# Patient Record
Sex: FEMALE | Race: ASIAN | NOT HISPANIC OR LATINO | Employment: FULL TIME | URBAN - METROPOLITAN AREA
[De-identification: names, ages, dates, MRNs, and addresses within clinical notes are randomized per-mention and may not be internally consistent; named-entity substitution may affect disease eponyms.]

---

## 2019-02-08 ENCOUNTER — HOSPITAL ENCOUNTER (OUTPATIENT)
Facility: MEDICAL CENTER | Age: 54
End: 2019-02-08
Attending: EMERGENCY MEDICINE | Admitting: INTERNAL MEDICINE
Payer: MEDICAID

## 2019-02-08 ENCOUNTER — APPOINTMENT (OUTPATIENT)
Dept: RADIOLOGY | Facility: MEDICAL CENTER | Age: 54
End: 2019-02-08
Attending: EMERGENCY MEDICINE
Payer: MEDICAID

## 2019-02-08 VITALS
WEIGHT: 161.16 LBS | RESPIRATION RATE: 17 BRPM | HEIGHT: 59 IN | HEART RATE: 88 BPM | TEMPERATURE: 97.5 F | OXYGEN SATURATION: 96 % | SYSTOLIC BLOOD PRESSURE: 157 MMHG | DIASTOLIC BLOOD PRESSURE: 89 MMHG | BODY MASS INDEX: 32.49 KG/M2

## 2019-02-08 DIAGNOSIS — J01.00 ACUTE MAXILLARY SINUSITIS, RECURRENCE NOT SPECIFIED: ICD-10-CM

## 2019-02-08 DIAGNOSIS — L03.211 FACIAL CELLULITIS: ICD-10-CM

## 2019-02-08 DIAGNOSIS — K04.7 DENTAL ABSCESS: ICD-10-CM

## 2019-02-08 PROBLEM — R73.9 HYPERGLYCEMIA: Status: ACTIVE | Noted: 2019-02-08

## 2019-02-08 PROBLEM — E87.6 HYPOKALEMIA: Status: ACTIVE | Noted: 2019-02-08

## 2019-02-08 LAB
ANION GAP SERPL CALC-SCNC: 12 MMOL/L (ref 0–11.9)
BASOPHILS # BLD AUTO: 0.7 % (ref 0–1.8)
BASOPHILS # BLD: 0.06 K/UL (ref 0–0.12)
BUN SERPL-MCNC: 9 MG/DL (ref 8–22)
CALCIUM SERPL-MCNC: 9 MG/DL (ref 8.5–10.5)
CHLORIDE SERPL-SCNC: 103 MMOL/L (ref 96–112)
CO2 SERPL-SCNC: 24 MMOL/L (ref 20–33)
CREAT SERPL-MCNC: 0.55 MG/DL (ref 0.5–1.4)
EOSINOPHIL # BLD AUTO: 0.22 K/UL (ref 0–0.51)
EOSINOPHIL NFR BLD: 2.4 % (ref 0–6.9)
ERYTHROCYTE [DISTWIDTH] IN BLOOD BY AUTOMATED COUNT: 43.3 FL (ref 35.9–50)
GLUCOSE SERPL-MCNC: 135 MG/DL (ref 65–99)
HCT VFR BLD AUTO: 47.9 % (ref 37–47)
HGB BLD-MCNC: 15.6 G/DL (ref 12–16)
IMM GRANULOCYTES # BLD AUTO: 0.12 K/UL (ref 0–0.11)
IMM GRANULOCYTES NFR BLD AUTO: 1.3 % (ref 0–0.9)
LYMPHOCYTES # BLD AUTO: 2.34 K/UL (ref 1–4.8)
LYMPHOCYTES NFR BLD: 25.7 % (ref 22–41)
MCH RBC QN AUTO: 29.9 PG (ref 27–33)
MCHC RBC AUTO-ENTMCNC: 32.6 G/DL (ref 33.6–35)
MCV RBC AUTO: 91.8 FL (ref 81.4–97.8)
MONOCYTES # BLD AUTO: 0.56 K/UL (ref 0–0.85)
MONOCYTES NFR BLD AUTO: 6.1 % (ref 0–13.4)
NEUTROPHILS # BLD AUTO: 5.82 K/UL (ref 2–7.15)
NEUTROPHILS NFR BLD: 63.8 % (ref 44–72)
NRBC # BLD AUTO: 0 K/UL
NRBC BLD-RTO: 0 /100 WBC
PLATELET # BLD AUTO: 276 K/UL (ref 164–446)
PMV BLD AUTO: 9.8 FL (ref 9–12.9)
POTASSIUM SERPL-SCNC: 3.4 MMOL/L (ref 3.6–5.5)
RBC # BLD AUTO: 5.22 M/UL (ref 4.2–5.4)
SODIUM SERPL-SCNC: 139 MMOL/L (ref 135–145)
WBC # BLD AUTO: 9.1 K/UL (ref 4.8–10.8)

## 2019-02-08 PROCEDURE — 700111 HCHG RX REV CODE 636 W/ 250 OVERRIDE (IP): Performed by: EMERGENCY MEDICINE

## 2019-02-08 PROCEDURE — 99285 EMERGENCY DEPT VISIT HI MDM: CPT

## 2019-02-08 PROCEDURE — 96375 TX/PRO/DX INJ NEW DRUG ADDON: CPT

## 2019-02-08 PROCEDURE — 700105 HCHG RX REV CODE 258: Performed by: EMERGENCY MEDICINE

## 2019-02-08 PROCEDURE — 96366 THER/PROPH/DIAG IV INF ADDON: CPT

## 2019-02-08 PROCEDURE — G0378 HOSPITAL OBSERVATION PER HR: HCPCS

## 2019-02-08 PROCEDURE — 700111 HCHG RX REV CODE 636 W/ 250 OVERRIDE (IP): Performed by: NURSE PRACTITIONER

## 2019-02-08 PROCEDURE — 700117 HCHG RX CONTRAST REV CODE 255: Performed by: EMERGENCY MEDICINE

## 2019-02-08 PROCEDURE — 80048 BASIC METABOLIC PNL TOTAL CA: CPT

## 2019-02-08 PROCEDURE — 99219 PR INITIAL OBSERVATION CARE,LEVL II: CPT | Performed by: INTERNAL MEDICINE

## 2019-02-08 PROCEDURE — 70487 CT MAXILLOFACIAL W/DYE: CPT

## 2019-02-08 PROCEDURE — 70355 PANORAMIC X-RAY OF JAWS: CPT

## 2019-02-08 PROCEDURE — 96365 THER/PROPH/DIAG IV INF INIT: CPT

## 2019-02-08 PROCEDURE — 700111 HCHG RX REV CODE 636 W/ 250 OVERRIDE (IP): Performed by: INTERNAL MEDICINE

## 2019-02-08 PROCEDURE — 85025 COMPLETE CBC W/AUTO DIFF WBC: CPT

## 2019-02-08 PROCEDURE — 700105 HCHG RX REV CODE 258: Performed by: INTERNAL MEDICINE

## 2019-02-08 RX ORDER — ACETAMINOPHEN 325 MG/1
650 TABLET ORAL EVERY 6 HOURS PRN
Status: DISCONTINUED | OUTPATIENT
Start: 2019-02-08 | End: 2019-02-08 | Stop reason: HOSPADM

## 2019-02-08 RX ORDER — OXYCODONE HYDROCHLORIDE 5 MG/1
5 TABLET ORAL EVERY 4 HOURS PRN
Qty: 10 TAB | Refills: 0 | Status: SHIPPED | OUTPATIENT
Start: 2019-02-08 | End: 2019-02-11

## 2019-02-08 RX ORDER — HEPARIN SODIUM 5000 [USP'U]/ML
5000 INJECTION, SOLUTION INTRAVENOUS; SUBCUTANEOUS EVERY 8 HOURS
Status: DISCONTINUED | OUTPATIENT
Start: 2019-02-08 | End: 2019-02-08 | Stop reason: HOSPADM

## 2019-02-08 RX ORDER — BISACODYL 10 MG
10 SUPPOSITORY, RECTAL RECTAL
Status: DISCONTINUED | OUTPATIENT
Start: 2019-02-08 | End: 2019-02-08 | Stop reason: HOSPADM

## 2019-02-08 RX ORDER — AMOXICILLIN AND CLAVULANATE POTASSIUM 875; 125 MG/1; MG/1
1 TABLET, FILM COATED ORAL 2 TIMES DAILY
Qty: 20 TAB | Refills: 0 | Status: SHIPPED | OUTPATIENT
Start: 2019-02-08 | End: 2019-02-18

## 2019-02-08 RX ORDER — POLYETHYLENE GLYCOL 3350 17 G/17G
1 POWDER, FOR SOLUTION ORAL
Status: DISCONTINUED | OUTPATIENT
Start: 2019-02-08 | End: 2019-02-08 | Stop reason: HOSPADM

## 2019-02-08 RX ORDER — ONDANSETRON 4 MG/1
4 TABLET, ORALLY DISINTEGRATING ORAL EVERY 4 HOURS PRN
Status: DISCONTINUED | OUTPATIENT
Start: 2019-02-08 | End: 2019-02-08 | Stop reason: HOSPADM

## 2019-02-08 RX ORDER — SODIUM CHLORIDE 9 MG/ML
INJECTION, SOLUTION INTRAVENOUS CONTINUOUS
Status: DISCONTINUED | OUTPATIENT
Start: 2019-02-08 | End: 2019-02-08 | Stop reason: HOSPADM

## 2019-02-08 RX ORDER — MORPHINE SULFATE 4 MG/ML
2 INJECTION, SOLUTION INTRAMUSCULAR; INTRAVENOUS EVERY 4 HOURS PRN
Status: DISCONTINUED | OUTPATIENT
Start: 2019-02-08 | End: 2019-02-08 | Stop reason: HOSPADM

## 2019-02-08 RX ORDER — PROMETHAZINE HYDROCHLORIDE 12.5 MG/1
12.5-25 SUPPOSITORY RECTAL EVERY 4 HOURS PRN
Status: DISCONTINUED | OUTPATIENT
Start: 2019-02-08 | End: 2019-02-08 | Stop reason: HOSPADM

## 2019-02-08 RX ORDER — PROMETHAZINE HYDROCHLORIDE 25 MG/1
12.5-25 TABLET ORAL EVERY 4 HOURS PRN
Status: DISCONTINUED | OUTPATIENT
Start: 2019-02-08 | End: 2019-02-08 | Stop reason: HOSPADM

## 2019-02-08 RX ORDER — ONDANSETRON 2 MG/ML
4 INJECTION INTRAMUSCULAR; INTRAVENOUS EVERY 4 HOURS PRN
Status: DISCONTINUED | OUTPATIENT
Start: 2019-02-08 | End: 2019-02-08 | Stop reason: HOSPADM

## 2019-02-08 RX ORDER — IBUPROFEN 200 MG
400 TABLET ORAL EVERY 6 HOURS PRN
Status: SHIPPED | COMMUNITY
End: 2022-08-02

## 2019-02-08 RX ORDER — AMOXICILLIN 250 MG
2 CAPSULE ORAL 2 TIMES DAILY
Status: DISCONTINUED | OUTPATIENT
Start: 2019-02-08 | End: 2019-02-08 | Stop reason: HOSPADM

## 2019-02-08 RX ORDER — ENALAPRILAT 1.25 MG/ML
1.25 INJECTION INTRAVENOUS EVERY 4 HOURS PRN
Status: DISCONTINUED | OUTPATIENT
Start: 2019-02-08 | End: 2019-02-08 | Stop reason: HOSPADM

## 2019-02-08 RX ADMIN — AMPICILLIN SODIUM AND SULBACTAM SODIUM 3 G: 2; 1 INJECTION, POWDER, FOR SOLUTION INTRAMUSCULAR; INTRAVENOUS at 09:53

## 2019-02-08 RX ADMIN — IOHEXOL 100 ML: 350 INJECTION, SOLUTION INTRAVENOUS at 10:22

## 2019-02-08 RX ADMIN — MORPHINE SULFATE 2 MG: 4 INJECTION INTRAVENOUS at 18:53

## 2019-02-08 RX ADMIN — ENALAPRILAT 1.25 MG: 2.5 INJECTION INTRAVENOUS at 17:10

## 2019-02-08 RX ADMIN — AMPICILLIN SODIUM AND SULBACTAM SODIUM 3 G: 2; 1 INJECTION, POWDER, FOR SOLUTION INTRAMUSCULAR; INTRAVENOUS at 18:45

## 2019-02-08 ASSESSMENT — ENCOUNTER SYMPTOMS
NERVOUS/ANXIOUS: 0
EYE REDNESS: 0
SEIZURES: 0
EYE DISCHARGE: 0
VOMITING: 0
WEIGHT LOSS: 0
EYE PAIN: 0
INSOMNIA: 0
ORTHOPNEA: 0
PALPITATIONS: 0
COUGH: 0
DEPRESSION: 0
HEADACHES: 0
MYALGIAS: 0
SHORTNESS OF BREATH: 0
NAUSEA: 0
FOCAL WEAKNESS: 0
BACK PAIN: 0
STRIDOR: 0
SPUTUM PRODUCTION: 0
ABDOMINAL PAIN: 0
CHILLS: 0
HEARTBURN: 0
DIZZINESS: 0
DIARRHEA: 0
FEVER: 0
NECK PAIN: 0
BLURRED VISION: 0

## 2019-02-08 ASSESSMENT — LIFESTYLE VARIABLES
ALCOHOL_USE: NO
EVER_SMOKED: NEVER

## 2019-02-08 ASSESSMENT — PATIENT HEALTH QUESTIONNAIRE - PHQ9
SUM OF ALL RESPONSES TO PHQ9 QUESTIONS 1 AND 2: 0
1. LITTLE INTEREST OR PLEASURE IN DOING THINGS: NOT AT ALL
2. FEELING DOWN, DEPRESSED, IRRITABLE, OR HOPELESS: NOT AT ALL

## 2019-02-08 NOTE — ASSESSMENT & PLAN NOTE
With facial swelling right   On iv unasyn  Pain control  Oral surgery consulted by ER and to have I&D done by Dr. Vandana VAZQUEZO

## 2019-02-08 NOTE — ED TRIAGE NOTES
Chief Complaint   Patient presents with   • Dental Pain     x 4 days.    • Facial Swelling     in right side of face. was sent by MD to get I&D done d/t facial swelling and to take antibiotics.     Triage process explained instructed to notify staff for any worsening symptoms. Pt states that swelling in right side of face is better that it was 4 days ago but was told by her MD to still come in for antibiotics and possible I&D.

## 2019-02-08 NOTE — H&P
Hospital Medicine History and Physical      Date of Service  2/8/2019    Chief Complaint  Chief Complaint   Patient presents with   • Dental Pain     x 4 days.    • Facial Swelling     in right side of face. was sent by MD to get I&D done d/t facial swelling and to take antibiotics.       History of Presenting Illness  Alan is a 53 y.o. female no significant PMH, who presents with above.  She stated that she has been having pain in her right mandibular area for the past 4 days associated with swelling.  She went to see her dentist today and she was told to come to ER since he did think that she would need incision and drainage.  In the ER she has imaging done with no obvious abscess noticed.  Dr. Ross was informed about the patient being here by ERP.  The patient was seen and examined by the bedside.  Currently she is not complaining about any pain.  She will be started on IV antibiotic and kept n.p.o. for possible procedure later today.    Primary Care Physician  No primary care provider on file.      Code Status  Full code    Review of Systems  Review of Systems   Constitutional: Negative for chills, fever and weight loss.   HENT: Negative for congestion and nosebleeds.         Right facial pain, swelling   Eyes: Negative for blurred vision, pain, discharge and redness.   Respiratory: Negative for cough, sputum production, shortness of breath and stridor.    Cardiovascular: Negative for chest pain, palpitations and orthopnea.   Gastrointestinal: Negative for abdominal pain, diarrhea, heartburn, nausea and vomiting.   Genitourinary: Negative for dysuria, frequency and urgency.   Musculoskeletal: Negative for back pain, myalgias and neck pain.   Skin: Negative for itching and rash.   Neurological: Negative for dizziness, focal weakness, seizures and headaches.   Psychiatric/Behavioral: Negative for depression. The patient is not nervous/anxious and does not have insomnia.      Please see HPI, all other systems  were reviewed and are negative (AMA/CMS criteria)     Past Medical History  No pertinent PMH    Surgical History  No Pertinent PSH    Medications  Not taking any medications  Family History  History reviewed. No pertinent family history.      Social History  Social History   Substance Use Topics   • Smoking status: Never Smoker   • Smokeless tobacco: Never Used   • Alcohol use No       Allergies  No Known Allergies     Physical Exam  Laboratory   Hemodynamics  Temp (24hrs), Av.4 °C (97.5 °F), Min:36.4 °C (97.5 °F), Max:36.4 °C (97.5 °F)   Temperature: 36.4 °C (97.5 °F)  Pulse  Av  Min: 80  Max: 80    Blood Pressure: (!) 178/101      Respiratory      Respiration: 16, Pulse Oximetry: 92 %             Physical Exam   Constitutional: She is oriented to person, place, and time. No distress.   HENT:   Head: Normocephalic and atraumatic.   Right facial swelling   Eyes: Pupils are equal, round, and reactive to light. Conjunctivae and EOM are normal.   Neck: Normal range of motion. Neck supple. No tracheal deviation present. No thyromegaly present.   Cardiovascular: Normal rate and regular rhythm.    No murmur heard.  Pulmonary/Chest: Effort normal and breath sounds normal. No respiratory distress. She has no wheezes.   Abdominal: Soft. Bowel sounds are normal. She exhibits no distension. There is no tenderness.   Musculoskeletal: She exhibits no edema or tenderness.   Neurological: She is alert and oriented to person, place, and time. No cranial nerve deficit.   Skin: Skin is warm and dry. She is not diaphoretic. No erythema.   Psychiatric: She has a normal mood and affect. Her behavior is normal. Thought content normal.       Recent Labs      19   0941   WBC  9.1   RBC  5.22   HEMOGLOBIN  15.6   HEMATOCRIT  47.9*   MCV  91.8   MCH  29.9   MCHC  32.6*   RDW  43.3   PLATELETCT  276   MPV  9.8     Recent Labs      19   0941   SODIUM  139   POTASSIUM  3.4*   CHLORIDE  103   CO2  24   GLUCOSE  135*   BUN  9    CREATININE  0.55   CALCIUM  9.0     Recent Labs      02/08/19   0941   GLUCOSE  135*                 No results found for: TROPONINI    Imaging  CT-MAXILLOFACIAL WITH PLUS RECONS   Final Result      Dental disease.   Right maxillary sinusitis. Ethmoid and left maxillary sinus inflammatory changes also identified.   No acute fracture.   Right facial soft tissue swelling and enhancement consistent with inflammatory changes. No loculated fluid consistent with abscess identified.   Large right submandibular enhancing lymph node likely reactive.      [id number]   Final Result      Periapical lucencies surrounding the mandibular incisors.             Assessment/Plan     I anticipate this patient is appropriate for observation status at this time.    Hyperglycemia- (present on admission)   Assessment & Plan    No history of DM  Likely stress related     Dental infection- (present on admission)   Assessment & Plan    With facial swelling right   On iv unasyn  Pain control  Oral surgery consulted by ER and to have I&D done by Dr. Ross  NPO     Hypokalemia- (present on admission)   Assessment & Plan    Replete as needed  Follow cmp in am         Prophylaxis:  sc heparin

## 2019-02-08 NOTE — CARE PLAN
Problem: Communication  Goal: The ability to communicate needs accurately and effectively will improve  Outcome: PROGRESSING AS EXPECTED      Problem: Pain Management  Goal: Pain level will decrease to patient's comfort goal  Outcome: PROGRESSING AS EXPECTED  No c/o pain at this time

## 2019-02-08 NOTE — DISCHARGE PLANNING
Care Transition Team Assessment    Spoke with patient at bedside. Anticipate no needs @ D/C. Sister will be ride @ D/C.    Information Source  Orientation : Oriented x 4  Information Given By: Patient    Readmission Evaluation  Is this a readmission?: No    Interdisciplinary Discharge Planning  Does Admitting Nurse Feel This Could be a Complex Discharge?: No  Primary Care Physician: None  Lives with - Patient's Self Care Capacity: Child Less than 18 Years of Age, Adult Children  Patient or legal guardian wants to designate a caregiver (see row info): No  Support Systems: Children, Family Member(s)  Housing / Facility: [address]  Do You Take your Prescribed Medications Regularly: No  Reasons Why Not Taking Medications :  (No PCP)  Able to Return to Previous ADL's: Yes  Mobility Issues: No  Prior Services: None  Patient Expects to be Discharged to:: Home  Assistance Needed: No  Durable Medical Equipment: Not Applicable    Discharge Preparedness  What are your discharge supports?: Child, Sibling  Prior Functional Level: Ambulatory  Difficulity with ADLs: None    Functional Assesment  Prior Functional Level: Ambulatory    Finances  Prescription Coverage: Yes    Anticipated Discharge Information  Anticipated discharge disposition: Home  Discharge Address: [address]  Discharge Contact Phone Number: [phone]

## 2019-02-08 NOTE — PROGRESS NOTES
Assessment and admit profile completed. Pt A&O x 4. Respirations are even and unlabored on RA. Neuro intact. POC updated. Pt educated on room and call light. Pt verbalizes understanding. Whiteboard updated. Pt denies pain at this time. Monitors applied, call light and belongings within reach. Needs met, will continue to monitor.

## 2019-02-08 NOTE — ED PROVIDER NOTES
ED Provider Note    Scribed for Joseline Salas M.D. by Smith Thornton. 2/8/2019  9:27 AM    Primary care provider: No primary care provider on file.  Means of arrival: walk in  History obtained from: patient  History limited by: none    CHIEF COMPLAINT  Chief Complaint   Patient presents with   • Dental Pain     x 4 days.    • Facial Swelling     in right side of face. was sent by MD to get I&D done d/t facial swelling and to take antibiotics.       RONNY Phillips is a 53 y.o. female who presents to the Emergency Department complaining of right upper dental pain for the last 4 days. Patient reports associated facial swelling. She states that her pain and swelling has worsened over the lat 4 days, prompting her to see her oral surgeon today. Patient was evaluated by her dental surgeon, Dr. Ross today and referred to [facility] for emergent incision and drainage procedure to be performed. 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 Patient denies fever, neck swelling, dysphagia.    REVIEW OF SYSTEMS  HEENT:  Dental pain, facial swelling. No ear pain, congestion, or sore throat, dysphagia  EYES: no discharge, redness, or vision changes  CARDIAC: no chest pain, no palpitations    PULMONARY: no dyspnea, cough, or congestion   GI: no vomiting, diarrhea, or abdominal pain   : no dysuria, back pain, or hematuria   Neuro: no weakness, numbness, aphasia, or headache  Musculoskeletal: no swelling, deformity, pain, or joint swelling  Endocrine: no fevers, sweating, or weight loss   SKIN: no rash, erythema, or contusions     See history of present illness. All other systems are negative. C.    PAST MEDICAL HISTORY   Diabetes    SURGICAL HISTORY  patient denies any surgical history    SOCIAL HISTORY  Social History   Substance Use Topics   • Smoking status: Never Smoker   • Smokeless tobacco: Never Used   • Alcohol use No      History   Drug Use No       FAMILY HISTORY  History reviewed. No pertinent family history.    CURRENT  MEDICATIONS  Home Medications     Reviewed by Kriss Piedra R.N. (Registered Nurse) on 02/08/19 at 1233  Med List Status: Complete   Medication Last Dose Status   ibuprofen (MOTRIN) 200 MG Tab PRN Active                ALLERGIES  No Known Allergies    PHYSICAL EXAM  VITAL SIGNS: BP (!) 178/101   Pulse 80   Temp 36.4 °C (97.5 °F) (Temporal)   Resp 16   Wt 72.4 kg (159 lb 9.8 oz)   SpO2 92%     Constitutional: Well developed, Well nourished, No acute distress, Non-toxic appearance.   HEENT: Normocephalic, Atraumatic,  external ears normal, pharynx pink,  Mucous  Membranes moist, No rhinorrhea or mucosal edema. Right sided maxillary facial swelling. No swelling under tongue. Dental carries in the right upper second molar. No gingival abscess.   Eyes: PERRL, EOMI, Conjunctiva normal, No discharge.   Neck: Normal range of motion, No tenderness, Supple, No stridor.   Lymphatic: No lymphadenopathy    Cardiovascular: Regular Rate and Rhythm, No murmurs,  rubs, or gallops.   Thorax & Lungs: Lungs clear to auscultation bilaterally, No respiratory distress, No wheezes, rhales or rhonchi, No chest wall tenderness.   Skin: Warm, Dry, No erythema, No rash,   Neurologic: Alert & oriented x 3 No focal deficits noted. Normal reflexes.         DIAGNOSTIC STUDIES / PROCEDURES    LABS  Results for orders placed or performed during the hospital encounter of 02/08/19   CBC WITH DIFFERENTIAL   Result Value Ref Range    WBC 9.1 4.8 - 10.8 K/uL    RBC 5.22 4.20 - 5.40 M/uL    Hemoglobin 15.6 12.0 - 16.0 g/dL    Hematocrit 47.9 (H) 37.0 - 47.0 %    MCV 91.8 81.4 - 97.8 fL    MCH 29.9 27.0 - 33.0 pg    MCHC 32.6 (L) 33.6 - 35.0 g/dL    RDW 43.3 35.9 - 50.0 fL    Platelet Count 276 164 - 446 K/uL    MPV 9.8 9.0 - 12.9 fL    Neutrophils-Polys 63.80 44.00 - 72.00 %    Lymphocytes 25.70 22.00 - 41.00 %    Monocytes 6.10 0.00 - 13.40 %    Eosinophils 2.40 0.00 - 6.90 %    Basophils 0.70 0.00 - 1.80 %    Immature Granulocytes 1.30 (H) 0.00  - 0.90 %    Nucleated RBC 0.00 /100 WBC    Neutrophils (Absolute) 5.82 2.00 - 7.15 K/uL    Lymphs (Absolute) 2.34 1.00 - 4.80 K/uL    Monos (Absolute) 0.56 0.00 - 0.85 K/uL    Eos (Absolute) 0.22 0.00 - 0.51 K/uL    Baso (Absolute) 0.06 0.00 - 0.12 K/uL    Immature Granulocytes (abs) 0.12 (H) 0.00 - 0.11 K/uL    NRBC (Absolute) 0.00 K/uL   Basic Metabolic Panel   Result Value Ref Range    Sodium 139 135 - 145 mmol/L    Potassium 3.4 (L) 3.6 - 5.5 mmol/L    Chloride 103 96 - 112 mmol/L    Co2 24 20 - 33 mmol/L    Glucose 135 (H) 65 - 99 mg/dL    Bun 9 8 - 22 mg/dL    Creatinine 0.55 0.50 - 1.40 mg/dL    Calcium 9.0 8.5 - 10.5 mg/dL    Anion Gap 12.0 (H) 0.0 - 11.9   ESTIMATED GFR   Result Value Ref Range    GFR If African American >60 >60 mL/min/1.73 m 2    GFR If Non African American >60 >60 mL/min/1.73 m 2   All labs reviewed by me.    RADIOLOGY  CT-MAXILLOFACIAL WITH PLUS RECONS   Final Result      Dental disease.   Right maxillary sinusitis. Ethmoid and left maxillary sinus inflammatory changes also identified.   No acute fracture.   Right facial soft tissue swelling and enhancement consistent with inflammatory changes. No loculated fluid consistent with abscess identified.   Large right submandibular enhancing lymph node likely reactive.      [id number]   Final Result      Periapical lucencies surrounding the mandibular incisors.      The radiologist's interpretation of all radiological studies have been reviewed by me.    COURSE & MEDICAL DECISION MAKING  Nursing notes, VS, PMSFHx reviewed in chart.    9:27 AM Patient seen and examined at bedside. Discussed her treatment plan and admission tot  hospital for consult with oral surgeon. Patient will be treated with antibiotics and admitted. Patient verbalizes understanding and agreement to this plan of care. Patient will be treated with Unasyn 3 g. Ordered DX mandible, CT maxillofacial, CBC with differential, BMP to evaluate her symptoms. The  differential diagnoses include but are not limited to: abscess    10:38 AM - I discussed the patient's case and the above findings with Dr. De La Cruz (U hospitalist) who agrees to admit the patient.     10:41 AM - Patient reevaluated at bedside. Discussed her admission for treatment. Patient agrees to this course.     10:53 AM - I discussed the patient's case and the above findings with Dr. Ross (oral surgeon) who agrees to consult after admission.     DISPOSITION:  Patient will be admitted to hospital in guarded condition.    FINAL IMPRESSION  1. Dental abscess    2. Facial cellulitis    3. Acute maxillary sinusitis, recurrence not specified          Smith SHAW (Scribe), am scribing for, and in the presence of, Joseline Salas M.D..    Electronically signed by: Smith Thornton (Scribe), 2/8/2019    Joseline SHAW M.D. personally performed the services described in this documentation, as scribed by Smith Thornton in my presence, and it is both accurate and complete.    The note accurately reflects work and decisions made by me.  Joseline Salas  2/8/2019  2:53 PM

## 2019-02-08 NOTE — PROGRESS NOTES
Pt arrived to unit via gurney at 1105. Pt oriented to room, unit, and plan of care. All questions answered at this time. Call light within reach, fall precautions in place, will continue to monitor.

## 2019-02-09 NOTE — OP REPORT
"Pt was encountered on the floor.  Pt reported pain and right facial swelling x 4 days.  Pt was seen by general dentist yesterday and instructed to go to ED.  Exam reveals caries #6 and periodontal disease #25 along with right facial swelling.  Imaging reveals periapical lesion at apex of #6 and canine space abscess.  Pt informed or risks, benefits, and alternatives to extraction of 6, 25, and incision and drainage of abscess.  Consent.  Medical history.  BP (!) 183/94   Pulse 83   Temp 37 °C (98.6 °F) (Temporal)   Resp 16   Ht 1.499 m (4' 11\")   Wt 73.1 kg (161 lb 2.5 oz)   SpO2 97%   Breastfeeding? No   BMI 32.55 kg/m²      Local anesthesia given:  2 carpules of 2% Lidocaine with 1:100,000 epinephrine.  15 blade full thickness mucoperiosteal flap to the facial of teeth 6 and 25.  Surgically extracted teeth 6 and 25.  Curetted the sites.  Gauze.  Post op instructions given verbally.      Recommendations:     1) Augmentin 875 BID x 7 days  2) Norco x 3-5 days  3) Pt to follow up with her general dentist.    4) Pt to be discharged today.     Diego Ross, DMD  "

## 2019-02-09 NOTE — PROGRESS NOTES
Report received,assumed pt care.pt awake, anticipating discharge after antibiotics done,family at bedside.

## 2019-02-09 NOTE — CONSULTS
MAXILLOFACIAL SURGERY NOTE    DATE OF CONSULTATION:  2/8/2019    CHIEF COMPLAINT:  Dental pain and right facial swelling    HISTORY OF PRESENT ILLNESS:  This is a 53 y.o. female who has a history of pain and swelling in the right maxilla.  Female has had worsening symptoms over the last 4 several days until female came to the hospital.  Patient saw general dentist at [facility] on 2/7/19 and was instructed to go to emergency room.        Past Medical/ Family / Social history (PFSH):   Past Medical History:   Active Ambulatory Problems     Diagnosis Date Noted   • No Active Ambulatory Problems     Resolved Ambulatory Problems     Diagnosis Date Noted   • No Resolved Ambulatory Problems     No Additional Past Medical History     History reviewed. No pertinent past medical history.      Past Surgical History:   None    Current Outpatient Medications:   Current Facility-Administered Medications   Medication Dose   • senna-docusate (PERICOLACE or SENOKOT S) 8.6-50 MG per tablet 2 Tab  2 Tab    And   • polyethylene glycol/lytes (MIRALAX) PACKET 1 Packet  1 Packet    And   • magnesium hydroxide (MILK OF MAGNESIA) suspension 30 mL  30 mL    And   • bisacodyl (DULCOLAX) suppository 10 mg  10 mg   • NS infusion     • heparin injection 5,000 Units  5,000 Units   • ondansetron (ZOFRAN) syringe/vial injection 4 mg  4 mg   • ondansetron (ZOFRAN ODT) dispertab 4 mg  4 mg   • promethazine (PHENERGAN) tablet 12.5-25 mg  12.5-25 mg   • promethazine (PHENERGAN) suppository 12.5-25 mg  12.5-25 mg   • prochlorperazine (COMPAZINE) injection 5-10 mg  5-10 mg   • acetaminophen (TYLENOL) tablet 650 mg  650 mg   • morphine (pf) 4 mg/ml injection 2 mg  2 mg   • ampicillin/sulbactam (UNASYN) 3 g in  mL IVPB  3 g   • enalaprilat (VASOTEC) injection 1.25 mg  1.25 mg         Allergies:  No Known Allergies    REVIEW OF SYSTEMS:  Denies CP, Denies SOB, Denies any Changes in vision, no nausea, no GI upset, 12 point ROS was done and is  "negative per the HPI.  Facial pain.     PHYSICAL EXAMINATION:  VITAL SIGNS:  BP (!) 183/94   Pulse 83   Temp 37 °C (98.6 °F) (Temporal)   Resp 16   Ht 1.499 m (4' 11\")   Wt 73.1 kg (161 lb 2.5 oz)   SpO2 97%   Breastfeeding? No   BMI 32.55 kg/m²  Stable.  female is afebrile.  GENERAL:  female is in no acute distress.  HEENT:  Head is normocephalic and atraumatic.  Mild swelling of right cheek and malar region.    EARS: TM clear.    EYES: Extraocular   muscles are intact with no entrapment.  Pupils equally round and reactive to light and   accommodation.    NOSE: Nares are patent bilateral with no crepitus of the nasal bones  ORAL:   35 mm mouth opening.  No deviation upon opening.  Carious tooth #6 noted and periodontal disease noted on #25.    THROAT:  Mallampati 2  HEART:  Her heart was regular rate and rhythm.  LUNGS:  Clear to auscultation bilaterally.    X-RAYS:  Pano:    FINDINGS:  There are periapical lucencies surrounding the mandibular incisors. Temporomandibular joints are maintained. No fracture is identified.   Impression       Periapical lucencies surrounding the mandibular incisors.       CT  FINDINGS:  No acute facial bone fracture identified.  Mild TMJ arthritic changes.  The optic globes and nerves appear to be intact.    There is anterior facial soft tissue swelling.    There is focal soft tissue swelling and enhancement located at the anterolateral margins of the right maxilla and right mandible. No loculated fluid collection is identified. However images are partially obscured by beam hardening dental artifact.  There is 1.0 x 1.3 cm enhancing right submandibular lymph node. Smaller submental and submandibular enhancing lymph nodes are also demonstrated.    The right maxillary sinus is almost completely opacified by marked mucosal thickening.  There is mucosal thickening within the ethmoid sinuses and mild mucosal thickening left maxillary sinus.  No fluid collections are " identified.    The right maxillary ostium and ethmoid infundibulum are opacified by mucosal thickening.  No bony destruction identified.    Multiple dental caries are identified.  There are periapical lucencies right maxillary second bicuspid and right maxillary second molar.  There is right mandibular first incisor area of lucency.     Impression       Dental disease.  Right maxillary sinusitis. Ethmoid and left maxillary sinus inflammatory changes also identified.  No acute fracture.  Right facial soft tissue swelling and enhancement consistent with inflammatory changes. No loculated fluid consistent with abscess identified.  Large right submandibular enhancing lymph node likely reactive.         ASSESSMENT:  This is a 53 y.o. female who has dental caries #6 and periodontal disease #25 along with right maxillary facial swelling.          PLAN:    1. Extraction of teeth #'s 6 and 25 and incision and drainage of right maxillary canine space infection.    2. Discharge patient home with Augmentin 875 BID x 7 days.  3. Discharge patient home with analgesic for 3-5 days.    4. Pt to see general dentist for further dental care.      Diego Ross, DMD

## 2019-02-09 NOTE — DISCHARGE SUMMARY
Discharge Summary    CHIEF COMPLAINT ON ADMISSION  Chief Complaint   Patient presents with   • Dental Pain     x 4 days.    • Facial Swelling     in right side of face. was sent by MD to get I&D done d/t facial swelling and to take antibiotics.       Reason for Admission  Tooth pain     Admission Date  2/8/2019    CODE STATUS  Full Code    HPI & HOSPITAL COURSE  This is a 53 y.o. female here with facial swelling.  Please refer to H&P for detail.  She was referred to hospital by her oral surgeon for incision and drainage of dental abscess and removal of her decayed teeth.  The patient was started on antibiotic.  Dr. obrien symptoms did incision and drainage and remove a few infected teeth.  She tolerated the procedure well.  According to the surgeon she can be discharged home and follow-up with dentist as an outpatient.  She was instructed to come back to ER if her symptoms get worse.       Therefore, she is discharged in fair and stable condition to home with close outpatient follow-up.        Discharge Date  2/8/19    FOLLOW UP ITEMS POST DISCHARGE      DISCHARGE DIAGNOSES  Active Problems:    Hypokalemia POA: Yes    Dental infection POA: Yes    Hyperglycemia POA: Yes      Overview:         Resolved Problems:    * No resolved hospital problems. *      FOLLOW UP  No future appointments.  dentist in 1 week            MEDICATIONS ON DISCHARGE     Medication List      START taking these medications      Instructions   amoxicillin-clavulanate 875-125 MG Tabs  Commonly known as:  AUGMENTIN   Take 1 Tab by mouth 2 times a day for 10 days.  Dose:  1 Tab     oxyCODONE immediate-release 5 MG Tabs  Commonly known as:  ROXICODONE   Take 1 Tab by mouth every four hours as needed for Severe Pain for up to 3 days.  Dose:  5 mg        CONTINUE taking these medications      Instructions   ibuprofen 200 MG Tabs  Commonly known as:  MOTRIN   Take 400 mg by mouth every 6 hours as needed for Mild Pain.  Dose:  400 mg             Allergies  No Known Allergies    DIET  Orders Placed This Encounter   Procedures   • Diet NPO     Standing Status:   Standing     Number of Occurrences:   1     Order Specific Question:   Restrict to:     Answer:   Ice Chips [2]       ACTIVITY  As tolerated.  Weight bearing as tolerated    CONSULTATIONS  Dr. Ross    PROCEDURES  I&D    LABORATORY  Lab Results   Component Value Date    SODIUM 139 02/08/2019    POTASSIUM 3.4 (L) 02/08/2019    CHLORIDE 103 02/08/2019    CO2 24 02/08/2019    GLUCOSE 135 (H) 02/08/2019    BUN 9 02/08/2019    CREATININE 0.55 02/08/2019        Lab Results   Component Value Date    WBC 9.1 02/08/2019    HEMOGLOBIN 15.6 02/08/2019    HEMATOCRIT 47.9 (H) 02/08/2019    PLATELETCT 276 02/08/2019        Total time of the discharge process exceeds 38 minutes.

## 2019-02-09 NOTE — PROGRESS NOTES
Iv access dc'd, paper works   with patient ,signed and discharged education discussed by previous RN, pt discharge home, condition stable, family at bedside, pt's ride home,escorted to car via wheelchair.

## 2019-02-09 NOTE — DISCHARGE INSTRUCTIONS
Discharge Instructions    Discharged to home by car with relative. Discharged via walking, hospital escort: Refused.  Special equipment needed: Not Applicable    Be sure to schedule a follow-up appointment with your primary care doctor or any specialists as instructed.     Discharge Plan:   Diet Plan: Discussed  Activity Level: Discussed  Confirmed Follow up Appointment: Patient to Call and Schedule Appointment  Confirmed Symptoms Management: Discussed  Medication Reconciliation Updated: Yes  Pneumococcal Vaccine Administered/Refused: Not given - Patient refused pneumococcal vaccine  Influenza Vaccine Indication: Patient Refuses    I understand that a diet low in cholesterol, fat, and sodium is recommended for good health. Unless I have been given specific instructions below for another diet, I accept this instruction as my diet prescription.   Other diet: Heart healthy    Special Instructions: None    · Is patient discharged on Warfarin / Coumadin?   No     Depression / Suicide Risk    As you are discharged from this [facility] facility, it is important to learn how to keep safe from harming yourself.    Recognize the warning signs:  · Abrupt changes in personality, positive or negative- including increase in energy   · Giving away possessions  · Change in eating patterns- significant weight changes-  positive or negative  · Change in sleeping patterns- unable to sleep or sleeping all the time   · Unwillingness or inability to communicate  · Depression  · Unusual sadness, discouragement and loneliness  · Talk of wanting to die  · Neglect of personal appearance   · Rebelliousness- reckless behavior  · Withdrawal from people/activities they love  · Confusion- inability to concentrate     If you or a loved one observes any of these behaviors or has concerns about self-harm, here's what you can do:  · Talk about it- your feelings and reasons for harming yourself  · Remove any means that you might use to hurt  yourself (examples: pills, rope, extension cords, firearm)  · Get professional help from the community (Mental Health, Substance Abuse, psychological counseling)  · Do not be alone:Call your Safe Contact- someone whom you trust who will be there for you.  · Call your local CRISIS HOTLINE 673-8184 or 852-271-5834  · Call your local Children's Mobile Crisis Response Team [place] (747) 631-5002 or www.Dinetouch  · Call the toll free National Suicide Prevention Hotlines   · National Suicide Prevention Lifeline 830-903-GIJA (2585)  · [facility] Partners Line Network 800-SUICIDE (105-6400)    Dental Extraction, Care After  Refer to this sheet in the next few weeks. These instructions provide you with information about caring for yourself after your procedure. Your health care provider may also give you more specific instructions. Your treatment has been planned according to current medical practices, but problems sometimes occur. Call your health care provider if you have any problems or questions after your procedure.  WHAT TO EXPECT AFTER THE PROCEDURE  After your procedure, it is common to have:   · Pain and swelling for a few days.  · Numbness for a few hours after the procedure.  HOME CARE INSTRUCTIONS  Lifestyle  · Protect the area where your tooth was extracted, even if there is no pain.  · Do not smoke, do not spit, and do not drink through a straw until your health care provider says it is okay.  · Eat soft-textured foods as directed by your health care provider. Avoid hot drinks and spicy foods until your gum has healed.  Incision Care  · Follow instructions from your health care provider about:  ¨ Incision care.  ¨ Gauze changes and removal.  ¨ Incision closure removal.  · Do not chew on the gauze.  · If you have heavy bleeding from your gum, fold a clean piece of gauze, place it on the bleeding gum, and bite on it as directed by your health care provider.  General Instructions  · Take medicines only as  directed by your health care provider.  · Do not rinse your mouth until your health care provider says it is okay. Once you are told that you can rinse your mouth, do not rinse with a lot of force (vigorously). Doing that can break up the needed clot that forms where your tooth was extracted.  ¨ You may rinse your mouth with warm salt water after your health care provider says it is okay. You can make a salt rinse by mixing one teaspoon of salt in two cups of warm water. Do this as directed by your health care provider.  · Do not brush or floss near the tooth socket where your tooth was extracted until your health care provider says it is okay. You may brush your other teeth.  · If directed, apply ice to your cheek on the affected side of your mouth:  ¨ Put ice in a plastic bag.  ¨ Place a towel between your skin and the bag.  ¨ Leave the ice on for 20 minutes, 2-3 times a day.  · Keep all follow-up visits as directed by your health care provider. This is important.  SEEK MEDICAL CARE IF:  · Your pain is not controlled with medicines.  · You have a fever with nausea, vomiting, or chills.  · You have a severe cough or shortness of breath.  SEEK IMMEDIATE MEDICAL CARE IF:  · You have uncontrolled bleeding, increased swelling, or severe pain.  · You have fluid, blood, or pus coming from the gum where your tooth was extracted.  · You have difficulty swallowing.  · You cannot open your mouth.     This information is not intended to replace advice given to you by your health care provider. Make sure you discuss any questions you have with your health care provider.     Document Released: 04/03/2012 Document Revised: 05/03/2016 Document Reviewed: 12/14/2015  [employer] Interactive Patient Education ©2016 [employer]

## 2021-03-15 DIAGNOSIS — Z23 NEED FOR VACCINATION: ICD-10-CM

## 2022-08-02 ENCOUNTER — ANESTHESIA EVENT (OUTPATIENT)
Dept: SURGERY | Facility: MEDICAL CENTER | Age: 57
DRG: 023 | End: 2022-08-02
Payer: COMMERCIAL

## 2022-08-02 ENCOUNTER — ANESTHESIA (OUTPATIENT)
Dept: SURGERY | Facility: MEDICAL CENTER | Age: 57
DRG: 023 | End: 2022-08-02
Payer: COMMERCIAL

## 2022-08-02 ENCOUNTER — HOSPITAL ENCOUNTER (INPATIENT)
Facility: MEDICAL CENTER | Age: 57
LOS: 10 days | DRG: 023 | End: 2022-08-12
Attending: EMERGENCY MEDICINE | Admitting: STUDENT IN AN ORGANIZED HEALTH CARE EDUCATION/TRAINING PROGRAM
Payer: COMMERCIAL

## 2022-08-02 ENCOUNTER — APPOINTMENT (OUTPATIENT)
Dept: RADIOLOGY | Facility: MEDICAL CENTER | Age: 57
DRG: 023 | End: 2022-08-02
Attending: EMERGENCY MEDICINE
Payer: COMMERCIAL

## 2022-08-02 ENCOUNTER — APPOINTMENT (OUTPATIENT)
Dept: RADIOLOGY | Facility: MEDICAL CENTER | Age: 57
DRG: 023 | End: 2022-08-02
Payer: COMMERCIAL

## 2022-08-02 DIAGNOSIS — R51.9 ACUTE INTRACTABLE HEADACHE, UNSPECIFIED HEADACHE TYPE: ICD-10-CM

## 2022-08-02 DIAGNOSIS — I16.1 HYPERTENSIVE EMERGENCY: ICD-10-CM

## 2022-08-02 DIAGNOSIS — I61.4 CEREBELLAR HEMORRHAGE, ACUTE (HCC): ICD-10-CM

## 2022-08-02 DIAGNOSIS — K04.7 DENTAL INFECTION: ICD-10-CM

## 2022-08-02 DIAGNOSIS — E87.6 HYPOKALEMIA: ICD-10-CM

## 2022-08-02 DIAGNOSIS — I61.4 CEREBELLAR HEMORRHAGE (HCC): ICD-10-CM

## 2022-08-02 DIAGNOSIS — I10 PRIMARY HYPERTENSION: ICD-10-CM

## 2022-08-02 DIAGNOSIS — R73.9 HYPERGLYCEMIA: ICD-10-CM

## 2022-08-02 DIAGNOSIS — I62.9 INTRACRANIAL HEMORRHAGE (HCC): ICD-10-CM

## 2022-08-02 LAB
ALBUMIN SERPL BCP-MCNC: 5 G/DL (ref 3.2–4.9)
ALBUMIN/GLOB SERPL: 1.4 G/DL
ALP SERPL-CCNC: 118 U/L (ref 30–99)
ALT SERPL-CCNC: 22 U/L (ref 2–50)
ANION GAP SERPL CALC-SCNC: 16 MMOL/L (ref 7–16)
APTT PPP: 25.8 SEC (ref 24.7–36)
AST SERPL-CCNC: 23 U/L (ref 12–45)
BASE EXCESS BLDA CALC-SCNC: -3 MMOL/L (ref -4–3)
BASOPHILS # BLD AUTO: 0 % (ref 0–1.8)
BASOPHILS # BLD: 0 K/UL (ref 0–0.12)
BILIRUB SERPL-MCNC: 0.5 MG/DL (ref 0.1–1.5)
BODY TEMPERATURE: ABNORMAL DEGREES
BREATHS SETTING VENT: 22
BUN SERPL-MCNC: 8 MG/DL (ref 8–22)
CALCIUM SERPL-MCNC: 8.9 MG/DL (ref 8.5–10.5)
CFT BLD TEG: 4.6 MIN (ref 4.6–9.1)
CFT P HPASE BLD TEG: 5.6 MIN (ref 4.3–8.3)
CHLORIDE SERPL-SCNC: 103 MMOL/L (ref 96–112)
CHOLEST SERPL-MCNC: 166 MG/DL (ref 100–199)
CLOT ANGLE BLD TEG: 72.1 DEGREES (ref 63–78)
CLOT LYSIS 30M P MA LENFR BLD TEG: 0 % (ref 0–2.6)
CO2 BLDA-SCNC: 24 MMOL/L (ref 20–33)
CO2 SERPL-SCNC: 23 MMOL/L (ref 20–33)
CREAT SERPL-MCNC: 0.53 MG/DL (ref 0.5–1.4)
CT.EXTRINSIC BLD ROTEM: 1.3 MIN (ref 0.8–2.1)
DELSYS IDSYS: ABNORMAL
EKG IMPRESSION: NORMAL
EOSINOPHIL # BLD AUTO: 0.25 K/UL (ref 0–0.51)
EOSINOPHIL NFR BLD: 1.8 % (ref 0–6.9)
ERYTHROCYTE [DISTWIDTH] IN BLOOD BY AUTOMATED COUNT: 40.7 FL (ref 35.9–50)
EST. AVERAGE GLUCOSE BLD GHB EST-MCNC: 157 MG/DL
GFR SERPLBLD CREATININE-BSD FMLA CKD-EPI: 108 ML/MIN/1.73 M 2
GLOBULIN SER CALC-MCNC: 3.6 G/DL (ref 1.9–3.5)
GLUCOSE BLD STRIP.AUTO-MCNC: 230 MG/DL (ref 65–99)
GLUCOSE BLD STRIP.AUTO-MCNC: 244 MG/DL (ref 65–99)
GLUCOSE BLD STRIP.AUTO-MCNC: 264 MG/DL (ref 65–99)
GLUCOSE SERPL-MCNC: 247 MG/DL (ref 65–99)
HBA1C MFR BLD: 7.1 % (ref 4–5.6)
HCO3 BLDA-SCNC: 22.9 MMOL/L (ref 17–25)
HCT VFR BLD AUTO: 47.6 % (ref 37–47)
HDLC SERPL-MCNC: 46 MG/DL
HGB BLD-MCNC: 15.8 G/DL (ref 12–16)
HOROWITZ INDEX BLDA+IHG-RTO: 240 MM[HG]
INR PPP: 0.88 (ref 0.87–1.13)
LACTATE BLD-SCNC: 4.8 MMOL/L (ref 0.5–2)
LDLC SERPL CALC-MCNC: 104 MG/DL
LYMPHOCYTES # BLD AUTO: 2.47 K/UL (ref 1–4.8)
LYMPHOCYTES NFR BLD: 17.5 % (ref 22–41)
MANUAL DIFF BLD: NORMAL
MCF BLD TEG: 61.4 MM (ref 52–69)
MCF.PLATELET INHIB BLD ROTEM: 20.5 MM (ref 15–32)
MCH RBC QN AUTO: 29.7 PG (ref 27–33)
MCHC RBC AUTO-ENTMCNC: 33.2 G/DL (ref 33.6–35)
MCV RBC AUTO: 89.5 FL (ref 81.4–97.8)
METAMYELOCYTES NFR BLD MANUAL: 1.8 %
MODE IMODE: ABNORMAL
MONOCYTES # BLD AUTO: 0.37 K/UL (ref 0–0.85)
MONOCYTES NFR BLD AUTO: 2.6 % (ref 0–13.4)
MORPHOLOGY BLD-IMP: NORMAL
MYELOCYTES NFR BLD MANUAL: 1.8 %
NEUTROPHILS # BLD AUTO: 10.5 K/UL (ref 2–7.15)
NEUTROPHILS NFR BLD: 71 % (ref 44–72)
NEUTS BAND NFR BLD MANUAL: 3.5 % (ref 0–10)
NRBC # BLD AUTO: 0 K/UL
NRBC BLD-RTO: 0 /100 WBC
O2/TOTAL GAS SETTING VFR VENT: 60 %
PA AA BLD-ACNC: 6.3 % (ref 0–11)
PA ADP BLD-ACNC: 15.7 % (ref 0–17)
PCO2 BLDA: 41 MMHG (ref 26–37)
PCO2 TEMP ADJ BLDA: 39.6 MMHG (ref 26–37)
PEEP END EXPIRATORY PRESSURE IPEEP: 8 CMH20
PH BLDA: 7.36 [PH] (ref 7.4–7.5)
PH TEMP ADJ BLDA: 7.37 [PH] (ref 7.4–7.5)
PLATELET # BLD AUTO: 264 K/UL (ref 164–446)
PLATELET BLD QL SMEAR: NORMAL
PMV BLD AUTO: 10.6 FL (ref 9–12.9)
PO2 BLDA: 144 MMHG (ref 64–87)
PO2 TEMP ADJ BLDA: 139 MMHG (ref 64–87)
POTASSIUM SERPL-SCNC: 3.1 MMOL/L (ref 3.6–5.5)
PROT SERPL-MCNC: 8.6 G/DL (ref 6–8.2)
PROTHROMBIN TIME: 11.9 SEC (ref 12–14.6)
RBC # BLD AUTO: 5.32 M/UL (ref 4.2–5.4)
RBC BLD AUTO: NORMAL
SAO2 % BLDA: 99 % (ref 93–99)
SODIUM SERPL-SCNC: 137 MMOL/L (ref 135–145)
SODIUM SERPL-SCNC: 142 MMOL/L (ref 135–145)
SPECIMEN DRAWN FROM PATIENT: ABNORMAL
TEG ALGORITHM TGALG: NORMAL
TIDAL VOLUME IVT: 280 ML
TRIGL SERPL-MCNC: 78 MG/DL (ref 0–149)
TROPONIN T SERPL-MCNC: <6 NG/L (ref 6–19)
WBC # BLD AUTO: 14.1 K/UL (ref 4.8–10.8)

## 2022-08-02 PROCEDURE — A9270 NON-COVERED ITEM OR SERVICE: HCPCS

## 2022-08-02 PROCEDURE — 700105 HCHG RX REV CODE 258

## 2022-08-02 PROCEDURE — 700111 HCHG RX REV CODE 636 W/ 250 OVERRIDE (IP)

## 2022-08-02 PROCEDURE — 70496 CT ANGIOGRAPHY HEAD: CPT

## 2022-08-02 PROCEDURE — 85384 FIBRINOGEN ACTIVITY: CPT

## 2022-08-02 PROCEDURE — 700101 HCHG RX REV CODE 250: Performed by: NEUROLOGICAL SURGERY

## 2022-08-02 PROCEDURE — 94003 VENT MGMT INPAT SUBQ DAY: CPT

## 2022-08-02 PROCEDURE — 99291 CRITICAL CARE FIRST HOUR: CPT

## 2022-08-02 PROCEDURE — 160048 HCHG OR STATISTICAL LEVEL 1-5: Performed by: NEUROLOGICAL SURGERY

## 2022-08-02 PROCEDURE — 93005 ELECTROCARDIOGRAM TRACING: CPT | Performed by: NURSE PRACTITIONER

## 2022-08-02 PROCEDURE — 82962 GLUCOSE BLOOD TEST: CPT | Mod: 91

## 2022-08-02 PROCEDURE — 99140 ANES COMP EMERGENCY COND: CPT | Performed by: ANESTHESIOLOGY

## 2022-08-02 PROCEDURE — 99291 CRITICAL CARE FIRST HOUR: CPT | Performed by: STUDENT IN AN ORGANIZED HEALTH CARE EDUCATION/TRAINING PROGRAM

## 2022-08-02 PROCEDURE — 96375 TX/PRO/DX INJ NEW DRUG ADDON: CPT

## 2022-08-02 PROCEDURE — 700111 HCHG RX REV CODE 636 W/ 250 OVERRIDE (IP): Performed by: NEUROLOGICAL SURGERY

## 2022-08-02 PROCEDURE — 00212 ANES ICR PX SUBDURAL TAPS: CPT | Performed by: ANESTHESIOLOGY

## 2022-08-02 PROCEDURE — 700105 HCHG RX REV CODE 258: Performed by: ANESTHESIOLOGY

## 2022-08-02 PROCEDURE — 700102 HCHG RX REV CODE 250 W/ 637 OVERRIDE(OP): Performed by: STUDENT IN AN ORGANIZED HEALTH CARE EDUCATION/TRAINING PROGRAM

## 2022-08-02 PROCEDURE — 700111 HCHG RX REV CODE 636 W/ 250 OVERRIDE (IP): Performed by: EMERGENCY MEDICINE

## 2022-08-02 PROCEDURE — 160029 HCHG SURGERY MINUTES - 1ST 30 MINS LEVEL 4: Performed by: NEUROLOGICAL SURGERY

## 2022-08-02 PROCEDURE — 700105 HCHG RX REV CODE 258: Performed by: EMERGENCY MEDICINE

## 2022-08-02 PROCEDURE — 770022 HCHG ROOM/CARE - ICU (200)

## 2022-08-02 PROCEDURE — 80053 COMPREHEN METABOLIC PANEL: CPT

## 2022-08-02 PROCEDURE — 85347 COAGULATION TIME ACTIVATED: CPT

## 2022-08-02 PROCEDURE — 85025 COMPLETE CBC W/AUTO DIFF WBC: CPT

## 2022-08-02 PROCEDURE — 96365 THER/PROPH/DIAG IV INF INIT: CPT

## 2022-08-02 PROCEDURE — 110371 HCHG SHELL REV 272: Performed by: NEUROLOGICAL SURGERY

## 2022-08-02 PROCEDURE — 37799 UNLISTED PX VASCULAR SURGERY: CPT

## 2022-08-02 PROCEDURE — 83036 HEMOGLOBIN GLYCOSYLATED A1C: CPT

## 2022-08-02 PROCEDURE — 700111 HCHG RX REV CODE 636 W/ 250 OVERRIDE (IP): Performed by: ANESTHESIOLOGY

## 2022-08-02 PROCEDURE — 99221 1ST HOSP IP/OBS SF/LOW 40: CPT | Performed by: NURSE PRACTITIONER

## 2022-08-02 PROCEDURE — 85576 BLOOD PLATELET AGGREGATION: CPT | Mod: 91

## 2022-08-02 PROCEDURE — 94002 VENT MGMT INPAT INIT DAY: CPT

## 2022-08-02 PROCEDURE — 160009 HCHG ANES TIME/MIN: Performed by: NEUROLOGICAL SURGERY

## 2022-08-02 PROCEDURE — 93005 ELECTROCARDIOGRAM TRACING: CPT | Performed by: EMERGENCY MEDICINE

## 2022-08-02 PROCEDURE — 36415 COLL VENOUS BLD VENIPUNCTURE: CPT

## 2022-08-02 PROCEDURE — 70450 CT HEAD/BRAIN W/O DYE: CPT

## 2022-08-02 PROCEDURE — 70498 CT ANGIOGRAPHY NECK: CPT

## 2022-08-02 PROCEDURE — 700117 HCHG RX CONTRAST REV CODE 255: Performed by: EMERGENCY MEDICINE

## 2022-08-02 PROCEDURE — 84484 ASSAY OF TROPONIN QUANT: CPT

## 2022-08-02 PROCEDURE — 84295 ASSAY OF SERUM SODIUM: CPT

## 2022-08-02 PROCEDURE — 700111 HCHG RX REV CODE 636 W/ 250 OVERRIDE (IP): Performed by: STUDENT IN AN ORGANIZED HEALTH CARE EDUCATION/TRAINING PROGRAM

## 2022-08-02 PROCEDURE — 85610 PROTHROMBIN TIME: CPT

## 2022-08-02 PROCEDURE — 80061 LIPID PANEL: CPT

## 2022-08-02 PROCEDURE — 700101 HCHG RX REV CODE 250: Performed by: EMERGENCY MEDICINE

## 2022-08-02 PROCEDURE — 83605 ASSAY OF LACTIC ACID: CPT

## 2022-08-02 PROCEDURE — 82803 BLOOD GASES ANY COMBINATION: CPT

## 2022-08-02 PROCEDURE — 85007 BL SMEAR W/DIFF WBC COUNT: CPT

## 2022-08-02 PROCEDURE — 94760 N-INVAS EAR/PLS OXIMETRY 1: CPT

## 2022-08-02 PROCEDURE — 160041 HCHG SURGERY MINUTES - EA ADDL 1 MIN LEVEL 4: Performed by: NEUROLOGICAL SURGERY

## 2022-08-02 PROCEDURE — 85730 THROMBOPLASTIN TIME PARTIAL: CPT

## 2022-08-02 PROCEDURE — 700101 HCHG RX REV CODE 250: Performed by: ANESTHESIOLOGY

## 2022-08-02 PROCEDURE — 700102 HCHG RX REV CODE 250 W/ 637 OVERRIDE(OP)

## 2022-08-02 PROCEDURE — 00CC0ZZ EXTIRPATION OF MATTER FROM CEREBELLUM, OPEN APPROACH: ICD-10-PCS | Performed by: NEUROLOGICAL SURGERY

## 2022-08-02 PROCEDURE — 110454 HCHG SHELL REV 250: Performed by: NEUROLOGICAL SURGERY

## 2022-08-02 DEVICE — DUREPAIR 1X1: Type: IMPLANTABLE DEVICE | Site: CRANIAL | Status: FUNCTIONAL

## 2022-08-02 RX ORDER — CEFAZOLIN SODIUM 1 G/3ML
INJECTION, POWDER, FOR SOLUTION INTRAMUSCULAR; INTRAVENOUS
Status: DISCONTINUED | OUTPATIENT
Start: 2022-08-02 | End: 2022-08-02 | Stop reason: HOSPADM

## 2022-08-02 RX ORDER — POLYETHYLENE GLYCOL 3350 17 G/17G
1 POWDER, FOR SOLUTION ORAL
Status: DISCONTINUED | OUTPATIENT
Start: 2022-08-02 | End: 2022-08-02

## 2022-08-02 RX ORDER — LIDOCAINE HYDROCHLORIDE 20 MG/ML
INJECTION, SOLUTION EPIDURAL; INFILTRATION; INTRACAUDAL; PERINEURAL PRN
Status: DISCONTINUED | OUTPATIENT
Start: 2022-08-02 | End: 2022-08-02 | Stop reason: SURG

## 2022-08-02 RX ORDER — ONDANSETRON 4 MG/1
4 TABLET, ORALLY DISINTEGRATING ORAL EVERY 4 HOURS PRN
Status: DISCONTINUED | OUTPATIENT
Start: 2022-08-02 | End: 2022-08-04

## 2022-08-02 RX ORDER — CLONIDINE HYDROCHLORIDE 0.1 MG/1
0.1 TABLET ORAL EVERY 4 HOURS PRN
Status: DISCONTINUED | OUTPATIENT
Start: 2022-08-02 | End: 2022-08-04

## 2022-08-02 RX ORDER — OXYCODONE HYDROCHLORIDE 5 MG/1
5 TABLET ORAL EVERY 4 HOURS PRN
Status: DISCONTINUED | OUTPATIENT
Start: 2022-08-02 | End: 2022-08-02

## 2022-08-02 RX ORDER — METHOCARBAMOL 750 MG/1
750 TABLET, FILM COATED ORAL EVERY 8 HOURS PRN
Status: DISCONTINUED | OUTPATIENT
Start: 2022-08-02 | End: 2022-08-04

## 2022-08-02 RX ORDER — AMOXICILLIN 250 MG
1 CAPSULE ORAL
Status: DISCONTINUED | OUTPATIENT
Start: 2022-08-02 | End: 2022-08-02

## 2022-08-02 RX ORDER — ONDANSETRON 2 MG/ML
4 INJECTION INTRAMUSCULAR; INTRAVENOUS EVERY 4 HOURS PRN
Status: DISCONTINUED | OUTPATIENT
Start: 2022-08-02 | End: 2022-08-02

## 2022-08-02 RX ORDER — BISACODYL 10 MG
10 SUPPOSITORY, RECTAL RECTAL
Status: DISCONTINUED | OUTPATIENT
Start: 2022-08-02 | End: 2022-08-12 | Stop reason: HOSPADM

## 2022-08-02 RX ORDER — AMOXICILLIN 250 MG
2 CAPSULE ORAL 2 TIMES DAILY
Status: DISCONTINUED | OUTPATIENT
Start: 2022-08-02 | End: 2022-08-02

## 2022-08-02 RX ORDER — ENOXAPARIN SODIUM 100 MG/ML
40 INJECTION SUBCUTANEOUS DAILY
Status: DISCONTINUED | OUTPATIENT
Start: 2022-08-03 | End: 2022-08-12 | Stop reason: HOSPADM

## 2022-08-02 RX ORDER — ONDANSETRON 4 MG/1
4 TABLET, ORALLY DISINTEGRATING ORAL EVERY 4 HOURS PRN
Status: DISCONTINUED | OUTPATIENT
Start: 2022-08-02 | End: 2022-08-02

## 2022-08-02 RX ORDER — SODIUM CHLORIDE, SODIUM LACTATE, POTASSIUM CHLORIDE, CALCIUM CHLORIDE 600; 310; 30; 20 MG/100ML; MG/100ML; MG/100ML; MG/100ML
INJECTION, SOLUTION INTRAVENOUS
Status: DISCONTINUED | OUTPATIENT
Start: 2022-08-02 | End: 2022-08-02 | Stop reason: SURG

## 2022-08-02 RX ORDER — AMOXICILLIN 250 MG
1 CAPSULE ORAL NIGHTLY
Status: DISCONTINUED | OUTPATIENT
Start: 2022-08-02 | End: 2022-08-02

## 2022-08-02 RX ORDER — ACETAMINOPHEN 650 MG/1
650 SUPPOSITORY RECTAL EVERY 4 HOURS PRN
Status: DISCONTINUED | OUTPATIENT
Start: 2022-08-02 | End: 2022-08-12 | Stop reason: HOSPADM

## 2022-08-02 RX ORDER — ONDANSETRON 2 MG/ML
INJECTION INTRAMUSCULAR; INTRAVENOUS PRN
Status: DISCONTINUED | OUTPATIENT
Start: 2022-08-02 | End: 2022-08-02 | Stop reason: SURG

## 2022-08-02 RX ORDER — DEXAMETHASONE SODIUM PHOSPHATE 4 MG/ML
INJECTION, SOLUTION INTRA-ARTICULAR; INTRALESIONAL; INTRAMUSCULAR; INTRAVENOUS; SOFT TISSUE PRN
Status: DISCONTINUED | OUTPATIENT
Start: 2022-08-02 | End: 2022-08-02 | Stop reason: SURG

## 2022-08-02 RX ORDER — LABETALOL HYDROCHLORIDE 5 MG/ML
20 INJECTION, SOLUTION INTRAVENOUS ONCE
Status: COMPLETED | OUTPATIENT
Start: 2022-08-02 | End: 2022-08-02

## 2022-08-02 RX ORDER — ACETAMINOPHEN 325 MG/1
650 TABLET ORAL EVERY 6 HOURS PRN
Status: DISCONTINUED | OUTPATIENT
Start: 2022-08-02 | End: 2022-08-02

## 2022-08-02 RX ORDER — ACETAMINOPHEN 500 MG
500 TABLET ORAL EVERY 6 HOURS PRN
Status: DISCONTINUED | OUTPATIENT
Start: 2022-08-02 | End: 2022-08-04

## 2022-08-02 RX ORDER — HYDROCODONE BITARTRATE AND ACETAMINOPHEN 10; 325 MG/1; MG/1
1 TABLET ORAL EVERY 4 HOURS PRN
Status: DISCONTINUED | OUTPATIENT
Start: 2022-08-02 | End: 2022-08-02

## 2022-08-02 RX ORDER — DEXTROSE MONOHYDRATE 25 G/50ML
25 INJECTION, SOLUTION INTRAVENOUS
Status: DISCONTINUED | OUTPATIENT
Start: 2022-08-02 | End: 2022-08-03

## 2022-08-02 RX ORDER — 3% SODIUM CHLORIDE 3 G/100ML
500 INJECTION, SOLUTION INTRAVENOUS CONTINUOUS
Status: DISCONTINUED | OUTPATIENT
Start: 2022-08-02 | End: 2022-08-04

## 2022-08-02 RX ORDER — DIPHENHYDRAMINE HYDROCHLORIDE 50 MG/ML
25 INJECTION INTRAMUSCULAR; INTRAVENOUS EVERY 6 HOURS PRN
Status: DISCONTINUED | OUTPATIENT
Start: 2022-08-02 | End: 2022-08-12 | Stop reason: HOSPADM

## 2022-08-02 RX ORDER — BISACODYL 10 MG
10 SUPPOSITORY, RECTAL RECTAL
Status: DISCONTINUED | OUTPATIENT
Start: 2022-08-02 | End: 2022-08-02

## 2022-08-02 RX ORDER — ENEMA 19; 7 G/133ML; G/133ML
1 ENEMA RECTAL
Status: DISCONTINUED | OUTPATIENT
Start: 2022-08-02 | End: 2022-08-12 | Stop reason: HOSPADM

## 2022-08-02 RX ORDER — LABETALOL HYDROCHLORIDE 5 MG/ML
10 INJECTION, SOLUTION INTRAVENOUS
Status: DISCONTINUED | OUTPATIENT
Start: 2022-08-02 | End: 2022-08-12 | Stop reason: HOSPADM

## 2022-08-02 RX ORDER — FAMOTIDINE 20 MG/1
20 TABLET, FILM COATED ORAL EVERY 12 HOURS
Status: DISCONTINUED | OUTPATIENT
Start: 2022-08-02 | End: 2022-08-04

## 2022-08-02 RX ORDER — AMOXICILLIN 250 MG
1 CAPSULE ORAL
Status: DISCONTINUED | OUTPATIENT
Start: 2022-08-02 | End: 2022-08-04

## 2022-08-02 RX ORDER — DEXMEDETOMIDINE HYDROCHLORIDE 100 UG/ML
INJECTION, SOLUTION INTRAVENOUS PRN
Status: DISCONTINUED | OUTPATIENT
Start: 2022-08-02 | End: 2022-08-02 | Stop reason: SURG

## 2022-08-02 RX ORDER — ACETAMINOPHEN 325 MG/1
650 TABLET ORAL EVERY 4 HOURS PRN
Status: DISCONTINUED | OUTPATIENT
Start: 2022-08-02 | End: 2022-08-02

## 2022-08-02 RX ORDER — HYDROMORPHONE HYDROCHLORIDE 1 MG/ML
0.5 INJECTION, SOLUTION INTRAMUSCULAR; INTRAVENOUS; SUBCUTANEOUS
Status: DISCONTINUED | OUTPATIENT
Start: 2022-08-02 | End: 2022-08-02

## 2022-08-02 RX ORDER — LABETALOL HYDROCHLORIDE 5 MG/ML
10 INJECTION, SOLUTION INTRAVENOUS
Status: DISCONTINUED | OUTPATIENT
Start: 2022-08-02 | End: 2022-08-02

## 2022-08-02 RX ORDER — ONDANSETRON 2 MG/ML
4 INJECTION INTRAMUSCULAR; INTRAVENOUS EVERY 4 HOURS PRN
Status: DISCONTINUED | OUTPATIENT
Start: 2022-08-02 | End: 2022-08-04

## 2022-08-02 RX ORDER — MIDAZOLAM HYDROCHLORIDE 1 MG/ML
5 INJECTION INTRAMUSCULAR; INTRAVENOUS
Status: DISCONTINUED | OUTPATIENT
Start: 2022-08-02 | End: 2022-08-09

## 2022-08-02 RX ORDER — PHENYLEPHRINE HYDROCHLORIDE 10 MG/ML
INJECTION, SOLUTION INTRAMUSCULAR; INTRAVENOUS; SUBCUTANEOUS PRN
Status: DISCONTINUED | OUTPATIENT
Start: 2022-08-02 | End: 2022-08-02 | Stop reason: SURG

## 2022-08-02 RX ORDER — DOCUSATE SODIUM 50 MG/5ML
100 LIQUID ORAL 2 TIMES DAILY
Status: DISCONTINUED | OUTPATIENT
Start: 2022-08-02 | End: 2022-08-04

## 2022-08-02 RX ORDER — HYDRALAZINE HYDROCHLORIDE 20 MG/ML
10 INJECTION INTRAMUSCULAR; INTRAVENOUS
Status: DISCONTINUED | OUTPATIENT
Start: 2022-08-02 | End: 2022-08-12 | Stop reason: HOSPADM

## 2022-08-02 RX ORDER — POLYETHYLENE GLYCOL 3350 17 G/17G
1 POWDER, FOR SOLUTION ORAL 2 TIMES DAILY PRN
Status: DISCONTINUED | OUTPATIENT
Start: 2022-08-02 | End: 2022-08-02

## 2022-08-02 RX ORDER — POLYETHYLENE GLYCOL 3350 17 G/17G
1 POWDER, FOR SOLUTION ORAL 2 TIMES DAILY PRN
Status: DISCONTINUED | OUTPATIENT
Start: 2022-08-02 | End: 2022-08-04

## 2022-08-02 RX ORDER — AMOXICILLIN 250 MG
1 CAPSULE ORAL NIGHTLY
Status: DISCONTINUED | OUTPATIENT
Start: 2022-08-02 | End: 2022-08-04

## 2022-08-02 RX ORDER — CEFAZOLIN SODIUM 1 G/3ML
INJECTION, POWDER, FOR SOLUTION INTRAMUSCULAR; INTRAVENOUS PRN
Status: DISCONTINUED | OUTPATIENT
Start: 2022-08-02 | End: 2022-08-02 | Stop reason: SURG

## 2022-08-02 RX ORDER — BUPIVACAINE HYDROCHLORIDE AND EPINEPHRINE 5; 5 MG/ML; UG/ML
INJECTION, SOLUTION EPIDURAL; INTRACAUDAL; PERINEURAL
Status: DISCONTINUED | OUTPATIENT
Start: 2022-08-02 | End: 2022-08-02 | Stop reason: HOSPADM

## 2022-08-02 RX ORDER — HYDRALAZINE HYDROCHLORIDE 20 MG/ML
20 INJECTION INTRAMUSCULAR; INTRAVENOUS EVERY 4 HOURS PRN
Status: DISCONTINUED | OUTPATIENT
Start: 2022-08-02 | End: 2022-08-02

## 2022-08-02 RX ORDER — OXYCODONE HYDROCHLORIDE 10 MG/1
10 TABLET ORAL EVERY 4 HOURS PRN
Status: DISCONTINUED | OUTPATIENT
Start: 2022-08-02 | End: 2022-08-02

## 2022-08-02 RX ORDER — MANNITOL 20 G/100ML
INJECTION, SOLUTION INTRAVENOUS PRN
Status: DISCONTINUED | OUTPATIENT
Start: 2022-08-02 | End: 2022-08-02 | Stop reason: SURG

## 2022-08-02 RX ORDER — DOCUSATE SODIUM 100 MG/1
100 CAPSULE, LIQUID FILLED ORAL 2 TIMES DAILY
Status: DISCONTINUED | OUTPATIENT
Start: 2022-08-02 | End: 2022-08-02

## 2022-08-02 RX ADMIN — HYDRALAZINE HYDROCHLORIDE 20 MG: 20 INJECTION INTRAMUSCULAR; INTRAVENOUS at 14:58

## 2022-08-02 RX ADMIN — INSULIN HUMAN 3 UNITS: 100 INJECTION, SOLUTION PARENTERAL at 23:24

## 2022-08-02 RX ADMIN — PHENYLEPHRINE HYDROCHLORIDE 100 MCG: 10 INJECTION INTRAVENOUS at 16:56

## 2022-08-02 RX ADMIN — LIDOCAINE HYDROCHLORIDE 80 MG: 20 INJECTION, SOLUTION EPIDURAL; INFILTRATION; INTRACAUDAL at 16:55

## 2022-08-02 RX ADMIN — PROPOFOL 50 MG: 10 INJECTION, EMULSION INTRAVENOUS at 17:07

## 2022-08-02 RX ADMIN — SODIUM CHLORIDE 500 ML: 3 INJECTION, SOLUTION INTRAVENOUS at 21:32

## 2022-08-02 RX ADMIN — INSULIN HUMAN 2 UNITS: 100 INJECTION, SOLUTION PARENTERAL at 19:51

## 2022-08-02 RX ADMIN — HYDRALAZINE HYDROCHLORIDE 10 MG: 20 INJECTION INTRAMUSCULAR; INTRAVENOUS at 20:16

## 2022-08-02 RX ADMIN — PHENYLEPHRINE HYDROCHLORIDE 100 MCG: 10 INJECTION INTRAVENOUS at 17:20

## 2022-08-02 RX ADMIN — MANNITOL 75 G: 20 INJECTION, SOLUTION INTRAVENOUS at 17:21

## 2022-08-02 RX ADMIN — SODIUM CHLORIDE, POTASSIUM CHLORIDE, SODIUM LACTATE AND CALCIUM CHLORIDE: 600; 310; 30; 20 INJECTION, SOLUTION INTRAVENOUS at 16:47

## 2022-08-02 RX ADMIN — IOHEXOL 94 ML: 350 INJECTION, SOLUTION INTRAVENOUS at 15:00

## 2022-08-02 RX ADMIN — CEFAZOLIN 2 G: 10 INJECTION, POWDER, FOR SOLUTION INTRAVENOUS at 21:39

## 2022-08-02 RX ADMIN — PROPOFOL 150 MG: 10 INJECTION, EMULSION INTRAVENOUS at 16:55

## 2022-08-02 RX ADMIN — FAMOTIDINE 20 MG: 10 INJECTION INTRAVENOUS at 20:19

## 2022-08-02 RX ADMIN — MINERAL OIL, PETROLATUM 1 APPLICATION: 425; 573 OINTMENT OPHTHALMIC at 21:39

## 2022-08-02 RX ADMIN — MIDAZOLAM 2 MG: 1 INJECTION INTRAMUSCULAR; INTRAVENOUS at 16:54

## 2022-08-02 RX ADMIN — PHENYLEPHRINE HYDROCHLORIDE 100 MCG: 10 INJECTION INTRAVENOUS at 17:10

## 2022-08-02 RX ADMIN — SODIUM CHLORIDE 2.5 MG/HR: 9 INJECTION, SOLUTION INTRAVENOUS at 15:28

## 2022-08-02 RX ADMIN — FENTANYL CITRATE 50 MCG: 50 INJECTION, SOLUTION INTRAMUSCULAR; INTRAVENOUS at 17:24

## 2022-08-02 RX ADMIN — SODIUM CHLORIDE, POTASSIUM CHLORIDE, SODIUM LACTATE AND CALCIUM CHLORIDE: 600; 310; 30; 20 INJECTION, SOLUTION INTRAVENOUS at 17:57

## 2022-08-02 RX ADMIN — DEXAMETHASONE SODIUM PHOSPHATE 10 MG: 4 INJECTION, SOLUTION INTRA-ARTICULAR; INTRALESIONAL; INTRAMUSCULAR; INTRAVENOUS; SOFT TISSUE at 17:01

## 2022-08-02 RX ADMIN — CEFAZOLIN 2 G: 330 INJECTION, POWDER, FOR SOLUTION INTRAMUSCULAR; INTRAVENOUS at 17:10

## 2022-08-02 RX ADMIN — LABETALOL HYDROCHLORIDE 20 MG: 5 INJECTION, SOLUTION INTRAVENOUS at 14:12

## 2022-08-02 RX ADMIN — ONDANSETRON 4 MG: 2 INJECTION INTRAMUSCULAR; INTRAVENOUS at 18:52

## 2022-08-02 RX ADMIN — FENTANYL CITRATE 50 MCG: 50 INJECTION, SOLUTION INTRAMUSCULAR; INTRAVENOUS at 16:55

## 2022-08-02 RX ADMIN — ROCURONIUM BROMIDE 20 MG: 10 INJECTION, SOLUTION INTRAVENOUS at 17:50

## 2022-08-02 RX ADMIN — ROCURONIUM BROMIDE 50 MG: 10 INJECTION, SOLUTION INTRAVENOUS at 16:55

## 2022-08-02 RX ADMIN — LABETALOL HYDROCHLORIDE 20 MG: 5 INJECTION, SOLUTION INTRAVENOUS at 14:34

## 2022-08-02 RX ADMIN — DEXMEDETOMIDINE 25 MCG: 200 INJECTION, SOLUTION INTRAVENOUS at 17:16

## 2022-08-02 ASSESSMENT — PAIN DESCRIPTION - PAIN TYPE
TYPE: ACUTE PAIN
TYPE: ACUTE PAIN

## 2022-08-02 ASSESSMENT — PAIN SCALES - PAIN ASSESSMENT IN ADVANCED DEMENTIA (PAINAD)
BREATHING: OCCASIONAL LABORED BREATHING, SHORT PERIOD OF HYPERVENTILATION
CONSOLABILITY: NO NEED TO CONSOLE
TOTALSCORE: 1
FACIALEXPRESSION: SMILING OR INEXPRESSIVE
BODYLANGUAGE: RELAXED

## 2022-08-02 ASSESSMENT — FIBROSIS 4 INDEX: FIB4 SCORE: 1.06

## 2022-08-02 NOTE — ED PROVIDER NOTES
ED Provider Note    CHIEF COMPLAINT  Chief Complaint   Patient presents with   • Fatigue     SInce this afternoon at a . Pt falling asleep during triage, arousable to painful stimuli. Pt oriented x 4 when awake.    • N/V     This afternoon. Hx T2 DM. FSBS in triage 230       HPI  Lana Phillips is a 57 y.o. female who presents with complaint of headache, and somnolence.  I speaking with the patient's sister at the bedside, she states the patient has no history of high blood pressure, she presents hypertensive 259/128.  Patient states she takes no medications.  Blood sugar noted elevated, patient states she does not take medication for her diabetes.  While at the  of her mother today did not feel well, went to the bathroom.  When her sister checked on her, she had been complaining of a headache.  When she did not return from the bathroom a second sister went to check on her finding her on the ground complaining of headache and somnolence.  Patient is able to open eyes to voice and answer questions, denies numbness or weakness to the extremities.  She denies vision change.  No known trauma.  Headache is holoacranial.  No neck pain.  No fever, no vomiting.  Per the sister, healthwise the patient was doing well this morning.  No history of taking blood thinners.    REVIEW OF SYSTEMS    Constitutional: No fever  Respiratory: No shortness of breath  Cardiac: No chest pain  Gastrointestinal: No vomiting  Musculoskeletal: No neck pain  Neurologic: Headache, altered mental status  Endocrine: Elevated blood sugar       All other systems are negative.       PAST MEDICAL HISTORY  No past medical history on file.    FAMILY HISTORY  No family history on file.    SOCIAL HISTORY  Social History     Socioeconomic History   • Marital status:    Tobacco Use   • Smoking status: Never Smoker   • Smokeless tobacco: Never Used   Substance and Sexual Activity   • Alcohol use: No   • Drug use: No       SURGICAL HISTORY  No  "past surgical history on file.    CURRENT MEDICATIONS  Home Medications    **Home medications have not yet been reviewed for this encounter**         ALLERGIES  No Known Allergies    PHYSICAL EXAM  VITAL SIGNS: BP (!) 259/128   Pulse (!) 58   Temp 36.2 °C (97.1 °F) (Temporal)   Resp 13   Ht 1.499 m (4' 11\")   Wt 68 kg (150 lb)   SpO2 92%   BMI 30.30 kg/m²   Constitutional:  Non-toxic appearance.   HENT: No facial swelling  Eyes: Anicteric, no conjunctivitis.     Cardiovascular: Normal heart rate, Normal rhythm  Pulmonary:  No wheezing, No rales.  No respiratory distress  Gastrointestinal: Soft, No tenderness, No masses  Skin: Warm, Dry, No cyanosis.   Neurologic: Speech is clear, follows commands, facial expression is symmetrical.  Generalized weakness with movement of hands and feet.  Sensation grossly intact  Psychiatric: Patient is somnolent, psychiatric evaluation difficult.  Patient is willing to answer questions, follows commands  Musculoskeletal: No extremity deformity    EKG/Labs  Results for orders placed or performed during the hospital encounter of 08/02/22   COMP METABOLIC PANEL   Result Value Ref Range    Sodium 142 135 - 145 mmol/L    Potassium 3.1 (L) 3.6 - 5.5 mmol/L    Chloride 103 96 - 112 mmol/L    Co2 23 20 - 33 mmol/L    Anion Gap 16.0 7.0 - 16.0    Glucose 247 (H) 65 - 99 mg/dL    Bun 8 8 - 22 mg/dL    Creatinine 0.53 0.50 - 1.40 mg/dL    Calcium 8.9 8.5 - 10.5 mg/dL    AST(SGOT) 23 12 - 45 U/L    ALT(SGPT) 22 2 - 50 U/L    Alkaline Phosphatase 118 (H) 30 - 99 U/L    Total Bilirubin 0.5 0.1 - 1.5 mg/dL    Albumin 5.0 (H) 3.2 - 4.9 g/dL    Total Protein 8.6 (H) 6.0 - 8.2 g/dL    Globulin 3.6 (H) 1.9 - 3.5 g/dL    A-G Ratio 1.4 g/dL   TROPONIN   Result Value Ref Range    Troponin T <6 6 - 19 ng/L   APTT   Result Value Ref Range    APTT 25.8 24.7 - 36.0 sec   PROTHROMBIN TIME (INR)   Result Value Ref Range    PT 11.9 (L) 12.0 - 14.6 sec    INR 0.88 0.87 - 1.13   ESTIMATED GFR   Result Value " Ref Range    GFR (CKD-EPI) 108 >60 mL/min/1.73 m 2   POCT glucose device results   Result Value Ref Range    POC Glucose, Blood 230 (H) 65 - 99 mg/dL         RADIOLOGY/PROCEDURES  CT-CTA HEAD WITH & W/O-POST PROCESS   Final Result         1. No hemodynamically significant narrowing of the major intracranial vessels.      2. Redemonstration of large parenchymal hemorrhage in the left cerebellum      CT-CTA NECK WITH & W/O-POST PROCESSING   Final Result      1. No evidence of flow-limiting stenosis or dissection in the cervical carotid or cervical vertebral arteries.      CT-HEAD W/O   Final Result      Large 4.6 x 3.4 cm intraparenchymal hematoma centered in the left cerebellar hemisphere. Associated mass effect results in effacement of the basal cisterns and fourth ventricle. No evidence of hydrocephalus.      Findings were discussed with Dr. JANET ARELLANO on 8/2/2022 2:45 PM.            COURSE & MEDICAL DECISION MAKING  Pertinent Labs & Imaging studies reviewed. (See chart for details)  Patient presents with altered mental status, headache, concerning for intracranial hemorrhage.  Hypertensive encephalopathy, ischemic stroke, metabolic encephalopathy all concerning.  CT scan of the head was obtained with and without contrast, large cerebellar intraparenchymal hemorrhage noted.  No evidence of aneurysmal abnormality as per radiologist.  Dr. Vences from neurosurgery was called emergently, has seen the patient will be taking her to surgery.  Placed to neurology.  a consult to neurology is pending.  I spoke with intensivist for having patient admitted to the ICU.  Patient received 20 mg of IV labetalol thus far twice for blood pressure control, most recently 211/115.  Protocol for blood pressure control in the setting of intracranial hemorrhage has been started.  Patient still remains able to open her eyes to voice, follow commands, answer questions.  She moves all extremities to command.  GCS is 14.      FINAL  IMPRESSION     1. Cerebellar hemorrhage (HCC)     2. Hypertensive emergency     3. Acute intractable headache, unspecified headache type          Critical care time 35 minutes     Electronically signed by: Quirino Bunn M.D., 8/2/2022 2:08 PM

## 2022-08-02 NOTE — ED NOTES
"Pt. Noted to move bilateral feet; purposeful movement. States \"I need to go to bathroom.\" Pure wick explained and placed.   "

## 2022-08-02 NOTE — ED NOTES
Pt. Continues to open eyes when spoken to but does not move extremities. Placed on 2L o2 via NC. Additional IV started; labs drawn and sent.

## 2022-08-02 NOTE — CONSULTS
ID:  Lana Phillips; 57 y.o. female      Admission Date: 2022   Date of Consultation: 2022  Requesting Provider: Baljinder George M.D.  PCP: No primary care provider on file.        Chief Complaint:: found down    Reason for consultation:: left cerebellar IPH      HPI:  Lana Phillips is a 57 y.o. female who presented to [facility] after being found down in the bathroom at her Mother's  today. Systolic blood pressure>200. CT shows large left cerebellar IPH. Pt in mild distress and complaining of headache. Sleepy.        Past Medical History:  No past medical history on file.    Past Surgical History:  No past surgical history on file.    Social History:  Social History     Occupational History   • Not on file   Tobacco Use   • Smoking status: Never Smoker   • Smokeless tobacco: Never Used   Substance and Sexual Activity   • Alcohol use: No   • Drug use: No   • Sexual activity: Not on file     Social History     Social History Narrative   • Not on file       Family History:  No family history on file.    Medications:  Prior to Admission medications    Medication Sig Start Date End Date Taking? Authorizing Provider   ibuprofen (MOTRIN) 200 MG Tab Take 400 mg by mouth every 6 hours as needed for Mild Pain.    Physician Outpatient       Allergies:  No Known Allergies    Review of Systems:    negative for constitutional, HEENT, cardiac, pulmonary, GI, , musculoskeletal, psych, dermatologic, endo, hematologic, immunologic except: headache      PHYSICAL  EXAMINATION:     General:  Awake and alert, oriented X2 (name and place)  GCS 14 (E3V5M)  Moderate distress  sleepy  Slowly follows commands    Cranial nerves:  PERRL, EOMI, VFF  Face symmetric, TML    Muscle testing:  RUE 5/5  LUE 5/5  RLE 5/5  LLE 5/5    No pronator drift  Sensation intact to light touch            LABS:  Recent Labs     22  1407   SODIUM 142   POTASSIUM 3.1*   CHLORIDE 103   CO2 23   GLUCOSE 247*   BUN 8   CREATININE 0.53    CALCIUM 8.9     Recent Labs     08/02/22  1407   WBC 14.1*   RBC 5.32   HEMOGLOBIN 15.8   HEMATOCRIT 47.6*   MCV 89.5   MCH 29.7   MCHC 33.2*   RDW 40.7   PLATELETCT 264   MPV 10.6     Lab Results   Component Value Date/Time    PROTHROMBTM 11.9 (L) 08/02/2022 02:07 PM    INR 0.88 08/02/2022 02:07 PM      Recent Labs     08/02/22  1407   ASTSGOT 23   ALTSGPT 22   TBILIRUBIN 0.5   ALKPHOSPHAT 118*   GLOBULIN 3.6*   INR 0.88       Radiology Studies:     There is a large 4.6 x 3.4 cm intraparenchymal hematoma centered in the left cerebellar hemisphere. There is adjacent hypoattenuation consistent with edema. Posterior fossa structures and basal cisterns are effaced secondary to edema/mass effect. No   evidence of hydrocephalus.     Impression and Plan:     Ms. Lana Phillips is a 57 y.o. year old female presenting with large cerebellar hemorrhage     - plan for urgent craniotomy for decompression  - SBP<160  - mannitol and intubation if patient declines    Thank you for the consultation. Please do not hesitate contacting me with questions.    Pedro Pablo Vences III, MD, PhD  Board eligible neurosurgeon  [facility] [place]

## 2022-08-02 NOTE — CONSULTS
Chief Complaint   Patient presents with   • Fatigue     SInce this afternoon at a . Pt falling asleep during triage, arousable to painful stimuli. Pt oriented x 4 when awake.    • N/V     This afternoon. Hx T2 DM. FSBS in triage 230       Problem List Items Addressed This Visit    None     Visit Diagnoses     Cerebellar hemorrhage (HCC)        Hypertensive emergency        Relevant Medications    labetalol (NORMODYNE/TRANDATE) injection 20 mg (Completed)    labetalol (NORMODYNE/TRANDATE) injection 20 mg (Completed)    labetalol (NORMODYNE/TRANDATE) injection 10 mg    hydrALAZINE (APRESOLINE) injection 20 mg    niCARdipine (CARDENE) 25 mg in  mL Infusion    Acute intractable headache, unspecified headache type            Neurology Consultation     History of present illness:  This is a 57-year old female with no known PMhx (?hypertension, details limited, no current medications) who presented to [facility] on 22 for a chief complaint of altered mentation, headache, dizziness and nausea. Details regarding HPI obtained via medical record. Per report, the patient was attending a  earlier today, when she was reportedly found in a bathroom minimally responsive. Patient was brought to the hospital; here she was found to have , SBP persistently in the 200s. Stat CT head on arrival revealed large Right cerebellar hemorrhage with associated mass effect/edema and compression of 3rd/4th ventricles/basilar cisterns; CTA with no obvious vascular process nor aneurysm.   Currently, patient is sitting up in stretcher; arousable, lethargic, poorly interactive. Follows simple commands, moves all extremities. Further ROS is limited. ICH Score 2 (GCS 5-12, Infratentorial).    Neurology has been consulted by Dr. Baljinder George to further evaluate the findings noted above.     Past medical history:   No past medical history on file.    Past surgical history:   No past surgical history on  file.    Family history:   No family history on file.    Social history:   Social History     Socioeconomic History   • Marital status:      Spouse name: Not on file   • Number of children: Not on file   • Years of education: Not on file   • Highest education level: Not on file   Occupational History   • Not on file   Tobacco Use   • Smoking status: Never Smoker   • Smokeless tobacco: Never Used   Substance and Sexual Activity   • Alcohol use: No   • Drug use: No   • Sexual activity: Not on file   Other Topics Concern   • Not on file   Social History Narrative   • Not on file     Social Determinants of Health     Financial Resource Strain: Not on file   Food Insecurity: Not on file   Transportation Needs: Not on file   Physical Activity: Not on file   Stress: Not on file   Social Connections: Not on file   Intimate Partner Violence: Not on file   Housing Stability: Not on file       Current medications:   Current Facility-Administered Medications   Medication Dose   • labetalol (NORMODYNE/TRANDATE) injection 10 mg  10 mg   • hydrALAZINE (APRESOLINE) injection 20 mg  20 mg   • niCARdipine (CARDENE) 25 mg in  mL Infusion  2.5-15 mg/hr     Current Outpatient Medications   Medication   • ibuprofen (MOTRIN) 200 MG Tab       Medication Allergy:  No Known Allergies    Review of systems:   As noted above; otherwise limited as patient is lethargic.        Physical examination:   Vitals:    08/02/22 1511 08/02/22 1516 08/02/22 1521 08/02/22 1526   BP: (!) 186/86 (!) 186/86 (!) 168/88 (!) 171/91   Pulse: 74 70 72 74   Resp: (!) 24 19 17 17   Temp:       TempSrc:       SpO2: 98% 97% 96% 96%   Weight:       Height:         General: Patient in no acute distress  HEENT: Normocephalic, no signs of acute trauma.   Neck: supple, no meningeal signs. There is normal range of motion. No tenderness on exam.   Chest: clear to auscultation. No cough.   CV: RRR, no murmurs.   Skin: no signs of acute rashes or trauma.    Musculoskeletal: joints exhibit full range of motion, without any pain to palpation. There are no signs of joint or muscle swelling. There is no tenderness to deep palpation of muscles.   Psychiatric: No hallucinatory behavior.       NEUROLOGICAL EXAM:   Mental status, orientation: Lethargic, poorly interactive.   Speech and language: speech is hypophonic, dysarthric. Follows simple commands with repeated verbal stim.   Cranial nerve exam: Pupils are 3-4 mm bilaterally and equally reactive to light. Visual fields are intact by confrontation. There is no nystagmus on primary or secondary gaze. Intact full EOM in all directions of gaze. Face appears symmetric. Sensation in the face is intact to light touch. Uvula is midline. Palate elevates symmetrically. Tongue is midline and without any signs of tongue biting or fasciculations.Shoulder shrug is intact bilaterally.   Motor exam: Strength is 4+/5 in all extremities. Tone is normal. No abnormal movements were seen on exam.   Sensory exam reveals normal sense of light touch and pinprick in all extremities.   Deep tendon reflexes:  Plantar responses are flexor. There is no clonus.   Coordination:  Dysmetria appreciated to BUE.   Gait: Not assessed at this time as patient is unable.        ANCILLARY DATA REVIEWED:     Lab Data Review:  Recent Results (from the past 24 hour(s))   POCT glucose device results    Collection Time: 08/02/22  1:49 PM   Result Value Ref Range    POC Glucose, Blood 230 (H) 65 - 99 mg/dL   CBC WITH DIFFERENTIAL    Collection Time: 08/02/22  2:07 PM   Result Value Ref Range    WBC 14.1 (H) 4.8 - 10.8 K/uL    RBC 5.32 4.20 - 5.40 M/uL    Hemoglobin 15.8 12.0 - 16.0 g/dL    Hematocrit 47.6 (H) 37.0 - 47.0 %    MCV 89.5 81.4 - 97.8 fL    MCH 29.7 27.0 - 33.0 pg    MCHC 33.2 (L) 33.6 - 35.0 g/dL    RDW 40.7 35.9 - 50.0 fL    Platelet Count 264 164 - 446 K/uL    MPV 10.6 9.0 - 12.9 fL    Neutrophils-Polys 71.00 44.00 - 72.00 %    Lymphocytes 17.50 (L)  22.00 - 41.00 %    Monocytes 2.60 0.00 - 13.40 %    Eosinophils 1.80 0.00 - 6.90 %    Basophils 0.00 0.00 - 1.80 %    Nucleated RBC 0.00 /100 WBC    Neutrophils (Absolute) 10.50 (H) 2.00 - 7.15 K/uL    Lymphs (Absolute) 2.47 1.00 - 4.80 K/uL    Monos (Absolute) 0.37 0.00 - 0.85 K/uL    Eos (Absolute) 0.25 0.00 - 0.51 K/uL    Baso (Absolute) 0.00 0.00 - 0.12 K/uL    NRBC (Absolute) 0.00 K/uL   COMP METABOLIC PANEL    Collection Time: 08/02/22  2:07 PM   Result Value Ref Range    Sodium 142 135 - 145 mmol/L    Potassium 3.1 (L) 3.6 - 5.5 mmol/L    Chloride 103 96 - 112 mmol/L    Co2 23 20 - 33 mmol/L    Anion Gap 16.0 7.0 - 16.0    Glucose 247 (H) 65 - 99 mg/dL    Bun 8 8 - 22 mg/dL    Creatinine 0.53 0.50 - 1.40 mg/dL    Calcium 8.9 8.5 - 10.5 mg/dL    AST(SGOT) 23 12 - 45 U/L    ALT(SGPT) 22 2 - 50 U/L    Alkaline Phosphatase 118 (H) 30 - 99 U/L    Total Bilirubin 0.5 0.1 - 1.5 mg/dL    Albumin 5.0 (H) 3.2 - 4.9 g/dL    Total Protein 8.6 (H) 6.0 - 8.2 g/dL    Globulin 3.6 (H) 1.9 - 3.5 g/dL    A-G Ratio 1.4 g/dL   TROPONIN    Collection Time: 08/02/22  2:07 PM   Result Value Ref Range    Troponin T <6 6 - 19 ng/L   APTT    Collection Time: 08/02/22  2:07 PM   Result Value Ref Range    APTT 25.8 24.7 - 36.0 sec   PROTHROMBIN TIME (INR)    Collection Time: 08/02/22  2:07 PM   Result Value Ref Range    PT 11.9 (L) 12.0 - 14.6 sec    INR 0.88 0.87 - 1.13   ESTIMATED GFR    Collection Time: 08/02/22  2:07 PM   Result Value Ref Range    GFR (CKD-EPI) 108 >60 mL/min/1.73 m 2   DIFFERENTIAL MANUAL    Collection Time: 08/02/22  2:07 PM   Result Value Ref Range    Bands-Stabs 3.50 0.00 - 10.00 %    Metamyelocytes 1.80 %    Myelocytes 1.80 %    Manual Diff Status PERFORMED    PERIPHERAL SMEAR REVIEW    Collection Time: 08/02/22  2:07 PM   Result Value Ref Range    Peripheral Smear Review see below    PLATELET ESTIMATE    Collection Time: 08/02/22  2:07 PM   Result Value Ref Range    Plt Estimation Normal    MORPHOLOGY     Collection Time: 08/02/22  2:07 PM   Result Value Ref Range    RBC Morphology Normal        Labs reviewed by me.         Imaging reviewed by me:     CT-CTA HEAD WITH & W/O-POST PROCESS   Final Result         1. No hemodynamically significant narrowing of the major intracranial vessels.      2. Redemonstration of large parenchymal hemorrhage in the left cerebellum      CT-CTA NECK WITH & W/O-POST PROCESSING   Final Result      1. No evidence of flow-limiting stenosis or dissection in the cervical carotid or cervical vertebral arteries.      CT-HEAD W/O   Final Result      Large 4.6 x 3.4 cm intraparenchymal hematoma centered in the left cerebellar hemisphere. Associated mass effect results in effacement of the basal cisterns and fourth ventricle. No evidence of hydrocephalus.      Findings were discussed with Dr. JANET ARELLANO on 8/2/2022 2:45 PM.          Modified Indira Scale (MRS): 0 = No symptoms      ASSESSMENT AND PLAN:  57-year old female with no known PMhx (?hypertension, details limited, no current medications) who presented to [facility] on 8/2/22 for a chief complaint of altered mentation, headache, dizziness and nausea; here she was found to have , SBP persistently in the 200s. Stat CT head on arrival revealed large Right cerebellar hemorrhage with associated mass effect/edema and effacement of 3rd/4th ventricles/basilar cisterns; CTA with no obvious vascular process nor aneurysm. Etiology most likely hypertensive. Plan now for urgent OR per Dr. Vences for decompressive sub occipital crani ?possible hematoma evacuation; there after blood pressure control and hypertonic tx.      Recommendations/Plan:     -q2h and PRN neuro assessment. VS per nursing/unit protocol. Please call neurology urgently with changes in exam.   --150; Nicardipine gtt, PRN labetalol, hydralazine.   -No anticoagulation/antithrombotics at this time.   -Maintain strict euthermia, euglycemia, eunatremia. Current  Na is 142; plan for hypertonic tx (3% saline) for Na goal 145-155 post operatively.   -PT/OT/SLP eval and treat when appropriate.    -Will defer all other medical management to ICU team.   -DVT PPX: SCDs.     To OR now per neurosurgery; will follow closely post op. The plan of care above has been discussed with Dr. Romel Dickens. Please call with questions.     HALEY VallejoP.R.N.  [facility]

## 2022-08-02 NOTE — ANESTHESIA PREPROCEDURE EVALUATION
Case: [id number] Date/Time: 08/02/22 1600    Procedure: CRANIOTOMY - Posterior  Prone  Pins    Pre-op diagnosis: intraparenchymal hematoma     Location: [facility] OR 07 / SURGERY [facility]    Surgeons: Pedro Pablo Vences M.D.          Relevant Problems   No relevant active problems     Anes H&P:  PAST MEDICAL HISTORY:   57 y.o. female who presents for Procedure(s):  CRANIOTOMY.  She has current and past medical problems significant for:    No past medical history on file.    SMOKING/ALCOHOL/RECREATIONAL DRUG USE:  Social History     Tobacco Use   • Smoking status: Never Smoker   • Smokeless tobacco: Never Used   Substance Use Topics   • Alcohol use: No   • Drug use: No     Social History     Substance and Sexual Activity   Drug Use No       PAST SURGICAL HISTORY:  No past surgical history on file.    ALLERGIES:   No Known Allergies    MEDICATIONS:  No current facility-administered medications on file prior to encounter.     No current outpatient medications on file prior to encounter.       LABS:  Lab Results   Component Value Date/Time    HEMOGLOBIN 15.8 08/02/2022 1407    HEMATOCRIT 47.6 (H) 08/02/2022 1407    WBC 14.1 (H) 08/02/2022 1407     Lab Results   Component Value Date/Time    SODIUM 142 08/02/2022 1407    POTASSIUM 3.1 (L) 08/02/2022 1407    CHLORIDE 103 08/02/2022 1407    CO2 23 08/02/2022 1407    GLUCOSE 247 (H) 08/02/2022 1407    BUN 8 08/02/2022 1407    CALCIUM 8.9 08/02/2022 1407         PREVIOUS ANESTHETICS: See EMR  __________________________________________      Physical Exam    Airway - unable to assess       Cardiovascular    Dental    Pulmonary    Abdominal    Neurological - sedated/unconcious                 Anesthesia Plan    ASA 4- EMERGENT   ASA physical status 4 criteria: CVA or TIA - recent (< 3 months)ASA physical status emergent criteria: neurologic compromise requiring immediate intervention    Plan - general       Airway plan will be ETT          Induction: intravenous    Postoperative Plan:  Postoperative administration of opioids is intended.    Pertinent diagnostic labs and testing reviewed    Informed Consent:    Anesthetic plan and risks discussed with patient.    Use of blood products discussed with: patient whom consented to blood products.

## 2022-08-02 NOTE — ED TRIAGE NOTES
"Chief Complaint   Patient presents with   • Fatigue     SInce this afternoon at a . Pt falling asleep during triage, arousable to painful stimuli. Pt oriented x 4 when awake.    • N/V     This afternoon. Hx T2 DM. FSBS in triage 230       BP (!) 259/128   Pulse (!) 58   Temp 36.2 °C (97.1 °F) (Temporal)   Resp 13   Ht 1.499 m (4' 11\")   Wt 68 kg (150 lb)   SpO2 92%   BMI 30.30 kg/m²     "

## 2022-08-03 ENCOUNTER — APPOINTMENT (OUTPATIENT)
Dept: RADIOLOGY | Facility: MEDICAL CENTER | Age: 57
DRG: 023 | End: 2022-08-03
Attending: INTERNAL MEDICINE
Payer: COMMERCIAL

## 2022-08-03 PROBLEM — I10 HYPERTENSION: Status: ACTIVE | Noted: 2022-08-03

## 2022-08-03 LAB
ALBUMIN SERPL BCP-MCNC: 4 G/DL (ref 3.2–4.9)
ALBUMIN/GLOB SERPL: 1.5 G/DL
ALP SERPL-CCNC: 95 U/L (ref 30–99)
ALT SERPL-CCNC: 18 U/L (ref 2–50)
ANION GAP SERPL CALC-SCNC: 15 MMOL/L (ref 7–16)
AST SERPL-CCNC: 14 U/L (ref 12–45)
BASE EXCESS BLDA CALC-SCNC: 1 MMOL/L (ref -4–3)
BILIRUB SERPL-MCNC: 0.3 MG/DL (ref 0.1–1.5)
BODY TEMPERATURE: ABNORMAL DEGREES
BREATHS SETTING VENT: 22
BUN SERPL-MCNC: 10 MG/DL (ref 8–22)
CALCIUM SERPL-MCNC: 8.5 MG/DL (ref 8.5–10.5)
CHLORIDE SERPL-SCNC: 109 MMOL/L (ref 96–112)
CO2 BLDA-SCNC: 27 MMOL/L (ref 20–33)
CO2 SERPL-SCNC: 22 MMOL/L (ref 20–33)
CREAT SERPL-MCNC: 0.53 MG/DL (ref 0.5–1.4)
DELSYS IDSYS: ABNORMAL
EKG IMPRESSION: NORMAL
ERYTHROCYTE [DISTWIDTH] IN BLOOD BY AUTOMATED COUNT: 41.4 FL (ref 35.9–50)
GFR SERPLBLD CREATININE-BSD FMLA CKD-EPI: 108 ML/MIN/1.73 M 2
GLOBULIN SER CALC-MCNC: 2.7 G/DL (ref 1.9–3.5)
GLUCOSE BLD STRIP.AUTO-MCNC: 146 MG/DL (ref 65–99)
GLUCOSE BLD STRIP.AUTO-MCNC: 215 MG/DL (ref 65–99)
GLUCOSE BLD STRIP.AUTO-MCNC: 245 MG/DL (ref 65–99)
GLUCOSE BLD STRIP.AUTO-MCNC: 280 MG/DL (ref 65–99)
GLUCOSE SERPL-MCNC: 268 MG/DL (ref 65–99)
HCO3 BLDA-SCNC: 25.9 MMOL/L (ref 17–25)
HCT VFR BLD AUTO: 40.5 % (ref 37–47)
HGB BLD-MCNC: 13.6 G/DL (ref 12–16)
HOROWITZ INDEX BLDA+IHG-RTO: 298 MM[HG]
MAGNESIUM SERPL-MCNC: 1.9 MG/DL (ref 1.5–2.5)
MCH RBC QN AUTO: 30 PG (ref 27–33)
MCHC RBC AUTO-ENTMCNC: 33.6 G/DL (ref 33.6–35)
MCV RBC AUTO: 89.4 FL (ref 81.4–97.8)
MODE IMODE: ABNORMAL
O2/TOTAL GAS SETTING VFR VENT: 60 %
PCO2 BLDA: 43.4 MMHG (ref 26–37)
PCO2 TEMP ADJ BLDA: 44.8 MMHG (ref 26–37)
PEEP END EXPIRATORY PRESSURE IPEEP: 8 CMH20
PH BLDA: 7.38 [PH] (ref 7.4–7.5)
PH TEMP ADJ BLDA: 7.37 [PH] (ref 7.4–7.5)
PHOSPHATE SERPL-MCNC: 3.7 MG/DL (ref 2.5–4.5)
PLATELET # BLD AUTO: 256 K/UL (ref 164–446)
PMV BLD AUTO: 10.4 FL (ref 9–12.9)
PO2 BLDA: 179 MMHG (ref 64–87)
PO2 TEMP ADJ BLDA: 183 MMHG (ref 64–87)
POTASSIUM SERPL-SCNC: 3.2 MMOL/L (ref 3.6–5.5)
PROT SERPL-MCNC: 6.7 G/DL (ref 6–8.2)
RBC # BLD AUTO: 4.53 M/UL (ref 4.2–5.4)
SAO2 % BLDA: 100 % (ref 93–99)
SODIUM SERPL-SCNC: 146 MMOL/L (ref 135–145)
SODIUM SERPL-SCNC: 150 MMOL/L (ref 135–145)
SODIUM SERPL-SCNC: 154 MMOL/L (ref 135–145)
SPECIMEN DRAWN FROM PATIENT: ABNORMAL
TIDAL VOLUME IVT: 280 ML
WBC # BLD AUTO: 14 K/UL (ref 4.8–10.8)

## 2022-08-03 PROCEDURE — 700105 HCHG RX REV CODE 258

## 2022-08-03 PROCEDURE — 71045 X-RAY EXAM CHEST 1 VIEW: CPT

## 2022-08-03 PROCEDURE — 700102 HCHG RX REV CODE 250 W/ 637 OVERRIDE(OP)

## 2022-08-03 PROCEDURE — 94150 VITAL CAPACITY TEST: CPT

## 2022-08-03 PROCEDURE — 84295 ASSAY OF SERUM SODIUM: CPT

## 2022-08-03 PROCEDURE — 85027 COMPLETE CBC AUTOMATED: CPT

## 2022-08-03 PROCEDURE — 93010 ELECTROCARDIOGRAM REPORT: CPT | Performed by: INTERNAL MEDICINE

## 2022-08-03 PROCEDURE — A9270 NON-COVERED ITEM OR SERVICE: HCPCS | Performed by: INTERNAL MEDICINE

## 2022-08-03 PROCEDURE — A9270 NON-COVERED ITEM OR SERVICE: HCPCS | Performed by: STUDENT IN AN ORGANIZED HEALTH CARE EDUCATION/TRAINING PROGRAM

## 2022-08-03 PROCEDURE — 700111 HCHG RX REV CODE 636 W/ 250 OVERRIDE (IP): Performed by: STUDENT IN AN ORGANIZED HEALTH CARE EDUCATION/TRAINING PROGRAM

## 2022-08-03 PROCEDURE — 770022 HCHG ROOM/CARE - ICU (200)

## 2022-08-03 PROCEDURE — 02HV33Z INSERTION OF INFUSION DEVICE INTO SUPERIOR VENA CAVA, PERCUTANEOUS APPROACH: ICD-10-PCS | Performed by: STUDENT IN AN ORGANIZED HEALTH CARE EDUCATION/TRAINING PROGRAM

## 2022-08-03 PROCEDURE — 82803 BLOOD GASES ANY COMBINATION: CPT

## 2022-08-03 PROCEDURE — 700111 HCHG RX REV CODE 636 W/ 250 OVERRIDE (IP): Performed by: NURSE PRACTITIONER

## 2022-08-03 PROCEDURE — A9270 NON-COVERED ITEM OR SERVICE: HCPCS

## 2022-08-03 PROCEDURE — 83735 ASSAY OF MAGNESIUM: CPT

## 2022-08-03 PROCEDURE — 99291 CRITICAL CARE FIRST HOUR: CPT | Performed by: INTERNAL MEDICINE

## 2022-08-03 PROCEDURE — 92610 EVALUATE SWALLOWING FUNCTION: CPT

## 2022-08-03 PROCEDURE — 80053 COMPREHEN METABOLIC PANEL: CPT

## 2022-08-03 PROCEDURE — 84100 ASSAY OF PHOSPHORUS: CPT

## 2022-08-03 PROCEDURE — 700102 HCHG RX REV CODE 250 W/ 637 OVERRIDE(OP): Performed by: STUDENT IN AN ORGANIZED HEALTH CARE EDUCATION/TRAINING PROGRAM

## 2022-08-03 PROCEDURE — 700111 HCHG RX REV CODE 636 W/ 250 OVERRIDE (IP): Performed by: INTERNAL MEDICINE

## 2022-08-03 PROCEDURE — C1751 CATH, INF, PER/CENT/MIDLINE: HCPCS

## 2022-08-03 PROCEDURE — 99233 SBSQ HOSP IP/OBS HIGH 50: CPT | Performed by: NURSE PRACTITIONER

## 2022-08-03 PROCEDURE — 700102 HCHG RX REV CODE 250 W/ 637 OVERRIDE(OP): Performed by: INTERNAL MEDICINE

## 2022-08-03 PROCEDURE — 37799 UNLISTED PX VASCULAR SURGERY: CPT

## 2022-08-03 PROCEDURE — 700111 HCHG RX REV CODE 636 W/ 250 OVERRIDE (IP)

## 2022-08-03 PROCEDURE — 82962 GLUCOSE BLOOD TEST: CPT | Mod: 91

## 2022-08-03 PROCEDURE — 94003 VENT MGMT INPAT SUBQ DAY: CPT

## 2022-08-03 RX ORDER — POTASSIUM CHLORIDE 7.45 MG/ML
10 INJECTION INTRAVENOUS
Status: COMPLETED | OUTPATIENT
Start: 2022-08-03 | End: 2022-08-03

## 2022-08-03 RX ORDER — SCOLOPAMINE TRANSDERMAL SYSTEM 1 MG/1
1 PATCH, EXTENDED RELEASE TRANSDERMAL
Status: DISCONTINUED | OUTPATIENT
Start: 2022-08-03 | End: 2022-08-12 | Stop reason: HOSPADM

## 2022-08-03 RX ADMIN — POTASSIUM CHLORIDE 10 MEQ: 7.46 INJECTION, SOLUTION INTRAVENOUS at 09:00

## 2022-08-03 RX ADMIN — ENOXAPARIN SODIUM 40 MG: 40 INJECTION SUBCUTANEOUS at 17:32

## 2022-08-03 RX ADMIN — ACETAMINOPHEN 650 MG: 650 SUPPOSITORY RECTAL at 10:12

## 2022-08-03 RX ADMIN — FAMOTIDINE 20 MG: 10 INJECTION INTRAVENOUS at 05:09

## 2022-08-03 RX ADMIN — POTASSIUM CHLORIDE 10 MEQ: 7.46 INJECTION, SOLUTION INTRAVENOUS at 13:34

## 2022-08-03 RX ADMIN — SODIUM CHLORIDE 500 ML: 3 INJECTION, SOLUTION INTRAVENOUS at 19:04

## 2022-08-03 RX ADMIN — FENTANYL CITRATE 25 MCG: 50 INJECTION, SOLUTION INTRAMUSCULAR; INTRAVENOUS at 10:11

## 2022-08-03 RX ADMIN — INSULIN HUMAN 2 UNITS: 100 INJECTION, SOLUTION PARENTERAL at 12:07

## 2022-08-03 RX ADMIN — MINERAL OIL, PETROLATUM 1 APPLICATION: 425; 573 OINTMENT OPHTHALMIC at 05:09

## 2022-08-03 RX ADMIN — SCOPOLAMINE 1 PATCH: 1.5 PATCH, EXTENDED RELEASE TRANSDERMAL at 07:40

## 2022-08-03 RX ADMIN — POTASSIUM CHLORIDE 10 MEQ: 7.46 INJECTION, SOLUTION INTRAVENOUS at 14:43

## 2022-08-03 RX ADMIN — CEFAZOLIN 2 G: 10 INJECTION, POWDER, FOR SOLUTION INTRAVENOUS at 05:08

## 2022-08-03 RX ADMIN — INSULIN HUMAN 2 UNITS: 100 INJECTION, SOLUTION PARENTERAL at 05:09

## 2022-08-03 RX ADMIN — HYDRALAZINE HYDROCHLORIDE 10 MG: 20 INJECTION INTRAMUSCULAR; INTRAVENOUS at 20:03

## 2022-08-03 RX ADMIN — HYDRALAZINE HYDROCHLORIDE 10 MG: 20 INJECTION INTRAMUSCULAR; INTRAVENOUS at 01:02

## 2022-08-03 RX ADMIN — HYDRALAZINE HYDROCHLORIDE 10 MG: 20 INJECTION INTRAMUSCULAR; INTRAVENOUS at 03:44

## 2022-08-03 RX ADMIN — FAMOTIDINE 20 MG: 10 INJECTION INTRAVENOUS at 17:32

## 2022-08-03 RX ADMIN — HYDRALAZINE HYDROCHLORIDE 10 MG: 20 INJECTION INTRAMUSCULAR; INTRAVENOUS at 05:16

## 2022-08-03 RX ADMIN — POTASSIUM CHLORIDE 10 MEQ: 7.46 INJECTION, SOLUTION INTRAVENOUS at 12:29

## 2022-08-03 ASSESSMENT — PAIN DESCRIPTION - PAIN TYPE
TYPE: ACUTE PAIN
TYPE: ACUTE PAIN;SURGICAL PAIN
TYPE: ACUTE PAIN

## 2022-08-03 ASSESSMENT — PULMONARY FUNCTION TESTS: FVC: 0.7

## 2022-08-03 ASSESSMENT — COPD QUESTIONNAIRES
DURING THE PAST 4 WEEKS HOW MUCH DID YOU FEEL SHORT OF BREATH: SOME OF THE TIME
HAVE YOU SMOKED AT LEAST 100 CIGARETTES IN YOUR ENTIRE LIFE: NO/DON'T KNOW
DO YOU EVER COUGH UP ANY MUCUS OR PHLEGM?: NO/ONLY WITH OCCASIONAL COLDS OR INFECTIONS
COPD SCREENING SCORE: 2

## 2022-08-03 NOTE — PROGRESS NOTES
Critical Care Progress Note    Date of admission  2022    Chief Complaint  57 y.o. female with no known past medical history, does not take medications, presents to the emergency department due to sudden onset syncope, altered mentation after attending her family member's  today.  Arrived to the ED with systolic blood pressure ~260 and stat head CT revealed a large right cerebellar hemorrhage with mass-effect and effacement of the ventricle.  Neurology and neurosurgery were consulted and she was taken to the OR emergently for decompressive craniectomy.    Hospital Course   admitted taken to OR for posterior fossa craniectomy for evacuation of intraparenchymal hemorrhage     Interval Problem Update  Reviewed last 24 hour events:  Neuro: rass 0 aox4 cam negative, perrla, no pain, moving all ext   HR: episode down to 34-but most of the time 50-70's  SBP: 150-190's, 3 pushes of prn's given  Tmax: 100  GI: npo, pending swallow eval  UOP: good urine output  Lines: artline, peripheral lines and benjamin  Resp: extubated to 2l n/c   Vte: cleared for lovenox tonight  PPI/H2:n/a  Antibx: none    CXR -> reviewed  Extubate  Scop patch  Sliding scale 7 units monitor need next 24 hrs  Continue hypertonic sodium therapy with review post op CT scan    Review of Systems  Review of Systems   Unable to perform ROS: Intubated        Vital Signs for last 24 hours   Temp:  [36.1 °C (97 °F)-36.2 °C (97.1 °F)] 36.1 °C (97 °F)  Pulse:  [54-79] 75  Resp:  [12-29] 29  BP: (117-259)/() 148/66  SpO2:  [92 %-100 %] 95 %    Hemodynamic parameters for last 24 hours       Respiratory Information for the last 24 hours  Vent Mode: Spont  Rate (breaths/min): 22  Vt Target (mL): 280  PEEP/CPAP: 8  P Support: 5  MAP: 9.5  Length of Weaning Trial (Hours): 2 ongoing    Physical Exam   Physical Exam  Vitals and nursing note reviewed.   Constitutional:       Appearance: Normal appearance.      Comments: Patient is on ventilator awake with  SAT   HENT:      Mouth/Throat:      Mouth: Mucous membranes are moist.      Comments: ET in place  Eyes:      Pupils: Pupils are equal, round, and reactive to light.   Cardiovascular:      Rate and Rhythm: Normal rate.   Pulmonary:      Effort: No respiratory distress.      Breath sounds: No stridor. No wheezing or rhonchi.      Comments: Mechanical breath bilateral  Abdominal:      General: There is no distension.      Palpations: There is no mass.      Tenderness: There is no abdominal tenderness. There is no guarding or rebound.      Hernia: No hernia is present.   Musculoskeletal:         General: No swelling or tenderness.   Skin:     Coloration: Skin is not jaundiced or pale.   Neurological:      Mental Status: She is alert.      Comments: She is wide awake on ventilator with follow commands quickly, no facial droop, EOMI no dysconjugate gaze or diplopia, able to stick out tongue, no pronator drift, no leg drift.    Psychiatric:      Comments: Seems appropriate         Medications  Current Facility-Administered Medications   Medication Dose Route Frequency Provider Last Rate Last Admin   • scopolamine (TRANSDERM-SCOP) patch 1 Patch  1 Patch Transdermal Q72HRS Parish Brenner M.D.   1 Patch at 08/03/22 0740   • potassium chloride (KCL) ivpb 10 mEq  10 mEq Intravenous Q HOUR Parish Brenner M.D.   Stopped at 08/03/22 0945   • Respiratory Therapy Consult   Nebulization Continuous RT Baljinder George M.D.       • midazolam (Versed) injection 5 mg  5 mg Intravenous Q5 MIN PRN Baljinder George M.D.       • acetaminophen (TYLENOL) suppository 650 mg  650 mg Rectal Q4HRS PRN Baljinder George M.D.   650 mg at 08/03/22 1012   • MD Alert...ICU Electrolyte Replacement per Pharmacy   Other PHARMACY TO DOSE Baljinder George M.D.       • niCARdipine (CARDENE) 25 mg in  mL Infusion  0-15 mg/hr Intravenous Continuous Baljinder George M.D.       • insulin regular (HumuLIN R,NovoLIN R) injection  1-6 Units Subcutaneous Q6HRS  Baljinder George M.D.   2 Units at 08/03/22 1207    And   • dextrose 50% (D50W) injection 25 g  25 g Intravenous Q15 MIN PRN Baljinder George M.D.       • Pharmacy Consult Request ...Pain Management Review 1 Each  1 Each Other PHARMACY TO DOSE Amanda Georges P.A.-C.       • MD ALERT...DO NOT ADMINISTER NSAIDS or ASPIRIN unless ORDERED By Neurosurgery 1 Each  1 Each Other PRN Amanda Georges, P.A.-C.       • diphenhydrAMINE (BENADRYL) injection 25 mg  25 mg Intravenous Q6HRS PRN Amanda Georges, P.A.-C.       • labetalol (NORMODYNE/TRANDATE) injection 10 mg  10 mg Intravenous Q HOUR PRN Amanda Georges, P.A.-C.       • bisacodyl (DULCOLAX) suppository 10 mg  10 mg Rectal Q24HRS PRN Amanda Georges, P.A.-C.       • sodium phosphate (Fleet) enema 133 mL  1 Each Rectal Once PRN Amanda Georges, P.A.-C.       • artificial tears (EYE LUBRICANT) ophth ointment 1 Application  1 Application Both Eyes Q8HRS Amanda Georges, P.A.-C.   1 Application at 08/03/22 0509   • enoxaparin (Lovenox) inj 40 mg  40 mg Subcutaneous DAILY AT 1800 Amanda Georges, P.A.-C.       • hydrALAZINE (APRESOLINE) injection 10 mg  10 mg Intravenous Q HOUR PRN Amanda Georges, P.A.-C.   10 mg at 08/03/22 0516   • cloNIDine (CATAPRES) tablet 0.1 mg  0.1 mg Enteral Tube Q4HRS PRN Amanda Georges, P.A.-C.       • 3% sodium chloride (HYPERTONIC SALINE) 500mL infusion 500 mL  500 mL Intravenous Continuous mAanda Georges, P.A.-C. 20 mL/hr at 08/03/22 1129 Rate Change not Required at 08/03/22 1129   • benzocaine-menthol (Cepacol) lozenge 1 Lozenge  1 Lozenge Mouth/Throat Q2HRS PRN Amanda Georges P.A.-C.       • acetaminophen (TYLENOL) tablet 500 mg  500 mg Enteral Tube Q6HRS PRN CASEY Byrd.A.-C.       • methocarbamol (ROBAXIN) tablet 750 mg  750 mg Enteral Tube Q8HRS PRN Amanda Georges P.A.-C.       • fentaNYL (SUBLIMAZE) injection 50 mcg  50 mcg Intravenous Q15 MIN PRN Baljinder George M.D.        And   • fentaNYL (SUBLIMAZE) injection 100 mcg  100 mcg  Intravenous Q15 MIN PRN Baljinder George M.D.        And   • fentaNYL (SUBLIMAZE) 50 mcg/mL in 50mL (Continuous Infusion)   Intravenous Continuous Baljinder George M.D.        And   • propofol (DIPRIVAN) injection  0-40 mcg/kg/min Intravenous Continuous Baljinder George M.D.       • famotidine (PEPCID) tablet 20 mg  20 mg Enteral Tube Q12HRS Baljinder George M.D.        Or   • famotidine (PEPCID) injection 20 mg  20 mg Intravenous Q12HRS Baljinder George M.D.   20 mg at 08/03/22 0509   • lidocaine (XYLOCAINE) 1 % injection 2 mL  2 mL Tracheal Tube Q30 MIN PRN Baljinder George M.D.       • magnesium hydroxide (MILK OF MAGNESIA) suspension 30 mL  30 mL Enteral Tube QDAY PRN Amanda Georges, P.A.-C.       • ondansetron (ZOFRAN ODT) dispertab 4 mg  4 mg Enteral Tube Q4HRS PRN Amanda Georges, P.A.-C.        Or   • ondansetron (ZOFRAN) syringe/vial injection 4 mg  4 mg Intravenous Q4HRS PRN Amanda Goerges, P.A.-C.       • polyethylene glycol/lytes (MIRALAX) PACKET 1 Packet  1 Packet Enteral Tube BID PRN Amanda Georges, P.A.-C.       • senna-docusate (PERICOLACE or SENOKOT S) 8.6-50 MG per tablet 1 Tablet  1 Tablet Enteral Tube Nightly Amanda Georges, P.A.-C.       • senna-docusate (PERICOLACE or SENOKOT S) 8.6-50 MG per tablet 1 Tablet  1 Tablet Enteral Tube Q24HRS PRN Amanda Georges, P.A.-C.       • docusate sodium (Colace) oral solution 100 mg  100 mg Enteral Tube BID Amanda Georges, P.A.-C.           Fluids    Intake/Output Summary (Last 24 hours) at 8/3/2022 1211  Last data filed at 8/3/2022 1000  Gross per 24 hour   Intake 1680 ml   Output 4795 ml   Net -3115 ml       Laboratory  Recent Labs     08/02/22  1947 08/03/22  0250   ISTATAPH 7.355* 7.383*   ISTATAPCO2 41.0* 43.4*   ISTATAPO2 144* 179*   ISTATATCO2 24 27   XSEOQPC0UDP 99 100*   ISTATARTHCO3 22.9 25.9*   ISTATARTBE -3 1   ISTATTEMP 97.2 F 99.9 F   ISTATFIO2 60 60   ISTATSPEC Arterial Arterial   ISTATAPHTC 7.366* 7.372*   PUKLONFW3DS 139* 183*         Recent Labs      08/02/22 1407 08/02/22  1943 08/03/22  0250 08/03/22  0939   SODIUM 142 137 146* 150*   POTASSIUM 3.1*  --  3.2*  --    CHLORIDE 103  --  109  --    CO2 23  --  22  --    BUN 8  --  10  --    CREATININE 0.53  --  0.53  --    MAGNESIUM  --   --  1.9  --    PHOSPHORUS  --   --  3.7  --    CALCIUM 8.9  --  8.5  --      Recent Labs     08/02/22 1407 08/03/22  0250   ALTSGPT 22 18   ASTSGOT 23 14   ALKPHOSPHAT 118* 95   TBILIRUBIN 0.5 0.3   GLUCOSE 247* 268*     Recent Labs     08/02/22 1407 08/03/22  0250   WBC 14.1* 14.0*   NEUTSPOLYS 71.00  --    LYMPHOCYTES 17.50*  --    MONOCYTES 2.60  --    EOSINOPHILS 1.80  --    BASOPHILS 0.00  --    ASTSGOT 23 14   ALTSGPT 22 18   ALKPHOSPHAT 118* 95   TBILIRUBIN 0.5 0.3     Recent Labs     08/02/22 1407 08/03/22  0250   RBC 5.32 4.53   HEMOGLOBIN 15.8 13.6   HEMATOCRIT 47.6* 40.5   PLATELETCT 264 256   PROTHROMBTM 11.9*  --    APTT 25.8  --    INR 0.88  --        Imaging  X-Ray:  I have personally reviewed the images and compared with prior images.  CT:    Reviewed    Assessment/Plan  * Cerebellar hemorrhage, acute (HCC)- (present on admission)  Assessment & Plan  Neurosurgery consult: Dr Vences s/p 8/2 posterior fossa craniectomy for evacuation of intraparenchymal hemorrhage with great operative result seen on postoperative CT  SBP goal < 160  Aspiration precautions  Head of bed 30 degress   Maintain CPP > 60-70  Neuro check Q 2 and pupilometer  Seizure prophylaxis: n/a  Maintain euvolemia  Sodium goal: 150-160 due to still with significant swelling in posterior fossa and effacement of 4th vent        Hypertension  Assessment & Plan  Was severely hypertensive to 260 on presentation  Strict SBP < 160  Will start oral medication once cleared by speech  PRN's and nicardipine gtt in place    Hyperglycemia- (present on admission)  Assessment & Plan  A1c  Sliding scale for now    Hypokalemia- (present on admission)  Assessment & Plan  Replete for K of 4       VTE:  Lovenox  tonight  Ulcer: Not Indicated  Lines: Central Line  PICC    I have performed a physical exam and reviewed and updated ROS and Plan today (8/3/2022). In review of yesterday's note (8/2/2022), there are no changes except as documented above.     Discussed patient condition and risk of morbidity and/or mortality with RN, RT, Pharmacy, Charge nurse / hot rounds, Patient and neurology and neurosurgery     The patient remains critically ill with ongoing hypertonic saline and monitor for hydrocephalus and extubation trial.  Critical care time = 55 minutes in directly providing and coordinating critical care and extensive data review.  No time overlap and excludes procedures.

## 2022-08-03 NOTE — OP REPORT
DATE OF SERVICE:  2022     PREOPERATIVE DIAGNOSIS:  Left cerebellar intraparenchymal hemorrhage causing   brainstem compression.     POSTOPERATIVE DIAGNOSIS:  Left cerebellar intraparenchymal hemorrhage causing   brainstem compression.     PROCEDURES PERFORMED:  1.  Suboccipital craniectomy.  2.  Evacuation of left cerebellar intraparenchymal hemorrhage.  3.  Duraplasty.  4.  C1 laminectomy.     SURGEON:  Pedro Pablo Vences MD     ASSISTANT:  Cheryl Dueñas PA-C     ANESTHESIOLOGIST:  Mario Ames MD     ANESTHESIA:  General endotracheal anesthesia.     COMPLICATIONS:  None.     FINDINGS:  Large intraparenchymal hemorrhage, now evacuated.     BLOOD LOSS:  200 mL.     INDICATIONS FOR PROCEDURE:  The patient is a 57-year-old female who was   attending her mother's  today when she started feeling sick.  The   patient went to the bathroom and was later found down in the bathroom.  The   patient was brought to the emergency department.  She was hypertensive with   systolics in the 200s and CT scan demonstrated a large left cerebellar   intraparenchymal hemorrhage with mass effect on the brainstem.  The patient   was GCS 14; however, becoming increasingly sleepy.  Given the size of the   hemorrhage and the patient's declining exam, decision was made to take the   patient to the operating room.  Risks and benefits were discussed with the   patient's family.  Risks include bleeding, infection, CSF leak,   pseudomeningocele, delayed hydrocephalus.  There is also risk of damage to   neural structures that could result in stroke, paralysis, and loss of bowel and bladder function.  There is also   the risk of general anesthetic, includes, but not limited to inability to   extubate, stroke, myocardial infarction, and rare instances death.  Despite   these risks, the patient's family wished to proceed with the procedure.     DESCRIPTION OF PROCEDURE:  The patient was brought to the operating room after    informed consent was obtained by her daughters.  She was induced by   anesthesia and then her head was placed in the Mcbride clamp and carefully   flipped prone on a Skytron OR bed.  Mcbride clamp was hooked to the Mcbride   head corea and all pressure points were padded.  The neck was flexed to allow   better access to the suboccipital area.  Strip of hair was shaved and a   midline incision was marked out from the inion to C2.  The patient was prepped   and draped in sterile fashion.  Preoperative antibiotics were given and a   timeout occurred.  A midline incision was made from approximately the inion to   C2.  Using Bovie electrocautery, I dissected down to the level of the   occiput.  This was followed down to the level of the foramen magnum before   identifying C1.  I widened the dissection over the left side where the   intraparenchymal hemorrhage was located.  With adequate exposure, I drilled   several dirk holes over the suboccipital area with more exposure on the left   side.  I then used a craniotome to complete the craniectomy; however, had   issues extending the cuts to the level of the foramen magnum.  Instead, I bit   the rest of the bone off the occiput using Kerrison rongeurs.  At this point,   the occipital area was decompressed with more exposure on the left side.    Next, I performed a C1 laminectomy.  Using high-speed bur, I drilled   laminectomy troughs bilaterally and then removed the lamina with a rongeur.    This decompressed the level of the foramen magnum.  I allowed additional space   for potential swelling.  I obtained hemostasis and then made a cut through   the dura over the left cerebellar hemisphere to the midline over the   cerebellar tonsils.  I reflected the dura and encountered the hematoma   immediately.  I opened up the area of the hematoma by performing a   corticectomy with bipolar and then there was coagulated blood in this area.    The clot was removed with suction and  bipolar down to the level of healthy   cerebellum.  Once the clot was evacuated, I irrigated out the wound and then   used Surgiflo and Gelfoam to obtain hemostasis.  I removed the Gelfoam,   irrigated it again and verified that there was hemostasis.  I lined the cavity   with Surgicel to prevent further bleeding and then performed a duraplasty.  I   used a Medtronic dural patch that was cut to size and sewed it with Nurolon   suture to the dura to allow for additional space for the brain to swell.  I   sealed the patch with Tisseel.  I irrigated the wound and obtained final   hemostasis.  I then closed the wound in layers with the fascia being closed   with 0 Vicryl, dermis with 2-0 Vicryl and the skin with a running nylon   suture.  Sponge and needle counts were correct x2.  The patient was returned   to anesthesia and taken to the ICU intubated.        ______________________________  MD PA Krueger/MEDHAT/JAI    DD:  08/02/2022 22:03  DT:  08/02/2022 22:53    Job#:  [id number]

## 2022-08-03 NOTE — OR SURGEON
Immediate Post OP Note    PreOp Diagnosis: Left cerebellar intraparenchymal hemorrhage.      PostOp Diagnosis: Same      Procedure(s):  Posterior fossa craniectomy for evacuation of intraparenchymal hemorrhage - Wound Class: Clean with Drain    Surgeon(s):  Pedro Pablo Vences M.D.    Anesthesiologist/Type of Anesthesia:  Anesthesiologist: Mario Ames M.D./General    Surgical Staff:  Assistant: Cheryl Dueñas P.A.-C.; Amanda Georges P.A.-C.  Circulator: Kurt De Leon RAMELIA.  Relief Circulator: Lynn Chawla R.CYNTHIA.; Ramila Smith R.NGeorge  Scrub Person: Parker Mcdonough    Specimens removed if any:  * No specimens in log *    Estimated Blood Loss: 200cc    Findings: Patient tolerated well, see full op report.    Complications: None         8/2/2022 7:24 PM Amanda Georges P.A.-C.

## 2022-08-03 NOTE — PROGRESS NOTES
Chief Complaint   Patient presents with   • Fatigue     SInce this afternoon at a . Pt falling asleep during triage, arousable to painful stimuli. Pt oriented x 4 when awake.    • N/V     This afternoon. Hx T2 DM. FSBS in triage 230       Problem List Items Addressed This Visit     * (Principal) Cerebellar hemorrhage, acute (HCC)     Admit ICU  q1 hour neuro checks  Emergent decompressive craniectomy    SBP <160    PRN:  - Labetalol  - Hydralazine    Nicardipine gtt if necessary    No VTE ppx/antiplatelet   Hypertonic saline if herniation  Sodium is 142  Mannitol 50mg q6 if concern for elevated ICP  Repeat CT head w/out contrast for clinical change  MRI brain w/wo contrast (ich protocol)  MRA brain without contrast  Hold Keppra  Platelet >100k, INR <1.5  Echocardiogram    HOB > 30 degrees  PT/OT/SLP  Avoid lying flat when possible            Relevant Orders    Referral to Physiatry (PMR)    Referral to Neurology      Other Visit Diagnoses     Cerebellar hemorrhage (HCC)        Hypertensive emergency        Relevant Medications    labetalol (NORMODYNE/TRANDATE) injection 20 mg (Completed)    labetalol (NORMODYNE/TRANDATE) injection 20 mg (Completed)    niCARdipine (CARDENE) 25 mg in  mL Infusion    labetalol (NORMODYNE/TRANDATE) injection 10 mg    enoxaparin (Lovenox) inj 40 mg (Start on 8/3/2022  6:00 PM)    hydrALAZINE (APRESOLINE) injection 10 mg    cloNIDine (CATAPRES) tablet 0.1 mg    Acute intractable headache, unspecified headache type            Neurology Progress Note     History of present illness:  This is a 57-year old female with no known PMhx (?hypertension, details limited, no current medications) who presented to [facility] on 22 for a chief complaint of altered mentation, headache, dizziness and nausea. Details regarding HPI obtained via medical record. Per report, the patient was attending a  earlier today, when she was reportedly found in a bathroom minimally  responsive. Patient was brought to the hospital; here she was found to have , SBP persistently in the 200s. Stat CT head on arrival revealed large LEFT cerebellar hemorrhage with associated mass effect/edema and compression of 3rd/4th ventricles/basilar cisterns; CTA with no obvious vascular process nor aneurysm.   Currently, patient is sitting up in stretcher; arousable, lethargic, poorly interactive. Follows simple commands, moves all extremities. Further ROS is limited. ICH Score 2 (GCS 5-12, Infratentorial).    Neurology has been consulted by Dr. Baljinder George to further evaluate the findings noted above.     Interval, 8/3/22:  Patient is sitting up in bed; awake and alert. Follows commands, moves all extremities. Admits to mild headache/pain. Denies nausea/dizziness. Patient s/p Left Sub occipital crani; today is POD #1. Hypertonic saline infusing; SBP 130s-160s overnight; at goal--goal 120-160 given profoundly elevated blood pressure on arrival and thus risk of hypoperfusion. Plan to normalize BP in coming 24 hours.     No changes to HPI as was previously documented.     Past medical history:   History reviewed. No pertinent past medical history.    Past surgical history:   History reviewed. No pertinent surgical history.    Family history:   History reviewed. No pertinent family history.    Social history:   Social History     Socioeconomic History   • Marital status:      Spouse name: Not on file   • Number of children: Not on file   • Years of education: Not on file   • Highest education level: Not on file   Occupational History   • Not on file   Tobacco Use   • Smoking status: Never Smoker   • Smokeless tobacco: Never Used   Substance and Sexual Activity   • Alcohol use: No   • Drug use: No   • Sexual activity: Not on file   Other Topics Concern   • Not on file   Social History Narrative   • Not on file     Social Determinants of Health     Financial Resource Strain: Not on file   Food  Insecurity: Not on file   Transportation Needs: Not on file   Physical Activity: Not on file   Stress: Not on file   Social Connections: Not on file   Intimate Partner Violence: Not on file   Housing Stability: Not on file       Current medications:   Current Facility-Administered Medications   Medication Dose   • scopolamine (TRANSDERM-SCOP) patch 1 Patch  1 Patch   • Respiratory Therapy Consult     • midazolam (Versed) injection 5 mg  5 mg   • acetaminophen (TYLENOL) suppository 650 mg  650 mg   • MD Alert...ICU Electrolyte Replacement per Pharmacy     • niCARdipine (CARDENE) 25 mg in  mL Infusion  0-15 mg/hr   • insulin regular (HumuLIN R,NovoLIN R) injection  1-6 Units    And   • dextrose 50% (D50W) injection 25 g  25 g   • Pharmacy Consult Request ...Pain Management Review 1 Each  1 Each   • MD ALERT...DO NOT ADMINISTER NSAIDS or ASPIRIN unless ORDERED By Neurosurgery 1 Each  1 Each   • diphenhydrAMINE (BENADRYL) injection 25 mg  25 mg   • labetalol (NORMODYNE/TRANDATE) injection 10 mg  10 mg   • bisacodyl (DULCOLAX) suppository 10 mg  10 mg   • sodium phosphate (Fleet) enema 133 mL  1 Each   • artificial tears (EYE LUBRICANT) ophth ointment 1 Application  1 Application   • enoxaparin (Lovenox) inj 40 mg  40 mg   • hydrALAZINE (APRESOLINE) injection 10 mg  10 mg   • cloNIDine (CATAPRES) tablet 0.1 mg  0.1 mg   • 3% sodium chloride (HYPERTONIC SALINE) 500mL infusion 500 mL  500 mL   • benzocaine-menthol (Cepacol) lozenge 1 Lozenge  1 Lozenge   • acetaminophen (TYLENOL) tablet 500 mg  500 mg   • methocarbamol (ROBAXIN) tablet 750 mg  750 mg   • fentaNYL (SUBLIMAZE) injection 50 mcg  50 mcg    And   • fentaNYL (SUBLIMAZE) injection 100 mcg  100 mcg    And   • fentaNYL (SUBLIMAZE) 50 mcg/mL in 50mL (Continuous Infusion)      And   • propofol (DIPRIVAN) injection  0-40 mcg/kg/min   • famotidine (PEPCID) tablet 20 mg  20 mg    Or   • famotidine (PEPCID) injection 20 mg  20 mg   • lidocaine (XYLOCAINE) 1 %  injection 2 mL  2 mL   • magnesium hydroxide (MILK OF MAGNESIA) suspension 30 mL  30 mL   • ondansetron (ZOFRAN ODT) dispertab 4 mg  4 mg    Or   • ondansetron (ZOFRAN) syringe/vial injection 4 mg  4 mg   • polyethylene glycol/lytes (MIRALAX) PACKET 1 Packet  1 Packet   • senna-docusate (PERICOLACE or SENOKOT S) 8.6-50 MG per tablet 1 Tablet  1 Tablet   • senna-docusate (PERICOLACE or SENOKOT S) 8.6-50 MG per tablet 1 Tablet  1 Tablet   • docusate sodium (Colace) oral solution 100 mg  100 mg       Medication Allergy:  No Known Allergies    Review of systems:   As noted above; otherwise all systems reviewed and determined to be non contributory.      Physical examination:   Vitals:    08/03/22 0300 08/03/22 0400 08/03/22 0500 08/03/22 0600   BP:       Pulse: 65 77 68 71   Resp: 18 20 18 19   Temp:       TempSrc:  Bladder     SpO2: 98% 97% 98% 98%   Weight:       Height:         General: Patient in no acute distress  HEENT: Normocephalic, no signs of acute trauma.   Neck: supple, no meningeal signs. There is normal range of motion. No tenderness on exam.   Chest: clear to auscultation. No cough.   CV: RRR, no murmurs.   Skin: no signs of acute rashes or trauma.   Musculoskeletal: joints exhibit full range of motion, without any pain to palpation. There are no signs of joint or muscle swelling. There is no tenderness to deep palpation of muscles.   Psychiatric: No hallucinatory behavior.       NEUROLOGICAL EXAM:   Mental status, orientation: Awake, oriented x 4. Affect flattened/depressed.   Speech and language: speech is hypophonic, mildly dysarthric. Follows simple commands with repeated verbal stim.   Cranial nerve exam: Pupils are 3-4 mm bilaterally and equally reactive to light. Visual fields are intact by confrontation. There is no nystagmus on primary or secondary gaze. Intact full EOM in all directions of gaze. Face appears symmetric. Sensation in the face is intact to light touch. Uvula is midline. Palate  elevates symmetrically. Tongue is midline and without any signs of tongue biting or fasciculations.Shoulder shrug is intact bilaterally.   Motor exam: Strength is 4+/5 in all extremities. Tone is normal. No abnormal movements were seen on exam.   Sensory exam reveals normal sense of light touch and pinprick in all extremities.   Deep tendon reflexes:  Plantar responses are flexor. There is no clonus.   Coordination:  Dysmetria appreciated to BUE; mild/improved today.   Gait: Not assessed at this time as patient is unable.          ANCILLARY DATA REVIEWED:     Lab Data Review:  Recent Results (from the past 24 hour(s))   POCT glucose device results    Collection Time: 08/02/22  1:49 PM   Result Value Ref Range    POC Glucose, Blood 230 (H) 65 - 99 mg/dL   CBC WITH DIFFERENTIAL    Collection Time: 08/02/22  2:07 PM   Result Value Ref Range    WBC 14.1 (H) 4.8 - 10.8 K/uL    RBC 5.32 4.20 - 5.40 M/uL    Hemoglobin 15.8 12.0 - 16.0 g/dL    Hematocrit 47.6 (H) 37.0 - 47.0 %    MCV 89.5 81.4 - 97.8 fL    MCH 29.7 27.0 - 33.0 pg    MCHC 33.2 (L) 33.6 - 35.0 g/dL    RDW 40.7 35.9 - 50.0 fL    Platelet Count 264 164 - 446 K/uL    MPV 10.6 9.0 - 12.9 fL    Neutrophils-Polys 71.00 44.00 - 72.00 %    Lymphocytes 17.50 (L) 22.00 - 41.00 %    Monocytes 2.60 0.00 - 13.40 %    Eosinophils 1.80 0.00 - 6.90 %    Basophils 0.00 0.00 - 1.80 %    Nucleated RBC 0.00 /100 WBC    Neutrophils (Absolute) 10.50 (H) 2.00 - 7.15 K/uL    Lymphs (Absolute) 2.47 1.00 - 4.80 K/uL    Monos (Absolute) 0.37 0.00 - 0.85 K/uL    Eos (Absolute) 0.25 0.00 - 0.51 K/uL    Baso (Absolute) 0.00 0.00 - 0.12 K/uL    NRBC (Absolute) 0.00 K/uL   COMP METABOLIC PANEL    Collection Time: 08/02/22  2:07 PM   Result Value Ref Range    Sodium 142 135 - 145 mmol/L    Potassium 3.1 (L) 3.6 - 5.5 mmol/L    Chloride 103 96 - 112 mmol/L    Co2 23 20 - 33 mmol/L    Anion Gap 16.0 7.0 - 16.0    Glucose 247 (H) 65 - 99 mg/dL    Bun 8 8 - 22 mg/dL    Creatinine 0.53 0.50 -  1.40 mg/dL    Calcium 8.9 8.5 - 10.5 mg/dL    AST(SGOT) 23 12 - 45 U/L    ALT(SGPT) 22 2 - 50 U/L    Alkaline Phosphatase 118 (H) 30 - 99 U/L    Total Bilirubin 0.5 0.1 - 1.5 mg/dL    Albumin 5.0 (H) 3.2 - 4.9 g/dL    Total Protein 8.6 (H) 6.0 - 8.2 g/dL    Globulin 3.6 (H) 1.9 - 3.5 g/dL    A-G Ratio 1.4 g/dL   TROPONIN    Collection Time: 08/02/22  2:07 PM   Result Value Ref Range    Troponin T <6 6 - 19 ng/L   APTT    Collection Time: 08/02/22  2:07 PM   Result Value Ref Range    APTT 25.8 24.7 - 36.0 sec   PROTHROMBIN TIME (INR)    Collection Time: 08/02/22  2:07 PM   Result Value Ref Range    PT 11.9 (L) 12.0 - 14.6 sec    INR 0.88 0.87 - 1.13   ESTIMATED GFR    Collection Time: 08/02/22  2:07 PM   Result Value Ref Range    GFR (CKD-EPI) 108 >60 mL/min/1.73 m 2   DIFFERENTIAL MANUAL    Collection Time: 08/02/22  2:07 PM   Result Value Ref Range    Bands-Stabs 3.50 0.00 - 10.00 %    Metamyelocytes 1.80 %    Myelocytes 1.80 %    Manual Diff Status PERFORMED    PERIPHERAL SMEAR REVIEW    Collection Time: 08/02/22  2:07 PM   Result Value Ref Range    Peripheral Smear Review see below    PLATELET ESTIMATE    Collection Time: 08/02/22  2:07 PM   Result Value Ref Range    Plt Estimation Normal    MORPHOLOGY    Collection Time: 08/02/22  2:07 PM   Result Value Ref Range    RBC Morphology Normal    PLATELET MAPPING WITH BASIC TEG    Collection Time: 08/02/22  2:39 PM   Result Value Ref Range    Reaction Time Initial-R 4.6 4.6 - 9.1 min    React Time Initial Hep 5.6 4.3 - 8.3 min    Clot Kinetics-K 1.3 0.8 - 2.1 min    Clot Angle-Angle 72.1 63.0 - 78.0 degrees    Maximum Clot Strength-MA 61.4 52.0 - 69.0 mm    TEG Functional Fibrinogen(MA) 20.5 15.0 - 32.0 mm    Lysis 30 minutes-LY30 0.0 0.0 - 2.6 %    % Inhibition ADP 15.7 0.0 - 17.0 %    % Inhibition AA 6.3 0.0 - 11.0 %    TEG Algorithm Link Algorithm    EKG (NOW)    Collection Time: 08/02/22  2:48 PM   Result Value Ref Range    Report       [facility]  [place] [facility]    Test Date:  2022  Pt Name:    ANDREAS SANCHEZ                     Department: ER  MRN:        [id number]                      Room:       RD 04  Gender:     Female                       Technician: [id number]  :        1965                   Requested By:JANET ARELLANO  Order #:    [id number]                    Reading MD:    Measurements  Intervals                                Axis  Rate:       69                           P:          8  GA:         181                          QRS:        9  QRSD:       100                          T:          56  QT:         476  QTc:        510    Interpretive Statements  Sinus rhythm  LVH with secondary repolarization abnormality  Anterior ST elevation, probably due to LVH  Borderline prolonged QT interval  No previous ECG available for comparison     POCT glucose device results    Collection Time: 22  4:21 PM   Result Value Ref Range    POC Glucose, Blood 264 (H) 65 - 99 mg/dL   Lipid Profile - Fasting    Collection Time: 22  7:43 PM   Result Value Ref Range    Cholesterol,Tot 166 100 - 199 mg/dL    Triglycerides 78 0 - 149 mg/dL    HDL 46 >=40 mg/dL     (H) <100 mg/dL   Sodium Serum (NA)    Collection Time: 22  7:43 PM   Result Value Ref Range    Sodium 137 135 - 145 mmol/L   Hemoglobin A1C prior to intervention or upon arrival to the unit    Collection Time: 22  7:43 PM   Result Value Ref Range    Glycohemoglobin 7.1 (H) 4.0 - 5.6 %    Est Avg Glucose 157 mg/dL   POCT glucose device results    Collection Time: 22  7:43 PM   Result Value Ref Range    POC Glucose, Blood 244 (H) 65 - 99 mg/dL   POCT arterial blood gas device results    Collection Time: 22  7:47 PM   Result Value Ref Range    Ph 7.355 (L) 7.400 - 7.500    Pco2 41.0 (H) 26.0 - 37.0 mmHg    Po2 144 (H) 64 - 87 mmHg    Tco2 24 20 - 33 mmol/L    S02 99 93 - 99 %    Hco3 22.9 17.0 - 25.0 mmol/L    BE -3 -4 - 3 mmol/L    Body Temp 97.2 F degrees     O2 Therapy 60 %    iPF Ratio 240     Ph Temp Erasto 7.366 (L) 7.400 - 7.500    Pco2 Temp Co 39.6 (H) 26.0 - 37.0 mmHg    Po2 Temp Cor 139 (H) 64 - 87 mmHg    Specimen Arterial     DelSys Vent     Tidal Volume 280 mL    Peep End Expiratory Pressure 8 cmh20    Set Rate 22     Mode APV-CMV    POCT lactate device results    Collection Time: 22  7:47 PM   Result Value Ref Range    iStat Lactate 4.8 (HH) 0.5 - 2.0 mmol/L   EKG    Collection Time: 22  8:16 PM   Result Value Ref Range    Report       [facility]    Test Date:  2022  Pt Name:    ANDREAS SANCHEZ                     Department: [facility]  MRN:        [id number]                      Room:       [facility]  Gender:     Female                       Technician: BALA  :        1965                   Requested By:RAMIRO LYNNE  Order #:    [id number]                    Reading MD: Jeremiah Silveira MD    Measurements  Intervals                                Axis  Rate:       61                           P:          0  CA:         188                          QRS:        2  QRSD:       92                           T:          -57  QT:         552  QTc:        556    Interpretive Statements  SINUS RHYTHM  LEFT VENTRICULAR HYPERTROPHY  ABNORMAL T, CONSIDER ISCHEMIA, INFERIOR LEADS  PROLONGED QT INTERVAL  Electronically Signed On 8-3-2022 6:44:52 PDT by Jeremiah Silveira MD     POCT glucose device results    Collection Time: 22 11:23 PM   Result Value Ref Range    POC Glucose, Blood 280 (H) 65 - 99 mg/dL   CBC without Differential    Collection Time: 22  2:50 AM   Result Value Ref Range    WBC 14.0 (H) 4.8 - 10.8 K/uL    RBC 4.53 4.20 - 5.40 M/uL    Hemoglobin 13.6 12.0 - 16.0 g/dL    Hematocrit 40.5 37.0 - 47.0 %    MCV 89.4 81.4 - 97.8 fL    MCH 30.0 27.0 - 33.0 pg    MCHC 33.6 33.6 - 35.0 g/dL    RDW 41.4 35.9 - 50.0 fL    Platelet Count 256 164 - 446 K/uL    MPV 10.4 9.0 - 12.9 fL   Comp Metabolic Panel (CMP)    Collection Time: 22  2:50 AM    Result Value Ref Range    Sodium 146 (H) 135 - 145 mmol/L    Potassium 3.2 (L) 3.6 - 5.5 mmol/L    Chloride 109 96 - 112 mmol/L    Co2 22 20 - 33 mmol/L    Anion Gap 15.0 7.0 - 16.0    Glucose 268 (H) 65 - 99 mg/dL    Bun 10 8 - 22 mg/dL    Creatinine 0.53 0.50 - 1.40 mg/dL    Calcium 8.5 8.5 - 10.5 mg/dL    AST(SGOT) 14 12 - 45 U/L    ALT(SGPT) 18 2 - 50 U/L    Alkaline Phosphatase 95 30 - 99 U/L    Total Bilirubin 0.3 0.1 - 1.5 mg/dL    Albumin 4.0 3.2 - 4.9 g/dL    Total Protein 6.7 6.0 - 8.2 g/dL    Globulin 2.7 1.9 - 3.5 g/dL    A-G Ratio 1.5 g/dL   Magnesium    Collection Time: 08/03/22  2:50 AM   Result Value Ref Range    Magnesium 1.9 1.5 - 2.5 mg/dL   PHOSPHORUS    Collection Time: 08/03/22  2:50 AM   Result Value Ref Range    Phosphorus 3.7 2.5 - 4.5 mg/dL   ESTIMATED GFR    Collection Time: 08/03/22  2:50 AM   Result Value Ref Range    GFR (CKD-EPI) 108 >60 mL/min/1.73 m 2   POCT arterial blood gas device results    Collection Time: 08/03/22  2:50 AM   Result Value Ref Range    Ph 7.383 (L) 7.400 - 7.500    Pco2 43.4 (H) 26.0 - 37.0 mmHg    Po2 179 (H) 64 - 87 mmHg    Tco2 27 20 - 33 mmol/L    S02 100 (H) 93 - 99 %    Hco3 25.9 (H) 17.0 - 25.0 mmol/L    BE 1 -4 - 3 mmol/L    Body Temp 99.9 F degrees    O2 Therapy 60 %    iPF Ratio 298     Ph Temp Erasto 7.372 (L) 7.400 - 7.500    Pco2 Temp Co 44.8 (H) 26.0 - 37.0 mmHg    Po2 Temp Cor 183 (H) 64 - 87 mmHg    Specimen Arterial     DelSys Vent     Tidal Volume 280 mL    Peep End Expiratory Pressure 8 cmh20    Set Rate 22     Mode APV-CMV    POCT glucose device results    Collection Time: 08/03/22  5:07 AM   Result Value Ref Range    POC Glucose, Blood 245 (H) 65 - 99 mg/dL       Labs reviewed by me.         Imaging reviewed by me:     CT-HEAD W/O   Final Result      1.  Postsurgical changes status post occipital craniectomy with evacuation of the left cerebellar hematoma. There is a small amount of residual blood and postsurgical pneumocephalus.   2.  There  is improved mass effect on the fourth ventricle.   3.  Lateral ventricles are stable in size.      CT-CTA HEAD WITH & W/O-POST PROCESS   Final Result         1. No hemodynamically significant narrowing of the major intracranial vessels.      2. Redemonstration of large parenchymal hemorrhage in the left cerebellum      CT-CTA NECK WITH & W/O-POST PROCESSING   Final Result      1. No evidence of flow-limiting stenosis or dissection in the cervical carotid or cervical vertebral arteries.      CT-HEAD W/O   Final Result      Large 4.6 x 3.4 cm intraparenchymal hematoma centered in the left cerebellar hemisphere. Associated mass effect results in effacement of the basal cisterns and fourth ventricle. No evidence of hydrocephalus.      Findings were discussed with Dr. JANET ARELLANO on 8/2/2022 2:45 PM.      EC-ECHOCARDIOGRAM COMPLETE W/O CONT    (Results Pending)   DX-CHEST-PORTABLE (1 VIEW)    (Results Pending)       Modified Indira Scale (MRS): 0 = No symptoms      ASSESSMENT AND PLAN:  57-year old female with no known PMhx (?hypertension, details limited, no current medications) who presented to [facility] on 8/2/22 for a chief complaint of altered mentation, headache, dizziness and nausea; here she was found to have , SBP persistently in the 200s after arrival. Stat CT head on arrival revealed large LEFT cerebellar hemorrhage with associated mass effect/edema and effacement of 3rd/4th ventricles/basilar cisterns; CTA with no obvious vascular process nor aneurysm. Etiology most likely hypertensive. Patient now s/p sub occipital crani for decompression and hematoma evacuation; today is post op day #1. Plan for continued strict SBP management and hypertonic saline therapy.      Recommendations/Plan:     -q2h and PRN neuro assessment. VS per nursing/unit protocol. Please call neurology urgently with changes in exam.   --160; Nicardipine gtt, PRN labetalol, hydralazine. Plan to normalize BP in  coming 24 hours.  -No anticoagulation/antithrombotics at this time.   -Maintain strict euthermia, euglycemia, euvolemia. Current Na is 150; given continued risk for evolving cerebral edema and mass effect/posterior fossa syndrome, recommend to continue hypertonic tx (3% saline) for Na goal 150-160. q6h Na checks.    -PT/OT/SLP eval and treat when appropriate.    -Will defer all other medical management to ICU team.   -DVT PPX: SCDs.     The plan of care above has been discussed with Dr. Romel Dickens and with Dr. Toussaint. Please call with questions.     DARYL Vallejo.P.R.CYNTHIA.  [employer]

## 2022-08-03 NOTE — PROGRESS NOTES
Neurosurgery Progress Note    Subjective:  57 year old female presented with left cerebellar intraparenchymal hemorrhage after she was found down with a systolic BP >200.    POD#0 left posterior fossa craniectomy with hemorrhage evacuation    Per nursing, two episodes of emesis overnight.  Patient currently intubated.  Denies headache, nausea or double vision.     Post op CT  shows post surgical changes with small amount of residual blood with post surgical pneumocephalus and improved mass effect on the fourth ventricle.     Exam:  GCS: [id number].  Patient intubated, off sedation   Patient nodding appropriately to questions  Posterior Dressing  CDI with minimal strike through  Face symmetric.    PERRL. EOMI.   Motor 5/5 throughout all four extremities  Briskly following commands to all four extremities.   Sensation grossly intact to all four extremities.        BP  Min: 134/70  Max: 259/128  Pulse  Av.1  Min: 54  Max: 79  Resp  Av.2  Min: 12  Max: 29  Temp  Av.2 °C (97.1 °F)  Min: 36.1 °C (97 °F)  Max: 36.2 °C (97.1 °F)  Monitored Temp 2  Av.5 °C (99.5 °F)  Min: 36.6 °C (97.88 °F)  Max: 37.8 °C (100.04 °F)  SpO2  Av.5 %  Min: 92 %  Max: 100 %    No data recorded    Recent Labs     22  1407 22  0250   WBC 14.1* 14.0*   RBC 5.32 4.53   HEMOGLOBIN 15.8 13.6   HEMATOCRIT 47.6* 40.5   MCV 89.5 89.4   MCH 29.7 30.0   MCHC 33.2* 33.6   RDW 40.7 41.4   PLATELETCT 264 256   MPV 10.6 10.4     Recent Labs     22  1407 22  1943 22  0250   SODIUM 142 137 146*   POTASSIUM 3.1*  --  3.2*   CHLORIDE 103  --  109   CO2 23  --  22   GLUCOSE 247*  --  268*   BUN 8  --  10   CREATININE 0.53  --  0.53   CALCIUM 8.9  --  8.5     Recent Labs     22  1407   APTT 25.8   INR 0.88     Recent Labs     22  1439   REACTMIN 4.6   CLOTKINET 1.3   CLOTANGL 72.1   MAXCLOTS 61.4   ZGN31UHY 0.0   PRCINADP 15.7   PRCINAA 6.3       Intake/Output                       22 0700 -  08/03/22 0659 08/03/22 0700 - 08/04/22 0659     6613-7143 3531-3178 Total 0700-1859 5612-1727 Total                 Intake    I.V.  1250  230 1480  --  -- --    Volume (mL) (Lactated Ringers) 1250 -- 1250 -- -- --    Volume (mL) (3% sodium chloride (HYPERTONIC SALINE) 500mL infusion 500 mL) -- 230 230 -- -- --    IV Piggyback  --  40 40  --  -- --    Volume (mL) (ceFAZolin (Ancef) in SWFI IV syringe 2 g) -- 40 40 -- -- --    Total Intake 3657 190 8608 -- -- --       Output    Urine  1700  2705 4405  --  -- --    Urine 1700 -- 1700 -- -- --    Urine Void (mL) -- 1620 1620 -- -- --    Output (mL) (Urethral Catheter Non-latex;Temperature probe 16 Fr.) -- 1085 1085 -- -- --    Emesis  --  -- --  --  -- --    Emesis - Number of Times -- 2 x 2 x -- -- --    Emesis/NG output  --  150 150  --  -- --    Output (mL) (Enteral Tube 08/03/22 Orogastric Oral) -- 150 150 -- -- --    Total Output 1700 2855 4555 -- -- --       Net I/O     -450 -2585 -3035 -- -- --            Intake/Output Summary (Last 24 hours) at 8/3/2022 0805  Last data filed at 8/3/2022 0600  Gross per 24 hour   Intake 1520 ml   Output 4555 ml   Net -3035 ml            • scopolamine  1 Patch Q72HRS   • Respiratory Therapy Consult   Continuous RT   • midazolam  5 mg Q5 MIN PRN   • acetaminophen  650 mg Q4HRS PRN   • MD Alert...Adult ICU Electrolyte Replacement per Pharmacy   PHARMACY TO DOSE   • niCARdipine infusion  0-15 mg/hr Continuous   • insulin regular  1-6 Units Q6HRS    And   • dextrose bolus  25 g Q15 MIN PRN   • Pharmacy Consult Request  1 Each PHARMACY TO DOSE   • MD ALERT...DO NOT ADMINISTER NSAIDS or ASPIRIN unless ORDERED By Neurosurgery  1 Each PRN   • diphenhydrAMINE  25 mg Q6HRS PRN   • labetalol  10 mg Q HOUR PRN   • bisacodyl  10 mg Q24HRS PRN   • sodium phosphate  1 Each Once PRN   • artificial tears  1 Application Q8HRS   • enoxaparin (LOVENOX) injection  40 mg DAILY AT 1800   • hydrALAZINE  10 mg Q HOUR PRN   • cloNIDine  0.1 mg Q4HRS PRN    • 3% sodium chloride  500 mL Continuous   • benzocaine-menthol  1 Lozenge Q2HRS PRN   • acetaminophen  500 mg Q6HRS PRN   • methocarbamol  750 mg Q8HRS PRN   • fentaNYL  50 mcg Q15 MIN PRN    And   • fentaNYL  100 mcg Q15 MIN PRN    And   • fentaNYL   Continuous    And   • propofol  0-40 mcg/kg/min Continuous   • famotidine  20 mg Q12HRS    Or   • famotidine  20 mg Q12HRS   • lidocaine  2 mL Q30 MIN PRN   • magnesium hydroxide  30 mL QDAY PRN   • ondansetron  4 mg Q4HRS PRN    Or   • ondansetron  4 mg Q4HRS PRN   • polyethylene glycol/lytes  1 Packet BID PRN   • senna-docusate  1 Tablet Nightly   • senna-docusate  1 Tablet Q24HRS PRN   • docusate sodium  100 mg BID       Assessment and Plan:  Hospital day # 2  POD# 1  Extubation today per intensivist, appreciate their care.   Keep head of bed >35 degrees   Ice incisions.  Post op CT 8/2 stable   Okay to discontinue 3% Na today  Okay for Q2 neuro checks after extubation with stable neuro exam.    Chemical prophylactic DVT therapy: Yes - Lovenox 40mg/qd    Start date/time: today

## 2022-08-03 NOTE — THERAPY
PT consult received. Pt with strict bedrest orders in place at this time. Will initiate PT eval as medically appropriate and indicated.    Jennifer Quezada, SPT  [employer] ext [phone]

## 2022-08-03 NOTE — THERAPY
"Speech Language Pathology   Clinical Swallow Evaluation     Patient Name: Lana Phillips  AGE:  57 y.o., SEX:  female  Medical Record #: [id number]  Today's Date: 8/3/2022     HPI:  57-year old female with no known PMhx (?hypertension, details limited, no current medications) who presented to [facility] on 8/2/22 for a chief complaint of altered mentation, headache, dizziness and nausea; here she was found to have , SBP persistently in the 200s after arrival. Stat CT head on arrival revealed large LEFT cerebellar hemorrhage with associated mass effect/edema and effacement of 3rd/4th ventricles/basilar cisterns; CTA with no obvious vascular process nor aneurysm. Etiology most likely hypertensive. Patient now s/p sub occipital crani for decompression and hematoma evacuation; today is post op day #1. Plan for continued strict SBP management and hypertonic saline therapy.        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    Oral Mechanism Evaluation  Facial Symmetry: Equal. Slight right facial weakness with smiling.   Facial Sensation: Equal  Labial Observations: WFL  Lingual Observations: Midline  Dentition: Good  Comments:      Voice  Quality: WFL  Intensity: Soft  Cough: Perceptually weak  Comments:      Current Method of Nutrition   NPO until cleared by speech pathology      Subjective  \"Water.\"       Assessment  Positioning: Henry's (60-90 degrees)  Bolus Administration: SLP  Oxygen Requirements: nasal canula  Factor(s) Affecting Performance:  closing eyes or sleeping when not stimulated.     Swallowing Trials  Ice: WFL  Thin Liquid (TN0): WFL  Mildly Thick Liquid (MT2): WFL    Comments:  Moderate verbal cues to maintain eye opening when " "given oral bolus. Speech is greater than 90% intelligible with low intensity of voice. No delay in swallowing and pt following directives for double swallow. Chest xray results on admit include bibasilar under-inflation atelectasis which could obscure additional processes.    Clinical Impressions  Related to acute large left cerebellar event with crani and intubation/extubation the last 24 hours, limited ice at 15 single ice chips with staff only each hour as tolerated. Swallow precautions posted for limited ice. SLP provided educ to the patient and RN regarding plan for FEES on Thurs to further assess pt's airway protection. Nurs to obtain orders.          Recommendations  1.  NPO, limited ice, FEES Thurs  2.  Instrumentation: FEES  3.  Swallowing Instructions & Precautions:   Supervision: 1:1 feeding with constant supervision for limited ice  Positioning: Fully upright and midline during oral intake  Medication: Non Oral   Strategies: double swallow with limited ice with staff only   Oral Care: Q4h     08/03/22 1430   Dysphagia Rating   Nutritional Liquid Intake Rating Scale Nothing by mouth  (limited ice only)   Nutritional Food Intake Rating Scale Nothing by mouth   Patient / Family Goals   Patient / Family Goal #1 \"water.\"   Goal #1 Outcome Goal not met   Short Term Goals   Short Term Goal # 1 Pt will consume 15 single ice chips ea hour when awake and with 1-1 with nurs with no s/s of difficulty.   Goal Outcome # 1 Goal not met       "

## 2022-08-03 NOTE — ASSESSMENT & PLAN NOTE
Neurosurgery consult: Dr Vences s/p 8/2 posterior fossa craniectomy for evacuation of intraparenchymal hemorrhage with great operative result seen on postoperative CT  SBP goal < 160  Aspiration precautions  Head of bed 30 degress   Maintain CPP > 60-70  Neuro check Q 2 and pupilometer  Seizure prophylaxis: n/a  Maintain euvolemia  Sodium goal: 150-160 due to still with significant swelling in posterior fossa and effacement of 4th vent

## 2022-08-03 NOTE — ASSESSMENT & PLAN NOTE
Was severely hypertensive to 260 on presentation  Strict SBP < 160  Will start oral medication once cleared by speech  PRN's and nicardipine gtt in place  Start lisinopril today 8/4

## 2022-08-03 NOTE — ANESTHESIA PROCEDURE NOTES
Airway    Date/Time: 8/2/2022 4:55 PM  Performed by: Mario Ames M.D.  Authorized by: Mario Ames M.D.     Location:  OR  Urgency:  Elective  Difficult Airway: No    Indications for Airway Management:  Anesthesia      Spontaneous Ventilation: absent    Sedation Level:  Deep  Preoxygenated: Yes    Patient Position:  Sniffing  Mask Difficulty Assessment:  0 - not attempted  Final Airway Type:  Endotracheal airway  Final Endotracheal Airway:  ETT  Cuffed: Yes    Technique Used for Successful ETT Placement:  Direct laryngoscopy    Insertion Site:  Oral  Blade Type:  Thacker  Laryngoscope Blade/Videolaryngoscope Blade Size:  2  ETT Size (mm):  7.5  Measured from:  Teeth  ETT to Teeth (cm):  22  Placement Verified by: auscultation and capnometry    Cormack-Lehane Classification:  Grade I - full view of glottis  Number of Attempts at Approach:  1

## 2022-08-03 NOTE — ANESTHESIA POSTPROCEDURE EVALUATION
Patient: Lana Phillips    Procedure Summary     Date: 08/02/22 Room / Location: [place] [address] / SURGERY [facility]    Anesthesia Start: 1648 Anesthesia Stop: 1939    Procedure: Posterior fossa craniectomy for evacuation of intraparenchymal hemorrhage (Head) Diagnosis: (intraparenchymal hematoma )    Surgeons: Pedro Pablo Vences M.D. Responsible Provider: Mario Ames M.D.    Anesthesia Type: general ASA Status: 4 - Emergent          Final Anesthesia Type: general  Last vitals  BP   Blood Pressure: (!) 150/72    Temp   36.1 °C (97 °F)    Pulse   74   Resp   (!) 29    SpO2   96 %      Anesthesia Post Evaluation    Patient location during evaluation: ICU  Patient participation: complete - patient cannot participate  Level of consciousness: obtunded/minimal responses    Airway patency: patent  Anesthetic complications: no  Cardiovascular status: hemodynamically stable  Respiratory status: acceptable  Hydration status: balanced    PONV: none          No complications documented.     Nurse Pain Score: 1 (NPRS)

## 2022-08-03 NOTE — PROCEDURES
Vascular Access Team     Date of Insertion: 8/3/22  Arm Circumference: 32  Internal length: 36  External Length: 1  Vein Occupancy %: 39   Reason for PICC: Critical access  Labs: WBC 14.0, , BUN 10, Cr 0.53, , INR 0.88 on 8/2/22     Consents confirmed, vessel patency confirmed with ultrasound. Risks and benefits of procedure explained to patient and education regarding central line associated bloodstream infections provided. Questions answered.      PICC placed in RUE per licensed provider order with ultrasound guidance.  5 Fr, single lumen PICC placed in basilic vein after 1 attempt(s). 2 mL of 1% lidocaine injected intradermally at the insertion site. A 21 gauge microintroducer needle was visualized entering the vein and modified Seldinger technique was used to obtain access to the vein. 36 cm catheter inserted and brisk blood return was observed from each lumen upon aspiration. Line secured at the 1 cm marker. TCS stylet removed and observed to be fully intact. Each lumen flushed using pulsatile method without resistance with 10 mL 0.9% normal saline. PICC line secured with Biopatch and Tegaderm.     PICC tip placement location is confirmed by nurse to be in the Superior Vena Cava (SVC) utilizing 3CG technology. PICC line is appropriate for use at this time. Patient tolerated procedure well, without complications.  Patient condition relayed to primary RN or ordering physician via this post procedure note in the EMR.      Ultrasound images uploaded to PACS and viewable in the EMR - yes  Ultrasound imaged printed and placed in paper chart - no     [employer] PICC ref # [id number], Lot # [id number], Expiration Date 5/31/23

## 2022-08-03 NOTE — DISCHARGE PLANNING
[facility] Transitional Care Coordination    Referral from: Dr George    Insurance Provider on Facesheet: [place] [employer]  Potential Rehab Diagnosis: Cerebellar hemorrhage    Chart review indicates patient may need on going medical management and may have therapy needs to possibly meet inpatient rehab facility criteria with the goal of returning to community.    D/C support: TBD     Physiatry consultation pended per protocol.     Left cerebellar intraparenchymal hemorrhage s/p crani - extubated this morning, awaiting therapy evals when appropriate. Physiatry consult pended, TCC will follow.     Thank you for the referral.

## 2022-08-03 NOTE — THERAPY
Occupational Therapy Contact Note    OT consult received. Pt with strict bedrest orders in place at this time.     Valarie Michaud, OTR/L

## 2022-08-03 NOTE — ANESTHESIA TIME REPORT
Anesthesia Start and Stop Event Times     Date Time Event    8/2/2022 1648 Ready for Procedure     1648 Anesthesia Start     1939 Anesthesia Stop        Responsible Staff  08/02/22    Name Role Begin End    Mario Ames M.D. Anesth 1648 1939        Overtime Reason:  no overtime (within assigned shift)    Comments:

## 2022-08-03 NOTE — ANESTHESIA PROCEDURE NOTES
Arterial Line  Performed by: Mario Ames M.D.  Authorized by: Mario Ames M.D.     Localization: surface landmarks    Patient Location:  OR  Indication: continuous blood pressure monitoring        Catheter Size:  20 G  Seldinger Technique?: Yes    Laterality:  Left  Site:  Radial artery  Line Secured:  Antimicrobial disc, tape and transparent dressing  Events: patient tolerated procedure well with no complications

## 2022-08-03 NOTE — PROGRESS NOTES
2 RN skin check with Marah    Large amount of non blanching areas on upper back     No other skin issues noted

## 2022-08-03 NOTE — WOUND TEAM
"Wound team consulted for wounds along upper back. Upon assessment, patient with diffuse area of bruising throughout her upper back. Per patient, the bruises developed because \"of a medicine\" that she takes. Recommend frequent turns and offloading. Patient on NAE. Consult completed. No advanced wound care needs at this time.                   "

## 2022-08-03 NOTE — CARE PLAN
The patient is Watcher - Medium risk of patient condition declining or worsening    Shift Goals  Clinical Goals: stable neuro exams  Patient Goals: edward  Family Goals: edward    Progress made toward(s) clinical / shift goals:  wound consult placed    Patient is not progressing towards the following goals:      Problem: Skin Integrity  Goal: Skin integrity is maintained or improved  Note: Wound consulted ordered for back wound. Q2 turns in place.

## 2022-08-04 PROBLEM — I62.9 INTRACRANIAL HEMORRHAGE (HCC): Status: ACTIVE | Noted: 2022-08-04

## 2022-08-04 LAB
ALBUMIN SERPL BCP-MCNC: 3.4 G/DL (ref 3.2–4.9)
ALBUMIN/GLOB SERPL: 1.3 G/DL
ALP SERPL-CCNC: 77 U/L (ref 30–99)
ALT SERPL-CCNC: 10 U/L (ref 2–50)
ANION GAP SERPL CALC-SCNC: 9 MMOL/L (ref 7–16)
AST SERPL-CCNC: 14 U/L (ref 12–45)
BILIRUB SERPL-MCNC: 0.4 MG/DL (ref 0.1–1.5)
BUN SERPL-MCNC: 14 MG/DL (ref 8–22)
CALCIUM SERPL-MCNC: 8 MG/DL (ref 8.5–10.5)
CHLORIDE SERPL-SCNC: 130 MMOL/L (ref 96–112)
CO2 SERPL-SCNC: 23 MMOL/L (ref 20–33)
CREAT SERPL-MCNC: 0.32 MG/DL (ref 0.5–1.4)
ERYTHROCYTE [DISTWIDTH] IN BLOOD BY AUTOMATED COUNT: 47.7 FL (ref 35.9–50)
GFR SERPLBLD CREATININE-BSD FMLA CKD-EPI: 122 ML/MIN/1.73 M 2
GLOBULIN SER CALC-MCNC: 2.6 G/DL (ref 1.9–3.5)
GLUCOSE BLD STRIP.AUTO-MCNC: 143 MG/DL (ref 65–99)
GLUCOSE BLD STRIP.AUTO-MCNC: 146 MG/DL (ref 65–99)
GLUCOSE BLD STRIP.AUTO-MCNC: 171 MG/DL (ref 65–99)
GLUCOSE BLD STRIP.AUTO-MCNC: 190 MG/DL (ref 65–99)
GLUCOSE SERPL-MCNC: 171 MG/DL (ref 65–99)
HCT VFR BLD AUTO: 38.8 % (ref 37–47)
HGB BLD-MCNC: 12.3 G/DL (ref 12–16)
MAGNESIUM SERPL-MCNC: 2.1 MG/DL (ref 1.5–2.5)
MCH RBC QN AUTO: 30.1 PG (ref 27–33)
MCHC RBC AUTO-ENTMCNC: 31.7 G/DL (ref 33.6–35)
MCV RBC AUTO: 94.9 FL (ref 81.4–97.8)
PHOSPHATE SERPL-MCNC: 2.1 MG/DL (ref 2.5–4.5)
PLATELET # BLD AUTO: 225 K/UL (ref 164–446)
PMV BLD AUTO: 10.6 FL (ref 9–12.9)
POTASSIUM SERPL-SCNC: 3.4 MMOL/L (ref 3.6–5.5)
PROCALCITONIN SERPL-MCNC: 0.23 NG/ML
PROT SERPL-MCNC: 6 G/DL (ref 6–8.2)
RBC # BLD AUTO: 4.09 M/UL (ref 4.2–5.4)
SODIUM SERPL-SCNC: 148 MMOL/L (ref 135–145)
SODIUM SERPL-SCNC: 149 MMOL/L (ref 135–145)
SODIUM SERPL-SCNC: 152 MMOL/L (ref 135–145)
SODIUM SERPL-SCNC: 162 MMOL/L (ref 135–145)
WBC # BLD AUTO: 16.2 K/UL (ref 4.8–10.8)

## 2022-08-04 PROCEDURE — 700111 HCHG RX REV CODE 636 W/ 250 OVERRIDE (IP): Performed by: STUDENT IN AN ORGANIZED HEALTH CARE EDUCATION/TRAINING PROGRAM

## 2022-08-04 PROCEDURE — 97162 PT EVAL MOD COMPLEX 30 MIN: CPT

## 2022-08-04 PROCEDURE — 83735 ASSAY OF MAGNESIUM: CPT

## 2022-08-04 PROCEDURE — 85027 COMPLETE CBC AUTOMATED: CPT

## 2022-08-04 PROCEDURE — 700101 HCHG RX REV CODE 250: Performed by: INTERNAL MEDICINE

## 2022-08-04 PROCEDURE — 92612 ENDOSCOPY SWALLOW (FEES) VID: CPT

## 2022-08-04 PROCEDURE — 80053 COMPREHEN METABOLIC PANEL: CPT

## 2022-08-04 PROCEDURE — 99291 CRITICAL CARE FIRST HOUR: CPT | Performed by: INTERNAL MEDICINE

## 2022-08-04 PROCEDURE — 84145 PROCALCITONIN (PCT): CPT

## 2022-08-04 PROCEDURE — 700102 HCHG RX REV CODE 250 W/ 637 OVERRIDE(OP): Performed by: INTERNAL MEDICINE

## 2022-08-04 PROCEDURE — 770000 HCHG ROOM/CARE - INTERMEDIATE ICU *

## 2022-08-04 PROCEDURE — 700105 HCHG RX REV CODE 258: Performed by: INTERNAL MEDICINE

## 2022-08-04 PROCEDURE — A9270 NON-COVERED ITEM OR SERVICE: HCPCS | Performed by: INTERNAL MEDICINE

## 2022-08-04 PROCEDURE — 99221 1ST HOSP IP/OBS SF/LOW 40: CPT | Performed by: STUDENT IN AN ORGANIZED HEALTH CARE EDUCATION/TRAINING PROGRAM

## 2022-08-04 PROCEDURE — 99233 SBSQ HOSP IP/OBS HIGH 50: CPT | Performed by: NURSE PRACTITIONER

## 2022-08-04 PROCEDURE — 84100 ASSAY OF PHOSPHORUS: CPT

## 2022-08-04 PROCEDURE — 700101 HCHG RX REV CODE 250

## 2022-08-04 PROCEDURE — 700111 HCHG RX REV CODE 636 W/ 250 OVERRIDE (IP)

## 2022-08-04 PROCEDURE — 82962 GLUCOSE BLOOD TEST: CPT

## 2022-08-04 PROCEDURE — 700102 HCHG RX REV CODE 250 W/ 637 OVERRIDE(OP): Performed by: STUDENT IN AN ORGANIZED HEALTH CARE EDUCATION/TRAINING PROGRAM

## 2022-08-04 PROCEDURE — 84295 ASSAY OF SERUM SODIUM: CPT

## 2022-08-04 PROCEDURE — 97165 OT EVAL LOW COMPLEX 30 MIN: CPT

## 2022-08-04 RX ORDER — 3% SODIUM CHLORIDE 3 G/100ML
500 INJECTION, SOLUTION INTRAVENOUS CONTINUOUS
Status: DISCONTINUED | OUTPATIENT
Start: 2022-08-04 | End: 2022-08-05

## 2022-08-04 RX ORDER — POLYETHYLENE GLYCOL 3350 17 G/17G
1 POWDER, FOR SOLUTION ORAL 2 TIMES DAILY PRN
Status: DISCONTINUED | OUTPATIENT
Start: 2022-08-04 | End: 2022-08-12 | Stop reason: HOSPADM

## 2022-08-04 RX ORDER — AMOXICILLIN 250 MG
1 CAPSULE ORAL NIGHTLY
Status: DISCONTINUED | OUTPATIENT
Start: 2022-08-04 | End: 2022-08-12 | Stop reason: HOSPADM

## 2022-08-04 RX ORDER — METHOCARBAMOL 750 MG/1
750 TABLET, FILM COATED ORAL EVERY 8 HOURS PRN
Status: DISCONTINUED | OUTPATIENT
Start: 2022-08-04 | End: 2022-08-12 | Stop reason: HOSPADM

## 2022-08-04 RX ORDER — CLONIDINE HYDROCHLORIDE 0.1 MG/1
0.1 TABLET ORAL EVERY 4 HOURS PRN
Status: DISCONTINUED | OUTPATIENT
Start: 2022-08-04 | End: 2022-08-12 | Stop reason: HOSPADM

## 2022-08-04 RX ORDER — LISINOPRIL 20 MG/1
20 TABLET ORAL
Status: DISCONTINUED | OUTPATIENT
Start: 2022-08-04 | End: 2022-08-05

## 2022-08-04 RX ORDER — AMLODIPINE BESYLATE 5 MG/1
10 TABLET ORAL
Status: DISCONTINUED | OUTPATIENT
Start: 2022-08-04 | End: 2022-08-12 | Stop reason: HOSPADM

## 2022-08-04 RX ORDER — ONDANSETRON 4 MG/1
4 TABLET, ORALLY DISINTEGRATING ORAL EVERY 4 HOURS PRN
Status: DISCONTINUED | OUTPATIENT
Start: 2022-08-04 | End: 2022-08-12 | Stop reason: HOSPADM

## 2022-08-04 RX ORDER — ACETAMINOPHEN 500 MG
500 TABLET ORAL EVERY 6 HOURS PRN
Status: DISCONTINUED | OUTPATIENT
Start: 2022-08-04 | End: 2022-08-12 | Stop reason: HOSPADM

## 2022-08-04 RX ORDER — ONDANSETRON 2 MG/ML
4 INJECTION INTRAMUSCULAR; INTRAVENOUS EVERY 4 HOURS PRN
Status: DISCONTINUED | OUTPATIENT
Start: 2022-08-04 | End: 2022-08-12 | Stop reason: HOSPADM

## 2022-08-04 RX ORDER — DOCUSATE SODIUM 50 MG/5ML
100 LIQUID ORAL 2 TIMES DAILY
Status: DISCONTINUED | OUTPATIENT
Start: 2022-08-04 | End: 2022-08-09

## 2022-08-04 RX ORDER — AMOXICILLIN 250 MG
1 CAPSULE ORAL
Status: DISCONTINUED | OUTPATIENT
Start: 2022-08-04 | End: 2022-08-12 | Stop reason: HOSPADM

## 2022-08-04 RX ADMIN — HYDRALAZINE HYDROCHLORIDE 10 MG: 20 INJECTION INTRAMUSCULAR; INTRAVENOUS at 11:26

## 2022-08-04 RX ADMIN — INSULIN HUMAN 1 UNITS: 100 INJECTION, SOLUTION PARENTERAL at 11:38

## 2022-08-04 RX ADMIN — INSULIN HUMAN 1 UNITS: 100 INJECTION, SOLUTION PARENTERAL at 17:55

## 2022-08-04 RX ADMIN — SENNOSIDES AND DOCUSATE SODIUM 1 TABLET: 50; 8.6 TABLET ORAL at 19:03

## 2022-08-04 RX ADMIN — SODIUM CHLORIDE 500 ML: 3 INJECTION, SOLUTION INTRAVENOUS at 22:47

## 2022-08-04 RX ADMIN — DOCUSATE SODIUM 100 MG: 50 LIQUID ORAL at 19:03

## 2022-08-04 RX ADMIN — ENOXAPARIN SODIUM 40 MG: 40 INJECTION SUBCUTANEOUS at 17:54

## 2022-08-04 RX ADMIN — INSULIN HUMAN 1 UNITS: 100 INJECTION, SOLUTION PARENTERAL at 23:55

## 2022-08-04 RX ADMIN — LISINOPRIL 20 MG: 20 TABLET ORAL at 11:28

## 2022-08-04 RX ADMIN — HYDRALAZINE HYDROCHLORIDE 10 MG: 20 INJECTION INTRAMUSCULAR; INTRAVENOUS at 09:27

## 2022-08-04 RX ADMIN — LABETALOL HYDROCHLORIDE 10 MG: 5 INJECTION, SOLUTION INTRAVENOUS at 03:05

## 2022-08-04 RX ADMIN — FAMOTIDINE 20 MG: 10 INJECTION INTRAVENOUS at 05:04

## 2022-08-04 RX ADMIN — LABETALOL HYDROCHLORIDE 10 MG: 5 INJECTION, SOLUTION INTRAVENOUS at 13:08

## 2022-08-04 RX ADMIN — POTASSIUM PHOSPHATE, MONOBASIC AND POTASSIUM PHOSPHATE, DIBASIC 30 MMOL: 224; 236 INJECTION, SOLUTION, CONCENTRATE INTRAVENOUS at 08:55

## 2022-08-04 RX ADMIN — AMLODIPINE BESYLATE 10 MG: 10 TABLET ORAL at 14:25

## 2022-08-04 RX ADMIN — HYDRALAZINE HYDROCHLORIDE 10 MG: 20 INJECTION INTRAMUSCULAR; INTRAVENOUS at 05:04

## 2022-08-04 RX ADMIN — SODIUM CHLORIDE 2 MG/HR: 9 INJECTION, SOLUTION INTRAVENOUS at 14:46

## 2022-08-04 RX ADMIN — HYDRALAZINE HYDROCHLORIDE 10 MG: 20 INJECTION INTRAMUSCULAR; INTRAVENOUS at 06:10

## 2022-08-04 RX ADMIN — HYDRALAZINE HYDROCHLORIDE 10 MG: 20 INJECTION INTRAMUSCULAR; INTRAVENOUS at 00:03

## 2022-08-04 ASSESSMENT — PAIN SCALES - PAIN ASSESSMENT IN ADVANCED DEMENTIA (PAINAD)
BREATHING: NORMAL
TOTALSCORE: 0
TOTALSCORE: 0
BODYLANGUAGE: RELAXED
BREATHING: NORMAL
BREATHING: NORMAL
BODYLANGUAGE: RELAXED
FACIALEXPRESSION: SMILING OR INEXPRESSIVE
CONSOLABILITY: NO NEED TO CONSOLE
BODYLANGUAGE: RELAXED
FACIALEXPRESSION: SMILING OR INEXPRESSIVE
TOTALSCORE: 0
FACIALEXPRESSION: SMILING OR INEXPRESSIVE
CONSOLABILITY: NO NEED TO CONSOLE
CONSOLABILITY: NO NEED TO CONSOLE

## 2022-08-04 ASSESSMENT — ENCOUNTER SYMPTOMS
CONSTIPATION: 0
EYES NEGATIVE: 1
PHOTOPHOBIA: 0
HALLUCINATIONS: 0
VOMITING: 0
ABDOMINAL PAIN: 0
CARDIOVASCULAR NEGATIVE: 1
COUGH: 0
DIZZINESS: 1
PALPITATIONS: 0
SPEECH CHANGE: 0
RESPIRATORY NEGATIVE: 1
DIARRHEA: 0
NERVOUS/ANXIOUS: 0
CONSTITUTIONAL NEGATIVE: 1
DOUBLE VISION: 0
NEUROLOGICAL NEGATIVE: 1
HEADACHES: 0
TINGLING: 0
CLAUDICATION: 0
NAUSEA: 1
SHORTNESS OF BREATH: 0
BLURRED VISION: 0
SPUTUM PRODUCTION: 0
SORE THROAT: 0
FEVER: 0
FOCAL WEAKNESS: 0
PSYCHIATRIC NEGATIVE: 1
TREMORS: 0
WEAKNESS: 0
DEPRESSION: 0
GASTROINTESTINAL NEGATIVE: 1
SENSORY CHANGE: 0
MYALGIAS: 0
MUSCULOSKELETAL NEGATIVE: 1

## 2022-08-04 ASSESSMENT — GAIT ASSESSMENTS
DEVIATION: BRADYKINETIC;DECREASED BASE OF SUPPORT
ASSISTIVE DEVICE: HAND HELD ASSIST
GAIT LEVEL OF ASSIST: MINIMAL ASSIST
DISTANCE (FEET): 3

## 2022-08-04 ASSESSMENT — COGNITIVE AND FUNCTIONAL STATUS - GENERAL
MOBILITY SCORE: 9
MOVING TO AND FROM BED TO CHAIR: UNABLE
SUGGESTED CMS G CODE MODIFIER MOBILITY: CM
STANDING UP FROM CHAIR USING ARMS: A LITTLE
PERSONAL GROOMING: A LITTLE
TOILETING: A LITTLE
EATING MEALS: A LITTLE
DAILY ACTIVITIY SCORE: 18
DRESSING REGULAR LOWER BODY CLOTHING: A LITTLE
TURNING FROM BACK TO SIDE WHILE IN FLAT BAD: UNABLE
MOVING FROM LYING ON BACK TO SITTING ON SIDE OF FLAT BED: UNABLE
HELP NEEDED FOR BATHING: A LITTLE
CLIMB 3 TO 5 STEPS WITH RAILING: TOTAL
DRESSING REGULAR UPPER BODY CLOTHING: A LITTLE
SUGGESTED CMS G CODE MODIFIER DAILY ACTIVITY: CK
WALKING IN HOSPITAL ROOM: A LOT

## 2022-08-04 ASSESSMENT — PAIN DESCRIPTION - PAIN TYPE
TYPE: ACUTE PAIN

## 2022-08-04 ASSESSMENT — ACTIVITIES OF DAILY LIVING (ADL): TOILETING: INDEPENDENT

## 2022-08-04 ASSESSMENT — FIBROSIS 4 INDEX: FIB4 SCORE: 1.12

## 2022-08-04 NOTE — CONSULTS
Hospital Medicine Consultation    Date of Service  8/4/2022    Referring Physician  Dr. Brenner    Consulting Physician  Dimitri Joe M.D.    Reason for Consultation  Cerebral hemorrhage    History of Presenting Illness  57 y.o. female with no known past medical history was admitted to the ICU on 8/2/2022 for large right cerebral hemorrhage with mass-effect which required emergent decompressive craniotomy on 8/2/2022 for which patient is currently status post occipital craniectomy with evacuation of the left cerebellar hematoma.  Neurology and neurosurgery following.  Postsurgically, she was continued on hypertonic therapy and ultimately extubated on 8/3/2022.  Patient was transition to hypotonic acetate and Archer catheter was discontinued.  On bedside evaluation, patient no acute distress.  No headaches or dizziness.    At the ICU, vital signs with tachypnea and SBP in the 170s.  Patient on 1 L nasal cannula.  CBC with leukocytosis with RBC at 16.2 and uptrending.  Chemistry with hyponatremia last sodium at 149 hypokalemia and hyperplasia.  Patient was transferred to the IMCU floor for continued care.    Review of Systems  Review of Systems   Constitutional: Negative.    HENT: Negative.    Eyes: Negative.    Respiratory: Negative.    Cardiovascular: Negative.    Gastrointestinal: Negative.    Genitourinary: Negative.    Musculoskeletal: Negative.    Skin: Negative.    Neurological: Negative.    Endo/Heme/Allergies: Negative.    Psychiatric/Behavioral: Negative.        Past Medical History  None    Surgical History   has a past surgical history that includes craniotomy (8/2/2022).    Family History  Reviewed and nonpertinent    Social History   reports that she has never smoked. She has never used smokeless tobacco. She reports that she does not drink alcohol and does not use drugs.    Medications  None       Allergies  No Known Allergies    Physical Exam  Pulse:  [] 92  Resp:  [20-36] 20  BP:  (129-201)/(58-85) 156/75  SpO2:  [92 %-100 %] 95 %    Physical Exam  Constitutional:       Appearance: Normal appearance.   HENT:      Head:      Comments: Surgical site covered by dressing     Mouth/Throat:      Mouth: Mucous membranes are moist.   Eyes:      Extraocular Movements: Extraocular movements intact.      Pupils: Pupils are equal, round, and reactive to light.   Cardiovascular:      Rate and Rhythm: Normal rate and regular rhythm.      Pulses: Normal pulses.      Heart sounds: Normal heart sounds.   Pulmonary:      Effort: Pulmonary effort is normal.      Breath sounds: Normal breath sounds.   Abdominal:      General: Bowel sounds are normal. There is no distension.      Palpations: Abdomen is soft.      Tenderness: There is no abdominal tenderness. There is no guarding or rebound.   Musculoskeletal:         General: No swelling. Normal range of motion.      Cervical back: Normal range of motion and neck supple.   Skin:     General: Skin is warm.      Coloration: Skin is not jaundiced.   Neurological:      General: No focal deficit present.      Mental Status: She is alert and oriented to person, place, and time. Mental status is at baseline.      Cranial Nerves: No cranial nerve deficit.   Psychiatric:         Mood and Affect: Mood normal.         Behavior: Behavior normal.         Thought Content: Thought content normal.         Judgment: Judgment normal.         Fluids  Date 08/04/22 0700 - 08/05/22 0659   Shift 6146-8572 8551-7580 2128-3193 24 Hour Total   INTAKE   I.V. 33.7   33.7   IV Piggyback 500   500   Shift Total 533.7   533.7   OUTPUT   Urine 600   600   Shift Total 600   600   Weight (kg) 68.5 68.5 68.5 68.5       Laboratory  Recent Labs     08/02/22  1407 08/03/22  0250 08/04/22  0600   WBC 14.1* 14.0* 16.2*   RBC 5.32 4.53 4.09*   HEMOGLOBIN 15.8 13.6 12.3   HEMATOCRIT 47.6* 40.5 38.8   MCV 89.5 89.4 94.9   MCH 29.7 30.0 30.1   MCHC 33.2* 33.6 31.7*   RDW 40.7 41.4 47.7   PLATELETCT 264  256 225   MPV 10.6 10.4 10.6     Recent Labs     08/02/22  1407 08/02/22  1943 08/03/22  0250 08/03/22  0939 08/03/22  2352 08/04/22  0600 08/04/22  1250   SODIUM 142   < > 146*   < > 152* 162* 149*   POTASSIUM 3.1*  --  3.2*  --   --  3.4*  --    CHLORIDE 103  --  109  --   --  130*  --    CO2 23  --  22  --   --  23  --    GLUCOSE 247*  --  268*  --   --  171*  --    BUN 8  --  10  --   --  14  --    CREATININE 0.53  --  0.53  --   --  0.32*  --    CALCIUM 8.9  --  8.5  --   --  8.0*  --     < > = values in this interval not displayed.     Recent Labs     08/02/22  1407   APTT 25.8   INR 0.88          Recent Labs     08/02/22 1943   TRIGLYCERIDE 78   HDL 46   *        Imaging  IR-PICC LINE PLACEMENT W/ GUIDANCE > AGE 5   Final Result                  Ultrasound-guided PICC placement performed by qualified nursing staff as    above.          DX-CHEST-PORTABLE (1 VIEW)   Final Result      Bibasilar underinflation atelectasis which could obscure an additional process.      CT-HEAD W/O   Final Result      1.  Postsurgical changes status post occipital craniectomy with evacuation of the left cerebellar hematoma. There is a small amount of residual blood and postsurgical pneumocephalus.   2.  There is improved mass effect on the fourth ventricle.   3.  Lateral ventricles are stable in size.      CT-CTA HEAD WITH & W/O-POST PROCESS   Final Result         1. No hemodynamically significant narrowing of the major intracranial vessels.      2. Redemonstration of large parenchymal hemorrhage in the left cerebellum      CT-CTA NECK WITH & W/O-POST PROCESSING   Final Result      1. No evidence of flow-limiting stenosis or dissection in the cervical carotid or cervical vertebral arteries.      CT-HEAD W/O   Final Result      Large 4.6 x 3.4 cm intraparenchymal hematoma centered in the left cerebellar hemisphere. Associated mass effect results in effacement of the basal cisterns and fourth ventricle. No evidence of  hydrocephalus.      Findings were discussed with Dr. JANET ARELLANO on 8/2/2022 2:45 PM.      EC-ECHOCARDIOGRAM COMPLETE W/O CONT    (Results Pending)       Assessment/Plan  * Cerebellar hemorrhage, acute (HCC)- (present on admission)  Assessment & Plan  Neurosurgery consult: Dr Vences s/p 8/2 posterior fossa craniectomy for evacuation of intraparenchymal hemorrhage with great operative result seen on postoperative CT  SBP goal < 160  Aspiration precautions  Head of bed 30 degress   Maintain CPP > 60-70  Neuro check Q 2 and pupilometer  Seizure prophylaxis: n/a  Maintain euvolemia  Sodium goal: 150-160 due to still with significant swelling in posterior fossa and effacement of 4th vent  OT recommending postacute care  SPL starting diet     Hypertension- (present on admission)  Assessment & Plan  Was severely hypertensive to 260 on presentation  Strict SBP < 160  Will start oral medication once cleared by speech  PRN's and nicardipine gtt in place  Start lisinopril today 8/4    Hyperglycemia- (present on admission)  Assessment & Plan  A1c  Sliding scale for now    Hypokalemia- (present on admission)  Assessment & Plan  Replete as necessary    DVT prophylaxis Lovenox  Time spent 59 minutes

## 2022-08-04 NOTE — THERAPY
Speech Language Pathology   Fiberoptic Endoscopic Evaluation of Swallow     Patient Name: Lana Phillips  AGE:  57 y.o., SEX:  female  Medical Record #: [id number]  Today's Date: 8/4/2022     Precautions: Fall Risk, Swallow Precautions ( See Comments)  Comments: pt sleepy when not stimulated.      Current Method of Nutrition   NPO until cleared by speech pathology      Pertinent Information:  Affect/Behavior: Calm, Flat  Oxygen Requirements: 2 L Nasal Cannula  Cortrak: None  Dentition: Good  Factor(s) Affecting Performance: Impaired mental status    Discussed the risks, benefits, and alternatives of the FEES procedure. Patient/family acknowledged and agreed to proceed.     Assessment  Flexible Endoscopic Evaluation of Swallowing (FEES) completed at bedside today. The endoscope was passed transnasally via right nare to evaluate the anatomy and physiology of swallowing. Pt tolerated the procedure with no apparent distress.    Anatomic Findings: Unremarkable  Vocal Fold Motion: Bilateral movement  Secretion Management: WNL  PO trials: ice chips, thin liquids, mildly thick liquids, liquidized, puree, soft & bite sized, mixed consistency      Consistency PAS Score Timing Comments   Ice Chips 1 N/A    Thin Liquid 3 During swallow and During swallow (inferred) PAS 2 and 3 before the swallow with cup sips   PAS 3 before and during the swallow with straw sips   Mildly Thick Liquid 2 Pre swallow    Liquidized 1 N/A    Puree 1 N/A    Soft & Bite Sized 2 Pre swallow PAS 2 during one occurrence  PAS 1 during all other trials tested (x4)   Mixed Consistency 1 N/A      1     No contrast enters airway  2     Contrast enters the airway, remains above the vocal folds, and is ejected from the airway (not seen in the airway at the end of the swallow).  3     Contrast enters the airway, remains above the vocal folds, and is not ejected from the airway (is seen in the airway after the swallow).  4     Contrast enters the airway, contacts the  vocal folds, and is ejected from the airway.  5     Contrast enters the airway, contacts the vocal folds, and is not ejected from the airway  6     Contrast enters the airway, crosses the plane of the vocal folds, and is ejected from the airway.  7     Contrast enters the airway, crosses the plane of the vocal folds, and is not ejected from the airway despite effort.  8     Contrast enters the airway, crosses the plane of the vocal folds, is not ejected from the airway and there is no response to aspiration.    Oral phase: Presumed piecemeal deglutition with solids; mildly prolonged mastication with soft solids; and reduced bolus control of liquids as seen by quick spillage into pharynx before the swallow (particularly thin liquids).     Pharyngeal phase: Reduced tongue base retraction; mistiming of pharyngeal swallow; and reduced laryngeal elevation. These impairments resulted in the followin. Inconsistent laryngeal penetration either before or suspected during the swallow following trials of thin liquids. Mild amount of residue remained high in laryngeal vestibule when taking single sips from the cup. Trace-mild residue approximated vocal cords (although did not make contact) following consecutive sips of thins from the straw. A second swallow significantly reduced residue in laryngeal vestibule to trace amounts. Trace amount of residue through interarytenoid space seen inconsistently following trials of thins.   2. Mild vallecular residue following trials of applesauce and pudding and moderate vallecular residue following trials of soft solids. A second swallow reduced residue following applesauce and pudding but not with soft solids. Soft solids were not cleared despite cues for 3-4 swallows. However, a liquid wash was effective to eliminate pharyngeal residue following trials of soft solids.   3.  There was one episode of transient laryngeal penetration over the top of the epiglottis following trials of  soft solids. Patient was sensate to this and resulted in harsh throat clearing response.       Clinical Impressions  The pt presents with a mild oropharyngeal dysphagia, likely acute related to large left cerebellar event s/p crani. Swallow safety is relatively preserved; swallow efficiency is reduced. Risk for aspiration PNA is low. Risk for malnutrition/dehydration is moderate. A modified diet is indicated at this time. Swallow prognosis is excellent given ongoing medical management and improved mentation and strength. Pt appears to be an excellent candidate for exercise-based and behavioral swallow rehabilitation.      Recommendations  1. Minced and Moist (level 5) and Mildly Thick Liquids (level 2)  2. OK for SINGLE SIPS of un-thickened water between meals (NO STRAWS)  3.  Swallowing Instructions & Precautions:   Supervision: Close supervision - patient may be left alone for less than 5 minutes at a time  Positioning: Fully upright and midline during oral intake  Medication: Whole with puree followed by MTL wash  Strategies: Small bites/sips, Alternate bites and sips, Slow rate of intake, No straws  Oral Care: Q8h      Plan  Recommend Speech Therapy 5 times per week until therapy goals are met for the following treatments:  Dysphagia Training, Cognitive-Linguistic Training and Patient / Family / Caregiver Education.    Discharge Recommendations: Recommend post-acute placement for additional speech therapy services prior to discharge home       08/04/22 1122   Vitals   O2 (LPM) 2   O2 Delivery Device Silicone Nasal Cannula   Prior Level Of Function   Communication Within Functional Limits   Swallow Within Functional Limits   Dentition Other (See Comments)  (adequate)   Hearing Within Functional Limits   Hearing Aid None   Patient's Primary Language English   Short Term Goals   Short Term Goal # 1 Pt will consume 15 single ice chips ea hour when awake and with 1-1 with nurs with no s/s of difficulty.   Goal  Outcome # 1 Goal met, new goal added   Short Term Goal # 1 B  Patient will consume a MM5/MT2 diet with no overt s/sx of aspiration given min cues to swallow precautions.   Short Term Goal # 2 Patient will consume sips of thin liquids with no overt s/sx of aspiration.

## 2022-08-04 NOTE — PROGRESS NOTES
Critical Care Progress Note    Date of admission  2022    Chief Complaint  57 y.o. female with no known past medical history, does not take medications, presents to the emergency department due to sudden onset syncope, altered mentation after attending her family member's  today.  Arrived to the ED with systolic blood pressure ~260 and stat head CT revealed a large right cerebellar hemorrhage with mass-effect and effacement of the ventricle.  Neurology and neurosurgery were consulted and she was taken to the OR emergently for decompressive craniectomy.    Hospital Course   admitted taken to OR for posterior fossa craniectomy for evacuation of intraparenchymal hemorrhage   8/3 extubated, continue on hypertonic therapy    Interval Problem Update  Reviewed last 24 hour events:  Neuro: Q2 neuro intact no sedations, held 3 % this am, follows  HR:   SBP: 130-170  Tmax: afebrile  GI: NPO, pending speech  UOP: good  Lines: d/c benjamin, peripheral IV  Resp: 2l nc   Vte: lovenox  PPI/H2:n/a  Antibx: none    Hold hypertonic gtt and change to hypertonic acetate pending repeat  Replace k and phos  Liberalized Q4 neuro checks  IMCU for hyperosmolar therapy ->       Review of Systems  Review of Systems   Constitutional: Negative for fever and malaise/fatigue.   HENT: Negative for nosebleeds and sore throat.    Eyes: Negative for blurred vision, double vision and photophobia.   Respiratory: Negative for cough, sputum production and shortness of breath.    Cardiovascular: Negative for chest pain, palpitations, claudication and leg swelling.   Gastrointestinal: Positive for nausea. Negative for abdominal pain, constipation, diarrhea and vomiting.   Genitourinary: Negative for dysuria and hematuria.   Musculoskeletal: Negative for myalgias.   Neurological: Positive for dizziness. Negative for tingling, tremors, sensory change, speech change, focal weakness, weakness and headaches.   Psychiatric/Behavioral: Negative  for depression and hallucinations. The patient is not nervous/anxious.         Vital Signs for last 24 hours   Pulse:  [] 89  Resp:  [19-36] 24  BP: (123-201)/(58-85) 153/71  SpO2:  [94 %-100 %] 98 %  Hemodynamic parameters for last 24 hours       Respiratory Information for the last 24 hours       Physical Exam   Physical Exam  Vitals and nursing note reviewed.   Constitutional:       Appearance: Normal appearance.      Comments: Patient is sitting up in chair   HENT:      Mouth/Throat:      Mouth: Mucous membranes are moist.   Eyes:      Pupils: Pupils are equal, round, and reactive to light.   Cardiovascular:      Rate and Rhythm: Normal rate.   Pulmonary:      Effort: No respiratory distress.      Breath sounds: No stridor. No wheezing or rhonchi.      Comments: Mechanical breath bilateral  Abdominal:      General: There is no distension.      Palpations: There is no mass.      Tenderness: There is no abdominal tenderness. There is no guarding or rebound.      Hernia: No hernia is present.   Musculoskeletal:         General: No swelling or tenderness.   Skin:     Coloration: Skin is not jaundiced or pale.   Neurological:      Mental Status: She is alert.      Comments: AOx3 follow commands quickly, no facial droop, EOMI no dysconjugate gaze or diplopia, able to stick out tongue, no pronator drift, no leg drift.    Psychiatric:      Comments: Seems appropriate         Medications  Current Facility-Administered Medications   Medication Dose Route Frequency Provider Last Rate Last Admin   • potassium phosphate 30 mmol in  mL ivpb  30 mmol Intravenous Once Parish Brenner M.D. 125 mL/hr at 08/04/22 0855 30 mmol at 08/04/22 0855   • senna-docusate (PERICOLACE or SENOKOT S) 8.6-50 MG per tablet 1 Tablet  1 Tablet Oral Q24HRS PRN Parish Brenner M.D.       • senna-docusate (PERICOLACE or SENOKOT S) 8.6-50 MG per tablet 1 Tablet  1 Tablet Oral Nightly Parish Brenner M.D.       • polyethylene  glycol/lytes (MIRALAX) PACKET 1 Packet  1 Packet Oral BID PRN Parish Brenner M.D.       • ondansetron (ZOFRAN ODT) dispertab 4 mg  4 mg Oral Q4HRS PRN Parish Brenner M.D.        Or   • ondansetron (ZOFRAN) syringe/vial injection 4 mg  4 mg Intravenous Q4HRS PRN Parish Brenner M.D.       • magnesium hydroxide (MILK OF MAGNESIA) suspension 30 mL  30 mL Oral QDAY PRN Parish Brenner M.D.       • methocarbamol (ROBAXIN) tablet 750 mg  750 mg Oral Q8HRS PRN Parish Brenner M.D.       • acetaminophen (TYLENOL) tablet 500 mg  500 mg Oral Q6HRS PRN Parish Brenner M.D.       • cloNIDine (CATAPRES) tablet 0.1 mg  0.1 mg Oral Q4HRS PRN Parish Brenner M.D.       • docusate sodium (Colace) oral solution 100 mg  100 mg Oral BID Parish Brenner M.D.       • lisinopril (PRINIVIL) tablet 20 mg  20 mg Oral Q DAY Parish Brenner M.D.   20 mg at 08/04/22 1128   • scopolamine (TRANSDERM-SCOP) patch 1 Patch  1 Patch Transdermal Q72HRS Parish Brenner M.D.   1 Patch at 08/03/22 0740   • dextrose 10 % BOLUS 250 mL  250 mL Intravenous Q15 MIN PRN Parish Brenner M.D.       • Respiratory Therapy Consult   Nebulization Continuous RT Baljinder George M.D.       • midazolam (Versed) injection 5 mg  5 mg Intravenous Q5 MIN PRN Baljinder George M.D.       • acetaminophen (TYLENOL) suppository 650 mg  650 mg Rectal Q4HRS PRN Baljinder George M.D.   650 mg at 08/03/22 1012   • MD Alert...ICU Electrolyte Replacement per Pharmacy   Other PHARMACY TO DOSE Baljinder George M.D.       • insulin regular (HumuLIN R,NovoLIN R) injection  1-6 Units Subcutaneous Q6HRS Baljinder George M.D.   1 Units at 08/04/22 1138   • Pharmacy Consult Request ...Pain Management Review 1 Each  1 Each Other PHARMACY TO DOSE Amanda Georges P.A.-C.       • MD ALERT...DO NOT ADMINISTER NSAIDS or ASPIRIN unless ORDERED By Neurosurgery 1 Each  1 Each Other PRN Amanda Georges P.A.-C.       • diphenhydrAMINE (BENADRYL) injection 25 mg  25 mg Intravenous Q6HRS PRN  Amanda Georges, P.A.-C.       • labetalol (NORMODYNE/TRANDATE) injection 10 mg  10 mg Intravenous Q HOUR PRN Amanda Georges, P.A.-C.   10 mg at 08/04/22 0305   • bisacodyl (DULCOLAX) suppository 10 mg  10 mg Rectal Q24HRS PRN Amanda Georges, P.A.-C.       • sodium phosphate (Fleet) enema 133 mL  1 Each Rectal Once PRN Amanda Georges, P.A.-C.       • enoxaparin (Lovenox) inj 40 mg  40 mg Subcutaneous DAILY AT 1800 Amanda Georges, P.A.-C.   40 mg at 08/03/22 1732   • hydrALAZINE (APRESOLINE) injection 10 mg  10 mg Intravenous Q HOUR PRN Amanda Georges, P.A.-C.   10 mg at 08/04/22 1126   • 3% sodium chloride (HYPERTONIC SALINE) 500mL infusion 500 mL  500 mL Intravenous Continuous Amanda Georges, P.A.-C.   Paused at 08/04/22 0741   • benzocaine-menthol (Cepacol) lozenge 1 Lozenge  1 Lozenge Mouth/Throat Q2HRS PRN Amanda Georges, P.A.-C.           Fluids    Intake/Output Summary (Last 24 hours) at 8/4/2022 1206  Last data filed at 8/4/2022 1000  Gross per 24 hour   Intake 829.12 ml   Output 925 ml   Net -95.88 ml       Laboratory  Recent Labs     08/02/22 1947 08/03/22  0250   ISTATAPH 7.355* 7.383*   ISTATAPCO2 41.0* 43.4*   ISTATAPO2 144* 179*   ISTATATCO2 24 27   [id number] 99 100*   ISTATARTHCO3 22.9 25.9*   ISTATARTBE -3 1   ISTATTEMP 97.2 F 99.9 F   ISTATFIO2 60 60   ISTATSPEC Arterial Arterial   ISTATAPHTC 7.366* 7.372*   ZCXYPGCF5HS 139* 183*         Recent Labs     08/02/22  1407 08/02/22 1943 08/03/22  0250 08/03/22  0939 08/03/22  1752 08/03/22  2352 08/04/22  0600   SODIUM 142   < > 146*   < > 154* 152* 162*   POTASSIUM 3.1*  --  3.2*  --   --   --  3.4*   CHLORIDE 103  --  109  --   --   --  130*   CO2 23  --  22  --   --   --  23   BUN 8  --  10  --   --   --  14   CREATININE 0.53  --  0.53  --   --   --  0.32*   MAGNESIUM  --   --  1.9  --   --   --  2.1   PHOSPHORUS  --   --  3.7  --   --   --  2.1*   CALCIUM 8.9  --  8.5  --   --   --  8.0*    < > = values in this interval not displayed.     Recent  Labs     08/02/22  1407 08/03/22  0250 08/04/22  0600   ALTSGPT 22 18 10   ASTSGOT 23 14 14   ALKPHOSPHAT 118* 95 77   TBILIRUBIN 0.5 0.3 0.4   GLUCOSE 247* 268* 171*     Recent Labs     08/02/22  1407 08/03/22  0250 08/04/22  0600   WBC 14.1* 14.0* 16.2*   NEUTSPOLYS 71.00  --   --    LYMPHOCYTES 17.50*  --   --    MONOCYTES 2.60  --   --    EOSINOPHILS 1.80  --   --    BASOPHILS 0.00  --   --    ASTSGOT 23 14 14   ALTSGPT 22 18 10   ALKPHOSPHAT 118* 95 77   TBILIRUBIN 0.5 0.3 0.4     Recent Labs     08/02/22  1407 08/03/22  0250 08/04/22  0600   RBC 5.32 4.53 4.09*   HEMOGLOBIN 15.8 13.6 12.3   HEMATOCRIT 47.6* 40.5 38.8   PLATELETCT 264 256 225   PROTHROMBTM 11.9*  --   --    APTT 25.8  --   --    INR 0.88  --   --        Imaging  X-Ray:  I have personally reviewed the images and compared with prior images.  CT:    Reviewed    Assessment/Plan  * Cerebellar hemorrhage, acute (HCC)- (present on admission)  Assessment & Plan  Neurosurgery consult: Dr Vences s/p 8/2 posterior fossa craniectomy for evacuation of intraparenchymal hemorrhage with great operative result seen on postoperative CT  SBP goal < 160  Aspiration precautions  Head of bed 30 degress   Maintain CPP > 60-70  Neuro check Q 2 and pupilometer  Seizure prophylaxis: n/a  Maintain euvolemia  Sodium goal: 150-160 due to still with significant swelling in posterior fossa and effacement of 4th vent        Hypertension  Assessment & Plan  Was severely hypertensive to 260 on presentation  Strict SBP < 160  Will start oral medication once cleared by speech  PRN's and nicardipine gtt in place  Start lisinopril today 8/4    Hyperglycemia- (present on admission)  Assessment & Plan  A1c  Sliding scale for now    Hypokalemia- (present on admission)  Assessment & Plan  Replete for K of 4       VTE:  Lovenox  Ulcer: Not Indicated  Lines: Central Line  PICC    I have performed a physical exam and reviewed and updated ROS and Plan today (8/4/2022). In review of  yesterday's note (8/3/2022), there are no changes except as documented above.     Discussed patient condition and risk of morbidity and/or mortality with RN, RT, Pharmacy, Charge nurse / hot rounds, Patient and neurology and neurosurgery     The patient remains critically ill with ongoing hypertonic saline and monitor for hydrocephalus.  Critical care time =  38 minutes in directly providing and coordinating critical care and extensive data review.  No time overlap and excludes procedures.

## 2022-08-04 NOTE — PROGRESS NOTES
Chief Complaint   Patient presents with   • Fatigue     SInce this afternoon at a . Pt falling asleep during triage, arousable to painful stimuli. Pt oriented x 4 when awake.    • N/V     This afternoon. Hx T2 DM. FSBS in triage 230       Problem List Items Addressed This Visit     * (Principal) Cerebellar hemorrhage, acute (HCC)     Neurosurgery consult: Dr Vences s/p  posterior fossa craniectomy for evacuation of intraparenchymal hemorrhage with great operative result seen on postoperative CT  SBP goal < 160  Aspiration precautions  Head of bed 30 degress   Maintain CPP > 60-70  Neuro check Q 2 and pupilometer  Seizure prophylaxis: n/a  Maintain euvolemia  Sodium goal: 150-160 due to still with significant swelling in posterior fossa and effacement of 4th vent               Relevant Orders    Referral to Physiatry (PMR)    Referral to Neurology      Other Visit Diagnoses     Cerebellar hemorrhage (HCC)        Hypertensive emergency        Relevant Medications    labetalol (NORMODYNE/TRANDATE) injection 20 mg (Completed)    labetalol (NORMODYNE/TRANDATE) injection 20 mg (Completed)    niCARdipine (CARDENE) 25 mg in  mL Infusion    labetalol (NORMODYNE/TRANDATE) injection 10 mg    enoxaparin (Lovenox) inj 40 mg    hydrALAZINE (APRESOLINE) injection 10 mg    cloNIDine (CATAPRES) tablet 0.1 mg    Acute intractable headache, unspecified headache type            Neurology Progress Note     History of present illness:  This is a 57-year old female with no known PMhx (?hypertension, details limited, no current medications) who presented to [facility] on 22 for a chief complaint of altered mentation, headache, dizziness and nausea. Details regarding HPI obtained via medical record. Per report, the patient was attending a  earlier today, when she was reportedly found in a bathroom minimally responsive. Patient was brought to the hospital; here she was found to have , SBP persistently  in the 200s. Stat CT head on arrival revealed large LEFT cerebellar hemorrhage with associated mass effect/edema and compression of 3rd/4th ventricles/basilar cisterns; CTA with no obvious vascular process nor aneurysm.   Currently, patient is sitting up in stretcher; arousable, lethargic, poorly interactive. Follows simple commands, moves all extremities. Further ROS is limited. ICH Score 2 (GCS 5-12, Infratentorial).    Neurology has been consulted by Dr. Baljinder George to further evaluate the findings noted above.     Interval, 8/3/22:  Patient is sitting up in bed; awake and alert. Follows commands, moves all extremities. Admits to mild headache/pain. Denies nausea/dizziness. Patient s/p Left Sub occipital crani; today is POD #1. Hypertonic saline infusing; SBP 130s-160s overnight; at goal--goal 120-160 given profoundly elevated blood pressure on arrival and thus risk of hypoperfusion. Plan to normalize BP in coming 24 hours.     Interval, 8/4/22  Patient is sitting up in bed; awake and alert. Denies headache, dizziness, nausea or weakness. Asking for ice/water; apparently awaiting FEES study today per SLP. No events overnight per nursing; SBP 130s-170s overnight/this morning. PT/OT to see today. Today is POD #2 suboccipital crani, PBD #2 Left cerebellar hemorrhage.     No changes to HPI as was previously documented.     Past medical history:   History reviewed. No pertinent past medical history.    Past surgical history:   Past Surgical History:   Procedure Laterality Date   • CRANIOTOMY  8/2/2022    Procedure: Posterior fossa craniectomy for evacuation of intraparenchymal hemorrhage;  Surgeon: Pedro Pablo Vences M.D.;  Location: SURGERY [facility];  Service: Neurosurgery       Family history:   History reviewed. No pertinent family history.    Social history:   Social History     Socioeconomic History   • Marital status:      Spouse name: Not on file   • Number of children: Not on file   • Years of  education: Not on file   • Highest education level: Not on file   Occupational History   • Not on file   Tobacco Use   • Smoking status: Never Smoker   • Smokeless tobacco: Never Used   Substance and Sexual Activity   • Alcohol use: No   • Drug use: No   • Sexual activity: Not on file   Other Topics Concern   • Not on file   Social History Narrative   • Not on file     Social Determinants of Health     Financial Resource Strain: Not on file   Food Insecurity: Not on file   Transportation Needs: Not on file   Physical Activity: Not on file   Stress: Not on file   Social Connections: Not on file   Intimate Partner Violence: Not on file   Housing Stability: Not on file       Current medications:   Current Facility-Administered Medications   Medication Dose   • potassium phosphate 30 mmol in  mL ivpb  30 mmol   • senna-docusate (PERICOLACE or SENOKOT S) 8.6-50 MG per tablet 1 Tablet  1 Tablet   • senna-docusate (PERICOLACE or SENOKOT S) 8.6-50 MG per tablet 1 Tablet  1 Tablet   • polyethylene glycol/lytes (MIRALAX) PACKET 1 Packet  1 Packet   • ondansetron (ZOFRAN ODT) dispertab 4 mg  4 mg    Or   • ondansetron (ZOFRAN) syringe/vial injection 4 mg  4 mg   • magnesium hydroxide (MILK OF MAGNESIA) suspension 30 mL  30 mL   • methocarbamol (ROBAXIN) tablet 750 mg  750 mg   • acetaminophen (TYLENOL) tablet 500 mg  500 mg   • cloNIDine (CATAPRES) tablet 0.1 mg  0.1 mg   • docusate sodium (Colace) oral solution 100 mg  100 mg   • scopolamine (TRANSDERM-SCOP) patch 1 Patch  1 Patch   • dextrose 10 % BOLUS 250 mL  250 mL   • Respiratory Therapy Consult     • midazolam (Versed) injection 5 mg  5 mg   • acetaminophen (TYLENOL) suppository 650 mg  650 mg   • MD Alert...ICU Electrolyte Replacement per Pharmacy     • niCARdipine (CARDENE) 25 mg in  mL Infusion  0-15 mg/hr   • insulin regular (HumuLIN R,NovoLIN R) injection  1-6 Units   • Pharmacy Consult Request ...Pain Management Review 1 Each  1 Each   • MD ALERT...DO  NOT ADMINISTER NSAIDS or ASPIRIN unless ORDERED By Neurosurgery 1 Each  1 Each   • diphenhydrAMINE (BENADRYL) injection 25 mg  25 mg   • labetalol (NORMODYNE/TRANDATE) injection 10 mg  10 mg   • bisacodyl (DULCOLAX) suppository 10 mg  10 mg   • sodium phosphate (Fleet) enema 133 mL  1 Each   • enoxaparin (Lovenox) inj 40 mg  40 mg   • hydrALAZINE (APRESOLINE) injection 10 mg  10 mg   • 3% sodium chloride (HYPERTONIC SALINE) 500mL infusion 500 mL  500 mL   • benzocaine-menthol (Cepacol) lozenge 1 Lozenge  1 Lozenge       Medication Allergy:  No Known Allergies    Review of systems:   As noted above; otherwise all systems reviewed and determined to be non contributory.      Physical examination:   Vitals:    08/04/22 0700 08/04/22 0800 08/04/22 0900 08/04/22 0926   BP: (!) 161/70 (!) 153/72 (!) 154/70 (!) 201/85   Pulse: 86 96 90 83   Resp: (!) 54 (!) 29 (!) 41 (!) 32   Temp:       TempSrc:       SpO2: 97% 97% 97% 95%   Weight:       Height:         General: Patient in no acute distress  HEENT: Normocephalic, no signs of acute trauma.   Neck: supple, no meningeal signs. There is normal range of motion. No tenderness on exam.   Chest: clear to auscultation. No cough.   CV: RRR, no murmurs.   Skin: no signs of acute rashes or trauma.   Musculoskeletal: joints exhibit full range of motion, without any pain to palpation. There are no signs of joint or muscle swelling. There is no tenderness to deep palpation of muscles.   Psychiatric: No hallucinatory behavior.       NEUROLOGICAL EXAM:   Mental status, orientation: Awake, oriented x 4. Affect flattened/depressed.   Speech and language: speech is hypophonic, mildly dysarthric. Follows simple commands with repeated verbal stim.   Cranial nerve exam: Pupils are 3-4 mm bilaterally and equally reactive to light. Visual fields are intact by confrontation. There is no nystagmus on primary or secondary gaze. Intact full EOM in all directions of gaze. Face appears symmetric.  Sensation in the face is intact to light touch. Uvula is midline. Palate elevates symmetrically. Tongue is midline and without any signs of tongue biting or fasciculations.Shoulder shrug is intact bilaterally.   Motor exam: Strength is 4+/5 in all extremities. Tone is normal. No abnormal movements were seen on exam.   Sensory exam reveals normal sense of light touch and pinprick in all extremities.   Deep tendon reflexes:  Plantar responses are flexor. There is no clonus.   Coordination:  Dysmetria appreciated to BUE; mild/improved today.   Gait: Not assessed at this time as patient is unable.      No significant changes to exam as was documented on 8/3/22.     ANCILLARY DATA REVIEWED:     Lab Data Review:  Recent Results (from the past 24 hour(s))   POCT glucose device results    Collection Time: 08/03/22 11:58 AM   Result Value Ref Range    POC Glucose, Blood 215 (H) 65 - 99 mg/dL   POCT glucose device results    Collection Time: 08/03/22  5:30 PM   Result Value Ref Range    POC Glucose, Blood 146 (H) 65 - 99 mg/dL   Sodium Serum (NA)    Collection Time: 08/03/22  5:52 PM   Result Value Ref Range    Sodium 154 (H) 135 - 145 mmol/L   POCT glucose device results    Collection Time: 08/03/22 11:51 PM   Result Value Ref Range    POC Glucose, Blood 143 (H) 65 - 99 mg/dL   Sodium Serum (NA)    Collection Time: 08/03/22 11:52 PM   Result Value Ref Range    Sodium 152 (H) 135 - 145 mmol/L   POCT glucose device results    Collection Time: 08/04/22  5:18 AM   Result Value Ref Range    POC Glucose, Blood 146 (H) 65 - 99 mg/dL   CBC without Differential    Collection Time: 08/04/22  6:00 AM   Result Value Ref Range    WBC 16.2 (H) 4.8 - 10.8 K/uL    RBC 4.09 (L) 4.20 - 5.40 M/uL    Hemoglobin 12.3 12.0 - 16.0 g/dL    Hematocrit 38.8 37.0 - 47.0 %    MCV 94.9 81.4 - 97.8 fL    MCH 30.1 27.0 - 33.0 pg    MCHC 31.7 (L) 33.6 - 35.0 g/dL    RDW 47.7 35.9 - 50.0 fL    Platelet Count 225 164 - 446 K/uL    MPV 10.6 9.0 - 12.9 fL    Comp Metabolic Panel (CMP)    Collection Time: 08/04/22  6:00 AM   Result Value Ref Range    Sodium 162 (HH) 135 - 145 mmol/L    Potassium 3.4 (L) 3.6 - 5.5 mmol/L    Chloride 130 (H) 96 - 112 mmol/L    Co2 23 20 - 33 mmol/L    Anion Gap 9.0 7.0 - 16.0    Glucose 171 (H) 65 - 99 mg/dL    Bun 14 8 - 22 mg/dL    Creatinine 0.32 (L) 0.50 - 1.40 mg/dL    Calcium 8.0 (L) 8.5 - 10.5 mg/dL    AST(SGOT) 14 12 - 45 U/L    ALT(SGPT) 10 2 - 50 U/L    Alkaline Phosphatase 77 30 - 99 U/L    Total Bilirubin 0.4 0.1 - 1.5 mg/dL    Albumin 3.4 3.2 - 4.9 g/dL    Total Protein 6.0 6.0 - 8.2 g/dL    Globulin 2.6 1.9 - 3.5 g/dL    A-G Ratio 1.3 g/dL   Magnesium    Collection Time: 08/04/22  6:00 AM   Result Value Ref Range    Magnesium 2.1 1.5 - 2.5 mg/dL   PHOSPHORUS    Collection Time: 08/04/22  6:00 AM   Result Value Ref Range    Phosphorus 2.1 (L) 2.5 - 4.5 mg/dL   ESTIMATED GFR    Collection Time: 08/04/22  6:00 AM   Result Value Ref Range    GFR (CKD-EPI) 122 >60 mL/min/1.73 m 2       Labs reviewed by me.         Imaging reviewed by me:     IR-PICC LINE PLACEMENT W/ GUIDANCE > AGE 5   Final Result                  Ultrasound-guided PICC placement performed by qualified nursing staff as    above.          DX-CHEST-PORTABLE (1 VIEW)   Final Result      Bibasilar underinflation atelectasis which could obscure an additional process.      CT-HEAD W/O   Final Result      1.  Postsurgical changes status post occipital craniectomy with evacuation of the left cerebellar hematoma. There is a small amount of residual blood and postsurgical pneumocephalus.   2.  There is improved mass effect on the fourth ventricle.   3.  Lateral ventricles are stable in size.      CT-CTA HEAD WITH & W/O-POST PROCESS   Final Result         1. No hemodynamically significant narrowing of the major intracranial vessels.      2. Redemonstration of large parenchymal hemorrhage in the left cerebellum      CT-CTA NECK WITH & W/O-POST PROCESSING   Final Result       1. No evidence of flow-limiting stenosis or dissection in the cervical carotid or cervical vertebral arteries.      CT-HEAD W/O   Final Result      Large 4.6 x 3.4 cm intraparenchymal hematoma centered in the left cerebellar hemisphere. Associated mass effect results in effacement of the basal cisterns and fourth ventricle. No evidence of hydrocephalus.      Findings were discussed with Dr. JANET ARELLANO on 8/2/2022 2:45 PM.      EC-ECHOCARDIOGRAM COMPLETE W/O CONT    (Results Pending)       Modified [place] Scale (MRS): 0 = No symptoms      ASSESSMENT AND PLAN:  57-year old female with no known PMhx (?hypertension, details limited, no current medications) who presented to [facility] on 8/2/22 for a chief complaint of altered mentation, headache, dizziness and nausea; here she was found to have , SBP persistently in the 200s after arrival. Stat CT head on arrival revealed large LEFT cerebellar hemorrhage with associated mass effect/edema and effacement of 3rd/4th ventricles/basilar cisterns; CTA with no obvious vascular process nor aneurysm. Etiology most likely hypertensive. Patient now s/p sub occipital crani for decompression and hematoma evacuation; today is post op day #2, PBD #2. Exam remains stable; PT/OT/SLP on board; Plan for continued strict SBP management and hypertonic saline therapy.      Recommendations/Plan:     -q2h and PRN neuro assessment. VS per nursing/unit protocol. Please call neurology urgently with changes in exam.   -OK to normalize BP for goal normotension, 100-130/60-80; Nicardipine gtt, PRN labetalol, hydralazine.  -No anticoagulation/antithrombotics at this time.   -Maintain strict euthermia, euglycemia, euvolemia. Current Na is 162; 3% held this morning; tentative plan to switch to Sodium Acetate this afternoon given continued risk for evolving cerebral edema and mass effect/posterior fossa syndrome for Na goal 150-160. q6h Na checks.    -PT/OT/SLP eval and treat.     -Will defer all other medical management to ICU team.    -DVT PPX: SCDs.     The plan of care above has been discussed with Dr. Romel Dickens and with Dr. Toussanit. Please call with questions.     BRIDGETTE Vallejo.  [place] [facility]

## 2022-08-04 NOTE — ASSESSMENT & PLAN NOTE
Was severely hypertensive to 260 on presentation  Strict SBP < 160  Status post nicardipine drip  Continue lisinopril 40 mg daily, continue amlodipine 10 mg daily  Stopped HCTZ given her previously low Na and ongoing need for salt tabs.

## 2022-08-04 NOTE — THERAPY
"Physical Therapy   Initial Evaluation     Patient Name: Lana Phillips  Age:  57 y.o., Sex:  female  Medical Record #: [id number]  Today's Date: 8/4/2022     Precautions  Precautions: Fall Risk;Swallow Precautions ( See Comments)    Assessment  Patient is 57 y.o. female admitted with large L cerebellar IPH after being found down in the bathroom; PMH including but not limited to. Pt underwent emergent L suboccipital craniotomy with C1 laminectomy on 8/2/2022. Pt presents to physical therapy with impairments in sequencing, balance, gait, and activity tolerance which impact pt's ability to return home at this time. Today, pt required Min A to complete mobility as detailed below and appears very motivated to work with PT. PT to follow while in house to optimize return of functional independence; however, would benefit from intensive post acute therapy to optimize functional recovery.     Plan    Recommend Physical Therapy 5 times per week until therapy goals are met for the following treatments:  Bed Mobility, Equipment, Gait Training, Neuro Re-Education / Balance, Self Care/Home Evaluation, Therapeutic Activities, and Therapeutic Exercises    DC Equipment Recommendations: Unable to determine at this time  Discharge Recommendations: Recommend post-acute placement for additional physical therapy services prior to discharge home (recommend a physiatry consult)        08/04/22 1155   Precautions   Precautions Fall Risk;Swallow Precautions ( See Comments)   Vitals   O2 Delivery Device Silicone Nasal Cannula   Pain 0 - 10 Group   Therapist Pain Assessment During Activity;Nurse Notified  (no pain reported)   Prior Living Situation   Prior Services None   Housing / Facility [address] [facility]   Steps Into Home 0   Steps In Home 0   Equipment Owned None   Lives with - Patient's Self Care Capacity Adult Children  (18 and 20 y/o kids)   Comments children able to assist upon DC home. Pt works in \"uniforms\"   Prior Level of Functional Mobility "   Bed Mobility Independent   Transfer Status Independent   Ambulation Independent   Distance Ambulation (Feet)   (community)   Assistive Devices Used None   Stairs Independent   Cognition    Cognition / Consciousness X   Level of Consciousness Alert   Ability To Follow Commands 2 Step   Safety Awareness Impaired   Attention Impaired   Comments focused on drinking water   Active ROM Lower Body    Active ROM Lower Body  WDL   Strength Lower Body   Lower Body Strength  WDL   Comments grossly assessed at 4-/5, intermittent difficulty following MMT commands   Coordination Lower Body    Coordination Lower Body  WDL   Comments formal toe tapping and heel to shin intact. Appears to have difficulty sequencing steps to pivot transfer   Balance Assessment   Sitting Balance (Static) Fair +   Sitting Balance (Dynamic) Fair +   Standing Balance (Static) Fair   Standing Balance (Dynamic) Fair -   Weight Shift Sitting Fair   Weight Shift Standing Fair   Comments w/ HHA   Gait Analysis   Gait Level Of Assist Minimal Assist   Assistive Device Hand Held Assist  (bilateral)   Distance (Feet) 3   # of Times Distance was Traveled 1   Deviation Bradykinetic;Decreased Base Of Support   # of Stairs Climbed 0   Weight Bearing Status no restrictions   Bed Mobility    Supine to Sit Minimal Assist   Sit to Supine   (seated in chair post session)   Scooting Moderate Assist   Functional Mobility   Sit to Stand Minimal Assist   Bed, Chair, Wheelchair Transfer Minimal Assist   Transfer Method Stand Step   ICU Target Mobility Level   ICU Mobility - Targeted Level Level 3B   How much difficulty does the patient currently have...   Turning over in bed (including adjusting bedclothes, sheets and blankets)? 1   Sitting down on and standing up from a chair with arms (e.g., wheelchair, bedside commode, etc.) 1   Moving from lying on back to sitting on the side of the bed? 1   How much help from another person does the patient currently need...   Moving to  and from a bed to a chair (including a wheelchair)? 3   Need to walk in a hospital room? 2   Climbing 3-5 steps with a railing? 1   6 clicks Mobility Score 9   Patient / Family Goals    Patient / Family Goal #1 to return home   Short Term Goals    Short Term Goal # 1 pt will perform supine <> sit with HOB flat, no railing and SPV in 6 visits   Short Term Goal # 2 pt will perform sit <> stand and transfer between various surfaces with LRAD and SPV to improve mobility independence in 6 visit   Short Term Goal # 3 pt will ambulate > 200 ft with LRAD and SPV to access community in 6 visits   Education Group   Education Provided Role of Physical Therapist   Role of Physical Therapist Patient Response Patient;Acceptance;Explanation;Verbal Demonstration   Problem List    Problems Impaired Bed Mobility;Impaired Transfers;Functional Strength Deficit;Impaired Balance;Decreased Activity Tolerance   Anticipated Discharge Equipment and Recommendations   DC Equipment Recommendations Unable to determine at this time   Discharge Recommendations Recommend post-acute placement for additional physical therapy services prior to discharge home  (recommend a physiatry consult)

## 2022-08-04 NOTE — ASSESSMENT & PLAN NOTE
Neurosurgery consult: Dr Vences s/p 8/2 posterior fossa craniectomy for evacuation of intraparenchymal hemorrhage with great operative result seen on postoperative CT  SBP goal < 160  Neuro checks q4h  Status post 3% saline. Okay to continue salt tabs. Goal eunatremia.  Neurosurgery and neurology following  PT/OT recommend acute rehab, PMR consulted

## 2022-08-04 NOTE — PROGRESS NOTES
Notified Dr. Brenner regarding NA of 162 and if he wanted to recollect to verify. Per Dr. Brenner no need to recollect at this time. Drip remains shut off per orders.

## 2022-08-04 NOTE — PROGRESS NOTES
Neurosurgery Progress Note    Subjective:  57 year old female presented with left cerebellar intraparenchymal hemorrhage after she was found down with a systolic BP >200.    POD#2 left posterior fossa craniectomy with hemorrhage evacuation    Extubated yesterday and doing fine.  Pain well controlled.  Na 162 this AM and now 3% drip has been discontinued.  CT this AM looks good, no evidence of hydrocephalus.   Has been on bed rest since surgery.  Getting swallow study today.     Exam:  A&O x3  PERRL. EOMI.   Motor 5/5 throughout all four extremities  Briskly following commands to all four extremities.   Sensation grossly intact to all four extremities.  No drift.  Dressing with old strikethrough, dry.         BP  Min: 123/58  Max: 175/82  Pulse  Av  Min: 60  Max: 105  Resp  Av.9  Min: 19  Max: 36  Monitored Temp 2  Av.7 °C (99.8 °F)  Min: 37.6 °C (99.68 °F)  Max: 37.8 °C (100.04 °F)  SpO2  Av %  Min: 94 %  Max: 100 %    No data recorded    Recent Labs     22  1407 22  0250 22  0600   WBC 14.1* 14.0* 16.2*   RBC 5.32 4.53 4.09*   HEMOGLOBIN 15.8 13.6 12.3   HEMATOCRIT 47.6* 40.5 38.8   MCV 89.5 89.4 94.9   MCH 29.7 30.0 30.1   MCHC 33.2* 33.6 31.7*   RDW 40.7 41.4 47.7   PLATELETCT 264 256 225   MPV 10.6 10.4 10.6     Recent Labs     22  1407 22  1943 22  0250 22  0939 22  1752 22  2352 22  0600   SODIUM 142   < > 146*   < > 154* 152* 162*   POTASSIUM 3.1*  --  3.2*  --   --   --  3.4*   CHLORIDE 103  --  109  --   --   --  130*   CO2 23  --  22  --   --   --  23   GLUCOSE 247*  --  268*  --   --   --  171*   BUN 8  --  10  --   --   --  14   CREATININE 0.53  --  0.53  --   --   --  0.32*   CALCIUM 8.9  --  8.5  --   --   --  8.0*    < > = values in this interval not displayed.     Recent Labs     22  1407   APTT 25.8   INR 0.88     Recent Labs     22  1439   REACTMIN 4.6   CLOTKINET 1.3   CLOTANGL 72.1   MAXCLOTS 61.4    TYP72ISJ 0.0   PRCINADP 15.7   PRCINAA 6.3       Intake/Output                       08/03/22 0700 - 08/04/22 0659 08/04/22 0700 - 08/05/22 0659 0700-1859 1900-0659 Total 0700-1859 1900-0659 Total                 Intake    I.V.  240  240 480  --  -- --    Volume (mL) (3% sodium chloride (HYPERTONIC SALINE) 500mL infusion 500 mL) 240 240 480 -- -- --    IV Piggyback  380  -- 380  --  -- --    Volume (mL) (potassium chloride (KCL) ivpb 10 mEq) 380 -- 380 -- -- --    Total Intake 620 240 860 -- -- --       Output    Urine  450  665 1115  --  -- --    Output (mL) (Urethral Catheter Non-latex;Temperature probe 16 Fr.)  -- -- --    Total Output  -- -- --       Net I/O     170 -425 -255 -- -- --            Intake/Output Summary (Last 24 hours) at 8/4/2022 0900  Last data filed at 8/4/2022 0600  Gross per 24 hour   Intake 820 ml   Output 1025 ml   Net -205 ml            • potassium phosphate IVPB (CUSTOM)  30 mmol Once   • scopolamine  1 Patch Q72HRS   • dextrose  250 mL Q15 MIN PRN   • Respiratory Therapy Consult   Continuous RT   • midazolam  5 mg Q5 MIN PRN   • acetaminophen  650 mg Q4HRS PRN   • MD Alert...Adult ICU Electrolyte Replacement per Pharmacy   PHARMACY TO DOSE   • niCARdipine infusion  0-15 mg/hr Continuous   • insulin regular  1-6 Units Q6HRS   • Pharmacy Consult Request  1 Each PHARMACY TO DOSE   • MD ALERT...DO NOT ADMINISTER NSAIDS or ASPIRIN unless ORDERED By Neurosurgery  1 Each PRN   • diphenhydrAMINE  25 mg Q6HRS PRN   • labetalol  10 mg Q HOUR PRN   • bisacodyl  10 mg Q24HRS PRN   • sodium phosphate  1 Each Once PRN   • enoxaparin (LOVENOX) injection  40 mg DAILY AT 1800   • hydrALAZINE  10 mg Q HOUR PRN   • cloNIDine  0.1 mg Q4HRS PRN   • 3% sodium chloride  500 mL Continuous   • benzocaine-menthol  1 Lozenge Q2HRS PRN   • acetaminophen  500 mg Q6HRS PRN   • methocarbamol  750 mg Q8HRS PRN   • magnesium hydroxide  30 mL QDAY PRN   • ondansetron  4 mg Q4HRS PRN    Or   •  ondansetron  4 mg Q4HRS PRN   • polyethylene glycol/lytes  1 Packet BID PRN   • senna-docusate  1 Tablet Nightly   • senna-docusate  1 Tablet Q24HRS PRN   • docusate sodium  100 mg BID       Assessment and Plan:  Hospital day # 3  POD# 2    Keep head of bed >35 degrees   Ice incisions.  Post op CT 8/4 stable   Okay to discontinue 3% Na   Okay for Q4 neuro checks   Ok to transfer to floor today per NS  PT/OT/ambulate as tolerated.  Continue neurology/medical care.  Following.     Chemical prophylactic DVT therapy: Yes - Lovenox 40mg/qd    Start date/time: today

## 2022-08-04 NOTE — THERAPY
"Occupational Therapy   Initial Evaluation     Patient Name: Lana Phillips  Age:  57 y.o., Sex:  female  Medical Record #: [id number]  Today's Date: 2022     Precautions  Precautions: Fall Risk, Swallow Precautions ( See Comments)    Assessment    Patient is 57 y.o. female admitted after having a fall at her mothers . Pt found to have cerebellar hemorrhage and HTN emergency. Pt s/p crani 8/3. Pt participated in OT evaluation. Pt reported being independent prior to the hospital admission and lives w/ her two adult children who are able to assist her as needed. Pt participated in seated toothbrushing and donned/doffed socks w/ supv. Pt required min A for bed mobility, mod A for scooting, and min A ADL txf. Pt perseverating on wanting water/ice chips, despite being educated on fluid restriction. Additionally, when pt asked to scoot to EOB, pt stood up instead. Pt demonstrated decreased strength, cognition, and balance that are impacting functional performance. Pt will continue to benefit from skilled OT while admitted to acute care.    Plan    Recommend Occupational Therapy 3 times per week until therapy goals are met for the following treatments:  Adaptive Equipment, Self Care/Activities of Daily Living, Therapeutic Activities, and Therapeutic Exercises.    DC Equipment Recommendations: (P) Unable to determine at this time  Discharge Recommendations: (P) Recommend post-acute placement for additional occupational therapy services prior to discharge home (Anticipate pt will progress to home w/ HH and support of family)     Subjective    \"When can I go back to work?\"     Objective     22 1154   Prior Living Situation   Prior Services None   Housing / Facility 1 Story House   Steps Into Home 0   Bathroom Set up Bathtub / Shower Combination   Equipment Owned None   Lives with - Patient's Self Care Capacity Adult Children  (18 and 19 years old)   Comments Pt reports her children are home most of the time and able to " assist her PRN. Neither child is currently working.   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)   History of Falls   History of Falls Yes   Date of Last Fall   (fall just prior to admit)   Precautions   Precautions Fall Risk;Swallow Precautions ( See Comments)   Vitals   Blood Pressure (!) 167/77  (final BP at end of evaluation)   Vitals Comments RN medicated for BP just prior to evaluation   Pain   Pain Scales 0 to 10 Scale    Pain 0 - 10 Group   Therapist Pain Assessment Post Activity Pain Same as Prior to Activity  (No pain reported, not rated)   Cognition    Cognition / Consciousness X   Level of Consciousness Alert   Ability To Follow Commands 2 Step   Safety Awareness Impaired   Attention Impaired   Comments Pt followed commands throughout the evaluation and answered questions appropriately; pt moved quickly w/ sitting EOB and standing prior to feet touching floor   Passive ROM Upper Body   Passive ROM Upper Body WDL   Active ROM Upper Body   Active ROM Upper Body  WDL   Strength Upper Body   Upper Body Strength  WDL   Sensation Upper Body   Upper Extremity Sensation  WDL   Upper Body Muscle Tone   Upper Body Muscle Tone  WDL   Coordination Upper Body   Coordination WDL   Balance Assessment   Sitting Balance (Static) Fair +   Sitting Balance (Dynamic) Fair   Standing Balance (Static) Fair   Standing Balance (Dynamic) Fair -   Weight Shift Sitting Fair   Weight Shift Standing Fair   Comments HHA x2   Bed Mobility    Supine to  Sit Minimal Assist  (use of therapists arms w/ min A provided)   Scooting Moderate Assist  (scooting to EOB)   Rolling Supervised   Comments No use of rail; pt used therapists arms to sit up   ADL Assessment   Grooming Supervision;Seated  (brushed teeth)   Lower Body Dressing Supervision  (don/doff socks)   Functional Mobility   Sit to Stand Contact Guard Assist   Bed, Chair, Wheelchair Transfer Min A   Transfer Method Stand Step   Mobility EOB>chair   Comments HHA x2   Activity Tolerance   Sitting in Chair up to chair post   Sitting Edge of Bed <5 min   Standing <3 min   Comments No increased WOB, pain or fatigue noted by pt   Patient / Family Goals   Patient / Family Goal #1 to go home   Short Term Goals   Short Term Goal # 1 Pt will perform toileting w/ supv   Short Term Goal # 2 Pt will complete standing grooming w/ supv   Short Term Goal # 3 Pt will perform ADL txf w/ supv   Education Group   Education Provided Role of Occupational Therapist;Activities of Daily Living   Role of Occupational Therapist Patient Response Patient;Acceptance;Explanation;Verbal Demonstration   ADL Patient Response Patient;Acceptance;Explanation;Demonstration;Verbal Demonstration;Action Demonstration   Problem List   Problem List Decreased Active Daily Living Skills;Decreased Homemaking Skills;Decreased Functional Mobility;Decreased Activity Tolerance;Safety Awareness Deficits / Cognition;Impaired Postural Control / Balance

## 2022-08-04 NOTE — DISCHARGE PLANNING
Case Management Discharge Planning    Admission Date: 8/2/2022  GMLOS: 7.1  ALOS: 2    6-Clicks ADL Score:    6-Clicks Mobility Score:      Patient on strict bedrest orders and PT/OT to yet provide evaluations and recommendations for post-hospitalization needs. [facility] following.    Anticipated Discharge Dispo: Discharge Disposition: Still a Patient (30)    DME Needed: Unable to determine at this time.    Action(s) Taken: Updated Provider/Nurse on Discharge Plan and OTHER: Chart reviewed and discharge plan updated. Discussed patients’ treatment and discharge plans with medical and nursing teams during IDT rounds.     Escalations Completed: None    Medically Clear: No    Next Steps: f/u with medical and nursing teams regarding discharge planning needs/barriers and assist as indicated. f/u with patient and family regarding discharge planning needs/barriers and update discharge plan as indicated. F/u with obtaining choice for regarding PT/OT recommendations after evaluations are complete.     Barriers to Discharge: Medical clearance and Pending PT Evaluation    Is the patient up for discharge tomorrow: No

## 2022-08-05 ENCOUNTER — APPOINTMENT (OUTPATIENT)
Dept: CARDIOLOGY | Facility: MEDICAL CENTER | Age: 57
DRG: 023 | End: 2022-08-05
Attending: STUDENT IN AN ORGANIZED HEALTH CARE EDUCATION/TRAINING PROGRAM
Payer: COMMERCIAL

## 2022-08-05 ENCOUNTER — APPOINTMENT (OUTPATIENT)
Dept: RADIOLOGY | Facility: MEDICAL CENTER | Age: 57
DRG: 023 | End: 2022-08-05
Attending: PHYSICAL MEDICINE & REHABILITATION
Payer: COMMERCIAL

## 2022-08-05 LAB
ALBUMIN SERPL BCP-MCNC: 3.4 G/DL (ref 3.2–4.9)
ALBUMIN/GLOB SERPL: 1.3 G/DL
ALP SERPL-CCNC: 77 U/L (ref 30–99)
ALT SERPL-CCNC: 15 U/L (ref 2–50)
ANION GAP SERPL CALC-SCNC: 13 MMOL/L (ref 7–16)
AST SERPL-CCNC: 22 U/L (ref 12–45)
BILIRUB SERPL-MCNC: 0.8 MG/DL (ref 0.1–1.5)
BUN SERPL-MCNC: 10 MG/DL (ref 8–22)
CALCIUM SERPL-MCNC: 8.1 MG/DL (ref 8.5–10.5)
CHLORIDE SERPL-SCNC: 111 MMOL/L (ref 96–112)
CO2 SERPL-SCNC: 23 MMOL/L (ref 20–33)
CREAT SERPL-MCNC: 0.3 MG/DL (ref 0.5–1.4)
ERYTHROCYTE [DISTWIDTH] IN BLOOD BY AUTOMATED COUNT: 48.6 FL (ref 35.9–50)
GFR SERPLBLD CREATININE-BSD FMLA CKD-EPI: 124 ML/MIN/1.73 M 2
GLOBULIN SER CALC-MCNC: 2.6 G/DL (ref 1.9–3.5)
GLUCOSE BLD STRIP.AUTO-MCNC: 141 MG/DL (ref 65–99)
GLUCOSE BLD STRIP.AUTO-MCNC: 147 MG/DL (ref 65–99)
GLUCOSE BLD STRIP.AUTO-MCNC: 156 MG/DL (ref 65–99)
GLUCOSE BLD STRIP.AUTO-MCNC: 169 MG/DL (ref 65–99)
GLUCOSE SERPL-MCNC: 160 MG/DL (ref 65–99)
HCT VFR BLD AUTO: 35.6 % (ref 37–47)
HGB BLD-MCNC: 11.4 G/DL (ref 12–16)
LV EJECT FRACT  99904: 65
LV EJECT FRACT MOD 2C 99903: 62.06
LV EJECT FRACT MOD 4C 99902: 59.08
LV EJECT FRACT MOD BP 99901: 59.84
MAGNESIUM SERPL-MCNC: 2 MG/DL (ref 1.5–2.5)
MCH RBC QN AUTO: 30.4 PG (ref 27–33)
MCHC RBC AUTO-ENTMCNC: 32 G/DL (ref 33.6–35)
MCV RBC AUTO: 94.9 FL (ref 81.4–97.8)
PHOSPHATE SERPL-MCNC: 2 MG/DL (ref 2.5–4.5)
PLATELET # BLD AUTO: 190 K/UL (ref 164–446)
PMV BLD AUTO: 10.9 FL (ref 9–12.9)
POTASSIUM SERPL-SCNC: 3.2 MMOL/L (ref 3.6–5.5)
PROT SERPL-MCNC: 6 G/DL (ref 6–8.2)
RBC # BLD AUTO: 3.75 M/UL (ref 4.2–5.4)
SODIUM SERPL-SCNC: 143 MMOL/L (ref 135–145)
SODIUM SERPL-SCNC: 144 MMOL/L (ref 135–145)
SODIUM SERPL-SCNC: 145 MMOL/L (ref 135–145)
SODIUM SERPL-SCNC: 145 MMOL/L (ref 135–145)
SODIUM SERPL-SCNC: 146 MMOL/L (ref 135–145)
SODIUM SERPL-SCNC: 147 MMOL/L (ref 135–145)
SODIUM SERPL-SCNC: 147 MMOL/L (ref 135–145)
WBC # BLD AUTO: 16.3 K/UL (ref 4.8–10.8)

## 2022-08-05 PROCEDURE — 770001 HCHG ROOM/CARE - MED/SURG/GYN PRIV*

## 2022-08-05 PROCEDURE — 700102 HCHG RX REV CODE 250 W/ 637 OVERRIDE(OP): Performed by: STUDENT IN AN ORGANIZED HEALTH CARE EDUCATION/TRAINING PROGRAM

## 2022-08-05 PROCEDURE — 700111 HCHG RX REV CODE 636 W/ 250 OVERRIDE (IP)

## 2022-08-05 PROCEDURE — 99223 1ST HOSP IP/OBS HIGH 75: CPT | Performed by: PHYSICAL MEDICINE & REHABILITATION

## 2022-08-05 PROCEDURE — A9270 NON-COVERED ITEM OR SERVICE: HCPCS | Performed by: STUDENT IN AN ORGANIZED HEALTH CARE EDUCATION/TRAINING PROGRAM

## 2022-08-05 PROCEDURE — A9270 NON-COVERED ITEM OR SERVICE: HCPCS | Performed by: INTERNAL MEDICINE

## 2022-08-05 PROCEDURE — 700101 HCHG RX REV CODE 250: Performed by: INTERNAL MEDICINE

## 2022-08-05 PROCEDURE — 85027 COMPLETE CBC AUTOMATED: CPT

## 2022-08-05 PROCEDURE — 99233 SBSQ HOSP IP/OBS HIGH 50: CPT | Performed by: NURSE PRACTITIONER

## 2022-08-05 PROCEDURE — 93306 TTE W/DOPPLER COMPLETE: CPT | Mod: 26 | Performed by: INTERNAL MEDICINE

## 2022-08-05 PROCEDURE — 700105 HCHG RX REV CODE 258: Performed by: STUDENT IN AN ORGANIZED HEALTH CARE EDUCATION/TRAINING PROGRAM

## 2022-08-05 PROCEDURE — 700105 HCHG RX REV CODE 258: Performed by: INTERNAL MEDICINE

## 2022-08-05 PROCEDURE — 71045 X-RAY EXAM CHEST 1 VIEW: CPT

## 2022-08-05 PROCEDURE — 93306 TTE W/DOPPLER COMPLETE: CPT

## 2022-08-05 PROCEDURE — 99232 SBSQ HOSP IP/OBS MODERATE 35: CPT | Performed by: STUDENT IN AN ORGANIZED HEALTH CARE EDUCATION/TRAINING PROGRAM

## 2022-08-05 PROCEDURE — 80053 COMPREHEN METABOLIC PANEL: CPT

## 2022-08-05 PROCEDURE — 82962 GLUCOSE BLOOD TEST: CPT | Mod: 91

## 2022-08-05 PROCEDURE — 84295 ASSAY OF SERUM SODIUM: CPT

## 2022-08-05 PROCEDURE — 700101 HCHG RX REV CODE 250: Performed by: STUDENT IN AN ORGANIZED HEALTH CARE EDUCATION/TRAINING PROGRAM

## 2022-08-05 PROCEDURE — 83735 ASSAY OF MAGNESIUM: CPT

## 2022-08-05 PROCEDURE — 700102 HCHG RX REV CODE 250 W/ 637 OVERRIDE(OP): Performed by: INTERNAL MEDICINE

## 2022-08-05 PROCEDURE — 92526 ORAL FUNCTION THERAPY: CPT

## 2022-08-05 PROCEDURE — 84100 ASSAY OF PHOSPHORUS: CPT

## 2022-08-05 RX ORDER — LISINOPRIL 20 MG/1
20 TABLET ORAL ONCE
Status: COMPLETED | OUTPATIENT
Start: 2022-08-05 | End: 2022-08-05

## 2022-08-05 RX ORDER — SODIUM CHLORIDE 1 G/1
1 TABLET ORAL
Status: DISCONTINUED | OUTPATIENT
Start: 2022-08-05 | End: 2022-08-12 | Stop reason: HOSPADM

## 2022-08-05 RX ORDER — LISINOPRIL 20 MG/1
40 TABLET ORAL
Status: DISCONTINUED | OUTPATIENT
Start: 2022-08-06 | End: 2022-08-12 | Stop reason: HOSPADM

## 2022-08-05 RX ORDER — 3% SODIUM CHLORIDE 3 G/100ML
500 INJECTION, SOLUTION INTRAVENOUS CONTINUOUS
Status: DISCONTINUED | OUTPATIENT
Start: 2022-08-05 | End: 2022-08-05

## 2022-08-05 RX ORDER — 3% SODIUM CHLORIDE 3 G/100ML
INJECTION, SOLUTION INTRAVENOUS CONTINUOUS
Status: DISCONTINUED | OUTPATIENT
Start: 2022-08-05 | End: 2022-08-06

## 2022-08-05 RX ADMIN — POTASSIUM PHOSPHATE, MONOBASIC AND POTASSIUM PHOSPHATE, DIBASIC 30 MMOL: 224; 236 INJECTION, SOLUTION, CONCENTRATE INTRAVENOUS at 11:58

## 2022-08-05 RX ADMIN — SODIUM CHLORIDE 1 G: 1 TABLET ORAL at 17:52

## 2022-08-05 RX ADMIN — SODIUM CHLORIDE 500 ML: 3 INJECTION, SOLUTION INTRAVENOUS at 02:15

## 2022-08-05 RX ADMIN — DOCUSATE SODIUM 100 MG: 50 LIQUID ORAL at 17:51

## 2022-08-05 RX ADMIN — INSULIN HUMAN 1 UNITS: 100 INJECTION, SOLUTION PARENTERAL at 18:01

## 2022-08-05 RX ADMIN — AMLODIPINE BESYLATE 10 MG: 10 TABLET ORAL at 06:15

## 2022-08-05 RX ADMIN — INSULIN HUMAN 1 UNITS: 100 INJECTION, SOLUTION PARENTERAL at 23:53

## 2022-08-05 RX ADMIN — SODIUM CHLORIDE 500 ML: 3 INJECTION, SOLUTION INTRAVENOUS at 07:00

## 2022-08-05 RX ADMIN — SODIUM CHLORIDE 500 ML: 3 INJECTION, SOLUTION INTRAVENOUS at 12:14

## 2022-08-05 RX ADMIN — SODIUM CHLORIDE: 3 INJECTION, SOLUTION INTRAVENOUS at 18:26

## 2022-08-05 RX ADMIN — ENOXAPARIN SODIUM 40 MG: 40 INJECTION SUBCUTANEOUS at 17:51

## 2022-08-05 RX ADMIN — DOCUSATE SODIUM 100 MG: 50 LIQUID ORAL at 06:15

## 2022-08-05 RX ADMIN — SODIUM CHLORIDE 1 G: 1 TABLET ORAL at 14:51

## 2022-08-05 RX ADMIN — SODIUM CHLORIDE 4.5 MG/HR: 9 INJECTION, SOLUTION INTRAVENOUS at 02:45

## 2022-08-05 RX ADMIN — LISINOPRIL 20 MG: 20 TABLET ORAL at 06:15

## 2022-08-05 RX ADMIN — HYDRALAZINE HYDROCHLORIDE 10 MG: 20 INJECTION INTRAMUSCULAR; INTRAVENOUS at 23:30

## 2022-08-05 RX ADMIN — LABETALOL HYDROCHLORIDE 10 MG: 5 INJECTION, SOLUTION INTRAVENOUS at 22:42

## 2022-08-05 RX ADMIN — SENNOSIDES AND DOCUSATE SODIUM 1 TABLET: 50; 8.6 TABLET ORAL at 21:05

## 2022-08-05 RX ADMIN — LISINOPRIL 20 MG: 20 TABLET ORAL at 12:01

## 2022-08-05 ASSESSMENT — PATIENT HEALTH QUESTIONNAIRE - PHQ9
SUM OF ALL RESPONSES TO PHQ9 QUESTIONS 1 AND 2: 0
2. FEELING DOWN, DEPRESSED, IRRITABLE, OR HOPELESS: NOT AT ALL
1. LITTLE INTEREST OR PLEASURE IN DOING THINGS: NOT AT ALL

## 2022-08-05 ASSESSMENT — ENCOUNTER SYMPTOMS
EYE PAIN: 0
VOMITING: 0
FEVER: 0
BLURRED VISION: 0
FOCAL WEAKNESS: 0
INSOMNIA: 0
BACK PAIN: 0
COUGH: 0
DIZZINESS: 0
PALPITATIONS: 0
WEAKNESS: 1
HEADACHES: 0
FALLS: 1
SHORTNESS OF BREATH: 0
NAUSEA: 0
CHILLS: 0
ABDOMINAL PAIN: 0
SENSORY CHANGE: 0

## 2022-08-05 ASSESSMENT — PAIN DESCRIPTION - PAIN TYPE
TYPE: ACUTE PAIN

## 2022-08-05 ASSESSMENT — LIFESTYLE VARIABLES: SUBSTANCE_ABUSE: 0

## 2022-08-05 NOTE — PROGRESS NOTES
Name: Wiliam YOB: 1984   MR: [id number]      Group Topic:  BH Group Psychotherapy  Group Date:  10/3/2019  Start Time:   4:00 PM  End Time:   5:10 PM  Facilitators:  George Ritter MD   Department::  [facility]    DATA: [place] was present for Healthy Sleep group. Vet was attentive, engaged, and participated in group discussion.     ASSESSMENT: Veterans completed assessment and discussion of sleep over last week. Group discussed stressors and anxiety that are impacting their ability to fall asleep and stay asleep.      Level of Participation: active  Quality of Participation: attentive, cooperative, engaged, motivated and supportive  Interactions with others: appropriate  Mood/Affect: appropriate, euthymic, normal range, congruent  Triggers (if applicable): n/a  Cognition: goal directed, insightful and logical  Progress: Moderate  Response: [place] reports that he continues to have trouble falling asleep, and has had more nightmares recently. He shared his stressors and how they are effecting his sleep.   Plan: Continue with Group    Patient Active Problem List   Diagnosis   • PTSD (post-traumatic stress disorder)   • Insomnia due to mental disorder   • Circadian rhythm sleep disorder, shift work type        Visit Diagnoses:  1. PTSD (post-traumatic stress disorder)    2. Insomnia due to mental disorder    3. Circadian rhythm sleep disorder, shift work type       Bedside report received and patient care assumed. Pt is resting in bed, A&O4, with no complaints of pain, and is on 2L nasal canula. Q2 hr neuro checks in place.  All fall precautions are in place, belongings at bedside table.  Pt was updated on POC, no questions or concerns. Pt educated on use of call light for assistance.

## 2022-08-05 NOTE — THERAPY
Speech Language Pathology  Daily Treatment     Patient Name: Lana Phillips  Age:  57 y.o., Sex:  female  Medical Record #: [id number]  Today's Date: 8/5/2022     Precautions  Precautions: Fall Risk, Swallow Precautions ( See Comments)  Comments: pt sleepy when not stimulated.    Assessment    Patient was seen for dysphagia tx with breakfast meal. Patient was somnolent, initially declining to participate but then agreed to have sips of water and some applesauce. She refused her breakfast due to fatigue and just wanting to sleep. Oral care was provided. Min verbal/visual cues provided for pt to take small sips/bites, double swallow, alternate bites/sips. Swallow exercises introduced today and handout provided. Pt was able to return demonstrate effortful swallow, ascending effortful pitch glides, falsetto and lingual presses given mod verbal/visual cues. No s/sx of aspiration occurred w/ PO intake. Session discontinued as pt fell back asleep.    Recommendations:  Continue minced/moist solids and mildly thick liuqids  Close supervision for meals  Ok for sips of thin water b/w meals after oral care  Float pills in puree followed by MTL wash    Plan    Continue current treatment plan.    Discharge Recommendations: Recommend post-acute placement for additional speech therapy services prior to discharge home       Objective       08/05/22 0924   Dysphagia    Positioning / Behavior Modification Modulate Rate or Bite Size;Multiple Swallows;Alternate Solids and Liquids;Self Monitoring   Oral / Pharyngeal / Laryngeal Exercises Laryngeal Exercises;Base of Tongue Exercises;Pharyngeal Constriction Exercises   Other Treatments PO thin water via cup, applesauce only as pt declined breakfast d/t fatigue   Diet / Liquid Recommendation Minced & Moist (5) - (Dysphagia II);Mildly Thick (2) - (Nectar Thick)  (ok for sips H20 b/w meals after oral care)   Recommended Route of Medication Administration   Medication Administration  Float Whole with  "Puree   Patient / Family Goals   Patient / Family Goal #1 \"water.\"   Goal #1 Outcome Progressing as expected   Short Term Goals   Short Term Goal # 1 B  Patient will consume a MM5/MT2 diet with no overt s/sx of aspiration given min cues to swallow precautions.  (Progressing slower than expected)   Short Term Goal # 2 Patient will consume sips of thin liquids with no overt s/sx of aspiration.   Goal Outcome # 2  Progressing as expected     "

## 2022-08-05 NOTE — PROGRESS NOTES
Throughout shift pt serum Na has been decreasing despite increasing the 3% saline drip from 20 mL/hr to 45 mL/hr. Na this am was 145, down from 148. Will do a STAT redraw to confirm.

## 2022-08-05 NOTE — PROGRESS NOTES
Chief Complaint   Patient presents with   • Fatigue     SInce this afternoon at a . Pt falling asleep during triage, arousable to painful stimuli. Pt oriented x 4 when awake.    • N/V     This afternoon. Hx T2 DM. FSBS in triage 230       Problem List Items Addressed This Visit     Hypokalemia     Replete as necessary           Relevant Orders    Referral to Skilled Nursing Facility    Dental infection    Relevant Orders    Referral to Skilled Nursing Facility    Hyperglycemia     A1c  Sliding scale for now           Relevant Orders    Referral to Skilled Nursing Facility    * (Principal) Cerebellar hemorrhage, acute (HCC)     Neurosurgery consult: Dr Vences s/p  posterior fossa craniectomy for evacuation of intraparenchymal hemorrhage with great operative result seen on postoperative CT  SBP goal < 160  Aspiration precautions  Head of bed 30 degress   Maintain CPP > 60-70  Neuro check Q 2 and pupilometer  Seizure prophylaxis: n/a  Maintain euvolemia  Sodium goal: 150-160 due to still with significant swelling in posterior fossa and effacement of 4th vent  OT recommending postacute care  SPL starting diet            Relevant Orders    Referral to Physiatry (PMR)    Referral to Neurology    Referral to Skilled Nursing Facility    Hypertension     Was severely hypertensive to 260 on presentation  Strict SBP < 160  Will start oral medication once cleared by speech  PRN's and nicardipine gtt in place  Start lisinopril today            Relevant Medications    labetalol (NORMODYNE/TRANDATE) injection 10 mg    enoxaparin (Lovenox) inj 40 mg    hydrALAZINE (APRESOLINE) injection 10 mg    cloNIDine (CATAPRES) tablet 0.1 mg    lisinopril (PRINIVIL) tablet 20 mg    niCARdipine (CARDENE) 25 mg in  mL Infusion    amLODIPine (NORVASC) tablet 10 mg    Other Relevant Orders    Referral to Skilled Nursing Facility    Intracranial hemorrhage (HCC)    Relevant Orders    Referral to Skilled Nursing Facility      Other  Visit Diagnoses     Cerebellar hemorrhage (HCC)        Relevant Orders    Referral to Skilled Nursing Facility    Hypertensive emergency        Relevant Medications    labetalol (NORMODYNE/TRANDATE) injection 20 mg (Completed)    labetalol (NORMODYNE/TRANDATE) injection 20 mg (Completed)    labetalol (NORMODYNE/TRANDATE) injection 10 mg    enoxaparin (Lovenox) inj 40 mg    hydrALAZINE (APRESOLINE) injection 10 mg    cloNIDine (CATAPRES) tablet 0.1 mg    lisinopril (PRINIVIL) tablet 20 mg    niCARdipine (CARDENE) 25 mg in  mL Infusion    amLODIPine (NORVASC) tablet 10 mg    Other Relevant Orders    Referral to Skilled Nursing Facility    Acute intractable headache, unspecified headache type        Relevant Orders    Referral to Skilled Nursing Facility        Neurology Progress Note     History of present illness:  This is a 57-year old female with no known PMhx (?hypertension, details limited, no current medications) who presented to [facility] on 22 for a chief complaint of altered mentation, headache, dizziness and nausea. Details regarding HPI obtained via medical record. Per report, the patient was attending a  earlier today, when she was reportedly found in a bathroom minimally responsive. Patient was brought to the hospital; here she was found to have , SBP persistently in the 200s. Stat CT head on arrival revealed large LEFT cerebellar hemorrhage with associated mass effect/edema and compression of 3rd/4th ventricles/basilar cisterns; CTA with no obvious vascular process nor aneurysm.   Currently, patient is sitting up in stretcher; arousable, lethargic, poorly interactive. Follows simple commands, moves all extremities. Further ROS is limited. ICH Score 2 (GCS 5-12, Infratentorial).    Neurology has been consulted by Dr. Baljinder George to further evaluate the findings noted above.     Interval, 8/3/22:  Patient is sitting up in bed; awake and alert. Follows commands, moves all  extremities. Admits to mild headache/pain. Denies nausea/dizziness. Patient s/p Left Sub occipital crani; today is POD #1. Hypertonic saline infusing; SBP 130s-160s overnight; at goal--goal 120-160 given profoundly elevated blood pressure on arrival and thus risk of hypoperfusion. Plan to normalize BP in coming 24 hours.     Interval, 8/4/22  Patient is sitting up in bed; awake and alert. Denies headache, dizziness, nausea or weakness. Asking for ice/water; apparently awaiting FEES study today per SLP. No events overnight per nursing; SBP 130s-170s overnight/this morning. PT/OT to see today. Today is POD #2 suboccipital crani, PBD #2 Left cerebellar hemorrhage.     Interval, 8/5/22  Patient is sitting up in bed; drowsy arousable. Follows commands. Now transferred to [facility]; no events overnight; hypertonic gtt infusing; SBP  140s-150s overnight. Today is PBD #3, POD #3,     No changes to HPI as was previously documented.     Past medical history:   History reviewed. No pertinent past medical history.    Past surgical history:   Past Surgical History:   Procedure Laterality Date   • CRANIOTOMY  8/2/2022    Procedure: Posterior fossa craniectomy for evacuation of intraparenchymal hemorrhage;  Surgeon: Pedro Pablo Vences M.D.;  Location: SURGERY [facility];  Service: Neurosurgery       Family history:   History reviewed. No pertinent family history.    Social history:   Social History     Socioeconomic History   • Marital status:      Spouse name: Not on file   • Number of children: Not on file   • Years of education: Not on file   • Highest education level: Not on file   Occupational History   • Not on file   Tobacco Use   • Smoking status: Never Smoker   • Smokeless tobacco: Never Used   Substance and Sexual Activity   • Alcohol use: No   • Drug use: No   • Sexual activity: Not on file   Other Topics Concern   • Not on file   Social History Narrative   • Not on file     Social Determinants of Health     Financial  Resource Strain: Not on file   Food Insecurity: Not on file   Transportation Needs: Not on file   Physical Activity: Not on file   Stress: Not on file   Social Connections: Not on file   Intimate Partner Violence: Not on file   Housing Stability: Not on file       Current medications:   Current Facility-Administered Medications   Medication Dose   • 3% sodium chloride (HYPERTONIC SALINE) 500mL infusion 500 mL  500 mL   • senna-docusate (PERICOLACE or SENOKOT S) 8.6-50 MG per tablet 1 Tablet  1 Tablet   • senna-docusate (PERICOLACE or SENOKOT S) 8.6-50 MG per tablet 1 Tablet  1 Tablet   • polyethylene glycol/lytes (MIRALAX) PACKET 1 Packet  1 Packet   • ondansetron (ZOFRAN ODT) dispertab 4 mg  4 mg    Or   • ondansetron (ZOFRAN) syringe/vial injection 4 mg  4 mg   • magnesium hydroxide (MILK OF MAGNESIA) suspension 30 mL  30 mL   • methocarbamol (ROBAXIN) tablet 750 mg  750 mg   • acetaminophen (TYLENOL) tablet 500 mg  500 mg   • cloNIDine (CATAPRES) tablet 0.1 mg  0.1 mg   • docusate sodium (Colace) oral solution 100 mg  100 mg   • lisinopril (PRINIVIL) tablet 20 mg  20 mg   • niCARdipine (CARDENE) 25 mg in  mL Infusion  0-15 mg/hr   • amLODIPine (NORVASC) tablet 10 mg  10 mg   • scopolamine (TRANSDERM-SCOP) patch 1 Patch  1 Patch   • dextrose 10 % BOLUS 250 mL  250 mL   • Respiratory Therapy Consult     • midazolam (Versed) injection 5 mg  5 mg   • acetaminophen (TYLENOL) suppository 650 mg  650 mg   • MD Alert...ICU Electrolyte Replacement per Pharmacy     • insulin regular (HumuLIN R,NovoLIN R) injection  1-6 Units   • Pharmacy Consult Request ...Pain Management Review 1 Each  1 Each   • MD ALERT...DO NOT ADMINISTER NSAIDS or ASPIRIN unless ORDERED By Neurosurgery 1 Each  1 Each   • diphenhydrAMINE (BENADRYL) injection 25 mg  25 mg   • labetalol (NORMODYNE/TRANDATE) injection 10 mg  10 mg   • bisacodyl (DULCOLAX) suppository 10 mg  10 mg   • sodium phosphate (Fleet) enema 133 mL  1 Each   • enoxaparin  (Lovenox) inj 40 mg  40 mg   • hydrALAZINE (APRESOLINE) injection 10 mg  10 mg   • benzocaine-menthol (Cepacol) lozenge 1 Lozenge  1 Lozenge       Medication Allergy:  No Known Allergies    Review of systems:   As noted above; otherwise all systems reviewed and determined to be non contributory.      Physical examination:   Vitals:    08/05/22 0700 08/05/22 0715 08/05/22 0745 08/05/22 0815   BP: (!) 146/78 (!) 141/67 (!) 156/72 (!) 158/74   Pulse: 88 95 98 93   Resp: (!) 38 (!) 35 (!) 43 (!) 31   Temp:       TempSrc:       SpO2:  92% 92% 97%   Weight:       Height:         General: Patient in no acute distress  HEENT: Normocephalic, no signs of acute trauma.   Neck: supple, no meningeal signs. There is normal range of motion. No tenderness on exam.   Chest: clear to auscultation. No cough.   CV: RRR, no murmurs.   Skin: no signs of acute rashes or trauma.   Musculoskeletal: joints exhibit full range of motion, without any pain to palpation. There are no signs of joint or muscle swelling. There is no tenderness to deep palpation of muscles.   Psychiatric: No hallucinatory behavior.       NEUROLOGICAL EXAM:   Mental status, orientation: Drowsy; oriented x 4. Affect flattened/depressed.   Speech and language: speech is hypophonic, mildly dysarthric. Follows simple commands with repeated verbal stim.   Cranial nerve exam: Pupils are 3-4 mm bilaterally and equally reactive to light. Visual fields are intact by confrontation. There is no nystagmus on primary or secondary gaze. Intact full EOM in all directions of gaze. Face appears symmetric. Sensation in the face is intact to light touch. Uvula is midline. Palate elevates symmetrically. Tongue is midline and without any signs of tongue biting or fasciculations.Shoulder shrug is intact bilaterally.   Motor exam: Strength is 4/5 in all extremities. Tone is normal. No abnormal movements were seen on exam.   Sensory exam reveals normal sense of light touch and pinprick in  all extremities.   Deep tendon reflexes:  Plantar responses are flexor. There is no clonus.   Coordination:  Dysmetria appreciated to BUE; mild/improved today.   Gait: Not assessed at this time as patient is unable.          ANCILLARY DATA REVIEWED:     Lab Data Review:  Recent Results (from the past 24 hour(s))   POCT glucose device results    Collection Time: 08/04/22 11:29 AM   Result Value Ref Range    POC Glucose, Blood 190 (H) 65 - 99 mg/dL   Sodium Serum (NA)    Collection Time: 08/04/22 12:50 PM   Result Value Ref Range    Sodium 149 (H) 135 - 145 mmol/L   PROCALCITONIN    Collection Time: 08/04/22 12:50 PM   Result Value Ref Range    Procalcitonin 0.23 <0.25 ng/mL   POCT glucose device results    Collection Time: 08/04/22  5:19 PM   Result Value Ref Range    POC Glucose, Blood 171 (H) 65 - 99 mg/dL   Sodium Serum (NA)    Collection Time: 08/04/22  5:51 PM   Result Value Ref Range    Sodium 148 (H) 135 - 145 mmol/L   POCT glucose device results    Collection Time: 08/04/22 11:54 PM   Result Value Ref Range    POC Glucose, Blood 156 (H) 65 - 99 mg/dL   Sodium Serum (NA)    Collection Time: 08/05/22 12:00 AM   Result Value Ref Range    Sodium 147 (H) 135 - 145 mmol/L   CBC without Differential    Collection Time: 08/05/22 12:00 AM   Result Value Ref Range    WBC 16.3 (H) 4.8 - 10.8 K/uL    RBC 3.75 (L) 4.20 - 5.40 M/uL    Hemoglobin 11.4 (L) 12.0 - 16.0 g/dL    Hematocrit 35.6 (L) 37.0 - 47.0 %    MCV 94.9 81.4 - 97.8 fL    MCH 30.4 27.0 - 33.0 pg    MCHC 32.0 (L) 33.6 - 35.0 g/dL    RDW 48.6 35.9 - 50.0 fL    Platelet Count 190 164 - 446 K/uL    MPV 10.9 9.0 - 12.9 fL   Comp Metabolic Panel    Collection Time: 08/05/22 12:00 AM   Result Value Ref Range    Sodium 147 (H) 135 - 145 mmol/L    Potassium 3.2 (L) 3.6 - 5.5 mmol/L    Chloride 111 96 - 112 mmol/L    Co2 23 20 - 33 mmol/L    Anion Gap 13.0 7.0 - 16.0    Glucose 160 (H) 65 - 99 mg/dL    Bun 10 8 - 22 mg/dL    Creatinine 0.30 (L) 0.50 - 1.40 mg/dL     Calcium 8.1 (L) 8.5 - 10.5 mg/dL    AST(SGOT) 22 12 - 45 U/L    ALT(SGPT) 15 2 - 50 U/L    Alkaline Phosphatase 77 30 - 99 U/L    Total Bilirubin 0.8 0.1 - 1.5 mg/dL    Albumin 3.4 3.2 - 4.9 g/dL    Total Protein 6.0 6.0 - 8.2 g/dL    Globulin 2.6 1.9 - 3.5 g/dL    A-G Ratio 1.3 g/dL   PHOSPHORUS    Collection Time: 08/05/22 12:00 AM   Result Value Ref Range    Phosphorus 2.0 (L) 2.5 - 4.5 mg/dL   MAGNESIUM    Collection Time: 08/05/22 12:00 AM   Result Value Ref Range    Magnesium 2.0 1.5 - 2.5 mg/dL   ESTIMATED GFR    Collection Time: 08/05/22 12:00 AM   Result Value Ref Range    GFR (CKD-EPI) 124 >60 mL/min/1.73 m 2   SODIUM SERUM (NA)    Collection Time: 08/05/22  4:20 AM   Result Value Ref Range    Sodium 145 135 - 145 mmol/L   Sodium Serum (NA)    Collection Time: 08/05/22  5:34 AM   Result Value Ref Range    Sodium 145 135 - 145 mmol/L   POCT glucose device results    Collection Time: 08/05/22  6:15 AM   Result Value Ref Range    POC Glucose, Blood 147 (H) 65 - 99 mg/dL       Labs reviewed by me.         Imaging reviewed by me:     IR-PICC LINE PLACEMENT W/ GUIDANCE > AGE 5   Final Result                  Ultrasound-guided PICC placement performed by qualified nursing staff as    above.          DX-CHEST-PORTABLE (1 VIEW)   Final Result      Bibasilar underinflation atelectasis which could obscure an additional process.      CT-HEAD W/O   Final Result      1.  Postsurgical changes status post occipital craniectomy with evacuation of the left cerebellar hematoma. There is a small amount of residual blood and postsurgical pneumocephalus.   2.  There is improved mass effect on the fourth ventricle.   3.  Lateral ventricles are stable in size.      CT-CTA HEAD WITH & W/O-POST PROCESS   Final Result         1. No hemodynamically significant narrowing of the major intracranial vessels.      2. Redemonstration of large parenchymal hemorrhage in the left cerebellum      CT-CTA NECK WITH & W/O-POST PROCESSING   Final  Result      1. No evidence of flow-limiting stenosis or dissection in the cervical carotid or cervical vertebral arteries.      CT-HEAD W/O   Final Result      Large 4.6 x 3.4 cm intraparenchymal hematoma centered in the left cerebellar hemisphere. Associated mass effect results in effacement of the basal cisterns and fourth ventricle. No evidence of hydrocephalus.      Findings were discussed with Dr. JANET ARELLANO on 8/2/2022 2:45 PM.      EC-ECHOCARDIOGRAM COMPLETE W/O CONT    (Results Pending)       Modified Indira Scale (MRS): 0 = No symptoms      ASSESSMENT AND PLAN:  57-year old female with no known PMhx (?hypertension, details limited, no current medications) who presented to [facility] on 8/2/22 for a chief complaint of altered mentation, headache, dizziness and nausea; here she was found to have , SBP persistently in the 200s after arrival. Stat CT head on arrival revealed large LEFT cerebellar hemorrhage with associated mass effect/edema and effacement of 3rd/4th ventricles/basilar cisterns; CTA with no obvious vascular process nor aneurysm. Etiology most likely hypertensive. Patient now s/p sub occipital crani for decompression and hematoma evacuation; today is post op day #2, PBD #2. Exam remains stable; PT/OT/SLP on board; Plan for continued strict SBP management and hypertonic saline therapy.      Recommendations/Plan:     -q2h and PRN neuro assessment. VS per nursing/unit protocol. Please call neurology urgently with changes in exam.   -OK to normalize BP for goal normotension, 100-130/60-80; Nicardipine gtt, PRN labetalol, hydralazine.  -No anticoagulation/antithrombotics at this time.   -Maintain strict euthermia, euglycemia, euvolemia. Current Na is 143; goal 150-160; Hypertonic saline infusion restarted this morning; monitor chloride level; continued risk for evolving cerebral edema and mass effect/posterior fossa syndrome. q6h Na checks.    -PT/OT/SLP eval and treat.   Physiatry consult.   -Will defer all other medical management to IMCU team.    -DVT PPX: SCDs.     The plan of care above has been discussed with Dr. Chavez. Please call with questions.     BRIDGETTE Vallejo.  [place] [facility]

## 2022-08-05 NOTE — PROGRESS NOTES
..4 Eyes Skin Assessment Completed by Abhinav, RN and JOHN Clement.    Head Incision  Ears WDL  Nose WDL  Mouth WDL  Neck WDL  Breast/Chest WDL  Shoulder Blades WDL  Spine WDL  (R) Arm/Elbow/Hand WDL  (L) Arm/Elbow/Hand WDL  Abdomen WDL  Groin WDL  Scrotum/Coccyx/Buttocks WDL  (R) Leg WDL  (L) Leg WDL  (R) Heel/Foot/Toe WDL  (L) Heel/Foot/Toe WDL          Devices In Places ECG, Blood Pressure Cuff and Pulse Ox      Interventions In Place Q2 Turns and Pressure Redistribution Mattress    Possible Skin Injury No    Pictures Uploaded Into Epic N/A  Wound Consult Placed N/A  RN Wound Prevention Protocol Ordered Yes

## 2022-08-05 NOTE — PROGRESS NOTES
Neurosurgery Progress Note    Subjective:  57 year old female presented with left cerebellar intraparenchymal hemorrhage after she was found down with a systolic BP >200.    POD#3 left posterior fossa craniectomy with hemorrhage evacuation    Extubated yesterday and doing fine.  Pain well controlled.  Na 145 this AM and 3% continued with a goal of 150-160 per neurology  CT / stable  Working with therapies who recommend rehab.    Exam:  A&O x3  PERRL. EOMI.   Motor 5/5 throughout all four extremities  Briskly following commands to all four extremities.   Sensation grossly intact to all four extremities.  No drift.  Dressing with old strikethrough, dry.         BP  Min: 102/56  Max: 201/85  Pulse  Av.3  Min: 77  Max: 104  Resp  Av.2  Min: 0  Max: 43  Temp  Av.8 °C (98.3 °F)  Min: 36.6 °C (97.8 °F)  Max: 37 °C (98.6 °F)  Monitored Temp 2  Av.7 °C (99.8 °F)  Min: 37.6 °C (99.68 °F)  Max: 37.7 °C (99.86 °F)  SpO2  Av.6 %  Min: 92 %  Max: 98 %    No data recorded    Recent Labs     22  0250 22  0600 22  0000   WBC 14.0* 16.2* 16.3*   RBC 4.53 4.09* 3.75*   HEMOGLOBIN 13.6 12.3 11.4*   HEMATOCRIT 40.5 38.8 35.6*   MCV 89.4 94.9 94.9   MCH 30.0 30.1 30.4   MCHC 33.6 31.7* 32.0*   RDW 41.4 47.7 48.6   PLATELETCT 256 225 190   MPV 10.4 10.6 10.9     Recent Labs     22  0250 22  0939 22  0600 22  1250 22  0000 22  0420 22  0534   SODIUM 146*   < > 162*   < > 147*  147* 145 145   POTASSIUM 3.2*  --  3.4*  --  3.2*  --   --    CHLORIDE 109  --  130*  --  111  --   --    CO2   --    --  23  --   --    GLUCOSE 268*  --  171*  --  160*  --   --    BUN 10  --  14  --  10  --   --    CREATININE 0.53  --  0.32*  --  0.30*  --   --    CALCIUM 8.5  --  8.0*  --  8.1*  --   --     < > = values in this interval not displayed.     Recent Labs     22  1407   APTT 25.8   INR 0.88     Recent Labs     22  1439   REACTMIN 4.6   CLOTKINET 1.3    CLOTANGL 72.1   MAXCLOTS 61.4   [id number] 0.0   PRCINADP 15.7   PRCINAA 6.3       Intake/Output                       08/04/22 0700 - 08/05/22 0659 08/05/22 0700 - 08/06/22 0659     0700-1859 1900-0659 Total 0700-1859 1900-0659 Total                 Intake    P.O.  --  150 150  --  -- --    P.O. -- 150 150 -- -- --    I.V.  33.7  923 956.7  --  -- --    Cardene Volume -- 593.4 593.4 -- -- --    Phenylephrine Volume 0 -- 0 -- -- --    Volume (mL) (3% sodium chloride (HYPERTONIC SALINE) 500mL infusion 500 mL) -- 78.8 78.8 -- -- --    Volume (mL) (dextrose 50% (D50W) injection 25 g) 0 -- 0 -- -- --    Volume (mL) (3% sodium chloride (HYPERTONIC SALINE) 500mL infusion 500 mL) 33.7 -- 33.7 -- -- --    Volume (mL) (3% sodium chloride (HYPERTONIC SALINE) 500mL infusion 500 mL) -- 250.8 250.8 -- -- --    IV Piggyback  500  -- 500  --  -- --    Volume (mL) (potassium chloride (KCL) ivpb 10 mEq) 0 -- 0 -- -- --    Volume (mL) (potassium phosphate 30 mmol in  mL ivpb) 500 -- 500 -- -- --    Total Intake 533.7 1073 1606.7 -- -- --       Output    Urine  600  875 1475  --  -- --    Number of Times Voided -- 1 x 1 x 1 x -- 1 x    Urine Void (mL) -- 875 875 -- -- --    Output (mL) ([REMOVED] Urethral Catheter Non-latex;Temperature probe 16 Fr.) 600 -- 600 -- -- --    Total Output  -- -- --       Net I/O     -66.3 198 131.7 -- -- --            Intake/Output Summary (Last 24 hours) at 8/5/2022 0920  Last data filed at 8/5/2022 0600  Gross per 24 hour   Intake 1573 ml   Output 1350 ml   Net 223 ml            • 3% sodium chloride  500 mL Continuous   • senna-docusate  1 Tablet Q24HRS PRN   • senna-docusate  1 Tablet Nightly   • polyethylene glycol/lytes  1 Packet BID PRN   • ondansetron  4 mg Q4HRS PRN    Or   • ondansetron  4 mg Q4HRS PRN   • magnesium hydroxide  30 mL QDAY PRN   • methocarbamol  750 mg Q8HRS PRN   • acetaminophen  500 mg Q6HRS PRN   • cloNIDine  0.1 mg Q4HRS PRN   • docusate sodium  100 mg BID   •  lisinopril  20 mg Q DAY   • niCARdipine infusion  0-15 mg/hr Continuous   • amLODIPine  10 mg Q DAY   • scopolamine  1 Patch Q72HRS   • dextrose  250 mL Q15 MIN PRN   • Respiratory Therapy Consult   Continuous RT   • midazolam  5 mg Q5 MIN PRN   • acetaminophen  650 mg Q4HRS PRN   • MD Alert...Adult ICU Electrolyte Replacement per Pharmacy   PHARMACY TO DOSE   • insulin regular  1-6 Units Q6HRS   • Pharmacy Consult Request  1 Each PHARMACY TO DOSE   • MD ALERT...DO NOT ADMINISTER NSAIDS or ASPIRIN unless ORDERED By Neurosurgery  1 Each PRN   • diphenhydrAMINE  25 mg Q6HRS PRN   • labetalol  10 mg Q HOUR PRN   • bisacodyl  10 mg Q24HRS PRN   • sodium phosphate  1 Each Once PRN   • enoxaparin (LOVENOX) injection  40 mg DAILY AT 1800   • hydrALAZINE  10 mg Q HOUR PRN   • benzocaine-menthol  1 Lozenge Q2HRS PRN       Assessment and Plan:  Hospital day # 4  POD# 3    Keep head of bed >35 degrees   Ice incisions.  Post op CT 8/4 stable   Continue 3% Na per neurology  Okay for Q4 neuro checks   PT/OT/ambulate as tolerated.  PT/OT recommending post acute placement.   Continue neurology/medical care.  Following.     Chemical prophylactic DVT therapy: Yes - Lovenox 40mg/qd    Start date/time: today

## 2022-08-05 NOTE — CONSULTS
Physical Medicine and Rehabilitation Consultation          Date of initial consultation: 2022  Consulting provider: Baljinder George MD  Reason for consultation: assess for acute inpatient rehab appropriateness  LOS: 3 Day(s)    Chief complaint: Large left IPH    HPI: The patient is a 57 y.o. right hand dominant female with a past medical history of hypertension;  who presented on 2022  1:57 PM with altered mental status, headache, dizziness, nausea.  Patient was attending a  when she was found in the bathroom minimally responsive.  Patient was brought to the hospital, found to have systolic pressure of 259 CT head found large right cerebellar hemorrhage with compression of the third and fourth ventricles.  Patient was seen by neurosurgery, and taken for suboccipital craniectomy with evacuation of left cerebellar IPH, duraplasty, and C1 laminectomy by Dr. Pedro Pablo Vences MD on 2022.    The patient currently reports overall doing well, she largely denies ROS.  Patient is requesting a Coke with ice.  She has a modified diet of mildly thick liquids which was reviewed with her and the nurse will try to accommodate her request.    ROS  Pertinent positives are mentioned in the HPI, all others reviewed and are negative.    Social Hx:  2 SH  1 MARCIN, 1 FOS inside required  With: 18 and 19-year-old children -neither work.  Patient also has 2 other sons ages 27 and 28 that do not live with her    THERAPY:  Restrictions: Fall risk, swallow precautions  PT: Functional mobility   : Walking 3 feet x 1 at min assist    OT: ADLs   supervision    SLP:   : Dysphagia; minced and moist solids with mildly thick liquids    IMAGING:  CT head 2022    Large 4.6 x 3.4 cm intraparenchymal hematoma centered in the left cerebellar hemisphere. Associated mass effect results in effacement of the basal cisterns and fourth ventricle. No evidence of hydrocephalus.    PROCEDURES:  Pedro Pablo Vences 2022  1.   Suboccipital craniectomy.  2.  Evacuation of left cerebellar intraparenchymal hemorrhage.  3.  Duraplasty.  4.  C1 laminectomy.    PMH:  History reviewed. No pertinent past medical history.    PSH:  Past Surgical History:   Procedure Laterality Date   • CRANIOTOMY  8/2/2022    Procedure: Posterior fossa craniectomy for evacuation of intraparenchymal hemorrhage;  Surgeon: Pedro Pablo Vences M.D.;  Location: SURGERY [facility];  Service: Neurosurgery       FHX:  Non-pertinent to today's issues    Medications:  Current Facility-Administered Medications   Medication Dose   • 3% sodium chloride (HYPERTONIC SALINE) 500mL infusion 500 mL  500 mL   • senna-docusate (PERICOLACE or SENOKOT S) 8.6-50 MG per tablet 1 Tablet  1 Tablet   • senna-docusate (PERICOLACE or SENOKOT S) 8.6-50 MG per tablet 1 Tablet  1 Tablet   • polyethylene glycol/lytes (MIRALAX) PACKET 1 Packet  1 Packet   • ondansetron (ZOFRAN ODT) dispertab 4 mg  4 mg    Or   • ondansetron (ZOFRAN) syringe/vial injection 4 mg  4 mg   • magnesium hydroxide (MILK OF MAGNESIA) suspension 30 mL  30 mL   • methocarbamol (ROBAXIN) tablet 750 mg  750 mg   • acetaminophen (TYLENOL) tablet 500 mg  500 mg   • cloNIDine (CATAPRES) tablet 0.1 mg  0.1 mg   • docusate sodium (Colace) oral solution 100 mg  100 mg   • lisinopril (PRINIVIL) tablet 20 mg  20 mg   • niCARdipine (CARDENE) 25 mg in  mL Infusion  0-15 mg/hr   • amLODIPine (NORVASC) tablet 10 mg  10 mg   • scopolamine (TRANSDERM-SCOP) patch 1 Patch  1 Patch   • dextrose 10 % BOLUS 250 mL  250 mL   • Respiratory Therapy Consult     • midazolam (Versed) injection 5 mg  5 mg   • acetaminophen (TYLENOL) suppository 650 mg  650 mg   • MD Alert...ICU Electrolyte Replacement per Pharmacy     • insulin regular (HumuLIN R,NovoLIN R) injection  1-6 Units   • Pharmacy Consult Request ...Pain Management Review 1 Each  1 Each   • MD ALERT...DO NOT ADMINISTER NSAIDS or ASPIRIN unless ORDERED By Neurosurgery 1 Each  1 Each   •  "diphenhydrAMINE (BENADRYL) injection 25 mg  25 mg   • labetalol (NORMODYNE/TRANDATE) injection 10 mg  10 mg   • bisacodyl (DULCOLAX) suppository 10 mg  10 mg   • sodium phosphate (Fleet) enema 133 mL  1 Each   • enoxaparin (Lovenox) inj 40 mg  40 mg   • hydrALAZINE (APRESOLINE) injection 10 mg  10 mg   • benzocaine-menthol (Cepacol) lozenge 1 Lozenge  1 Lozenge       Allergies:  No Known Allergies      Physical Exam:  Vitals: BP (!) 158/74   Pulse 93   Temp 37 °C (98.6 °F) (Temporal)   Resp (!) 31   Ht 1.499 m (4' 11\")   Wt 67.8 kg (149 lb 7.6 oz)   SpO2 97%   Gen: NAD  Head: NC/AT  Eyes/ Nose/ Mouth: moist mucous membranes  Cardio: RRR, good distal perfusion, warm extremities  Pulm: normal respiratory effort, no cyanosis   Abd: Soft NTND, negative borborygmi   Ext: No peripheral edema. No calf tenderness. No clubbing.    Mental status: answers questions appropriately follows commands  Speech: fluent, no aphasia or dysarthria    Motor:      Upper Extremity  Myotome R L   Shoulder flexion C5 5 5   Elbow flexion C5 5 5   Wrist extension C6 5 5   Elbow extension C7 5 5   Finger flexion C8 5 5   Finger abduction T1 5 5     Lower Extremity Myotome R L   Hip flexion L2 5 5   Knee extension L3 5 5   Ankle dorsiflexion L4 5 5   Toe extension L5 5 5   Ankle plantarflexion S1 5 5     Sensory:   intact to light touch through out    DTRs:  Right  Left    Brachioradialis  2+  2+   Patella tendon  2+ 2+     Tone: no spasticity noted, no cogwheeling noted    Coordination:   Altered finger randy left      Labs: Reviewed and significant for   Recent Labs     08/02/22  1407 08/03/22  0250 08/04/22  0600 08/05/22  0000   RBC 5.32 4.53 4.09* 3.75*   HEMOGLOBIN 15.8 13.6 12.3 11.4*   HEMATOCRIT 47.6* 40.5 38.8 35.6*   PLATELETCT 264 256 225 190   PROTHROMBTM 11.9*  --   --   --    APTT 25.8  --   --   --    INR 0.88  --   --   --      Recent Labs     08/03/22  0250 08/03/22  0939 08/04/22  0600 08/04/22  1250 08/05/22  0000 " 08/05/22  0420 08/05/22  0534   SODIUM 146*   < > 162*   < > 147*  147* 145 145   POTASSIUM 3.2*  --  3.4*  --  3.2*  --   --    CHLORIDE 109  --  130*  --  111  --   --    CO2 22  --  23  --  23  --   --    GLUCOSE 268*  --  171*  --  160*  --   --    BUN 10  --  14  --  10  --   --    CREATININE 0.53  --  0.32*  --  0.30*  --   --    CALCIUM 8.5  --  8.0*  --  8.1*  --   --     < > = values in this interval not displayed.     Recent Results (from the past 24 hour(s))   POCT glucose device results    Collection Time: 08/04/22 11:29 AM   Result Value Ref Range    POC Glucose, Blood 190 (H) 65 - 99 mg/dL   Sodium Serum (NA)    Collection Time: 08/04/22 12:50 PM   Result Value Ref Range    Sodium 149 (H) 135 - 145 mmol/L   PROCALCITONIN    Collection Time: 08/04/22 12:50 PM   Result Value Ref Range    Procalcitonin 0.23 <0.25 ng/mL   POCT glucose device results    Collection Time: 08/04/22  5:19 PM   Result Value Ref Range    POC Glucose, Blood 171 (H) 65 - 99 mg/dL   Sodium Serum (NA)    Collection Time: 08/04/22  5:51 PM   Result Value Ref Range    Sodium 148 (H) 135 - 145 mmol/L   POCT glucose device results    Collection Time: 08/04/22 11:54 PM   Result Value Ref Range    POC Glucose, Blood 156 (H) 65 - 99 mg/dL   Sodium Serum (NA)    Collection Time: 08/05/22 12:00 AM   Result Value Ref Range    Sodium 147 (H) 135 - 145 mmol/L   CBC without Differential    Collection Time: 08/05/22 12:00 AM   Result Value Ref Range    WBC 16.3 (H) 4.8 - 10.8 K/uL    RBC 3.75 (L) 4.20 - 5.40 M/uL    Hemoglobin 11.4 (L) 12.0 - 16.0 g/dL    Hematocrit 35.6 (L) 37.0 - 47.0 %    MCV 94.9 81.4 - 97.8 fL    MCH 30.4 27.0 - 33.0 pg    MCHC 32.0 (L) 33.6 - 35.0 g/dL    RDW 48.6 35.9 - 50.0 fL    Platelet Count 190 164 - 446 K/uL    MPV 10.9 9.0 - 12.9 fL   Comp Metabolic Panel    Collection Time: 08/05/22 12:00 AM   Result Value Ref Range    Sodium 147 (H) 135 - 145 mmol/L    Potassium 3.2 (L) 3.6 - 5.5 mmol/L    Chloride 111 96 - 112  mmol/L    Co2 23 20 - 33 mmol/L    Anion Gap 13.0 7.0 - 16.0    Glucose 160 (H) 65 - 99 mg/dL    Bun 10 8 - 22 mg/dL    Creatinine 0.30 (L) 0.50 - 1.40 mg/dL    Calcium 8.1 (L) 8.5 - 10.5 mg/dL    AST(SGOT) 22 12 - 45 U/L    ALT(SGPT) 15 2 - 50 U/L    Alkaline Phosphatase 77 30 - 99 U/L    Total Bilirubin 0.8 0.1 - 1.5 mg/dL    Albumin 3.4 3.2 - 4.9 g/dL    Total Protein 6.0 6.0 - 8.2 g/dL    Globulin 2.6 1.9 - 3.5 g/dL    A-G Ratio 1.3 g/dL   PHOSPHORUS    Collection Time: 08/05/22 12:00 AM   Result Value Ref Range    Phosphorus 2.0 (L) 2.5 - 4.5 mg/dL   MAGNESIUM    Collection Time: 08/05/22 12:00 AM   Result Value Ref Range    Magnesium 2.0 1.5 - 2.5 mg/dL   ESTIMATED GFR    Collection Time: 08/05/22 12:00 AM   Result Value Ref Range    GFR (CKD-EPI) 124 >60 mL/min/1.73 m 2   SODIUM SERUM (NA)    Collection Time: 08/05/22  4:20 AM   Result Value Ref Range    Sodium 145 135 - 145 mmol/L   Sodium Serum (NA)    Collection Time: 08/05/22  5:34 AM   Result Value Ref Range    Sodium 145 135 - 145 mmol/L   POCT glucose device results    Collection Time: 08/05/22  6:15 AM   Result Value Ref Range    POC Glucose, Blood 147 (H) 65 - 99 mg/dL         ASSESSMENT:  Patient is a 57 y.o. female admitted with left cerebellar IPH now s/p suboccipital craniectomy with evacuation of IPH    [facility] Code / Diagnosis to Support: 0001.4 - Stroke: No Paresis    Rehabilitation: Impaired ADLs and mobility  Patient is a good candidate for inpatient rehab based on needs for PT, OT, and speech therapy.  Patient will also benefit from family training.  Patient has a good discharge situation which will be home with adult children.     Barriers to transfer include: Insurance authorization, TCCs to verify disposition, medical clearance and bed availability     All cases are subject to administrative review and recommendations may change    Additional Recommendations:  -Good candidate for IPR.  Patient has deficits with mobility and ADLs secondary to  left cerebellar IPH.  Patient does not have paresis, but does have ataxia on the left, balance deficits, and swallow dysfunction.  -TCC to submit to Medicaid insurance for prior authorization  -PMR to follow in the periphery for rehab appropriateness, please reach out with questions or request for medical management    Medical Complexity:    Left cerebellar IPH  -Large 4.6 x 3.4 cm IPH, status post suboccipital craniectomy with evacuation of hemorrhage by Dr. Vences on 8/2/2022  -Continue PT OT and SLP while in-house  -Tight blood pressure control  -Sodium goal 150-160 per neurology, on 3% sodium  -Keep head of bed 35 degrees or more per neurosurgery  -Okay for Lovenox per neurosurgery    Hypertension  -Lisinopril 40 mg tablet daily  -Amlodipine 10 mg daily  -Clonidine as needed  -Labetalol injection as needed  -Hydralazine injection as needed    Leukocytosis  -WBC 16.3, stable  -Patient appears nontoxic, etiology is likely reactive  -Patient is high risk for aspiration pneumonia, had atelectasis on last chest x-ray 8/3 repeat chest x-ray ordered today    DVT PPX: Lovenox 40 mg injection daily      Thank you for allowing us to participate in the care of this patient.     Patient was seen for 84 minutes on unit/floor of which > 50% of time was spent on counseling and coordination of care regarding the above, including prognosis, risk reduction, benefits of treatment, and options for next stage of care.    Sal Skaggs, DO   Physical Medicine and Rehabilitation     Please note that this dictation was created using voice recognition software. I have made every reasonable attempt to correct obvious errors, but there may be errors of grammar and possibly content that I did not discover before finalizing the note.

## 2022-08-05 NOTE — PROGRESS NOTES
Hospital Medicine Daily Progress Note    Date of Service  8/5/2022    Chief Complaint  Lana Phillips is a 57 y.o. female admitted 8/2/2022 with cerebral hemorrhage.    Hospital Course  57 y.o. female with no known past medical history was admitted to the ICU on 8/2/2022 for large right cerebral hemorrhage with mass-effect which required emergent decompressive craniotomy on 8/2/2022 for which patient is currently status post occipital craniectomy with evacuation of the left cerebellar hematoma.  Neurology and neurosurgery following.  Postsurgically, she was continued on hypertonic therapy and ultimately extubated on 8/3/2022.  Patient was transition to hypotonic acetate and Archer catheter was discontinued.  On bedside evaluation, patient no acute distress.  No headaches or dizziness.     At the ICU, vital signs with tachypnea and SBP in the 170s.  Patient on 1 L nasal cannula.  CBC with leukocytosis with RBC at 16.2 and uptrending.  Chemistry with hyponatremia last sodium at 149 hypokalemia and hyperplasia.  Patient was transferred to the IMCU floor for continued care.    Interval Problem Update  Transferred to [facility] overnight out of ICU.  S/p posterior fossa craniectomy and intraparenchymal hemorrhage evacuation 8/2.  Patient reports some surgical site discomfort, otherwise denies complaints.  3% saline restarted overnight as Na was below goal Na of 150-160.  Continue 3% saline at 60cc/hr. Continue q6h Na checks.  Off nicardipine drip.  Increase lisinopril to 40 mg daily, continue amlodipine 10 mg daily for BP control. PRN's for SBP>160.  Okay for neurosurgery floor. Q4h neuro checks.  Neurosurgery and neurology following.  Disposition SNF when medically cleared. PM&R consulted for acute rehab evaluation.      I have discussed this patient's plan of care and discharge plan at IDT rounds today with Case Management, Nursing, Nursing leadership, and other members of the IDT team.    Consultants/Specialty  neurology and  neurosurgery    Code Status  Full Code    Disposition  Patient is not medically cleared for discharge.   Anticipate discharge to to skilled nursing facility.  I have placed the appropriate orders for post-discharge needs.    Review of Systems  Review of Systems   Constitutional: Negative for chills and fever.   Eyes: Negative for blurred vision and pain.   Respiratory: Negative for cough and shortness of breath.    Cardiovascular: Negative for chest pain, palpitations and leg swelling.   Gastrointestinal: Negative for abdominal pain, nausea and vomiting.   Genitourinary: Negative for dysuria and urgency.   Musculoskeletal: Positive for falls. Negative for back pain.   Skin: Negative for itching and rash.   Neurological: Positive for weakness. Negative for dizziness, sensory change, focal weakness and headaches.   Psychiatric/Behavioral: Negative for substance abuse. The patient does not have insomnia.         Physical Exam  Temp:  [36.6 °C (97.8 °F)-37 °C (98.6 °F)] 37 °C (98.6 °F)  Pulse:  [77-98] 84  Resp:  [0-43] 29  BP: (102-177)/(56-95) 145/70  SpO2:  [92 %-98 %] 95 %    Physical Exam  Vitals reviewed.   Constitutional:       General: She is not in acute distress.     Appearance: She is not diaphoretic.   HENT:      Head: Normocephalic and atraumatic.      Right Ear: External ear normal.      Left Ear: External ear normal.      Nose: Nose normal. No congestion.      Mouth/Throat:      Pharynx: No oropharyngeal exudate or posterior oropharyngeal erythema.   Eyes:      Extraocular Movements: Extraocular movements intact.      Pupils: Pupils are equal, round, and reactive to light.   Cardiovascular:      Rate and Rhythm: Normal rate and regular rhythm.   Pulmonary:      Effort: Pulmonary effort is normal. No respiratory distress.      Breath sounds: Normal breath sounds. No wheezing.   Abdominal:      General: Bowel sounds are normal. There is no distension.      Palpations: Abdomen is soft.      Tenderness: There  is no abdominal tenderness.   Musculoskeletal:         General: No swelling. Normal range of motion.      Cervical back: Normal range of motion and neck supple.   Skin:     General: Skin is warm and dry.   Neurological:      General: No focal deficit present.      Mental Status: She is alert and oriented to person, place, and time.      Cranial Nerves: No cranial nerve deficit.      Sensory: No sensory deficit.      Motor: No weakness.   Psychiatric:         Mood and Affect: Mood normal.         Behavior: Behavior normal.         Fluids    Intake/Output Summary (Last 24 hours) at 8/5/2022 1204  Last data filed at 8/5/2022 0600  Gross per 24 hour   Intake 1187.58 ml   Output 1100 ml   Net 87.58 ml       Laboratory  Recent Labs     08/03/22  0250 08/04/22  0600 08/05/22  0000   WBC 14.0* 16.2* 16.3*   RBC 4.53 4.09* 3.75*   HEMOGLOBIN 13.6 12.3 11.4*   HEMATOCRIT 40.5 38.8 35.6*   MCV 89.4 94.9 94.9   MCH 30.0 30.1 30.4   MCHC 33.6 31.7* 32.0*   RDW 41.4 47.7 48.6   PLATELETCT 256 225 190   MPV 10.4 10.6 10.9     Recent Labs     08/03/22  0250 08/03/22  0939 08/04/22  0600 08/04/22  1250 08/05/22  0000 08/05/22  0420 08/05/22  0534   SODIUM 146*   < > 162*   < > 147*  147* 145 145   POTASSIUM 3.2*  --  3.4*  --  3.2*  --   --    CHLORIDE 109  --  130*  --  111  --   --    CO2 22  --  23  --  23  --   --    GLUCOSE 268*  --  171*  --  160*  --   --    BUN 10  --  14  --  10  --   --    CREATININE 0.53  --  0.32*  --  0.30*  --   --    CALCIUM 8.5  --  8.0*  --  8.1*  --   --     < > = values in this interval not displayed.     Recent Labs     08/02/22  1407   APTT 25.8   INR 0.88         Recent Labs     08/02/22  1943   TRIGLYCERIDE 78   HDL 46   *       Imaging  IR-PICC LINE PLACEMENT W/ GUIDANCE > AGE 5   Final Result                  Ultrasound-guided PICC placement performed by qualified nursing staff as    above.          DX-CHEST-PORTABLE (1 VIEW)   Final Result      Bibasilar underinflation atelectasis  which could obscure an additional process.      CT-HEAD W/O   Final Result      1.  Postsurgical changes status post occipital craniectomy with evacuation of the left cerebellar hematoma. There is a small amount of residual blood and postsurgical pneumocephalus.   2.  There is improved mass effect on the fourth ventricle.   3.  Lateral ventricles are stable in size.      CT-CTA HEAD WITH & W/O-POST PROCESS   Final Result         1. No hemodynamically significant narrowing of the major intracranial vessels.      2. Redemonstration of large parenchymal hemorrhage in the left cerebellum      CT-CTA NECK WITH & W/O-POST PROCESSING   Final Result      1. No evidence of flow-limiting stenosis or dissection in the cervical carotid or cervical vertebral arteries.      CT-HEAD W/O   Final Result      Large 4.6 x 3.4 cm intraparenchymal hematoma centered in the left cerebellar hemisphere. Associated mass effect results in effacement of the basal cisterns and fourth ventricle. No evidence of hydrocephalus.      Findings were discussed with Dr. JANET ARELLANO on 8/2/2022 2:45 PM.      EC-ECHOCARDIOGRAM COMPLETE W/O CONT    (Results Pending)        Assessment/Plan  * Cerebellar hemorrhage, acute (HCC)- (present on admission)  Assessment & Plan  Neurosurgery consult: Dr Vences s/p 8/2 posterior fossa craniectomy for evacuation of intraparenchymal hemorrhage with great operative result seen on postoperative CT  SBP goal < 160  Neuro checks q4h  Continue 3% saline, goal Na 150-160. Continue q6h Na checks.  Neurosurgery and neurology following  PT/OT recommend SNF, PMR consulted    Hypertension- (present on admission)  Assessment & Plan  Was severely hypertensive to 260 on presentation  Strict SBP < 160  Off nicardipine drip  Increase lisinopril dose to 40 mg daily, continue amlodipine 10 mg daily, adjust as needed  Prn's for SBP>160    Hyperglycemia- (present on admission)  Assessment & Plan  A1c  Sliding scale for  now    Hypokalemia- (present on admission)  Assessment & Plan  Replete as necessary       VTE prophylaxis: enoxaparin ppx    I have performed a physical exam and reviewed and updated ROS and Plan today (8/5/2022). In review of yesterday's note (8/4/2022), there are no changes except as documented above.

## 2022-08-05 NOTE — CARE PLAN
The patient is Watcher - Medium risk of patient condition declining or worsening    Shift Goals  Clinical Goals: stable neuro checks, SBP<160  Patient Goals: sleep, comfort  Family Goals: comfort      Problem: Skin Integrity  Goal: Skin integrity is maintained or improved  Outcome: Progressing     Problem: Fall Risk  Goal: Patient will remain free from falls  Outcome: Progressing     Problem: Craniotomy Surgery  Goal: Post-Operative Craniotomy Surgery: Patient will achieve optimal post-surgical outcomes  Outcome: Progressing     Problem: Neuro Status  Goal: Neuro status will remain stable or improve  Outcome: Progressing     Problem: Incision Care  Goal: Optimal post surgical incision care  Outcome: Progressing

## 2022-08-06 LAB
ALBUMIN SERPL BCP-MCNC: 3.3 G/DL (ref 3.2–4.9)
ALBUMIN/GLOB SERPL: 1.3 G/DL
ALP SERPL-CCNC: 82 U/L (ref 30–99)
ALT SERPL-CCNC: 19 U/L (ref 2–50)
ANION GAP SERPL CALC-SCNC: 11 MMOL/L (ref 7–16)
AST SERPL-CCNC: 21 U/L (ref 12–45)
BILIRUB SERPL-MCNC: 0.9 MG/DL (ref 0.1–1.5)
BUN SERPL-MCNC: 6 MG/DL (ref 8–22)
CALCIUM SERPL-MCNC: 7.9 MG/DL (ref 8.5–10.5)
CHLORIDE SERPL-SCNC: 112 MMOL/L (ref 96–112)
CO2 SERPL-SCNC: 25 MMOL/L (ref 20–33)
CREAT SERPL-MCNC: 0.25 MG/DL (ref 0.5–1.4)
ERYTHROCYTE [DISTWIDTH] IN BLOOD BY AUTOMATED COUNT: 44.6 FL (ref 35.9–50)
GFR SERPLBLD CREATININE-BSD FMLA CKD-EPI: 129 ML/MIN/1.73 M 2
GLOBULIN SER CALC-MCNC: 2.6 G/DL (ref 1.9–3.5)
GLUCOSE BLD STRIP.AUTO-MCNC: 116 MG/DL (ref 65–99)
GLUCOSE BLD STRIP.AUTO-MCNC: 163 MG/DL (ref 65–99)
GLUCOSE BLD STRIP.AUTO-MCNC: 173 MG/DL (ref 65–99)
GLUCOSE SERPL-MCNC: 141 MG/DL (ref 65–99)
HCT VFR BLD AUTO: 35.3 % (ref 37–47)
HGB BLD-MCNC: 11.6 G/DL (ref 12–16)
MAGNESIUM SERPL-MCNC: 2 MG/DL (ref 1.5–2.5)
MCH RBC QN AUTO: 30.6 PG (ref 27–33)
MCHC RBC AUTO-ENTMCNC: 32.9 G/DL (ref 33.6–35)
MCV RBC AUTO: 93.1 FL (ref 81.4–97.8)
PHOSPHATE SERPL-MCNC: 2.2 MG/DL (ref 2.5–4.5)
PLATELET # BLD AUTO: 185 K/UL (ref 164–446)
PMV BLD AUTO: 10.4 FL (ref 9–12.9)
POTASSIUM SERPL-SCNC: 2.9 MMOL/L (ref 3.6–5.5)
PROT SERPL-MCNC: 5.9 G/DL (ref 6–8.2)
RBC # BLD AUTO: 3.79 M/UL (ref 4.2–5.4)
SODIUM SERPL-SCNC: 142 MMOL/L (ref 135–145)
SODIUM SERPL-SCNC: 146 MMOL/L (ref 135–145)
SODIUM SERPL-SCNC: 148 MMOL/L (ref 135–145)
WBC # BLD AUTO: 9.4 K/UL (ref 4.8–10.8)

## 2022-08-06 PROCEDURE — 85027 COMPLETE CBC AUTOMATED: CPT

## 2022-08-06 PROCEDURE — 84100 ASSAY OF PHOSPHORUS: CPT

## 2022-08-06 PROCEDURE — A9270 NON-COVERED ITEM OR SERVICE: HCPCS | Performed by: STUDENT IN AN ORGANIZED HEALTH CARE EDUCATION/TRAINING PROGRAM

## 2022-08-06 PROCEDURE — 84295 ASSAY OF SERUM SODIUM: CPT | Mod: 91

## 2022-08-06 PROCEDURE — 80053 COMPREHEN METABOLIC PANEL: CPT

## 2022-08-06 PROCEDURE — A9270 NON-COVERED ITEM OR SERVICE: HCPCS | Performed by: INTERNAL MEDICINE

## 2022-08-06 PROCEDURE — 99232 SBSQ HOSP IP/OBS MODERATE 35: CPT | Performed by: NURSE PRACTITIONER

## 2022-08-06 PROCEDURE — 700105 HCHG RX REV CODE 258: Performed by: STUDENT IN AN ORGANIZED HEALTH CARE EDUCATION/TRAINING PROGRAM

## 2022-08-06 PROCEDURE — 83735 ASSAY OF MAGNESIUM: CPT

## 2022-08-06 PROCEDURE — 99232 SBSQ HOSP IP/OBS MODERATE 35: CPT | Performed by: STUDENT IN AN ORGANIZED HEALTH CARE EDUCATION/TRAINING PROGRAM

## 2022-08-06 PROCEDURE — 700111 HCHG RX REV CODE 636 W/ 250 OVERRIDE (IP)

## 2022-08-06 PROCEDURE — 700102 HCHG RX REV CODE 250 W/ 637 OVERRIDE(OP): Performed by: INTERNAL MEDICINE

## 2022-08-06 PROCEDURE — 82962 GLUCOSE BLOOD TEST: CPT

## 2022-08-06 PROCEDURE — 770001 HCHG ROOM/CARE - MED/SURG/GYN PRIV*

## 2022-08-06 PROCEDURE — 700102 HCHG RX REV CODE 250 W/ 637 OVERRIDE(OP): Performed by: STUDENT IN AN ORGANIZED HEALTH CARE EDUCATION/TRAINING PROGRAM

## 2022-08-06 RX ORDER — HYDROCHLOROTHIAZIDE 25 MG/1
12.5 TABLET ORAL
Status: DISCONTINUED | OUTPATIENT
Start: 2022-08-06 | End: 2022-08-07

## 2022-08-06 RX ORDER — POTASSIUM CHLORIDE 20 MEQ/1
40 TABLET, EXTENDED RELEASE ORAL ONCE
Status: COMPLETED | OUTPATIENT
Start: 2022-08-06 | End: 2022-08-06

## 2022-08-06 RX ORDER — HYDROXYZINE HYDROCHLORIDE 10 MG/1
10 TABLET, FILM COATED ORAL ONCE
Status: DISPENSED | OUTPATIENT
Start: 2022-08-06 | End: 2022-08-07

## 2022-08-06 RX ADMIN — HYDRALAZINE HYDROCHLORIDE 10 MG: 20 INJECTION INTRAMUSCULAR; INTRAVENOUS at 04:12

## 2022-08-06 RX ADMIN — POTASSIUM CHLORIDE 40 MEQ: 1500 TABLET, EXTENDED RELEASE ORAL at 07:44

## 2022-08-06 RX ADMIN — HYDROCHLOROTHIAZIDE 12.5 MG: 25 TABLET ORAL at 07:43

## 2022-08-06 RX ADMIN — LABETALOL HYDROCHLORIDE 10 MG: 5 INJECTION, SOLUTION INTRAVENOUS at 07:53

## 2022-08-06 RX ADMIN — SODIUM CHLORIDE 1 G: 1 TABLET ORAL at 11:16

## 2022-08-06 RX ADMIN — SENNOSIDES AND DOCUSATE SODIUM 1 TABLET: 50; 8.6 TABLET ORAL at 20:11

## 2022-08-06 RX ADMIN — SODIUM CHLORIDE 1 G: 1 TABLET ORAL at 07:45

## 2022-08-06 RX ADMIN — DOCUSATE SODIUM 100 MG: 50 LIQUID ORAL at 17:36

## 2022-08-06 RX ADMIN — DIBASIC SODIUM PHOSPHATE, MONOBASIC POTASSIUM PHOSPHATE AND MONOBASIC SODIUM PHOSPHATE 500 MG: 852; 155; 130 TABLET ORAL at 07:44

## 2022-08-06 RX ADMIN — DIBASIC SODIUM PHOSPHATE, MONOBASIC POTASSIUM PHOSPHATE AND MONOBASIC SODIUM PHOSPHATE 500 MG: 852; 155; 130 TABLET ORAL at 17:36

## 2022-08-06 RX ADMIN — SODIUM CHLORIDE 1 G: 1 TABLET ORAL at 17:36

## 2022-08-06 RX ADMIN — SODIUM CHLORIDE: 3 INJECTION, SOLUTION INTRAVENOUS at 00:03

## 2022-08-06 RX ADMIN — LISINOPRIL 40 MG: 20 TABLET ORAL at 05:05

## 2022-08-06 RX ADMIN — SODIUM CHLORIDE 500 ML: 3 INJECTION, SOLUTION INTRAVENOUS at 07:49

## 2022-08-06 RX ADMIN — LABETALOL HYDROCHLORIDE 10 MG: 5 INJECTION, SOLUTION INTRAVENOUS at 20:11

## 2022-08-06 RX ADMIN — INSULIN HUMAN 1 UNITS: 100 INJECTION, SOLUTION PARENTERAL at 11:13

## 2022-08-06 RX ADMIN — DOCUSATE SODIUM 100 MG: 50 LIQUID ORAL at 05:06

## 2022-08-06 RX ADMIN — SCOPOLAMINE 1 PATCH: 1.5 PATCH, EXTENDED RELEASE TRANSDERMAL at 07:45

## 2022-08-06 RX ADMIN — ENOXAPARIN SODIUM 40 MG: 40 INJECTION SUBCUTANEOUS at 17:37

## 2022-08-06 RX ADMIN — DIBASIC SODIUM PHOSPHATE, MONOBASIC POTASSIUM PHOSPHATE AND MONOBASIC SODIUM PHOSPHATE 500 MG: 852; 155; 130 TABLET ORAL at 11:16

## 2022-08-06 RX ADMIN — AMLODIPINE BESYLATE 10 MG: 10 TABLET ORAL at 05:05

## 2022-08-06 ASSESSMENT — LIFESTYLE VARIABLES: SUBSTANCE_ABUSE: 0

## 2022-08-06 ASSESSMENT — ENCOUNTER SYMPTOMS
ABDOMINAL PAIN: 0
COUGH: 0
WEAKNESS: 1
BACK PAIN: 0
VOMITING: 0
FEVER: 0
SHORTNESS OF BREATH: 0
EYE PAIN: 0
PALPITATIONS: 0
CHILLS: 0
FOCAL WEAKNESS: 0
FALLS: 1
SENSORY CHANGE: 0
BLURRED VISION: 0
HEADACHES: 0
NAUSEA: 0
INSOMNIA: 0
DIZZINESS: 0

## 2022-08-06 ASSESSMENT — PAIN DESCRIPTION - PAIN TYPE
TYPE: ACUTE PAIN
TYPE: ACUTE PAIN

## 2022-08-06 NOTE — PROGRESS NOTES
Bedside report received and patient care assumed. Pt is resting in bed, A&O4, with no complaints of pain, and is on 2L nasal canula. All fall precautions are in place, belongings at bedside table.  Pt was updated on POC, no questions or concerns. Pt educated on use of call light for assistance.

## 2022-08-06 NOTE — DISCHARGE PLANNING
Would appreciate updated [place] evals once appropriate.  Plan to submit to insurance on Monday. Na gtt remains a barrier.

## 2022-08-06 NOTE — PROGRESS NOTES
Chief Complaint   Patient presents with   • Fatigue     SInce this afternoon at a . Pt falling asleep during triage, arousable to painful stimuli. Pt oriented x 4 when awake.    • N/V     This afternoon. Hx T2 DM. FSBS in triage 230       Problem List Items Addressed This Visit     Hypokalemia     Replete as necessary           Relevant Orders    Referral to Skilled Nursing Facility    Dental infection    Relevant Orders    Referral to Skilled Nursing Facility    Hyperglycemia     A1c  Sliding scale for now           Relevant Orders    Referral to Skilled Nursing Facility    * (Principal) Cerebellar hemorrhage, acute (HCC)     Neurosurgery consult: Dr Vences s/p  posterior fossa craniectomy for evacuation of intraparenchymal hemorrhage with great operative result seen on postoperative CT  SBP goal < 160  Neuro checks q4h  Continue 3% saline, goal Na 150-160. Continue q6h Na checks.  Neurosurgery and neurology following  PT/OT recommend SNF, PMR consulted           Relevant Orders    Referral to Physiatry (PMR)    Referral to Neurology    Referral to Skilled Nursing Facility    Hypertension     Was severely hypertensive to 260 on presentation  Strict SBP < 160  Off nicardipine drip  Continue lisinopril 40 mg daily, continue amlodipine 10 mg daily  Start HCTZ 12.5 mg daily  Prn's for SBP>160           Relevant Medications    labetalol (NORMODYNE/TRANDATE) injection 10 mg    enoxaparin (Lovenox) inj 40 mg    hydrALAZINE (APRESOLINE) injection 10 mg    cloNIDine (CATAPRES) tablet 0.1 mg    amLODIPine (NORVASC) tablet 10 mg    lisinopril (PRINIVIL) tablet 40 mg    hydroCHLOROthiazide (HYDRODIURIL) tablet 12.5 mg    Other Relevant Orders    Referral to Skilled Nursing Facility    Intracranial hemorrhage (HCC)    Relevant Orders    Referral to Skilled Nursing Facility      Other Visit Diagnoses     Cerebellar hemorrhage (HCC)        Relevant Orders    Referral to Skilled Nursing Facility    Hypertensive emergency         Relevant Medications    labetalol (NORMODYNE/TRANDATE) injection 20 mg (Completed)    labetalol (NORMODYNE/TRANDATE) injection 20 mg (Completed)    labetalol (NORMODYNE/TRANDATE) injection 10 mg    enoxaparin (Lovenox) inj 40 mg    hydrALAZINE (APRESOLINE) injection 10 mg    cloNIDine (CATAPRES) tablet 0.1 mg    amLODIPine (NORVASC) tablet 10 mg    lisinopril (PRINIVIL) tablet 40 mg    lisinopril (PRINIVIL) tablet 20 mg (Completed)    hydroCHLOROthiazide (HYDRODIURIL) tablet 12.5 mg    Other Relevant Orders    Referral to Skilled Nursing Facility    Acute intractable headache, unspecified headache type        Relevant Orders    Referral to Skilled Nursing Facility        Neurology Progress Note     History of present illness:  This is a 57-year old female with no known PMhx (?hypertension, details limited, no current medications) who presented to [facility] on 22 for a chief complaint of altered mentation, headache, dizziness and nausea. Details regarding HPI obtained via medical record. Per report, the patient was attending a  earlier today, when she was reportedly found in a bathroom minimally responsive. Patient was brought to the hospital; here she was found to have , SBP persistently in the 200s. Stat CT head on arrival revealed large LEFT cerebellar hemorrhage with associated mass effect/edema and compression of 3rd/4th ventricles/basilar cisterns; CTA with no obvious vascular process nor aneurysm.   Currently, patient is sitting up in stretcher; arousable, lethargic, poorly interactive. Follows simple commands, moves all extremities. Further ROS is limited. ICH Score 2 (GCS 5-12, Infratentorial).    Neurology has been consulted by Dr. Baljinder George to further evaluate the findings noted above.     Interval, 8/3/22:  Patient is sitting up in bed; awake and alert. Follows commands, moves all extremities. Admits to mild headache/pain. Denies nausea/dizziness. Patient s/p Left Sub  occipital crani; today is POD #1. Hypertonic saline infusing; SBP 130s-160s overnight; at goal--goal 120-160 given profoundly elevated blood pressure on arrival and thus risk of hypoperfusion. Plan to normalize BP in coming 24 hours.     Interval, 8/4/22  Patient is sitting up in bed; awake and alert. Denies headache, dizziness, nausea or weakness. Asking for ice/water; apparently awaiting FEES study today per SLP. No events overnight per nursing; SBP 130s-170s overnight/this morning. PT/OT to see today. Today is POD #2 suboccipital crani, PBD #2 Left cerebellar hemorrhage.     Interval, 8/5/22  Patient is sitting up in bed; drowsy arousable. Follows commands. Now transferred to [facility]; no events overnight; hypertonic gtt infusing; SBP  140s-150s overnight. Today is PBD #3, POD #3,     Interval, 8/6/22  Patient is sitting up in bed; drowsy, arouable, interactive. Asking for ice. Follows commands, moves all extremities. No events overnight. Plan to d/c hypertonic gtt today and normalize na (allow to trend downward).      No changes to HPI as was previously documented.     Past medical history:   History reviewed. No pertinent past medical history.    Past surgical history:   Past Surgical History:   Procedure Laterality Date   • CRANIOTOMY  8/2/2022    Procedure: Posterior fossa craniectomy for evacuation of intraparenchymal hemorrhage;  Surgeon: Pedro Pablo Vences M.D.;  Location: SURGERY [facility];  Service: Neurosurgery       Family history:   History reviewed. No pertinent family history.    Social history:   Social History     Socioeconomic History   • Marital status:      Spouse name: Not on file   • Number of children: Not on file   • Years of education: Not on file   • Highest education level: Not on file   Occupational History   • Not on file   Tobacco Use   • Smoking status: Never Smoker   • Smokeless tobacco: Never Used   Substance and Sexual Activity   • Alcohol use: No   • Drug use: No   • Sexual  activity: Not on file   Other Topics Concern   • Not on file   Social History Narrative   • Not on file     Social Determinants of Health     Financial Resource Strain: Not on file   Food Insecurity: Not on file   Transportation Needs: Not on file   Physical Activity: Not on file   Stress: Not on file   Social Connections: Not on file   Intimate Partner Violence: Not on file   Housing Stability: Not on file       Current medications:   Current Facility-Administered Medications   Medication Dose   • hydrOXYzine HCl (ATARAX) tablet 10 mg  10 mg   • hydroCHLOROthiazide (HYDRODIURIL) tablet 12.5 mg  12.5 mg   • phosphorus (K-Phos-Neutral) per tablet 500 mg  500 mg   • lisinopril (PRINIVIL) tablet 40 mg  40 mg   • 3% sodium chloride (HYPERTONIC SALINE) 500mL infusion     • sodium chloride (SALT) tablet 1 g  1 g   • senna-docusate (PERICOLACE or SENOKOT S) 8.6-50 MG per tablet 1 Tablet  1 Tablet   • senna-docusate (PERICOLACE or SENOKOT S) 8.6-50 MG per tablet 1 Tablet  1 Tablet   • polyethylene glycol/lytes (MIRALAX) PACKET 1 Packet  1 Packet   • ondansetron (ZOFRAN ODT) dispertab 4 mg  4 mg    Or   • ondansetron (ZOFRAN) syringe/vial injection 4 mg  4 mg   • magnesium hydroxide (MILK OF MAGNESIA) suspension 30 mL  30 mL   • methocarbamol (ROBAXIN) tablet 750 mg  750 mg   • acetaminophen (TYLENOL) tablet 500 mg  500 mg   • cloNIDine (CATAPRES) tablet 0.1 mg  0.1 mg   • docusate sodium (Colace) oral solution 100 mg  100 mg   • amLODIPine (NORVASC) tablet 10 mg  10 mg   • scopolamine (TRANSDERM-SCOP) patch 1 Patch  1 Patch   • dextrose 10 % BOLUS 250 mL  250 mL   • Respiratory Therapy Consult     • midazolam (Versed) injection 5 mg  5 mg   • acetaminophen (TYLENOL) suppository 650 mg  650 mg   • MD Alert...ICU Electrolyte Replacement per Pharmacy     • insulin regular (HumuLIN R,NovoLIN R) injection  1-6 Units   • Pharmacy Consult Request ...Pain Management Review 1 Each  1 Each   • MD ALERT...DO NOT ADMINISTER NSAIDS or  ASPIRIN unless ORDERED By Neurosurgery 1 Each  1 Each   • diphenhydrAMINE (BENADRYL) injection 25 mg  25 mg   • labetalol (NORMODYNE/TRANDATE) injection 10 mg  10 mg   • bisacodyl (DULCOLAX) suppository 10 mg  10 mg   • sodium phosphate (Fleet) enema 133 mL  1 Each   • enoxaparin (Lovenox) inj 40 mg  40 mg   • hydrALAZINE (APRESOLINE) injection 10 mg  10 mg   • benzocaine-menthol (Cepacol) lozenge 1 Lozenge  1 Lozenge       Medication Allergy:  No Known Allergies    Review of systems:   As noted above; otherwise all systems reviewed and determined to be non contributory.      Physical examination:   Vitals:    08/05/22 2330 08/06/22 0000 08/06/22 0400 08/06/22 0800   BP: (!) 165/85 (!) 143/69 (!) 149/72    Pulse: 80 88 81    Resp:   19 16   Temp:  36.8 °C (98.2 °F) 36.9 °C (98.4 °F) 36.5 °C (97.7 °F)   TempSrc:  Temporal Temporal Temporal   SpO2: 94% 95% 96%    Weight:       Height:         General: Patient in no acute distress  HEENT: Normocephalic, no signs of acute trauma.   Neck: supple, no meningeal signs. There is normal range of motion. No tenderness on exam.   Chest: clear to auscultation. No cough.   CV: RRR, no murmurs.   Skin: no signs of acute rashes or trauma.   Musculoskeletal: joints exhibit full range of motion, without any pain to palpation. There are no signs of joint or muscle swelling. There is no tenderness to deep palpation of muscles.   Psychiatric: No hallucinatory behavior.       NEUROLOGICAL EXAM:   Mental status, orientation: Drowsy; oriented x 4. Affect flattened/depressed.   Speech and language: speech is hypophonic, mildly dysarthric. Follows simple commands.  Cranial nerve exam: Pupils are 3-4 mm bilaterally and equally reactive to light. Visual fields are intact by confrontation. There is no nystagmus on primary or secondary gaze. Intact full EOM in all directions of gaze. Face appears symmetric. Sensation in the face is intact to light touch. Uvula is midline. Palate elevates  symmetrically. Tongue is midline and without any signs of tongue biting or fasciculations.Shoulder shrug is intact bilaterally.   Motor exam: Strength is 4+/5 in all extremities. Tone is normal. No abnormal movements were seen on exam.   Sensory exam reveals normal sense of light touch and pinprick in all extremities.   Deep tendon reflexes:  Plantar responses are flexor. There is no clonus.   Coordination:  Dysmetria appreciated to BUE; mild/improved today.   Gait: Not assessed at this time as patient is unable.          ANCILLARY DATA REVIEWED:     Lab Data Review:  Recent Results (from the past 24 hour(s))   Sodium Serum (NA)    Collection Time: 08/05/22 11:40 AM   Result Value Ref Range    Sodium 143 135 - 145 mmol/L   POCT glucose device results    Collection Time: 08/05/22 12:13 PM   Result Value Ref Range    POC Glucose, Blood 141 (H) 65 - 99 mg/dL   EC-ECHOCARDIOGRAM COMPLETE W/O CONT    Collection Time: 08/05/22  5:20 PM   Result Value Ref Range    Eject.Frac. MOD BP 59.84     Eject.Frac. MOD 4C 59.08     Eject.Frac. MOD 2C 62.06     Left Ventrical Ejection Fraction 65    Sodium Serum (NA)    Collection Time: 08/05/22  5:56 PM   Result Value Ref Range    Sodium 146 (H) 135 - 145 mmol/L   POCT glucose device results    Collection Time: 08/05/22  5:56 PM   Result Value Ref Range    POC Glucose, Blood 169 (H) 65 - 99 mg/dL   Sodium Serum (NA)    Collection Time: 08/05/22 11:20 PM   Result Value Ref Range    Sodium 144 135 - 145 mmol/L   POCT glucose device results    Collection Time: 08/05/22 11:50 PM   Result Value Ref Range    POC Glucose, Blood 163 (H) 65 - 99 mg/dL   PHOSPHORUS    Collection Time: 08/06/22  5:00 AM   Result Value Ref Range    Phosphorus 2.2 (L) 2.5 - 4.5 mg/dL   CBC without Differential    Collection Time: 08/06/22  5:00 AM   Result Value Ref Range    WBC 9.4 4.8 - 10.8 K/uL    RBC 3.79 (L) 4.20 - 5.40 M/uL    Hemoglobin 11.6 (L) 12.0 - 16.0 g/dL    Hematocrit 35.3 (L) 37.0 - 47.0 %    MCV  93.1 81.4 - 97.8 fL    MCH 30.6 27.0 - 33.0 pg    MCHC 32.9 (L) 33.6 - 35.0 g/dL    RDW 44.6 35.9 - 50.0 fL    Platelet Count 185 164 - 446 K/uL    MPV 10.4 9.0 - 12.9 fL   Comp Metabolic Panel (CMP)    Collection Time: 08/06/22  5:00 AM   Result Value Ref Range    Sodium 148 (H) 135 - 145 mmol/L    Potassium 2.9 (L) 3.6 - 5.5 mmol/L    Chloride 112 96 - 112 mmol/L    Co2 25 20 - 33 mmol/L    Anion Gap 11.0 7.0 - 16.0    Glucose 141 (H) 65 - 99 mg/dL    Bun 6 (L) 8 - 22 mg/dL    Creatinine 0.25 (L) 0.50 - 1.40 mg/dL    Calcium 7.9 (L) 8.5 - 10.5 mg/dL    AST(SGOT) 21 12 - 45 U/L    ALT(SGPT) 19 2 - 50 U/L    Alkaline Phosphatase 82 30 - 99 U/L    Total Bilirubin 0.9 0.1 - 1.5 mg/dL    Albumin 3.3 3.2 - 4.9 g/dL    Total Protein 5.9 (L) 6.0 - 8.2 g/dL    Globulin 2.6 1.9 - 3.5 g/dL    A-G Ratio 1.3 g/dL   Magnesium    Collection Time: 08/06/22  5:00 AM   Result Value Ref Range    Magnesium 2.0 1.5 - 2.5 mg/dL   ESTIMATED GFR    Collection Time: 08/06/22  5:00 AM   Result Value Ref Range    GFR (CKD-EPI) 129 >60 mL/min/1.73 m 2       Labs reviewed by me.         Imaging reviewed by me:     EC-ECHOCARDIOGRAM COMPLETE W/O CONT   Final Result      DX-CHEST-PORTABLE (1 VIEW)   Final Result      1.  Persistent hypoinflation with mild worsening bibasilar atelectasis.   2.  Supportive tubing as described above.      IR-PICC LINE PLACEMENT W/ GUIDANCE > AGE 5   Final Result                  Ultrasound-guided PICC placement performed by qualified nursing staff as    above.          DX-CHEST-PORTABLE (1 VIEW)   Final Result      Bibasilar underinflation atelectasis which could obscure an additional process.      CT-HEAD W/O   Final Result      1.  Postsurgical changes status post occipital craniectomy with evacuation of the left cerebellar hematoma. There is a small amount of residual blood and postsurgical pneumocephalus.   2.  There is improved mass effect on the fourth ventricle.   3.  Lateral ventricles are stable in size.       CT-CTA HEAD WITH & W/O-POST PROCESS   Final Result         1. No hemodynamically significant narrowing of the major intracranial vessels.      2. Redemonstration of large parenchymal hemorrhage in the left cerebellum      CT-CTA NECK WITH & W/O-POST PROCESSING   Final Result      1. No evidence of flow-limiting stenosis or dissection in the cervical carotid or cervical vertebral arteries.      CT-HEAD W/O   Final Result      Large 4.6 x 3.4 cm intraparenchymal hematoma centered in the left cerebellar hemisphere. Associated mass effect results in effacement of the basal cisterns and fourth ventricle. No evidence of hydrocephalus.      Findings were discussed with Dr. JANET ARELLANO on 8/2/2022 2:45 PM.          Modified Indira Scale (MRS): 0 = No symptoms      ASSESSMENT AND PLAN:  57-year old female with no known PMhx (?hypertension, details limited, no current medications) who presented to [facility] on 8/2/22 for a chief complaint of altered mentation, headache, dizziness and nausea; here she was found to have , SBP persistently in the 200s after arrival. Stat CT head on arrival revealed large LEFT cerebellar hemorrhage with associated mass effect/edema and effacement of 3rd/4th ventricles/basilar cisterns; CTA with no obvious vascular process nor aneurysm. Etiology most likely hypertensive. Plan to normalize Na levels today; continue serial neuro checks; case management for dispo/physiatry consultation for rehab.    Recommendations/Plan:     -q4h and PRN neuro assessment. VS per nursing/unit protocol. Please call neurology urgently with changes in exam.   -OK to normalize BP for goal normotension, 100-130/60-80; Nicardipine gtt, PRN labetalol, hydralazine.  -Maintain strict euthermia, euglycemia, euvolemia, eunatremia. Current Na is 148; stop hypertonic gtt and allow to slowly trend down/normalize. Continue salt tabs.   -PT/OT/SLP eval and treat.  Physiatry consult.   -Will defer all other  medical management to IMCU team.    -DVT PPX: SCDs. Lovenox.     The plan of care above has been discussed with Dr. Chavez. Please call with questions.     BRIDGETTE Vallejo.  [facility]

## 2022-08-06 NOTE — PROGRESS NOTES
Neurosurgery Progress Note    Subjective:  57 year old female presented with left cerebellar intraparenchymal hemorrhage after she was found down with a systolic BP >200.  POD#4 left posterior fossa craniectomy with hemorrhage evacuation.    No acute events overnight.   Pain well controlled.  Working with therapies who recommend rehab.      Exam:  A&O x3  PERRL. EOMI.   Motor 5/5 throughout all four extremities  Briskly following commands to all four extremities.   Sensation grossly intact to all four extremities.  No drift.  Incision c/d/i, no drainage     BP  Min: 143/69  Max: 165/85  Pulse  Av.2  Min: 80  Max: 95  Resp  Av  Min: 16  Max: 26  Temp  Av.8 °C (98.2 °F)  Min: 36.5 °C (97.7 °F)  Max: 37 °C (98.6 °F)  SpO2  Av.6 %  Min: 91 %  Max: 96 %    No data recorded    Recent Labs     22  0600 22  0000 22  0500   WBC 16.2* 16.3* 9.4   RBC 4.09* 3.75* 3.79*   HEMOGLOBIN 12.3 11.4* 11.6*   HEMATOCRIT 38.8 35.6* 35.3*   MCV 94.9 94.9 93.1   MCH 30.1 30.4 30.6   MCHC 31.7* 32.0* 32.9*   RDW 47.7 48.6 44.6   PLATELETCT 225 190 185   MPV 10.6 10.9 10.4     Recent Labs     22  0600 22  1250 22  0000 22  0420 22  1756 22  2320 22  0500   SODIUM 162*   < > 147*  147*   < > 146* 144 148*   POTASSIUM 3.4*  --  3.2*  --   --   --  2.9*   CHLORIDE 130*  --  111  --   --   --  112   CO2 23  -  23  --   --   --  25   GLUCOSE 171*  --  160*  --   --   --  141*   BUN 14  --  10  --   --   --  6*   CREATININE 0.32*  --  0.30*  --   --   --  0.25*   CALCIUM 8.0*  --  8.1*  --   --   --  7.9*    < > = values in this interval not displayed.               Intake/Output                       22 - 22 - 22 Total  Total                 Intake    P.O.  --  30 30  --  -- --    P.O. -- 30 30 -- -- --    I.V.  --  1985.5 1985.5  --  -- --    Volume (mL) (3% sodium chloride  (HYPERTONIC SALINE) 500mL infusion 500 mL) -- 120.8 120.8 -- -- --    Volume (mL) (3% sodium chloride (HYPERTONIC SALINE) 500mL infusion 500 mL) -- 780 780 -- -- --    Volume (mL) (3% sodium chloride (HYPERTONIC SALINE) 500mL infusion) -- 1031.3 1031.3 -- -- --    Volume (mL) (3% sodium chloride (HYPERTONIC SALINE) 500mL infusion 500 mL) -- 53.5 53.5 -- -- --    Total Intake -- 2015.5 2015.5 -- -- --       Output    Urine  --  -- --  --  -- --    Number of Times Voided 5 x 1 x 6 x 1 x -- 1 x    Stool  --  -- --  --  -- --    Number of Times Stooled -- 0 x 0 x -- -- --    Total Output -- -- -- -- -- --       Net I/O     -- 2015.5 2015.5 -- -- --            Intake/Output Summary (Last 24 hours) at 8/6/2022 1002  Last data filed at 8/6/2022 0400  Gross per 24 hour   Intake 2015.5 ml   Output --   Net 2015.5 ml            • hydrOXYzine HCl  10 mg Once   • hydroCHLOROthiazide  12.5 mg Q DAY   • phosphorus  500 mg Q6HRS   • lisinopril  40 mg Q DAY   • 3% sodium chloride   Continuous   • sodium chloride  1 g TID WITH MEALS   • senna-docusate  1 Tablet Q24HRS PRN   • senna-docusate  1 Tablet Nightly   • polyethylene glycol/lytes  1 Packet BID PRN   • ondansetron  4 mg Q4HRS PRN    Or   • ondansetron  4 mg Q4HRS PRN   • magnesium hydroxide  30 mL QDAY PRN   • methocarbamol  750 mg Q8HRS PRN   • acetaminophen  500 mg Q6HRS PRN   • cloNIDine  0.1 mg Q4HRS PRN   • docusate sodium  100 mg BID   • amLODIPine  10 mg Q DAY   • scopolamine  1 Patch Q72HRS   • dextrose  250 mL Q15 MIN PRN   • Respiratory Therapy Consult   Continuous RT   • midazolam  5 mg Q5 MIN PRN   • acetaminophen  650 mg Q4HRS PRN   • MD Alert...Adult ICU Electrolyte Replacement per Pharmacy   PHARMACY TO DOSE   • insulin regular  1-6 Units Q6HRS   • Pharmacy Consult Request  1 Each PHARMACY TO DOSE   • MD ALERT...DO NOT ADMINISTER NSAIDS or ASPIRIN unless ORDERED By Neurosurgery  1 Each PRN   • diphenhydrAMINE  25 mg Q6HRS PRN   • labetalol  10 mg Q HOUR PRN   •  bisacodyl  10 mg Q24HRS PRN   • sodium phosphate  1 Each Once PRN   • enoxaparin (LOVENOX) injection  40 mg DAILY AT 1800   • hydrALAZINE  10 mg Q HOUR PRN   • benzocaine-menthol  1 Lozenge Q2HRS PRN       Assessment and Plan:  Hospital day #5  POD#4  Keep head of bed >35 degrees   Ice incisions.  Okay for Q4 neuro checks   PT/OT/ambulate as tolerated.  PT/OT recommending post acute placement.   Continue neurology/medical care.  Following.       Chemical prophylactic DVT therapy: Yes - Lovenox 40mg/qd    Start date/time: today

## 2022-08-06 NOTE — CARE PLAN
The patient is Watcher - Medium risk of patient condition declining or worsening    Shift Goals  Clinical Goals: Stable or improved neuro status SBP<160  Patient Goals: Rest  Family Goals: TOMMIE    Progress made toward(s) clinical / shift goals:    Problem: Pain - Standard  Goal: Alleviation of pain or a reduction in pain to the patient’s comfort goal  Outcome: Progressing     Problem: Knowledge Deficit - Standard  Goal: Patient and family/care givers will demonstrate understanding of plan of care, disease process/condition, diagnostic tests and medications  Outcome: Progressing     Problem: Skin Integrity  Goal: Skin integrity is maintained or improved  Outcome: Progressing     Problem: Fall Risk  Goal: Patient will remain free from falls  Outcome: Progressing     Problem: Neuro Status  Goal: Neuro status will remain stable or improve  Outcome: Progressing     Problem: Incision Care  Goal: Optimal post surgical incision care  Outcome: Progressing

## 2022-08-06 NOTE — PROGRESS NOTES
Hospital Medicine Daily Progress Note    Date of Service  8/6/2022    Chief Complaint  Lana Phillips is a 57 y.o. female admitted 8/2/2022 with cerebral hemorrhage.    Hospital Course  57 y.o. female with no known past medical history was admitted to the ICU on 8/2/2022 for large right cerebral hemorrhage with mass-effect which required emergent decompressive craniotomy on 8/2/2022 for which patient is currently status post occipital craniectomy with evacuation of the left cerebellar hematoma.  Neurology and neurosurgery following.  Postsurgically, she was continued on hypertonic therapy and ultimately extubated on 8/3/2022.  Patient was transition to hypotonic acetate and Archer catheter was discontinued.  On bedside evaluation, patient no acute distress.  No headaches or dizziness.     At the ICU, vital signs with tachypnea and SBP in the 170s.  Patient on 1 L nasal cannula.  CBC with leukocytosis with RBC at 16.2 and uptrending.  Chemistry with hyponatremia last sodium at 149 hypokalemia and hyperplasia.  Patient was transferred to the IMCU floor for continued care.    Interval Problem Update  No acute events overnight.  S/p posterior fossa craniectomy and intraparenchymal hemorrhage evacuation 8/2.  Continue 3% saline at 75cc/hr. Started salt tabs yesterday.  BP remains elevated. Start HCTZ, continue lisinopril and amlodipine.  Na 148, continue q6h Na checks.  K 2.9, replete orally. Monitor.  Okay for neuro floor.  Disposition SNF when medically cleared, also being evaluated for rehab.      I have discussed this patient's plan of care and discharge plan at IDT rounds today with Case Management, Nursing, Nursing leadership, and other members of the IDT team.    Consultants/Specialty  neurology and neurosurgery    Code Status  Full Code    Disposition  Patient is not medically cleared for discharge.   Anticipate discharge to to skilled nursing facility.  I have placed the appropriate orders for post-discharge  needs.    Review of Systems  Review of Systems   Constitutional: Negative for chills and fever.   Eyes: Negative for blurred vision and pain.   Respiratory: Negative for cough and shortness of breath.    Cardiovascular: Negative for chest pain, palpitations and leg swelling.   Gastrointestinal: Negative for abdominal pain, nausea and vomiting.   Genitourinary: Negative for dysuria and urgency.   Musculoskeletal: Positive for falls. Negative for back pain.   Skin: Negative for itching and rash.   Neurological: Positive for weakness. Negative for dizziness, sensory change, focal weakness and headaches.   Psychiatric/Behavioral: Negative for substance abuse. The patient does not have insomnia.         Physical Exam  Temp:  [36.5 °C (97.7 °F)-37 °C (98.6 °F)] 36.5 °C (97.7 °F)  Pulse:  [80-95] 81  Resp:  [16-26] 16  BP: (143-165)/(69-85) 149/72  SpO2:  [91 %-96 %] 96 %    Physical Exam  Vitals reviewed.   Constitutional:       General: She is not in acute distress.     Appearance: She is not diaphoretic.   HENT:      Head: Normocephalic and atraumatic.      Right Ear: External ear normal.      Left Ear: External ear normal.      Nose: Nose normal. No congestion.      Mouth/Throat:      Pharynx: No oropharyngeal exudate or posterior oropharyngeal erythema.   Eyes:      Extraocular Movements: Extraocular movements intact.      Pupils: Pupils are equal, round, and reactive to light.   Cardiovascular:      Rate and Rhythm: Normal rate and regular rhythm.   Pulmonary:      Effort: Pulmonary effort is normal. No respiratory distress.      Breath sounds: Normal breath sounds. No wheezing.   Abdominal:      General: Bowel sounds are normal. There is no distension.      Palpations: Abdomen is soft.      Tenderness: There is no abdominal tenderness.   Musculoskeletal:         General: No swelling. Normal range of motion.      Cervical back: Normal range of motion and neck supple.   Skin:     General: Skin is warm and dry.    Neurological:      General: No focal deficit present.      Mental Status: She is alert and oriented to person, place, and time.      Cranial Nerves: No cranial nerve deficit.      Sensory: No sensory deficit.      Motor: No weakness.   Psychiatric:         Mood and Affect: Mood normal.         Behavior: Behavior normal.         Fluids    Intake/Output Summary (Last 24 hours) at 8/6/2022 1003  Last data filed at 8/6/2022 0400  Gross per 24 hour   Intake 2015.5 ml   Output --   Net 2015.5 ml       Laboratory  Recent Labs     08/04/22  0600 08/05/22  0000 08/06/22  0500   WBC 16.2* 16.3* 9.4   RBC 4.09* 3.75* 3.79*   HEMOGLOBIN 12.3 11.4* 11.6*   HEMATOCRIT 38.8 35.6* 35.3*   MCV 94.9 94.9 93.1   MCH 30.1 30.4 30.6   MCHC 31.7* 32.0* 32.9*   RDW 47.7 48.6 44.6   PLATELETCT 225 190 185   MPV 10.6 10.9 10.4     Recent Labs     08/04/22  0600 08/04/22  1250 08/05/22  0000 08/05/22  0420 08/05/22  1756 08/05/22  2320 08/06/22  0500   SODIUM 162*   < > 147*  147*   < > 146* 144 148*   POTASSIUM 3.4*  --  3.2*  --   --   --  2.9*   CHLORIDE 130*  --  111  --   --   --  112   CO2 23  --  23  --   --   --  25   GLUCOSE 171*  --  160*  --   --   --  141*   BUN 14  --  10  --   --   --  6*   CREATININE 0.32*  --  0.30*  --   --   --  0.25*   CALCIUM 8.0*  --  8.1*  --   --   --  7.9*    < > = values in this interval not displayed.                   Imaging  EC-ECHOCARDIOGRAM COMPLETE W/O CONT   Final Result      DX-CHEST-PORTABLE (1 VIEW)   Final Result      1.  Persistent hypoinflation with mild worsening bibasilar atelectasis.   2.  Supportive tubing as described above.      IR-PICC LINE PLACEMENT W/ GUIDANCE > AGE 5   Final Result                  Ultrasound-guided PICC placement performed by qualified nursing staff as    above.          DX-CHEST-PORTABLE (1 VIEW)   Final Result      Bibasilar underinflation atelectasis which could obscure an additional process.      CT-HEAD W/O   Final Result      1.  Postsurgical changes  status post occipital craniectomy with evacuation of the left cerebellar hematoma. There is a small amount of residual blood and postsurgical pneumocephalus.   2.  There is improved mass effect on the fourth ventricle.   3.  Lateral ventricles are stable in size.      CT-CTA HEAD WITH & W/O-POST PROCESS   Final Result         1. No hemodynamically significant narrowing of the major intracranial vessels.      2. Redemonstration of large parenchymal hemorrhage in the left cerebellum      CT-CTA NECK WITH & W/O-POST PROCESSING   Final Result      1. No evidence of flow-limiting stenosis or dissection in the cervical carotid or cervical vertebral arteries.      CT-HEAD W/O   Final Result      Large 4.6 x 3.4 cm intraparenchymal hematoma centered in the left cerebellar hemisphere. Associated mass effect results in effacement of the basal cisterns and fourth ventricle. No evidence of hydrocephalus.      Findings were discussed with Dr. JANET ARELLANO on 8/2/2022 2:45 PM.           Assessment/Plan  * Cerebellar hemorrhage, acute (HCC)- (present on admission)  Assessment & Plan  Neurosurgery consult: Dr Vences s/p 8/2 posterior fossa craniectomy for evacuation of intraparenchymal hemorrhage with great operative result seen on postoperative CT  SBP goal < 160  Neuro checks q4h  Continue 3% saline, goal Na 150-160. Continue q6h Na checks.  Neurosurgery and neurology following  PT/OT recommend SNF, PMR consulted    Hypertension- (present on admission)  Assessment & Plan  Was severely hypertensive to 260 on presentation  Strict SBP < 160  Off nicardipine drip  Continue lisinopril 40 mg daily, continue amlodipine 10 mg daily  Start HCTZ 12.5 mg daily  Prn's for SBP>160    Hyperglycemia- (present on admission)  Assessment & Plan  A1c  Sliding scale for now    Hypokalemia- (present on admission)  Assessment & Plan  Replete as necessary       VTE prophylaxis: enoxaparin ppx    I have performed a physical exam and reviewed and  updated ROS and Plan today (8/6/2022). In review of yesterday's note (8/5/2022), there are no changes except as documented above.

## 2022-08-07 LAB
ALBUMIN SERPL BCP-MCNC: 3.6 G/DL (ref 3.2–4.9)
ALBUMIN/GLOB SERPL: 1.3 G/DL
ALP SERPL-CCNC: 82 U/L (ref 30–99)
ALT SERPL-CCNC: 18 U/L (ref 2–50)
ANION GAP SERPL CALC-SCNC: 14 MMOL/L (ref 7–16)
AST SERPL-CCNC: 14 U/L (ref 12–45)
BILIRUB SERPL-MCNC: 1 MG/DL (ref 0.1–1.5)
BUN SERPL-MCNC: 8 MG/DL (ref 8–22)
CALCIUM SERPL-MCNC: 8.1 MG/DL (ref 8.5–10.5)
CHLORIDE SERPL-SCNC: 101 MMOL/L (ref 96–112)
CO2 SERPL-SCNC: 27 MMOL/L (ref 20–33)
CREAT SERPL-MCNC: 0.33 MG/DL (ref 0.5–1.4)
ERYTHROCYTE [DISTWIDTH] IN BLOOD BY AUTOMATED COUNT: 41.1 FL (ref 35.9–50)
GFR SERPLBLD CREATININE-BSD FMLA CKD-EPI: 121 ML/MIN/1.73 M 2
GLOBULIN SER CALC-MCNC: 2.7 G/DL (ref 1.9–3.5)
GLUCOSE BLD STRIP.AUTO-MCNC: 124 MG/DL (ref 65–99)
GLUCOSE BLD STRIP.AUTO-MCNC: 133 MG/DL (ref 65–99)
GLUCOSE BLD STRIP.AUTO-MCNC: 145 MG/DL (ref 65–99)
GLUCOSE BLD STRIP.AUTO-MCNC: 154 MG/DL (ref 65–99)
GLUCOSE SERPL-MCNC: 131 MG/DL (ref 65–99)
HCT VFR BLD AUTO: 35.3 % (ref 37–47)
HGB BLD-MCNC: 11.8 G/DL (ref 12–16)
MAGNESIUM SERPL-MCNC: 1.9 MG/DL (ref 1.5–2.5)
MCH RBC QN AUTO: 30 PG (ref 27–33)
MCHC RBC AUTO-ENTMCNC: 33.4 G/DL (ref 33.6–35)
MCV RBC AUTO: 89.8 FL (ref 81.4–97.8)
PHOSPHATE SERPL-MCNC: 3.5 MG/DL (ref 2.5–4.5)
PLATELET # BLD AUTO: 191 K/UL (ref 164–446)
PMV BLD AUTO: 10.2 FL (ref 9–12.9)
POTASSIUM SERPL-SCNC: 3 MMOL/L (ref 3.6–5.5)
PROT SERPL-MCNC: 6.3 G/DL (ref 6–8.2)
RBC # BLD AUTO: 3.93 M/UL (ref 4.2–5.4)
SODIUM SERPL-SCNC: 140 MMOL/L (ref 135–145)
SODIUM SERPL-SCNC: 141 MMOL/L (ref 135–145)
SODIUM SERPL-SCNC: 142 MMOL/L (ref 135–145)
WBC # BLD AUTO: 8.2 K/UL (ref 4.8–10.8)

## 2022-08-07 PROCEDURE — 700102 HCHG RX REV CODE 250 W/ 637 OVERRIDE(OP): Performed by: INTERNAL MEDICINE

## 2022-08-07 PROCEDURE — 700111 HCHG RX REV CODE 636 W/ 250 OVERRIDE (IP): Performed by: STUDENT IN AN ORGANIZED HEALTH CARE EDUCATION/TRAINING PROGRAM

## 2022-08-07 PROCEDURE — 700102 HCHG RX REV CODE 250 W/ 637 OVERRIDE(OP): Performed by: STUDENT IN AN ORGANIZED HEALTH CARE EDUCATION/TRAINING PROGRAM

## 2022-08-07 PROCEDURE — 82962 GLUCOSE BLOOD TEST: CPT | Mod: 91

## 2022-08-07 PROCEDURE — 83735 ASSAY OF MAGNESIUM: CPT

## 2022-08-07 PROCEDURE — 85027 COMPLETE CBC AUTOMATED: CPT

## 2022-08-07 PROCEDURE — A9270 NON-COVERED ITEM OR SERVICE: HCPCS | Performed by: STUDENT IN AN ORGANIZED HEALTH CARE EDUCATION/TRAINING PROGRAM

## 2022-08-07 PROCEDURE — 99232 SBSQ HOSP IP/OBS MODERATE 35: CPT | Performed by: STUDENT IN AN ORGANIZED HEALTH CARE EDUCATION/TRAINING PROGRAM

## 2022-08-07 PROCEDURE — A9270 NON-COVERED ITEM OR SERVICE: HCPCS | Performed by: INTERNAL MEDICINE

## 2022-08-07 PROCEDURE — 770001 HCHG ROOM/CARE - MED/SURG/GYN PRIV*

## 2022-08-07 PROCEDURE — 99232 SBSQ HOSP IP/OBS MODERATE 35: CPT | Performed by: NURSE PRACTITIONER

## 2022-08-07 PROCEDURE — 84295 ASSAY OF SERUM SODIUM: CPT

## 2022-08-07 PROCEDURE — 84100 ASSAY OF PHOSPHORUS: CPT

## 2022-08-07 PROCEDURE — 80053 COMPREHEN METABOLIC PANEL: CPT

## 2022-08-07 PROCEDURE — 700111 HCHG RX REV CODE 636 W/ 250 OVERRIDE (IP)

## 2022-08-07 RX ORDER — MAGNESIUM SULFATE HEPTAHYDRATE 40 MG/ML
2 INJECTION, SOLUTION INTRAVENOUS ONCE
Status: COMPLETED | OUTPATIENT
Start: 2022-08-07 | End: 2022-08-07

## 2022-08-07 RX ORDER — HYDROCHLOROTHIAZIDE 25 MG/1
12.5 TABLET ORAL ONCE
Status: COMPLETED | OUTPATIENT
Start: 2022-08-07 | End: 2022-08-07

## 2022-08-07 RX ORDER — POTASSIUM CHLORIDE 20 MEQ/1
40 TABLET, EXTENDED RELEASE ORAL ONCE
Status: COMPLETED | OUTPATIENT
Start: 2022-08-07 | End: 2022-08-07

## 2022-08-07 RX ORDER — HYDROCHLOROTHIAZIDE 25 MG/1
25 TABLET ORAL
Status: DISCONTINUED | OUTPATIENT
Start: 2022-08-08 | End: 2022-08-09

## 2022-08-07 RX ADMIN — ENOXAPARIN SODIUM 40 MG: 40 INJECTION SUBCUTANEOUS at 18:04

## 2022-08-07 RX ADMIN — AMLODIPINE BESYLATE 10 MG: 10 TABLET ORAL at 05:51

## 2022-08-07 RX ADMIN — POTASSIUM CHLORIDE 40 MEQ: 1500 TABLET, EXTENDED RELEASE ORAL at 08:32

## 2022-08-07 RX ADMIN — DOCUSATE SODIUM 100 MG: 50 LIQUID ORAL at 06:00

## 2022-08-07 RX ADMIN — INSULIN HUMAN 1 UNITS: 100 INJECTION, SOLUTION PARENTERAL at 06:11

## 2022-08-07 RX ADMIN — SODIUM CHLORIDE 1 G: 1 TABLET ORAL at 08:32

## 2022-08-07 RX ADMIN — MAGNESIUM SULFATE HEPTAHYDRATE 2 G: 40 INJECTION, SOLUTION INTRAVENOUS at 11:46

## 2022-08-07 RX ADMIN — HYDROCHLOROTHIAZIDE 12.5 MG: 25 TABLET ORAL at 08:32

## 2022-08-07 RX ADMIN — SODIUM CHLORIDE 1 G: 1 TABLET ORAL at 18:04

## 2022-08-07 RX ADMIN — HYDROCHLOROTHIAZIDE 12.5 MG: 25 TABLET ORAL at 05:51

## 2022-08-07 RX ADMIN — SODIUM CHLORIDE 1 G: 1 TABLET ORAL at 11:46

## 2022-08-07 RX ADMIN — LISINOPRIL 40 MG: 20 TABLET ORAL at 05:51

## 2022-08-07 ASSESSMENT — ENCOUNTER SYMPTOMS
PALPITATIONS: 0
BLURRED VISION: 0
FALLS: 1
BACK PAIN: 0
COUGH: 0
FEVER: 0
ABDOMINAL PAIN: 0
DIZZINESS: 0
VOMITING: 0
INSOMNIA: 0
FOCAL WEAKNESS: 0
WEAKNESS: 1
SENSORY CHANGE: 0
CHILLS: 0
EYE PAIN: 0
NAUSEA: 0
HEADACHES: 0
SHORTNESS OF BREATH: 0

## 2022-08-07 ASSESSMENT — LIFESTYLE VARIABLES: SUBSTANCE_ABUSE: 0

## 2022-08-07 NOTE — CARE PLAN
Problem: Pain - Standard  Goal: Alleviation of pain or a reduction in pain to the patient’s comfort goal  Outcome: Progressing     Problem: Knowledge Deficit - Standard  Goal: Patient and family/care givers will demonstrate understanding of plan of care, disease process/condition, diagnostic tests and medications  Outcome: Progressing     Problem: Skin Integrity  Goal: Skin integrity is maintained or improved  Outcome: Progressing     Problem: Fall Risk  Goal: Patient will remain free from falls  Outcome: Progressing     Problem: Neuro Status  Goal: Neuro status will remain stable or improve  Outcome: Progressing   The patient is Watcher - Medium risk of patient condition declining or worsening    Shift Goals  Clinical Goals: SBP<160  Patient Goals: rest, go home  Family Goals: TOMMIE

## 2022-08-07 NOTE — PROGRESS NOTES
Hospital Medicine Daily Progress Note    Date of Service  8/7/2022    Chief Complaint  Lana Phillips is a 57 y.o. female admitted 8/2/2022 with cerebral hemorrhage.    Hospital Course  57 y.o. female with no known past medical history was admitted to the ICU on 8/2/2022 for large right cerebral hemorrhage with mass-effect which required emergent decompressive craniotomy on 8/2/2022 for which patient is currently status post occipital craniectomy with evacuation of the left cerebellar hematoma.  Neurology and neurosurgery following.  Postsurgically, she was continued on hypertonic therapy and ultimately extubated on 8/3/2022.  Patient was transition to hypotonic acetate and Archer catheter was discontinued.  On bedside evaluation, patient no acute distress.  No headaches or dizziness.     At the ICU, vital signs with tachypnea and SBP in the 170s.  Patient on 1 L nasal cannula.  CBC with leukocytosis with RBC at 16.2 and uptrending.  Chemistry with hyponatremia last sodium at 149 hypokalemia and hyperplasia.  Patient was transferred to the IMCU floor for continued care.    Interval Problem Update  No acute events overnight.  Stop 3% saline. Goal eunatremia. Okay to continue salt tabs.  BP elevated.  Continue lisinopril, amlodipine, increase dose of HCTZ.  Okay for neuro floor.  Patient is medically cleared.  Pending rehab vs SNF.      I have discussed this patient's plan of care and discharge plan at IDT rounds today with Case Management, Nursing, Nursing leadership, and other members of the IDT team.    Consultants/Specialty  neurology and neurosurgery    Code Status  Full Code    Disposition  Patient is medically cleared for discharge.   Anticipate discharge to to skilled nursing facility vs rehab.  I have placed the appropriate orders for post-discharge needs.    Review of Systems  Review of Systems   Constitutional: Negative for chills and fever.   Eyes: Negative for blurred vision and pain.   Respiratory: Negative for  cough and shortness of breath.    Cardiovascular: Negative for chest pain, palpitations and leg swelling.   Gastrointestinal: Negative for abdominal pain, nausea and vomiting.   Genitourinary: Negative for dysuria and urgency.   Musculoskeletal: Positive for falls. Negative for back pain.   Skin: Negative for itching and rash.   Neurological: Positive for weakness. Negative for dizziness, sensory change, focal weakness and headaches.   Psychiatric/Behavioral: Negative for substance abuse. The patient does not have insomnia.         Physical Exam  Temp:  [36.6 °C (97.9 °F)-36.8 °C (98.2 °F)] 36.8 °C (98.2 °F)  Pulse:  [72-85] 72  Resp:  [18] 18  BP: (142-172)/(76-93) 167/87  SpO2:  [92 %-94 %] 94 %    Physical Exam  Vitals reviewed.   Constitutional:       General: She is not in acute distress.     Appearance: She is not diaphoretic.   HENT:      Head: Normocephalic and atraumatic.      Right Ear: External ear normal.      Left Ear: External ear normal.      Nose: Nose normal. No congestion.      Mouth/Throat:      Pharynx: No oropharyngeal exudate or posterior oropharyngeal erythema.   Eyes:      Extraocular Movements: Extraocular movements intact.      Pupils: Pupils are equal, round, and reactive to light.   Cardiovascular:      Rate and Rhythm: Normal rate and regular rhythm.   Pulmonary:      Effort: Pulmonary effort is normal. No respiratory distress.      Breath sounds: Normal breath sounds. No wheezing.   Abdominal:      General: Bowel sounds are normal. There is no distension.      Palpations: Abdomen is soft.      Tenderness: There is no abdominal tenderness.   Musculoskeletal:         General: No swelling. Normal range of motion.      Cervical back: Normal range of motion and neck supple.   Skin:     General: Skin is warm and dry.   Neurological:      General: No focal deficit present.      Mental Status: She is alert and oriented to person, place, and time.      Cranial Nerves: No cranial nerve deficit.       Sensory: No sensory deficit.      Motor: No weakness.   Psychiatric:         Mood and Affect: Mood normal.         Behavior: Behavior normal.         Fluids    Intake/Output Summary (Last 24 hours) at 8/7/2022 1259  Last data filed at 8/7/2022 1146  Gross per 24 hour   Intake 770 ml   Output --   Net 770 ml       Laboratory  Recent Labs     08/05/22  0000 08/06/22  0500 08/07/22  0415   WBC 16.3* 9.4 8.2   RBC 3.75* 3.79* 3.93*   HEMOGLOBIN 11.4* 11.6* 11.8*   HEMATOCRIT 35.6* 35.3* 35.3*   MCV 94.9 93.1 89.8   MCH 30.4 30.6 30.0   MCHC 32.0* 32.9* 33.4*   RDW 48.6 44.6 41.1   PLATELETCT 190 185 191   MPV 10.9 10.4 10.2     Recent Labs     08/05/22  0000 08/05/22  0420 08/06/22  0500 08/06/22  1538 08/07/22  0145 08/07/22  0415 08/07/22  0600   SODIUM 147*  147*   < > 148*   < > 141 142 140   POTASSIUM 3.2*  --  2.9*  --   --  3.0*  --    CHLORIDE 111  --  112  --   --  101  --    CO2 23  --  25  --   --  27  --    GLUCOSE 160*  --  141*  --   --  131*  --    BUN 10  --  6*  --   --  8  --    CREATININE 0.30*  --  0.25*  --   --  0.33*  --    CALCIUM 8.1*  --  7.9*  --   --  8.1*  --     < > = values in this interval not displayed.                   Imaging  EC-ECHOCARDIOGRAM COMPLETE W/O CONT   Final Result      DX-CHEST-PORTABLE (1 VIEW)   Final Result      1.  Persistent hypoinflation with mild worsening bibasilar atelectasis.   2.  Supportive tubing as described above.      IR-PICC LINE PLACEMENT W/ GUIDANCE > AGE 5   Final Result                  Ultrasound-guided PICC placement performed by qualified nursing staff as    above.          DX-CHEST-PORTABLE (1 VIEW)   Final Result      Bibasilar underinflation atelectasis which could obscure an additional process.      CT-HEAD W/O   Final Result      1.  Postsurgical changes status post occipital craniectomy with evacuation of the left cerebellar hematoma. There is a small amount of residual blood and postsurgical pneumocephalus.   2.  There is improved mass  effect on the fourth ventricle.   3.  Lateral ventricles are stable in size.      CT-CTA HEAD WITH & W/O-POST PROCESS   Final Result         1. No hemodynamically significant narrowing of the major intracranial vessels.      2. Redemonstration of large parenchymal hemorrhage in the left cerebellum      CT-CTA NECK WITH & W/O-POST PROCESSING   Final Result      1. No evidence of flow-limiting stenosis or dissection in the cervical carotid or cervical vertebral arteries.      CT-HEAD W/O   Final Result      Large 4.6 x 3.4 cm intraparenchymal hematoma centered in the left cerebellar hemisphere. Associated mass effect results in effacement of the basal cisterns and fourth ventricle. No evidence of hydrocephalus.      Findings were discussed with Dr. JANET ARELLANO on 8/2/2022 2:45 PM.           Assessment/Plan  * Cerebellar hemorrhage, acute (HCC)- (present on admission)  Assessment & Plan  Neurosurgery consult: Dr Vences s/p 8/2 posterior fossa craniectomy for evacuation of intraparenchymal hemorrhage with great operative result seen on postoperative CT  SBP goal < 160  Neuro checks q4h  Stop 3% saline. Okay to continue salt tabs. Goal eunatremia.  Neurosurgery and neurology following  PT/OT recommend SNF, PMR consulted    Hypertension- (present on admission)  Assessment & Plan  Was severely hypertensive to 260 on presentation  Strict SBP < 160  Off nicardipine drip  Continue lisinopril 40 mg daily, continue amlodipine 10 mg daily  increase HCTZ to 25 mg daily  Prn's for SBP>160    Hyperglycemia- (present on admission)  Assessment & Plan  A1c  Sliding scale for now    Hypokalemia- (present on admission)  Assessment & Plan  Replete as necessary       VTE prophylaxis: enoxaparin ppx    I have performed a physical exam and reviewed and updated ROS and Plan today (8/7/2022). In review of yesterday's note (8/6/2022), there are no changes except as documented above.

## 2022-08-07 NOTE — PROGRESS NOTES
Chief Complaint   Patient presents with   • Fatigue     SInce this afternoon at a . Pt falling asleep during triage, arousable to painful stimuli. Pt oriented x 4 when awake.    • N/V     This afternoon. Hx T2 DM. FSBS in triage 230       Problem List Items Addressed This Visit     Hypokalemia     Replete as necessary           Relevant Orders    Referral to Skilled Nursing Facility    Dental infection    Relevant Orders    Referral to Skilled Nursing Facility    Hyperglycemia     A1c  Sliding scale for now           Relevant Orders    Referral to Skilled Nursing Facility    * (Principal) Cerebellar hemorrhage, acute (HCC)     Neurosurgery consult: Dr Vences s/p  posterior fossa craniectomy for evacuation of intraparenchymal hemorrhage with great operative result seen on postoperative CT  SBP goal < 160  Neuro checks q4h  Continue 3% saline, goal Na 150-160. Continue q6h Na checks.  Neurosurgery and neurology following  PT/OT recommend SNF, PMR consulted           Relevant Orders    Referral to Physiatry (PMR)    Referral to Neurology    Referral to Skilled Nursing Facility    Hypertension     Was severely hypertensive to 260 on presentation  Strict SBP < 160  Off nicardipine drip  Continue lisinopril 40 mg daily, continue amlodipine 10 mg daily  Start HCTZ 12.5 mg daily  Prn's for SBP>160           Relevant Medications    labetalol (NORMODYNE/TRANDATE) injection 10 mg    enoxaparin (Lovenox) inj 40 mg    hydrALAZINE (APRESOLINE) injection 10 mg    cloNIDine (CATAPRES) tablet 0.1 mg    amLODIPine (NORVASC) tablet 10 mg    lisinopril (PRINIVIL) tablet 40 mg    hydroCHLOROthiazide (HYDRODIURIL) tablet 25 mg (Start on 2022  6:00 AM)    Other Relevant Orders    Referral to Skilled Nursing Facility    Intracranial hemorrhage (HCC)    Relevant Orders    Referral to Skilled Nursing Facility      Other Visit Diagnoses     Cerebellar hemorrhage (HCC)        Relevant Orders    Referral to Skilled Nursing Facility     Hypertensive emergency        Relevant Medications    labetalol (NORMODYNE/TRANDATE) injection 20 mg (Completed)    labetalol (NORMODYNE/TRANDATE) injection 20 mg (Completed)    labetalol (NORMODYNE/TRANDATE) injection 10 mg    enoxaparin (Lovenox) inj 40 mg    hydrALAZINE (APRESOLINE) injection 10 mg    cloNIDine (CATAPRES) tablet 0.1 mg    amLODIPine (NORVASC) tablet 10 mg    lisinopril (PRINIVIL) tablet 40 mg    lisinopril (PRINIVIL) tablet 20 mg (Completed)    hydroCHLOROthiazide (HYDRODIURIL) tablet 25 mg (Start on 2022  6:00 AM)    hydroCHLOROthiazide (HYDRODIURIL) tablet 12.5 mg (Completed)    Other Relevant Orders    Referral to Skilled Nursing Facility    Acute intractable headache, unspecified headache type        Relevant Orders    Referral to Skilled Nursing Facility        Neurology Progress Note     History of present illness:  This is a 57-year old female with no known PMhx (?hypertension, details limited, no current medications) who presented to [facility] on 22 for a chief complaint of altered mentation, headache, dizziness and nausea. Details regarding HPI obtained via medical record. Per report, the patient was attending a  earlier today, when she was reportedly found in a bathroom minimally responsive. Patient was brought to the hospital; here she was found to have , SBP persistently in the 200s. Stat CT head on arrival revealed large LEFT cerebellar hemorrhage with associated mass effect/edema and compression of 3rd/4th ventricles/basilar cisterns; CTA with no obvious vascular process nor aneurysm.   Currently, patient is sitting up in stretcher; arousable, lethargic, poorly interactive. Follows simple commands, moves all extremities. Further ROS is limited. ICH Score 2 (GCS 5-12, Infratentorial).    Neurology has been consulted by Dr. Baljinder George to further evaluate the findings noted above.     Interval, 8/3/22:  Patient is sitting up in bed; awake and alert.  Follows commands, moves all extremities. Admits to mild headache/pain. Denies nausea/dizziness. Patient s/p Left Sub occipital crani; today is POD #1. Hypertonic saline infusing; SBP 130s-160s overnight; at goal--goal 120-160 given profoundly elevated blood pressure on arrival and thus risk of hypoperfusion. Plan to normalize BP in coming 24 hours.     Interval, 8/4/22  Patient is sitting up in bed; awake and alert. Denies headache, dizziness, nausea or weakness. Asking for ice/water; apparently awaiting FEES study today per SLP. No events overnight per nursing; SBP 130s-170s overnight/this morning. PT/OT to see today. Today is POD #2 suboccipital crani, PBD #2 Left cerebellar hemorrhage.     Interval, 8/5/22  Patient is sitting up in bed; drowsy arousable. Follows commands. Now transferred to [facility]; no events overnight; hypertonic gtt infusing; SBP  140s-150s overnight. Today is PBD #3, POD #3,     Interval, 8/6/22  Patient is sitting up in bed; drowsy, arouable, interactive. Asking for ice. Follows commands, moves all extremities. No events overnight. Plan to d/c hypertonic gtt today and normalize na (allow to trend downward).      Interval, 8/7/22:  Patient is sitting up in bed; awake and alert, eating breakfast. Feels well today, better. Denies headache, dizziness or weakness. No events overnight per nursing.     No changes to HPI as was previously documented.     Past medical history:   History reviewed. No pertinent past medical history.    Past surgical history:   Past Surgical History:   Procedure Laterality Date   • CRANIOTOMY  8/2/2022    Procedure: Posterior fossa craniectomy for evacuation of intraparenchymal hemorrhage;  Surgeon: Pedro Pablo Vences M.D.;  Location: SURGERY [facility];  Service: Neurosurgery       Family history:   History reviewed. No pertinent family history.    Social history:   Social History     Socioeconomic History   • Marital status:      Spouse name: Not on file   • Number  of children: Not on file   • Years of education: Not on file   • Highest education level: Not on file   Occupational History   • Not on file   Tobacco Use   • Smoking status: Never Smoker   • Smokeless tobacco: Never Used   Substance and Sexual Activity   • Alcohol use: No   • Drug use: No   • Sexual activity: Not on file   Other Topics Concern   • Not on file   Social History Narrative   • Not on file     Social Determinants of Health     Financial Resource Strain: Not on file   Food Insecurity: Not on file   Transportation Needs: Not on file   Physical Activity: Not on file   Stress: Not on file   Social Connections: Not on file   Intimate Partner Violence: Not on file   Housing Stability: Not on file       Current medications:   Current Facility-Administered Medications   Medication Dose   • [START ON 8/8/2022] hydroCHLOROthiazide (HYDRODIURIL) tablet 25 mg  25 mg   • magnesium sulfate IVPB premix 2 g  2 g   • lisinopril (PRINIVIL) tablet 40 mg  40 mg   • sodium chloride (SALT) tablet 1 g  1 g   • senna-docusate (PERICOLACE or SENOKOT S) 8.6-50 MG per tablet 1 Tablet  1 Tablet   • senna-docusate (PERICOLACE or SENOKOT S) 8.6-50 MG per tablet 1 Tablet  1 Tablet   • polyethylene glycol/lytes (MIRALAX) PACKET 1 Packet  1 Packet   • ondansetron (ZOFRAN ODT) dispertab 4 mg  4 mg    Or   • ondansetron (ZOFRAN) syringe/vial injection 4 mg  4 mg   • magnesium hydroxide (MILK OF MAGNESIA) suspension 30 mL  30 mL   • methocarbamol (ROBAXIN) tablet 750 mg  750 mg   • acetaminophen (TYLENOL) tablet 500 mg  500 mg   • cloNIDine (CATAPRES) tablet 0.1 mg  0.1 mg   • docusate sodium (Colace) oral solution 100 mg  100 mg   • amLODIPine (NORVASC) tablet 10 mg  10 mg   • scopolamine (TRANSDERM-SCOP) patch 1 Patch  1 Patch   • dextrose 10 % BOLUS 250 mL  250 mL   • Respiratory Therapy Consult     • midazolam (Versed) injection 5 mg  5 mg   • acetaminophen (TYLENOL) suppository 650 mg  650 mg   • MD Alert...ICU Electrolyte  Replacement per Pharmacy     • insulin regular (HumuLIN R,NovoLIN R) injection  1-6 Units   • Pharmacy Consult Request ...Pain Management Review 1 Each  1 Each   • MD ALERT...DO NOT ADMINISTER NSAIDS or ASPIRIN unless ORDERED By Neurosurgery 1 Each  1 Each   • diphenhydrAMINE (BENADRYL) injection 25 mg  25 mg   • labetalol (NORMODYNE/TRANDATE) injection 10 mg  10 mg   • bisacodyl (DULCOLAX) suppository 10 mg  10 mg   • sodium phosphate (Fleet) enema 133 mL  1 Each   • enoxaparin (Lovenox) inj 40 mg  40 mg   • hydrALAZINE (APRESOLINE) injection 10 mg  10 mg   • benzocaine-menthol (Cepacol) lozenge 1 Lozenge  1 Lozenge       Medication Allergy:  No Known Allergies    Review of systems:   As noted above; otherwise all systems reviewed and determined to be non contributory.      Physical examination:   Vitals:    08/06/22 0800 08/06/22 2000 08/06/22 2100 08/07/22 0600   BP:  (!) 172/93 (!) 158/76 (!) 142/76   Pulse:  85     Resp: 16 18  18   Temp: 36.5 °C (97.7 °F) 36.6 °C (97.9 °F)     TempSrc: Temporal Temporal     SpO2:  92%  94%   Weight:       Height:         General: Patient in no acute distress  HEENT: Normocephalic, no signs of acute trauma.   Neck: supple, no meningeal signs. There is normal range of motion. No tenderness on exam.   Chest: clear to auscultation. No cough.   CV: RRR, no murmurs.   Skin: no signs of acute rashes or trauma.   Musculoskeletal: joints exhibit full range of motion, without any pain to palpation. There are no signs of joint or muscle swelling. There is no tenderness to deep palpation of muscles.   Psychiatric: No hallucinatory behavior.       NEUROLOGICAL EXAM:   Mental status, orientation: Awake, oriented x 4.    Speech and language: speech is hypophonic, mildly dysarthric. Follows simple commands.  Cranial nerve exam: Pupils are 3-4 mm bilaterally and equally reactive to light. Visual fields are intact by confrontation. There is no nystagmus on primary or secondary gaze. Intact full  EOM in all directions of gaze. Face appears symmetric. Sensation in the face is intact to light touch. Uvula is midline. Palate elevates symmetrically. Tongue is midline and without any signs of tongue biting or fasciculations.Shoulder shrug is intact bilaterally.   Motor exam: Strength is 4+/5 in all extremities. Tone is normal. No abnormal movements were seen on exam.   Sensory exam reveals normal sense of light touch and pinprick in all extremities.   Deep tendon reflexes:  Plantar responses are flexor. There is no clonus.   Coordination:  Dysmetria appreciated to BUE; mild/improved today.   Gait: Not assessed at this time as patient is unable.      No changes to exam as was documented on 8/6/22.     ANCILLARY DATA REVIEWED:     Lab Data Review:  Recent Results (from the past 24 hour(s))   POCT glucose device results    Collection Time: 08/06/22 11:11 AM   Result Value Ref Range    POC Glucose, Blood 173 (H) 65 - 99 mg/dL   SODIUM SERUM (NA)    Collection Time: 08/06/22  3:38 PM   Result Value Ref Range    Sodium 142 135 - 145 mmol/L   POCT glucose device results    Collection Time: 08/06/22  5:34 PM   Result Value Ref Range    POC Glucose, Blood 116 (H) 65 - 99 mg/dL   SODIUM SERUM (NA)    Collection Time: 08/06/22  5:35 PM   Result Value Ref Range    Sodium 146 (H) 135 - 145 mmol/L   POCT glucose device results    Collection Time: 08/07/22  1:01 AM   Result Value Ref Range    POC Glucose, Blood 124 (H) 65 - 99 mg/dL   SODIUM SERUM (NA)    Collection Time: 08/07/22  1:45 AM   Result Value Ref Range    Sodium 141 135 - 145 mmol/L   PHOSPHORUS    Collection Time: 08/07/22  4:15 AM   Result Value Ref Range    Phosphorus 3.5 2.5 - 4.5 mg/dL   CBC without Differential    Collection Time: 08/07/22  4:15 AM   Result Value Ref Range    WBC 8.2 4.8 - 10.8 K/uL    RBC 3.93 (L) 4.20 - 5.40 M/uL    Hemoglobin 11.8 (L) 12.0 - 16.0 g/dL    Hematocrit 35.3 (L) 37.0 - 47.0 %    MCV 89.8 81.4 - 97.8 fL    MCH 30.0 27.0 - 33.0 pg     MCHC 33.4 (L) 33.6 - 35.0 g/dL    RDW 41.1 35.9 - 50.0 fL    Platelet Count 191 164 - 446 K/uL    MPV 10.2 9.0 - 12.9 fL   Comp Metabolic Panel (CMP)    Collection Time: 08/07/22  4:15 AM   Result Value Ref Range    Sodium 142 135 - 145 mmol/L    Potassium 3.0 (L) 3.6 - 5.5 mmol/L    Chloride 101 96 - 112 mmol/L    Co2 27 20 - 33 mmol/L    Anion Gap 14.0 7.0 - 16.0    Glucose 131 (H) 65 - 99 mg/dL    Bun 8 8 - 22 mg/dL    Creatinine 0.33 (L) 0.50 - 1.40 mg/dL    Calcium 8.1 (L) 8.5 - 10.5 mg/dL    AST(SGOT) 14 12 - 45 U/L    ALT(SGPT) 18 2 - 50 U/L    Alkaline Phosphatase 82 30 - 99 U/L    Total Bilirubin 1.0 0.1 - 1.5 mg/dL    Albumin 3.6 3.2 - 4.9 g/dL    Total Protein 6.3 6.0 - 8.2 g/dL    Globulin 2.7 1.9 - 3.5 g/dL    A-G Ratio 1.3 g/dL   Magnesium    Collection Time: 08/07/22  4:15 AM   Result Value Ref Range    Magnesium 1.9 1.5 - 2.5 mg/dL   ESTIMATED GFR    Collection Time: 08/07/22  4:15 AM   Result Value Ref Range    GFR (CKD-EPI) 121 >60 mL/min/1.73 m 2   SODIUM SERUM (NA)    Collection Time: 08/07/22  6:00 AM   Result Value Ref Range    Sodium 140 135 - 145 mmol/L   POCT glucose device results    Collection Time: 08/07/22  6:01 AM   Result Value Ref Range    POC Glucose, Blood 154 (H) 65 - 99 mg/dL       Labs reviewed by me.         Imaging reviewed by me:     EC-ECHOCARDIOGRAM COMPLETE W/O CONT   Final Result      DX-CHEST-PORTABLE (1 VIEW)   Final Result      1.  Persistent hypoinflation with mild worsening bibasilar atelectasis.   2.  Supportive tubing as described above.      IR-PICC LINE PLACEMENT W/ GUIDANCE > AGE 5   Final Result                  Ultrasound-guided PICC placement performed by qualified nursing staff as    above.          DX-CHEST-PORTABLE (1 VIEW)   Final Result      Bibasilar underinflation atelectasis which could obscure an additional process.      CT-HEAD W/O   Final Result      1.  Postsurgical changes status post occipital craniectomy with evacuation of the left cerebellar  hematoma. There is a small amount of residual blood and postsurgical pneumocephalus.   2.  There is improved mass effect on the fourth ventricle.   3.  Lateral ventricles are stable in size.      CT-CTA HEAD WITH & W/O-POST PROCESS   Final Result         1. No hemodynamically significant narrowing of the major intracranial vessels.      2. Redemonstration of large parenchymal hemorrhage in the left cerebellum      CT-CTA NECK WITH & W/O-POST PROCESSING   Final Result      1. No evidence of flow-limiting stenosis or dissection in the cervical carotid or cervical vertebral arteries.      CT-HEAD W/O   Final Result      Large 4.6 x 3.4 cm intraparenchymal hematoma centered in the left cerebellar hemisphere. Associated mass effect results in effacement of the basal cisterns and fourth ventricle. No evidence of hydrocephalus.      Findings were discussed with Dr. AJNET ARELLANO on 8/2/2022 2:45 PM.          Modified Indira Scale (MRS): 0 = No symptoms      ASSESSMENT AND PLAN:  57-year old female with no known PMhx (?hypertension, details limited, no current medications) who presented to [facility] on 8/2/22 for a chief complaint of altered mentation, headache, dizziness and nausea; here she was found to have , SBP persistently in the 200s after arrival. Stat CT head on arrival revealed large LEFT cerebellar hemorrhage with associated mass effect/edema and effacement of 3rd/4th ventricles/basilar cisterns; CTA with no obvious vascular process nor aneurysm. Etiology most likely hypertensive. Today is PBD #5. Hypertonic gtt now stopped; remains on PO NaCl tabs; continue serial neuro checks; case management for dispo/physiatry consultation for rehab.    Recommendations/Plan:     -q4h and PRN neuro assessment. VS per nursing/unit protocol. Please call neurology urgently with changes in exam.   -OK to normalize BP for goal normotension, 100-130/60-80; Nicardipine gtt, PRN labetalol, hydralazine.  -Maintain  strict euthermia, euglycemia, euvolemia, eunatremia. Current Na is 140; ok to Continue salt tabs.   -PT/OT/SLP eval and treat.  Physiatry consult.   -Will defer all other medical management to IMCU team.    -DVT PPX: SCDs. Lovenox.     The plan of care above has been discussed with Dr. Chavez. Other than the above, no further recommendations from a neurological perspective; Please call with questions.     DARYL [email or web address].[email or web address].  [facility]

## 2022-08-07 NOTE — CARE PLAN
The patient is Watcher - Medium risk of patient condition declining or worsening    Shift Goals  Clinical Goals: SBP<160  Patient Goals: rest, go home  Family Goals: TOMMIE    Progress made toward(s) clinical / shift goals:  progressing      Problem: Incision Care  Goal: Optimal post surgical incision care  Outcome: Progressing     Problem: Craniotomy Surgery  Goal: Post-Operative Craniotomy Surgery: Patient will achieve optimal post-surgical outcomes  Outcome: Progressing     Problem: Fall Risk  Goal: Patient will remain free from falls  Outcome: Progressing     Problem: Skin Integrity  Goal: Skin integrity is maintained or improved  Outcome: Progressing     Problem: Knowledge Deficit - Standard  Goal: Patient and family/care givers will demonstrate understanding of plan of care, disease process/condition, diagnostic tests and medications  Outcome: Progressing     Problem: Pain - Standard  Goal: Alleviation of pain or a reduction in pain to the patient’s comfort goal  Outcome: Progressing

## 2022-08-07 NOTE — PROGRESS NOTES
Neurosurgery Progress Note    Subjective:  57 year old female presented with left cerebellar intraparenchymal hemorrhage after she was found down with a systolic BP >200.  POD#5 left posterior fossa craniectomy with hemorrhage evacuation.    No acute events overnight.   Pain well controlled.  Working with therapies who recommend rehab.      Exam:  A&O x3  PERRL. EOMI.   Motor 5/5 throughout all four extremities  Briskly following commands to all four extremities.   Sensation grossly intact to all four extremities.  No drift.  Incision c/d/i, no drainage     BP  Min: 142/76  Max: 172/93  Pulse  Av  Min: 85  Max: 85  Resp  Av  Min: 18  Max: 18  Temp  Av.6 °C (97.9 °F)  Min: 36.6 °C (97.9 °F)  Max: 36.6 °C (97.9 °F)  SpO2  Av %  Min: 92 %  Max: 94 %    No data recorded    Recent Labs     22  0000 22  0500 22  0415   WBC 16.3* 9.4 8.2   RBC 3.75* 3.79* 3.93*   HEMOGLOBIN 11.4* 11.6* 11.8*   HEMATOCRIT 35.6* 35.3* 35.3*   MCV 94.9 93.1 89.8   MCH 30.4 30.6 30.0   MCHC 32.0* 32.9* 33.4*   RDW 48.6 44.6 41.1   PLATELETCT 190 185 191   MPV 10.9 10.4 10.2     Recent Labs     22  0000 22  0420 22  0500 22  1538 22  0145 22  0415 22  0600   SODIUM 147*  147*   < > 148*   < > 141 142 140   POTASSIUM 3.2*  --  2.9*  --   --  3.0*  --    CHLORIDE 111  --  112  --   --  101  --    CO2 23  --  25  --   --  27  --    GLUCOSE 160*  --  141*  --   --  131*  --    BUN 10  --  6*  --   --  8  --    CREATININE 0.30*  --  0.25*  --   --  0.33*  --    CALCIUM 8.1*  --  7.9*  --   --  8.1*  --     < > = values in this interval not displayed.               Intake/Output                       22 - 22 - 22 Total  Total                 Intake    P.O.  160  480 640  240  -- 240    P.O. 160 480 640 240 -- 240    Total Intake 160 480 640 240 -- 240       Output    Urine  --  --  --  --  -- --    Number of Times Voided 6 x 3 x 9 x 2 x -- 2 x    Total Output -- -- -- -- -- --       Net I/O     160 480 640 240 -- 240            Intake/Output Summary (Last 24 hours) at 8/7/2022 1036  Last data filed at 8/7/2022 0928  Gross per 24 hour   Intake 720 ml   Output --   Net 720 ml            • [START ON 8/8/2022] hydroCHLOROthiazide  25 mg Q DAY   • magnesium sulfate  2 g Once   • lisinopril  40 mg Q DAY   • sodium chloride  1 g TID WITH MEALS   • senna-docusate  1 Tablet Q24HRS PRN   • senna-docusate  1 Tablet Nightly   • polyethylene glycol/lytes  1 Packet BID PRN   • ondansetron  4 mg Q4HRS PRN    Or   • ondansetron  4 mg Q4HRS PRN   • magnesium hydroxide  30 mL QDAY PRN   • methocarbamol  750 mg Q8HRS PRN   • acetaminophen  500 mg Q6HRS PRN   • cloNIDine  0.1 mg Q4HRS PRN   • docusate sodium  100 mg BID   • amLODIPine  10 mg Q DAY   • scopolamine  1 Patch Q72HRS   • dextrose  250 mL Q15 MIN PRN   • Respiratory Therapy Consult   Continuous RT   • midazolam  5 mg Q5 MIN PRN   • acetaminophen  650 mg Q4HRS PRN   • MD Alert...Adult ICU Electrolyte Replacement per Pharmacy   PHARMACY TO DOSE   • insulin regular  1-6 Units Q6HRS   • Pharmacy Consult Request  1 Each PHARMACY TO DOSE   • MD ALERT...DO NOT ADMINISTER NSAIDS or ASPIRIN unless ORDERED By Neurosurgery  1 Each PRN   • diphenhydrAMINE  25 mg Q6HRS PRN   • labetalol  10 mg Q HOUR PRN   • bisacodyl  10 mg Q24HRS PRN   • sodium phosphate  1 Each Once PRN   • enoxaparin (LOVENOX) injection  40 mg DAILY AT 1800   • hydrALAZINE  10 mg Q HOUR PRN   • benzocaine-menthol  1 Lozenge Q2HRS PRN       Assessment and Plan:  Hospital day #6  POD#5  She continues to improve   Keep head of bed >35 degrees   Ice incisions.  Okay for Q4 neuro checks   PT/OT/ambulate as tolerated.  PT/OT recommending post acute placement.   Continue neurology/medical care.  Ok to transfer to SNF vs rehab when deemed medically stable.     Chemical prophylactic DVT therapy: Yes -  Lovenox 40mg/qd    Start date/time: today

## 2022-08-08 LAB
ALBUMIN SERPL BCP-MCNC: 3.9 G/DL (ref 3.2–4.9)
ALBUMIN/GLOB SERPL: 1.4 G/DL
ALP SERPL-CCNC: 85 U/L (ref 30–99)
ALT SERPL-CCNC: 20 U/L (ref 2–50)
ANION GAP SERPL CALC-SCNC: 11 MMOL/L (ref 7–16)
AST SERPL-CCNC: 23 U/L (ref 12–45)
BILIRUB SERPL-MCNC: 0.8 MG/DL (ref 0.1–1.5)
BUN SERPL-MCNC: 8 MG/DL (ref 8–22)
CALCIUM SERPL-MCNC: 8.4 MG/DL (ref 8.5–10.5)
CHLORIDE SERPL-SCNC: 97 MMOL/L (ref 96–112)
CO2 SERPL-SCNC: 29 MMOL/L (ref 20–33)
CREAT SERPL-MCNC: 0.36 MG/DL (ref 0.5–1.4)
ERYTHROCYTE [DISTWIDTH] IN BLOOD BY AUTOMATED COUNT: 41 FL (ref 35.9–50)
GFR SERPLBLD CREATININE-BSD FMLA CKD-EPI: 118 ML/MIN/1.73 M 2
GLOBULIN SER CALC-MCNC: 2.7 G/DL (ref 1.9–3.5)
GLUCOSE BLD STRIP.AUTO-MCNC: 142 MG/DL (ref 65–99)
GLUCOSE BLD STRIP.AUTO-MCNC: 155 MG/DL (ref 65–99)
GLUCOSE BLD STRIP.AUTO-MCNC: 170 MG/DL (ref 65–99)
GLUCOSE BLD STRIP.AUTO-MCNC: 171 MG/DL (ref 65–99)
GLUCOSE BLD STRIP.AUTO-MCNC: 173 MG/DL (ref 65–99)
GLUCOSE SERPL-MCNC: 228 MG/DL (ref 65–99)
HCT VFR BLD AUTO: 38.7 % (ref 37–47)
HGB BLD-MCNC: 12.9 G/DL (ref 12–16)
MAGNESIUM SERPL-MCNC: 2.2 MG/DL (ref 1.5–2.5)
MCH RBC QN AUTO: 30.2 PG (ref 27–33)
MCHC RBC AUTO-ENTMCNC: 33.3 G/DL (ref 33.6–35)
MCV RBC AUTO: 90.6 FL (ref 81.4–97.8)
PHOSPHATE SERPL-MCNC: 3.3 MG/DL (ref 2.5–4.5)
PLATELET # BLD AUTO: 227 K/UL (ref 164–446)
PMV BLD AUTO: 10.1 FL (ref 9–12.9)
POTASSIUM SERPL-SCNC: 3.2 MMOL/L (ref 3.6–5.5)
PROT SERPL-MCNC: 6.6 G/DL (ref 6–8.2)
RBC # BLD AUTO: 4.27 M/UL (ref 4.2–5.4)
SODIUM SERPL-SCNC: 137 MMOL/L (ref 135–145)
WBC # BLD AUTO: 7.8 K/UL (ref 4.8–10.8)

## 2022-08-08 PROCEDURE — 97535 SELF CARE MNGMENT TRAINING: CPT

## 2022-08-08 PROCEDURE — 700102 HCHG RX REV CODE 250 W/ 637 OVERRIDE(OP): Performed by: INTERNAL MEDICINE

## 2022-08-08 PROCEDURE — 84100 ASSAY OF PHOSPHORUS: CPT

## 2022-08-08 PROCEDURE — 770001 HCHG ROOM/CARE - MED/SURG/GYN PRIV*

## 2022-08-08 PROCEDURE — A9270 NON-COVERED ITEM OR SERVICE: HCPCS | Performed by: STUDENT IN AN ORGANIZED HEALTH CARE EDUCATION/TRAINING PROGRAM

## 2022-08-08 PROCEDURE — 99232 SBSQ HOSP IP/OBS MODERATE 35: CPT | Performed by: STUDENT IN AN ORGANIZED HEALTH CARE EDUCATION/TRAINING PROGRAM

## 2022-08-08 PROCEDURE — 92526 ORAL FUNCTION THERAPY: CPT

## 2022-08-08 PROCEDURE — 82962 GLUCOSE BLOOD TEST: CPT

## 2022-08-08 PROCEDURE — 700111 HCHG RX REV CODE 636 W/ 250 OVERRIDE (IP)

## 2022-08-08 PROCEDURE — 97530 THERAPEUTIC ACTIVITIES: CPT

## 2022-08-08 PROCEDURE — 83735 ASSAY OF MAGNESIUM: CPT

## 2022-08-08 PROCEDURE — 97116 GAIT TRAINING THERAPY: CPT

## 2022-08-08 PROCEDURE — 85027 COMPLETE CBC AUTOMATED: CPT

## 2022-08-08 PROCEDURE — A9270 NON-COVERED ITEM OR SERVICE: HCPCS | Performed by: INTERNAL MEDICINE

## 2022-08-08 PROCEDURE — 700102 HCHG RX REV CODE 250 W/ 637 OVERRIDE(OP): Performed by: STUDENT IN AN ORGANIZED HEALTH CARE EDUCATION/TRAINING PROGRAM

## 2022-08-08 PROCEDURE — 80053 COMPREHEN METABOLIC PANEL: CPT

## 2022-08-08 RX ORDER — POTASSIUM CHLORIDE 20 MEQ/1
40 TABLET, EXTENDED RELEASE ORAL EVERY 6 HOURS
Status: COMPLETED | OUTPATIENT
Start: 2022-08-08 | End: 2022-08-08

## 2022-08-08 RX ADMIN — ACETAMINOPHEN 500 MG: 500 TABLET ORAL at 17:24

## 2022-08-08 RX ADMIN — HYDROCHLOROTHIAZIDE 25 MG: 25 TABLET ORAL at 05:37

## 2022-08-08 RX ADMIN — AMLODIPINE BESYLATE 10 MG: 10 TABLET ORAL at 05:37

## 2022-08-08 RX ADMIN — SODIUM CHLORIDE 1 G: 1 TABLET ORAL at 11:53

## 2022-08-08 RX ADMIN — INSULIN HUMAN 1 UNITS: 100 INJECTION, SOLUTION PARENTERAL at 23:16

## 2022-08-08 RX ADMIN — ENOXAPARIN SODIUM 40 MG: 40 INJECTION SUBCUTANEOUS at 17:23

## 2022-08-08 RX ADMIN — SODIUM CHLORIDE 1 G: 1 TABLET ORAL at 17:23

## 2022-08-08 RX ADMIN — SODIUM CHLORIDE 1 G: 1 TABLET ORAL at 08:41

## 2022-08-08 RX ADMIN — INSULIN HUMAN 1 UNITS: 100 INJECTION, SOLUTION PARENTERAL at 11:53

## 2022-08-08 RX ADMIN — POTASSIUM CHLORIDE 40 MEQ: 1500 TABLET, EXTENDED RELEASE ORAL at 10:23

## 2022-08-08 RX ADMIN — POTASSIUM CHLORIDE 40 MEQ: 1500 TABLET, EXTENDED RELEASE ORAL at 15:28

## 2022-08-08 RX ADMIN — LISINOPRIL 40 MG: 20 TABLET ORAL at 05:37

## 2022-08-08 RX ADMIN — INSULIN HUMAN 1 UNITS: 100 INJECTION, SOLUTION PARENTERAL at 17:26

## 2022-08-08 ASSESSMENT — ENCOUNTER SYMPTOMS
PALPITATIONS: 0
FOCAL WEAKNESS: 0
HEADACHES: 0
FEVER: 0
BLURRED VISION: 0
NAUSEA: 0
COUGH: 0
ABDOMINAL PAIN: 0
EYE PAIN: 0
DIZZINESS: 0
CHILLS: 0
FALLS: 1
BACK PAIN: 0
INSOMNIA: 0
SHORTNESS OF BREATH: 0
WEAKNESS: 1
VOMITING: 0
SENSORY CHANGE: 0

## 2022-08-08 ASSESSMENT — COGNITIVE AND FUNCTIONAL STATUS - GENERAL
MOBILITY SCORE: 18
DRESSING REGULAR UPPER BODY CLOTHING: A LITTLE
MOVING FROM LYING ON BACK TO SITTING ON SIDE OF FLAT BED: A LITTLE
CLIMB 3 TO 5 STEPS WITH RAILING: A LITTLE
SUGGESTED CMS G CODE MODIFIER DAILY ACTIVITY: CJ
WALKING IN HOSPITAL ROOM: A LITTLE
TOILETING: A LITTLE
HELP NEEDED FOR BATHING: A LITTLE
STANDING UP FROM CHAIR USING ARMS: A LITTLE
MOVING TO AND FROM BED TO CHAIR: A LITTLE
SUGGESTED CMS G CODE MODIFIER MOBILITY: CK
TURNING FROM BACK TO SIDE WHILE IN FLAT BAD: A LITTLE
DRESSING REGULAR LOWER BODY CLOTHING: A LITTLE
DAILY ACTIVITIY SCORE: 20

## 2022-08-08 ASSESSMENT — GAIT ASSESSMENTS
GAIT LEVEL OF ASSIST: MINIMAL ASSIST
ASSISTIVE DEVICE: FRONT WHEEL WALKER
DISTANCE (FEET): 200

## 2022-08-08 ASSESSMENT — LIFESTYLE VARIABLES: SUBSTANCE_ABUSE: 0

## 2022-08-08 NOTE — THERAPY
Speech Language Pathology  Daily Treatment     Patient Name: Lana Phillips  Age:  57 y.o., Sex:  female  Medical Record #: [id number]  Today's Date: 8/8/2022     Precautions: Fall Risk, Swallow Precautions ( See Comments)  Comments: pt sleepy when not stimulated.    Assessment  Patient was seen on this date for dysphagia treatment. Pt with improved TIMOTHY and AAOx4. Daughter at bedside for session. Pt consuming items from MM5 meal tray but some of the solids on tray were consistent with soft and bite size. Additional PO trials included thin liquids, mixed consistencies, and regular solids. Patient presented with no overt s/sx of aspiration across entire session. Voice remained clear. Mastication was functional and pt required min verbal cues to utilize liquid wash to clear possible pharyngeal residue as seen during FEES on 8/4. Patient required moderate fade to min verbal cues to reduce feeding rate. Education provided to pt regarding current status and SLP recs.    Recommendations  1. Upgrade to regular and thin liquid diet   -slow feeding rate   -Alternate solids and liquids  2. Medications whole with liquid wash   3. SLP following to complete orders for cognitive-linguistic evaluation    Plan  Continue current treatment plan.    Discharge Recommendations: Anticipate that the patient will have no further speech therapy needs after discharge from the hospital       08/08/22 1354   Vitals   O2 Delivery Device Room air w/o2 available   Short Term Goals   Short Term Goal # 1 B  Patient will consume a MM5/MT2 diet with no overt s/sx of aspiration given min cues to swallow precautions.   Short Term Goal # 2 Patient will consume sips of thin liquids with no overt s/sx of aspiration.   Goal Outcome # 2  Goal met, new goal aded   Short Term Goal # 2 B  Patient will consume a RG7/TN0 diet with no overt s/sx of aspiration.

## 2022-08-08 NOTE — PROGRESS NOTES
Hospital Medicine Daily Progress Note    Date of Service  8/8/2022    Chief Complaint  Lana Phillips is a 57 y.o. female admitted 8/2/2022 with cerebral hemorrhage.    Hospital Course  57 y.o. female with no known past medical history was admitted to the ICU on 8/2/2022 for large right cerebral hemorrhage with mass-effect which required emergent decompressive craniotomy on 8/2/2022 for which patient is currently status post occipital craniectomy with evacuation of the left cerebellar hematoma.  Neurology and neurosurgery following.  Postsurgically, she was continued on hypertonic therapy and ultimately extubated on 8/3/2022. She did well post operatively. Sodium levels kept elevated per neurology recommendations with 3% saline, now off hypertonic saline with goal eunatremia. Na levels normalized. Patient a good rehab candidate, PM&R consulted. Patient is medically cleared to discharge to rehab vs SNF.    Interval Problem Update  No acute events overnight.  Na levels normal.  BP goal normotension. Consider increasing HTN medications tomorrow if remains elevated.  Continue lisinopril, amlodipine, HCTZ.  Okay for neuro floor.  Patient is medically cleared.  Pending rehab vs SNF.      I have discussed this patient's plan of care and discharge plan at IDT rounds today with Case Management, Nursing, Nursing leadership, and other members of the IDT team.    Consultants/Specialty  neurology and neurosurgery    Code Status  Full Code    Disposition  Patient is medically cleared for discharge.   Anticipate discharge to to skilled nursing facility vs rehab.  I have placed the appropriate orders for post-discharge needs.    Review of Systems  Review of Systems   Constitutional: Negative for chills and fever.   Eyes: Negative for blurred vision and pain.   Respiratory: Negative for cough and shortness of breath.    Cardiovascular: Negative for chest pain, palpitations and leg swelling.   Gastrointestinal: Negative for abdominal pain,  nausea and vomiting.   Genitourinary: Negative for dysuria and urgency.   Musculoskeletal: Positive for falls. Negative for back pain.   Skin: Negative for itching and rash.   Neurological: Positive for weakness. Negative for dizziness, sensory change, focal weakness and headaches.   Psychiatric/Behavioral: Negative for substance abuse. The patient does not have insomnia.         Physical Exam  Temp:  [36.5 °C (97.7 °F)-36.8 °C (98.2 °F)] 36.8 °C (98.2 °F)  Pulse:  [79-82] 82  Resp:  [20-21] 21  BP: (143-159)/(69-78) 144/69  SpO2:  [93 %-94 %] 93 %    Physical Exam  Vitals reviewed.   Constitutional:       General: She is not in acute distress.     Appearance: She is not diaphoretic.   HENT:      Head: Normocephalic and atraumatic.      Right Ear: External ear normal.      Left Ear: External ear normal.      Nose: Nose normal. No congestion.      Mouth/Throat:      Pharynx: No oropharyngeal exudate or posterior oropharyngeal erythema.   Eyes:      Extraocular Movements: Extraocular movements intact.      Pupils: Pupils are equal, round, and reactive to light.   Cardiovascular:      Rate and Rhythm: Normal rate and regular rhythm.   Pulmonary:      Effort: Pulmonary effort is normal. No respiratory distress.      Breath sounds: Normal breath sounds. No wheezing.   Abdominal:      General: Bowel sounds are normal. There is no distension.      Palpations: Abdomen is soft.      Tenderness: There is no abdominal tenderness.   Musculoskeletal:         General: No swelling. Normal range of motion.      Cervical back: Normal range of motion and neck supple.   Skin:     General: Skin is warm and dry.   Neurological:      General: No focal deficit present.      Mental Status: She is alert and oriented to person, place, and time.      Cranial Nerves: No cranial nerve deficit.      Sensory: No sensory deficit.      Motor: No weakness.   Psychiatric:         Mood and Affect: Mood normal.         Behavior: Behavior normal.          Fluids    Intake/Output Summary (Last 24 hours) at 8/8/2022 1221  Last data filed at 8/8/2022 0900  Gross per 24 hour   Intake 1440 ml   Output --   Net 1440 ml       Laboratory  Recent Labs     08/06/22  0500 08/07/22  0415 08/08/22  0415   WBC 9.4 8.2 7.8   RBC 3.79* 3.93* 4.27   HEMOGLOBIN 11.6* 11.8* 12.9   HEMATOCRIT 35.3* 35.3* 38.7   MCV 93.1 89.8 90.6   MCH 30.6 30.0 30.2   MCHC 32.9* 33.4* 33.3*   RDW 44.6 41.1 41.0   PLATELETCT 185 191 227   MPV 10.4 10.2 10.1     Recent Labs     08/06/22  0500 08/06/22  1538 08/07/22  0415 08/07/22  0600 08/08/22  0415   SODIUM 148*   < > 142 140 137   POTASSIUM 2.9*  --  3.0*  --  3.2*   CHLORIDE 112  --  101  --  97   CO2 25  --  27  --  29   GLUCOSE 141*  --  131*  --  228*   BUN 6*  --  8  --  8   CREATININE 0.25*  --  0.33*  --  0.36*   CALCIUM 7.9*  --  8.1*  --  8.4*    < > = values in this interval not displayed.                   Imaging  EC-ECHOCARDIOGRAM COMPLETE W/O CONT   Final Result      DX-CHEST-PORTABLE (1 VIEW)   Final Result      1.  Persistent hypoinflation with mild worsening bibasilar atelectasis.   2.  Supportive tubing as described above.      IR-PICC LINE PLACEMENT W/ GUIDANCE > AGE 5   Final Result                  Ultrasound-guided PICC placement performed by qualified nursing staff as    above.          DX-CHEST-PORTABLE (1 VIEW)   Final Result      Bibasilar underinflation atelectasis which could obscure an additional process.      CT-HEAD W/O   Final Result      1.  Postsurgical changes status post occipital craniectomy with evacuation of the left cerebellar hematoma. There is a small amount of residual blood and postsurgical pneumocephalus.   2.  There is improved mass effect on the fourth ventricle.   3.  Lateral ventricles are stable in size.      CT-CTA HEAD WITH & W/O-POST PROCESS   Final Result         1. No hemodynamically significant narrowing of the major intracranial vessels.      2. Redemonstration of large parenchymal  hemorrhage in the left cerebellum      CT-CTA NECK WITH & W/O-POST PROCESSING   Final Result      1. No evidence of flow-limiting stenosis or dissection in the cervical carotid or cervical vertebral arteries.      CT-HEAD W/O   Final Result      Large 4.6 x 3.4 cm intraparenchymal hematoma centered in the left cerebellar hemisphere. Associated mass effect results in effacement of the basal cisterns and fourth ventricle. No evidence of hydrocephalus.      Findings were discussed with Dr. JANET ARELLANO on 8/2/2022 2:45 PM.           Assessment/Plan  * Cerebellar hemorrhage, acute (HCC)- (present on admission)  Assessment & Plan  Neurosurgery consult: Dr Vences s/p 8/2 posterior fossa craniectomy for evacuation of intraparenchymal hemorrhage with great operative result seen on postoperative CT  SBP goal < 160  Neuro checks q4h  Stop 3% saline. Okay to continue salt tabs. Goal eunatremia.  Neurosurgery and neurology following  PT/OT recommend SNF, PMR consulted    Hypertension- (present on admission)  Assessment & Plan  Was severely hypertensive to 260 on presentation  Strict SBP < 160  Off nicardipine drip  Continue lisinopril 40 mg daily, continue amlodipine 10 mg daily  increase HCTZ to 25 mg daily  Prn's for SBP>160    Hyperglycemia- (present on admission)  Assessment & Plan  A1c  Sliding scale for now    Hypokalemia- (present on admission)  Assessment & Plan  Replete as necessary       VTE prophylaxis: enoxaparin ppx    I have performed a physical exam and reviewed and updated ROS and Plan today (8/8/2022). In review of yesterday's note (8/7/2022), there are no changes except as documented above.

## 2022-08-08 NOTE — CARE PLAN
Problem: Pain - Standard  Goal: Alleviation of pain or a reduction in pain to the patient’s comfort goal  Outcome: Progressing     Problem: Knowledge Deficit - Standard  Goal: Patient and family/care givers will demonstrate understanding of plan of care, disease process/condition, diagnostic tests and medications  Outcome: Progressing     Problem: Skin Integrity  Goal: Skin integrity is maintained or improved  Outcome: Progressing     Problem: Fall Risk  Goal: Patient will remain free from falls  Outcome: Progressing   The patient is Stable - Low risk of patient condition declining or worsening    Shift Goals  Clinical Goals: SBP<160, VSS, BS WNL  Patient Goals: get better, go home  Family Goals: TOMMIE

## 2022-08-08 NOTE — CARE PLAN
The patient is Watcher - Medium risk of patient condition declining or worsening    Shift Goals  Clinical Goals: SBP<160, VSS, BS WNL  Patient Goals: get better, go home  Family Goals: TOMMIE    Progress made toward(s) clinical / shift goals:  progressing    Problem: Neuro Status  Goal: Neuro status will remain stable or improve  Outcome: Progressing     Problem: Craniotomy Surgery  Goal: Post-Operative Craniotomy Surgery: Patient will achieve optimal post-surgical outcomes  Outcome: Progressing     Problem: Fall Risk  Goal: Patient will remain free from falls  Outcome: Progressing     Problem: Skin Integrity  Goal: Skin integrity is maintained or improved  Outcome: Progressing     Problem: Knowledge Deficit - Standard  Goal: Patient and family/care givers will demonstrate understanding of plan of care, disease process/condition, diagnostic tests and medications  Outcome: Progressing     Problem: Pain - Standard  Goal: Alleviation of pain or a reduction in pain to the patient’s comfort goal  Outcome: Progressing

## 2022-08-08 NOTE — THERAPY
Physical Therapy   Daily Treatment     Patient Name: Lana Phillips  Age:  57 y.o., Sex:  female  Medical Record #: [id number]  Today's Date: 8/8/2022     Precautions  Precautions: Fall Risk;Swallow Precautions ( See Comments)    Assessment    Pt is eager to get up to walk, asking for pants, then changes mind, wants underwear, able to don this without need for assist. During ambulation, pt shows LOB to left x 5 during 200 foot walk using FWW with min A needed to recover. Pt is at risk to fall, will need to use FWW and will benefit from increased ambulation frequency with nsg.     Plan    Continue current treatment plan.    DC Equipment Recommendations: Unable to determine at this time  Discharge Recommendations: Recommend post-acute placement for additional physical therapy services prior to discharge home        Objective       08/08/22 6747   Charge Group   Charges  Yes   PT Gait Training 1   PT Therapeutic Activities 1   Total Time Spent   PT Total Time Yes   PT Gait Training Time Spent (Mins) 15   PT Therapeutic Activities Time Spent (Mins) 8   PT Total Time Spent (Calculated) 23   Precautions   Precautions Fall Risk;Swallow Precautions ( See Comments)   Pain 0 - 10 Group   Therapist Pain Assessment 0;During Activity;Nurse Notified   Cognition    Cognition / Consciousness X   Level of Consciousness Alert   Safety Awareness Impaired   Attention Impaired   Active ROM Lower Body    Active ROM Lower Body  WDL   Strength Lower Body   Lower Body Strength  WDL   Comments functional for ambulation, but fatigued by end, needed to sit.   Balance   Sitting Balance (Static) Fair +   Sitting Balance (Dynamic) Fair +   Standing Balance (Static) Fair   Standing Balance (Dynamic) Poor +   Weight Shift Sitting Fair   Weight Shift Standing Fair   Skilled Intervention Verbal Cuing;Sequencing   Comments with FWW   Gait Analysis   Gait Level Of Assist Minimal Assist   Assistive Device Front Wheel Walker   Distance (Feet) 200   # of Times  Distance was Traveled 1   Deviation   (LOB to left, 5x with min A to recover)   # of Stairs Climbed 0   Weight Bearing Status no restrictions   Skilled Intervention Verbal Cuing;Sequencing   Bed Mobility    Supine to Sit Standby Assist   Sit to Supine Standby Assist   Scooting Standby Assist   Rolling Standby Assist   Skilled Intervention Verbal Cuing   Comments pt sat EOB, asked for pants then changed mind and asked for underwear, donned this in stting without need for assist, straightened her socks without assist.   Functional Mobility   Sit to Stand Contact Guard Assist   Bed, Chair, Wheelchair Transfer Contact Guard Assist   Skilled Intervention Verbal Cuing;Tactile Cuing   How much difficulty does the patient currently have...   Turning over in bed (including adjusting bedclothes, sheets and blankets)? 3   Sitting down on and standing up from a chair with arms (e.g., wheelchair, bedside commode, etc.) 3   Moving from lying on back to sitting on the side of the bed? 3   How much help from another person does the patient currently need...   Moving to and from a bed to a chair (including a wheelchair)? 3   Need to walk in a hospital room? 3   Climbing 3-5 steps with a railing? 3   6 clicks Mobility Score 18   Activity Tolerance   Standing 8 min   Short Term Goals    Short Term Goal # 1 pt will perform supine <> sit with HOB flat, no railing and SPV in 6 visits   Goal Outcome # 1 goal not met   Short Term Goal # 2 pt will perform sit <> stand and transfer between various surfaces with LRAD and SPV to improve mobility independence in 6 visit   Goal Outcome # 2 Goal not met   Short Term Goal # 3 pt will ambulate > 200 ft with LRAD and SPV to access community in 6 visits   Goal Outcome # 3 Goal not met   Education Group   Role of Physical Therapist Patient Response Patient;Acceptance;Explanation;Verbal Demonstration   Anticipated Discharge Equipment and Recommendations   DC Equipment Recommendations Unable to  determine at this time   Discharge Recommendations Recommend post-acute placement for additional physical therapy services prior to discharge home   Interdisciplinary Plan of Care Collaboration   IDT Collaboration with  Nursing   Patient Position at End of Therapy In Bed;Call Light within Reach;Tray Table within Reach;Phone within Reach   Collaboration Comments nsg updated   Session Information   Date / Session Number  8/8-2 (2/5, 8/10)

## 2022-08-08 NOTE — PROGRESS NOTES
Neurosurgery Progress Note    Subjective:  57 year old female presented with left cerebellar intraparenchymal hemorrhage after she was found down with a systolic BP >200.  POD#6 left posterior fossa craniectomy with hemorrhage evacuation.    No acute events overnight. Denies headache, dizziness, visual changes.   Working with therapies who recommend rehab.    Exam:  A&O x3  PERRL. EOMI.   Motor 5/5 throughout all four extremities  Sensation grossly intact to all four extremities.  No drift.  Follows commands x4  Incision c/d/i, no drainage     BP  Min: 143/71  Max: 159/78  Pulse  Av.5  Min: 79  Max: 82  Resp  Av.5  Min: 20  Max: 21  Temp  Av.6 °C (97.9 °F)  Min: 36.5 °C (97.7 °F)  Max: 36.8 °C (98.2 °F)  SpO2  Av.3 %  Min: 93 %  Max: 94 %    No data recorded    Recent Labs     22  0500 22  0415 22  0415   WBC 9.4 8.2 7.8   RBC 3.79* 3.93* 4.27   HEMOGLOBIN 11.6* 11.8* 12.9   HEMATOCRIT 35.3* 35.3* 38.7   MCV 93.1 89.8 90.6   MCH 30.6 30.0 30.2   MCHC 32.9* 33.4* 33.3*   RDW 44.6 41.1 41.0   PLATELETCT 185 191 227   MPV 10.4 10.2 10.1     Recent Labs     22  0500 22  1538 22  0415 22  0600 22  0415   SODIUM 148*   < > 142 140 137   POTASSIUM 2.9*  --  3.0*  --  3.2*   CHLORIDE 112  --  101  --  97   CO2 25  --  27  --  29   GLUCOSE 141*  --  131*  --  228*   BUN 6*  --  8  --  8   CREATININE 0.25*  --  0.33*  --  0.36*   CALCIUM 7.9*  --  8.1*  --  8.4*    < > = values in this interval not displayed.               Intake/Output                       22 07 - 22 0659 08700 - 22 0659      Total  Total                 Intake    P.O.  1080  240 1320  360  -- 360    P.O. 1993 768 2221 360 -- 360    I.V.  50  -- 50  --  -- --    Magnesium Sulfate Volume 50 -- 50 -- -- --    Total Intake 0651 181 1913 360 -- 360       Output    Urine  --  -- --  --  -- --    Number of Times Voided 5 x 1 x 6 x  1 x -- 1 x    Total Output -- -- -- -- -- --       Net I/O     8243 633 7394 360 -- 360            Intake/Output Summary (Last 24 hours) at 8/8/2022 1012  Last data filed at 8/8/2022 0900  Gross per 24 hour   Intake 1490 ml   Output --   Net 1490 ml            • potassium chloride SA  40 mEq Q6HR   • hydroCHLOROthiazide  25 mg Q DAY   • lisinopril  40 mg Q DAY   • sodium chloride  1 g TID WITH MEALS   • senna-docusate  1 Tablet Q24HRS PRN   • senna-docusate  1 Tablet Nightly   • polyethylene glycol/lytes  1 Packet BID PRN   • ondansetron  4 mg Q4HRS PRN    Or   • ondansetron  4 mg Q4HRS PRN   • magnesium hydroxide  30 mL QDAY PRN   • methocarbamol  750 mg Q8HRS PRN   • acetaminophen  500 mg Q6HRS PRN   • cloNIDine  0.1 mg Q4HRS PRN   • docusate sodium  100 mg BID   • amLODIPine  10 mg Q DAY   • scopolamine  1 Patch Q72HRS   • dextrose  250 mL Q15 MIN PRN   • Respiratory Therapy Consult   Continuous RT   • midazolam  5 mg Q5 MIN PRN   • acetaminophen  650 mg Q4HRS PRN   • MD Alert...Adult ICU Electrolyte Replacement per Pharmacy   PHARMACY TO DOSE   • insulin regular  1-6 Units Q6HRS   • Pharmacy Consult Request  1 Each PHARMACY TO DOSE   • MD ALERT...DO NOT ADMINISTER NSAIDS or ASPIRIN unless ORDERED By Neurosurgery  1 Each PRN   • diphenhydrAMINE  25 mg Q6HRS PRN   • labetalol  10 mg Q HOUR PRN   • bisacodyl  10 mg Q24HRS PRN   • sodium phosphate  1 Each Once PRN   • enoxaparin (LOVENOX) injection  40 mg DAILY AT 1800   • hydrALAZINE  10 mg Q HOUR PRN   • benzocaine-menthol  1 Lozenge Q2HRS PRN       Assessment and Plan:  Hospital day #7  POD#6  Okay for Q4 neuro checks   PT/OT/ambulate as tolerated.  PT/OT recommending post acute placement.   Continue neurology/medical care.  Ok to transfer to SNF vs rehab when deemed medically stable.   She is stable from NS standpoint. She will follow up with Dr. Vences/Caro in about 2 wks time. She will need nylon suture removed POD14.   Call with any questions  [phone].      Chemical prophylactic DVT therapy: Yes - Lovenox 40mg/qd    Start date/time: today

## 2022-08-08 NOTE — THERAPY
"Occupational Therapy  Daily Treatment     Patient Name: Lana Phillips  Age:  57 y.o., Sex:  female  Medical Record #: [id number]  Today's Date: 8/8/2022     Precautions: Fall Risk, Swallow Precautions ( See Comments)  Comments: pt sleepy when not stimulated.    Assessment    Pt was seen for OT tx, appears to be steadily improving in participation of ADL's. Remains w/higher level balance deficits decreased safety awareness/attention and impulsivity. Pt was working FT and independent prior to this admission, pt would benefit from daily intensive therapy to maximize recovery and return to [facility].    Plan  Continue current treatment plan.    DC Equipment Recommendations: Unable to determine at this time  Discharge Recommendations: Recommend post-acute placement for additional occupational therapy services prior to discharge home; pt does have to potential to continue to progress in this setting and does have family support upon dc.     Subjective  \"I want to go outside\"      Objective     08/08/22 0950   Treatment Charges   Charges Yes   OT Self Care / ADL 1   Total Time Spent   OT Time Spent Yes   OT Self Care / ADL (Minutes) 19   OT Total Time Spent (Calculated) 19   Precautions   Precautions Fall Risk;Swallow Precautions ( See Comments)   Pain 0 - 10 Group   Location Head   Therapist Pain Assessment During Activity;Nurse Notified;2   Cognition    Cognition / Consciousness X   Level of Consciousness Alert   Ability To Follow Commands 2 Step   Safety Awareness Impaired   Attention Impaired   Comments can be impuslive and quick moving decreased attention and appears to fatigue   Passive ROM Upper Body   Passive ROM Upper Body WDL   Active ROM Upper Body   Active ROM Upper Body  WDL   Strength Upper Body   Upper Body Strength  WDL   Sensation Upper Body   Upper Extremity Sensation  Not Tested   Upper Body Muscle Tone   Upper Body Muscle Tone  WDL   Other Treatments   Other Treatments Provided Focused on standing ADL' s   Balance "   Sitting Balance (Static) Fair +   Sitting Balance (Dynamic) Fair +   Standing Balance (Static) Fair   Standing Balance (Dynamic) Fair -   Weight Shift Sitting Fair   Weight Shift Standing Fair   Skilled Intervention Verbal Cuing;Tactile Cuing;Facilitation   Comments w/fww   Bed Mobility    Supine to Sit Supervised   Sit to Supine Standby Assist   Skilled Intervention Verbal Cuing   Activities of Daily Living   Grooming Standing;Contact Guard Assist   Upper Body Dressing Minimal Assist   Lower Body Dressing Minimal Assist   Toileting Contact Guard Assist   Skilled Intervention Verbal Cuing;Tactile Cuing;Facilitation   Comments improving can be impuslive and quick moving and inattentive to environment during standing tasks   How much help from another person does the patient currently need...   6 Clicks Daily Activity Score 20   Modified Indira (mRS)   Modified Faulkner Score 2   Functional Mobility   Sit to Stand Contact Guard Assist   Bed, Chair, Wheelchair Transfer Contact Guard Assist   Toilet Transfers Contact Guard Assist   Mobility walking in room w/fww   Skilled Intervention Verbal Cuing;Tactile Cuing;Facilitation   Visual Perception   Visual Perception  Not Tested   Activity Tolerance   Comments c/o fatigue   Patient / Family Goals   Patient / Family Goal #1 to go home   Goal #1 Outcome Goal not met   Short Term Goals   Short Term Goal # 1 Pt will perform toileting w/ supv   Goal Outcome # 1 Progressing as expected   Short Term Goal # 2 Pt will perform standing grooming w/ supv   Goal Outcome # 2 Progressing as expected   Short Term Goal # 3 Pt will perform ADL txf w/ supv   Goal Outcome # 3 Progressing as expected   Education Group   Role of Occupational Therapist Patient Response Patient;Acceptance;Explanation;Verbal Demonstration   ADL Patient Response Patient;Acceptance;Explanation;Demonstration;Verbal Demonstration;Action Demonstration

## 2022-08-09 PROBLEM — R73.9 HYPERGLYCEMIA: Status: RESOLVED | Noted: 2019-02-08 | Resolved: 2022-08-09

## 2022-08-09 PROBLEM — E11.65 TYPE 2 DIABETES MELLITUS WITH HYPERGLYCEMIA, WITHOUT LONG-TERM CURRENT USE OF INSULIN (HCC): Status: ACTIVE | Noted: 2022-08-09

## 2022-08-09 LAB
ALBUMIN SERPL BCP-MCNC: 3.7 G/DL (ref 3.2–4.9)
ALBUMIN/GLOB SERPL: 1.2 G/DL
ALP SERPL-CCNC: 89 U/L (ref 30–99)
ALT SERPL-CCNC: 42 U/L (ref 2–50)
ANION GAP SERPL CALC-SCNC: 9 MMOL/L (ref 7–16)
AST SERPL-CCNC: 59 U/L (ref 12–45)
BILIRUB SERPL-MCNC: 0.4 MG/DL (ref 0.1–1.5)
BUN SERPL-MCNC: 12 MG/DL (ref 8–22)
CALCIUM SERPL-MCNC: 8.4 MG/DL (ref 8.5–10.5)
CHLORIDE SERPL-SCNC: 101 MMOL/L (ref 96–112)
CO2 SERPL-SCNC: 28 MMOL/L (ref 20–33)
CREAT SERPL-MCNC: 0.42 MG/DL (ref 0.5–1.4)
ERYTHROCYTE [DISTWIDTH] IN BLOOD BY AUTOMATED COUNT: 41.1 FL (ref 35.9–50)
GFR SERPLBLD CREATININE-BSD FMLA CKD-EPI: 114 ML/MIN/1.73 M 2
GLOBULIN SER CALC-MCNC: 3 G/DL (ref 1.9–3.5)
GLUCOSE BLD STRIP.AUTO-MCNC: 151 MG/DL (ref 65–99)
GLUCOSE BLD STRIP.AUTO-MCNC: 176 MG/DL (ref 65–99)
GLUCOSE BLD STRIP.AUTO-MCNC: 191 MG/DL (ref 65–99)
GLUCOSE SERPL-MCNC: 135 MG/DL (ref 65–99)
HCT VFR BLD AUTO: 40.2 % (ref 37–47)
HGB BLD-MCNC: 13.1 G/DL (ref 12–16)
MAGNESIUM SERPL-MCNC: 2.2 MG/DL (ref 1.5–2.5)
MCH RBC QN AUTO: 29.9 PG (ref 27–33)
MCHC RBC AUTO-ENTMCNC: 32.6 G/DL (ref 33.6–35)
MCV RBC AUTO: 91.8 FL (ref 81.4–97.8)
PHOSPHATE SERPL-MCNC: 3 MG/DL (ref 2.5–4.5)
PLATELET # BLD AUTO: 253 K/UL (ref 164–446)
PMV BLD AUTO: 10.3 FL (ref 9–12.9)
POTASSIUM SERPL-SCNC: 3.8 MMOL/L (ref 3.6–5.5)
PROT SERPL-MCNC: 6.7 G/DL (ref 6–8.2)
RBC # BLD AUTO: 4.38 M/UL (ref 4.2–5.4)
SODIUM SERPL-SCNC: 138 MMOL/L (ref 135–145)
WBC # BLD AUTO: 9.8 K/UL (ref 4.8–10.8)

## 2022-08-09 PROCEDURE — 80053 COMPREHEN METABOLIC PANEL: CPT

## 2022-08-09 PROCEDURE — 82962 GLUCOSE BLOOD TEST: CPT

## 2022-08-09 PROCEDURE — 700111 HCHG RX REV CODE 636 W/ 250 OVERRIDE (IP)

## 2022-08-09 PROCEDURE — 700102 HCHG RX REV CODE 250 W/ 637 OVERRIDE(OP): Performed by: HOSPITALIST

## 2022-08-09 PROCEDURE — 83735 ASSAY OF MAGNESIUM: CPT

## 2022-08-09 PROCEDURE — A9270 NON-COVERED ITEM OR SERVICE: HCPCS | Performed by: INTERNAL MEDICINE

## 2022-08-09 PROCEDURE — A9270 NON-COVERED ITEM OR SERVICE: HCPCS | Performed by: HOSPITALIST

## 2022-08-09 PROCEDURE — 700102 HCHG RX REV CODE 250 W/ 637 OVERRIDE(OP): Performed by: STUDENT IN AN ORGANIZED HEALTH CARE EDUCATION/TRAINING PROGRAM

## 2022-08-09 PROCEDURE — 700102 HCHG RX REV CODE 250 W/ 637 OVERRIDE(OP): Performed by: INTERNAL MEDICINE

## 2022-08-09 PROCEDURE — 84100 ASSAY OF PHOSPHORUS: CPT

## 2022-08-09 PROCEDURE — A9270 NON-COVERED ITEM OR SERVICE: HCPCS | Performed by: STUDENT IN AN ORGANIZED HEALTH CARE EDUCATION/TRAINING PROGRAM

## 2022-08-09 PROCEDURE — 770001 HCHG ROOM/CARE - MED/SURG/GYN PRIV*

## 2022-08-09 PROCEDURE — 99232 SBSQ HOSP IP/OBS MODERATE 35: CPT | Performed by: HOSPITALIST

## 2022-08-09 PROCEDURE — 85027 COMPLETE CBC AUTOMATED: CPT

## 2022-08-09 RX ORDER — DOCUSATE SODIUM 100 MG/1
100 CAPSULE, LIQUID FILLED ORAL 2 TIMES DAILY
Status: DISCONTINUED | OUTPATIENT
Start: 2022-08-09 | End: 2022-08-12 | Stop reason: HOSPADM

## 2022-08-09 RX ORDER — POTASSIUM CHLORIDE 20 MEQ/1
40 TABLET, EXTENDED RELEASE ORAL ONCE
Status: COMPLETED | OUTPATIENT
Start: 2022-08-09 | End: 2022-08-09

## 2022-08-09 RX ADMIN — SODIUM CHLORIDE 1 G: 1 TABLET ORAL at 08:44

## 2022-08-09 RX ADMIN — LISINOPRIL 40 MG: 20 TABLET ORAL at 04:45

## 2022-08-09 RX ADMIN — ENOXAPARIN SODIUM 40 MG: 40 INJECTION SUBCUTANEOUS at 18:05

## 2022-08-09 RX ADMIN — INSULIN HUMAN 1 UNITS: 100 INJECTION, SOLUTION PARENTERAL at 12:42

## 2022-08-09 RX ADMIN — SODIUM CHLORIDE 1 G: 1 TABLET ORAL at 18:05

## 2022-08-09 RX ADMIN — SODIUM CHLORIDE 1 G: 1 TABLET ORAL at 12:47

## 2022-08-09 RX ADMIN — DOCUSATE SODIUM 100 MG: 100 CAPSULE, LIQUID FILLED ORAL at 18:06

## 2022-08-09 RX ADMIN — HYDROCHLOROTHIAZIDE 25 MG: 25 TABLET ORAL at 04:44

## 2022-08-09 RX ADMIN — POTASSIUM CHLORIDE 40 MEQ: 1500 TABLET, EXTENDED RELEASE ORAL at 08:44

## 2022-08-09 RX ADMIN — METFORMIN HYDROCHLORIDE 850 MG: 850 TABLET ORAL at 21:11

## 2022-08-09 RX ADMIN — SITAGLIPTIN 50 MG: 50 TABLET, FILM COATED ORAL at 20:16

## 2022-08-09 RX ADMIN — SCOPOLAMINE 1 PATCH: 1.5 PATCH, EXTENDED RELEASE TRANSDERMAL at 08:45

## 2022-08-09 RX ADMIN — SENNOSIDES AND DOCUSATE SODIUM 1 TABLET: 50; 8.6 TABLET ORAL at 20:16

## 2022-08-09 RX ADMIN — AMLODIPINE BESYLATE 10 MG: 10 TABLET ORAL at 04:45

## 2022-08-09 ASSESSMENT — PAIN DESCRIPTION - PAIN TYPE: TYPE: ACUTE PAIN

## 2022-08-09 ASSESSMENT — ENCOUNTER SYMPTOMS
FEVER: 0
HEADACHES: 1
CHILLS: 0
SHORTNESS OF BREATH: 0
ROS GI COMMENTS: EATING WELL

## 2022-08-09 ASSESSMENT — FIBROSIS 4 INDEX: FIB4 SCORE: 2.05

## 2022-08-09 NOTE — ASSESSMENT & PLAN NOTE
Known diagnosis but not previously on medications.   HbA1c 7.1  She has been on sliding scale  Initiate Metformin 850 mg BID and Januvia 50 mg   Stopped sliding scale.

## 2022-08-09 NOTE — DISCHARGE PLANNING
Following for post acute services per Attending patient is medically cleared. Prior to transfer will require verification of return to community support. Patient choice and insurance prior authorization will follow.

## 2022-08-09 NOTE — CARE PLAN
The patient is Stable - Low risk of patient condition declining or worsening    Shift Goals  Clinical Goals: mobility/VSS  Patient Goals: comfort/rest  Family Goals: TOMMIE    Progress made toward(s) clinical / shift goals:    Problem: Pain - Standard  Goal: Alleviation of pain or a reduction in pain to the patient’s comfort goal  Outcome: Progressing     Problem: Knowledge Deficit - Standard  Goal: Patient and family/care givers will demonstrate understanding of plan of care, disease process/condition, diagnostic tests and medications  Outcome: Progressing     Problem: Skin Integrity  Goal: Skin integrity is maintained or improved  Outcome: Progressing       Patient is not progressing towards the following goals:

## 2022-08-09 NOTE — DISCHARGE PLANNING
Call out to daughter Dolly to discuss post acute options and return to community support. Return call pending.

## 2022-08-09 NOTE — DISCHARGE PLANNING
Case Management Discharge Planning    Admission Date: 8/2/2022  GMLOS: 7.1  ALOS: 7    6-Clicks ADL Score: 20  6-Clicks Mobility Score: 18      Anticipated Discharge Dispo: Discharge Disposition: Disch to  rehab facility or distinct part unit (62)    DME Needed: No    Action(s) Taken: Updated Provider/Nurse on Discharge Plan and OTHER: Reviewed chart and updated discharge plan. Reviewed treatment and discharge plans, needs, and barriers with medical and nursing teams.    Patient's daughter called and requested information on Advanced Directives for patient and asked if she could get a letter supporting that her mother was currently hospitalized. She indicates that her mother had lost her ID and that a photocopy was provided by [facility]. However, the DMV had indicated that her mother had to appear for herself to request a new ID. The patient's daughter indicates they wanted some form of proof that the patient was hospitalized. A letter was supplied indicating the patient was currently hospitalized, the date admitted, that the patient would be discharged once medically cleared by the physician, and that any assistance provided to the family to handle the patient's personal matters was appreciated. RN CM also provided the Advanced Directives booklet to the patient's daughters with instructions on how to complete. They acknowledged their understanding and thanked RN CM for the requested letter.    Escalations Completed: Provider and Speciality Provider ([facility] - for bed availability)    Medically Clear: Yes, Attending indicates patient medically cleared for IPR if bed available. Message sent to Konrad Lincoln at [facility] to check on status of bed availability.    Next Steps: f/u with medical and nursing teams regarding discharge planning needs/barriers and assist as indicated. f/u with patient and family regarding discharge planning needs/barriers and update discharge plan as indicated. F/u with [facility]  on bed availability for IPR.    Barriers to Discharge: Pending Placement    Is the patient up for discharge tomorrow: No, patient is set to discharge today pending bed availability at [facility] for IPR.

## 2022-08-10 PROCEDURE — 770001 HCHG ROOM/CARE - MED/SURG/GYN PRIV*

## 2022-08-10 PROCEDURE — 99232 SBSQ HOSP IP/OBS MODERATE 35: CPT | Performed by: HOSPITALIST

## 2022-08-10 PROCEDURE — 700102 HCHG RX REV CODE 250 W/ 637 OVERRIDE(OP): Performed by: INTERNAL MEDICINE

## 2022-08-10 PROCEDURE — 92523 SPEECH SOUND LANG COMPREHEN: CPT

## 2022-08-10 PROCEDURE — 700102 HCHG RX REV CODE 250 W/ 637 OVERRIDE(OP): Performed by: STUDENT IN AN ORGANIZED HEALTH CARE EDUCATION/TRAINING PROGRAM

## 2022-08-10 PROCEDURE — A9270 NON-COVERED ITEM OR SERVICE: HCPCS | Performed by: STUDENT IN AN ORGANIZED HEALTH CARE EDUCATION/TRAINING PROGRAM

## 2022-08-10 PROCEDURE — 700102 HCHG RX REV CODE 250 W/ 637 OVERRIDE(OP): Performed by: HOSPITALIST

## 2022-08-10 PROCEDURE — A9270 NON-COVERED ITEM OR SERVICE: HCPCS | Performed by: INTERNAL MEDICINE

## 2022-08-10 PROCEDURE — A9270 NON-COVERED ITEM OR SERVICE: HCPCS | Performed by: HOSPITALIST

## 2022-08-10 PROCEDURE — 700111 HCHG RX REV CODE 636 W/ 250 OVERRIDE (IP)

## 2022-08-10 PROCEDURE — 97116 GAIT TRAINING THERAPY: CPT

## 2022-08-10 PROCEDURE — 97535 SELF CARE MNGMENT TRAINING: CPT

## 2022-08-10 RX ADMIN — LISINOPRIL 40 MG: 20 TABLET ORAL at 04:58

## 2022-08-10 RX ADMIN — SODIUM CHLORIDE 1 G: 1 TABLET ORAL at 09:12

## 2022-08-10 RX ADMIN — METFORMIN HYDROCHLORIDE 850 MG: 850 TABLET ORAL at 18:11

## 2022-08-10 RX ADMIN — SODIUM CHLORIDE 1 G: 1 TABLET ORAL at 18:11

## 2022-08-10 RX ADMIN — SODIUM CHLORIDE 1 G: 1 TABLET ORAL at 14:38

## 2022-08-10 RX ADMIN — SITAGLIPTIN 50 MG: 50 TABLET, FILM COATED ORAL at 04:58

## 2022-08-10 RX ADMIN — METFORMIN HYDROCHLORIDE 850 MG: 850 TABLET ORAL at 09:11

## 2022-08-10 RX ADMIN — ENOXAPARIN SODIUM 40 MG: 40 INJECTION SUBCUTANEOUS at 18:11

## 2022-08-10 RX ADMIN — DOCUSATE SODIUM 100 MG: 100 CAPSULE, LIQUID FILLED ORAL at 04:58

## 2022-08-10 RX ADMIN — AMLODIPINE BESYLATE 10 MG: 10 TABLET ORAL at 04:58

## 2022-08-10 ASSESSMENT — PAIN DESCRIPTION - PAIN TYPE
TYPE: ACUTE PAIN

## 2022-08-10 ASSESSMENT — COGNITIVE AND FUNCTIONAL STATUS - GENERAL
DRESSING REGULAR LOWER BODY CLOTHING: A LITTLE
MOBILITY SCORE: 21
CLIMB 3 TO 5 STEPS WITH RAILING: A LITTLE
PERSONAL GROOMING: A LITTLE
TOILETING: A LITTLE
WALKING IN HOSPITAL ROOM: A LITTLE
DRESSING REGULAR UPPER BODY CLOTHING: A LITTLE
STANDING UP FROM CHAIR USING ARMS: A LITTLE
DAILY ACTIVITIY SCORE: 19
HELP NEEDED FOR BATHING: A LITTLE
SUGGESTED CMS G CODE MODIFIER DAILY ACTIVITY: CK
SUGGESTED CMS G CODE MODIFIER MOBILITY: CJ

## 2022-08-10 ASSESSMENT — GAIT ASSESSMENTS
DISTANCE (FEET): 500
ASSISTIVE DEVICE: FRONT WHEEL WALKER
GAIT LEVEL OF ASSIST: MINIMAL ASSIST

## 2022-08-10 ASSESSMENT — ENCOUNTER SYMPTOMS
FEVER: 0
HEADACHES: 1
ROS GI COMMENTS: TOLERATING A DIET.
SHORTNESS OF BREATH: 0

## 2022-08-10 ASSESSMENT — FIBROSIS 4 INDEX: FIB4 SCORE: 2.05

## 2022-08-10 NOTE — CARE PLAN
The patient is Stable - Low risk of patient condition declining or worsening    Shift Goals  Clinical Goals: stable neuro status, safety  Patient Goals: go home  Family Goals: TOMMIE      Problem: Skin Integrity  Goal: Skin integrity is maintained or improved  Outcome: Progressing     Problem: Incision Care  Goal: Optimal post surgical incision care  Outcome: Progressing     Problem: Neuro Status  Goal: Neuro status will remain stable or improve  Outcome: Progressing

## 2022-08-10 NOTE — PROGRESS NOTES
"Late entry:     This RN was called to bedside by ANDERSON Briones, stating, \"Patient fell in the shower.\"  This RN to shower and notes ANDERSON Briones to be standing with Patient who is lying on floor, no distress noted.  Pt alert and oriented, states, \"I just got off balance. I didn't hit my head. I don't hurt anywhere.  I just want to finish my shower.\"  ANDERSON Briones states, \"She was sitting on the bench seat and just fell over.  I didn't see her hit her head, she fell into the side rail and then laid on the ground.\"  Pt helped back to bench seat and then back to bed via 2 RN assist using front wheel walker without incident.  VS: 142/79, HR 73, SpaO2 96% on room air.      Dr. Shi notified and states no new orders at this time.  Continue with plan of care.   "

## 2022-08-10 NOTE — DISCHARGE PLANNING
Follow up for post acute service. Spoke with son Bryant discussed goals and expectation for IRF level of care. Discussed anticipated length of stay 10 to14 days. Discussed Covid visitation policy. Discussed prior authorization though [employer].  Bryant tells me that he will be able to provide 24/7 supervision and physical  assistance for return to community support. Primary caregivers will be an Aunt and sister Neliad they work but multiple family member will pitch in to support her return to home a 2 story home with 2 steps to enter and a flight of stair up to the bedroom and shower.  There is a half bath on the lower level. Bryant is agreeable to transfer to [facility] when medical cleared and insurance authorization is obtained. Will submit to insurance for consideration.

## 2022-08-10 NOTE — PROGRESS NOTES
Hospital Medicine Daily Progress Note    Date of Service  8/10/2022    Chief Complaint  Lana Phillips is a 57 y.o. female admitted 8/2/2022 with vomiting and hypersomnolence.     Hospital Course  Ms. Phillips is a 57 y.o. female with no known past medical history was admitted to the ICU on 8/2/2022 for large right cerebral hemorrhage with mass-effect which required emergent decompressive craniotomy on 8/2/2022 for which patient is currently status post occipital craniectomy with evacuation of the left cerebellar hematoma.  Neurology and neurosurgery following.  Postsurgically, she was continued on hypertonic therapy and ultimately extubated on 8/3/2022. She did well post operatively. Sodium levels kept elevated per neurology recommendations with 3% saline, now off hypertonic saline with goal eunatremia. Na levels normalized. Patient a good rehab candidate, PM&R consulted. Patient is medically cleared to discharge to rehab vs SNF    Interval Problem Update  8/9: Ms. Phillips seen and evaluated in the IMCU.  She has been working with therapies and is looking forward to going to rehab as she feels her balance is quite off.  Her daughter is at bedside.  She lives with her daughter and 2 sons and plans on going back there after a 2 to 3-week stint at rehab.  Her sodium levels are appropriate on salt tabs and hydrochlorothiazide to be stopped given the propensity for hyponatremia.  She will be changed to oral diabetic medications.  8/10: Ms. Phillips was evaluated and examined in the IMCU. She continues to look forward to inpatient rehab. Her blood pressure has been low hundreds to 120's today. I contacted the rehab liaison Michael and gave him the numbers to call for family members.      I have discussed this patient's plan of care and discharge plan at IDT rounds today with Case Management, Nursing, Nursing leadership, and other members of the IDT team.    Consultants/Specialty  neurosurgery  Critical care  Code Status  Full  Code    Disposition  Patient is medically cleared for discharge.   Anticipate discharge to to an inpatient rehabilitation hospital.  I have placed the appropriate orders for post-discharge needs.    Review of Systems  Review of Systems   Constitutional:  Negative for fever.   Respiratory:  Negative for shortness of breath.    Cardiovascular:  Negative for chest pain.   Gastrointestinal:         Tolerating a diet.   Neurological:  Positive for headaches.        Off balance when walking   All other systems reviewed and are negative.     Physical Exam  Temp:  [36.2 °C (97.1 °F)-36.8 °C (98.2 °F)] 36.2 °C (97.1 °F)  Pulse:  [73-81] 76  Resp:  [14-18] 18  BP: (107-142)/(59-79) 107/59  SpO2:  [94 %-96 %] 94 %    Physical Exam  Vitals and nursing note reviewed.   Constitutional:       General: She is not in acute distress.     Appearance: Normal appearance. She is not toxic-appearing.   HENT:      Head: Normocephalic.      Comments: Craniotomy scars without infection     Mouth/Throat:      Mouth: Mucous membranes are moist.      Pharynx: Oropharynx is clear.   Eyes:      General: No scleral icterus.     Conjunctiva/sclera: Conjunctivae normal.   Cardiovascular:      Rate and Rhythm: Normal rate and regular rhythm.   Pulmonary:      Effort: Pulmonary effort is normal.      Breath sounds: Normal breath sounds.      Comments: Room air  Abdominal:      General: There is no distension.      Tenderness: There is no abdominal tenderness.   Musculoskeletal:      Cervical back: Neck supple.      Right lower leg: No edema.      Left lower leg: No edema.   Skin:     General: Skin is warm and dry.   Neurological:      General: No focal deficit present.      Mental Status: She is alert and oriented to person, place, and time.   Psychiatric:         Mood and Affect: Mood normal.         Behavior: Behavior normal.       Fluids    Intake/Output Summary (Last 24 hours) at 8/10/2022 1525  Last data filed at 8/10/2022 1400  Gross per 24 hour    Intake 740 ml   Output --   Net 740 ml         Laboratory  Recent Labs     08/08/22  0415 08/09/22 0315   WBC 7.8 9.8   RBC 4.27 4.38   HEMOGLOBIN 12.9 13.1   HEMATOCRIT 38.7 40.2   MCV 90.6 91.8   MCH 30.2 29.9   MCHC 33.3* 32.6*   RDW 41.0 41.1   PLATELETCT 227 253   MPV 10.1 10.3       Recent Labs     08/08/22  0415 08/09/22 0315   SODIUM 137 138   POTASSIUM 3.2* 3.8   CHLORIDE 97 101   CO2 29 28   GLUCOSE 228* 135*   BUN 8 12   CREATININE 0.36* 0.42*   CALCIUM 8.4* 8.4*                     Imaging  EC-ECHOCARDIOGRAM COMPLETE W/O CONT   Final Result      DX-CHEST-PORTABLE (1 VIEW)   Final Result      1.  Persistent hypoinflation with mild worsening bibasilar atelectasis.   2.  Supportive tubing as described above.      IR-PICC LINE PLACEMENT W/ GUIDANCE > AGE 5   Final Result                  Ultrasound-guided PICC placement performed by qualified nursing staff as    above.          DX-CHEST-PORTABLE (1 VIEW)   Final Result      Bibasilar underinflation atelectasis which could obscure an additional process.      CT-HEAD W/O   Final Result      1.  Postsurgical changes status post occipital craniectomy with evacuation of the left cerebellar hematoma. There is a small amount of residual blood and postsurgical pneumocephalus.   2.  There is improved mass effect on the fourth ventricle.   3.  Lateral ventricles are stable in size.      CT-CTA HEAD WITH & W/O-POST PROCESS   Final Result         1. No hemodynamically significant narrowing of the major intracranial vessels.      2. Redemonstration of large parenchymal hemorrhage in the left cerebellum      CT-CTA NECK WITH & W/O-POST PROCESSING   Final Result      1. No evidence of flow-limiting stenosis or dissection in the cervical carotid or cervical vertebral arteries.      CT-HEAD W/O   Final Result      Large 4.6 x 3.4 cm intraparenchymal hematoma centered in the left cerebellar hemisphere. Associated mass effect results in effacement of the basal cisterns and  fourth ventricle. No evidence of hydrocephalus.      Findings were discussed with Dr. JANET ARELLANO on 8/2/2022 2:45 PM.           Assessment/Plan  * Cerebellar hemorrhage, acute (HCC)- (present on admission)  Assessment & Plan  Neurosurgery consult: Dr Vences s/p 8/2 posterior fossa craniectomy for evacuation of intraparenchymal hemorrhage with great operative result seen on postoperative CT  SBP goal < 160  Neuro checks q4h  Status post 3% saline. Okay to continue salt tabs. Goal eunatremia.  Neurosurgery and neurology following  PT/OT recommend acute rehab, PMR consulted    Hypertension- (present on admission)  Assessment & Plan  Was severely hypertensive to 260 on presentation  Strict SBP < 160  Status post nicardipine drip  Continue lisinopril 40 mg daily, continue amlodipine 10 mg daily  Stopped HCTZ given her previously low Na and ongoing need for salt tabs. If her BP goes up then consider adding Labetalol.   PRNs for SBP>160    Type 2 diabetes mellitus with hyperglycemia, without long-term current use of insulin (HCC)- (present on admission)  Assessment & Plan  Known diagnosis but not previously on medications.   HbA1c 7.1  She has been on sliding scale  Initiate Metformin 850 mg BID and Januvia 50 mg   Stopped sliding scale.     Hypokalemia- (present on admission)  Assessment & Plan  Replete as necessary       VTE prophylaxis: enoxaparin ppx    I have performed a physical exam and reviewed and updated ROS and Plan today (8/10/2022). In review of yesterday's note (8/9/2022), there are no changes except as documented above.

## 2022-08-10 NOTE — THERAPY
"Occupational Therapy  Daily Treatment     Patient Name: Lana Phillips  Age:  57 y.o., Sex:  female  Medical Record #: [id number]  Today's Date: 8/10/2022     Precautions  Precautions: (P) Fall Risk, Swallow Precautions ( See Comments)  Comments: pt sleepy when not stimulated.    Assessment    Pt seen for OT tx session. Pt continues to demo BADLs and functional mobility at CGA/Min A level w/ cues for sequencing and safety throughout session. Pt had one manuel LOB in bathroom, required physical assist to correct. Will continue to follow while in house.     Plan    Continue current treatment plan.    DC Equipment Recommendations: (P) Unable to determine at this time  Discharge Recommendations: (P) Recommend post-acute placement for additional occupational therapy services prior to discharge home    Subjective    \"I was fine until I fell!\"     Objective       08/10/22 1201   Treatment Charges   Charges Yes   OT Self Care / ADL 1   Total Time Spent   OT Self Care / ADL (Minutes) 20   Precautions   Precautions Fall Risk;Swallow Precautions ( See Comments)   Vitals   O2 (LPM) 0   O2 Delivery Device None - Room Air   Cognition    Cognition / Consciousness X   Level of Consciousness Alert   Ability To Follow Commands 2 Step   Safety Awareness Impaired   Attention Impaired   Comments pleasent, difficult attending tasks placing pt at higher fall risk   Active ROM Upper Body   Active ROM Upper Body  WDL   Strength Upper Body   Upper Body Strength  WDL   Upper Body Muscle Tone   Upper Body Muscle Tone  WDL   Balance   Sitting Balance (Static) Fair +   Sitting Balance (Dynamic) Fair +   Standing Balance (Static) Fair   Standing Balance (Dynamic) Poor +   Weight Shift Sitting Fair   Weight Shift Standing Fair   Skilled Intervention Tactile Cuing;Verbal Cuing;Facilitation   Comments had 1 manuel LOB in bathroom, unable to self correct   Bed Mobility    Supine to Sit Supervised   Sit to Supine Supervised   Scooting Supervised   Skilled " Intervention Verbal Cuing   Activities of Daily Living   Grooming Contact Guard Assist;Standing   Lower Body Dressing Minimal Assist   Toileting Minimal Assist  (A for balance during underpant management)   Skilled Intervention Verbal Cuing;Tactile Cuing   How much help from another person does the patient currently need...   Putting on and taking off regular lower body clothing? 3   Bathing (including washing, rinsing, and drying)? 3   Toileting, which includes using a toilet, bedpan, or urinal? 3   Putting on and taking off regular upper body clothing? 3   Taking care of personal grooming such as brushing teeth? 3   Eating meals? 4   6 Clicks Daily Activity Score 19   Functional Mobility   Sit to Stand Standby Assist   Bed, Chair, Wheelchair Transfer Contact Guard Assist   Toilet Transfers Contact Guard Assist   Mobility within room and bathroom w/ FWW   Skilled Intervention Verbal Cuing;Tactile Cuing   Activity Tolerance   Sitting in Chair NT   Sitting Edge of Bed 10 min   Standing 10 min   Comments no overt s/s of fatigue   Patient / Family Goals   Patient / Family Goal #1 to go home   Goal #1 Outcome Progressing as expected   Short Term Goals   Short Term Goal # 1 Pt will perform toileting w/ supv   Goal Outcome # 1 Progressing as expected   Short Term Goal # 2 Pt will perform standing grooming w/ supv   Goal Outcome # 2 Progressing as expected   Short Term Goal # 3 Pt will perform ADL txf w/ supv   Goal Outcome # 3 Progressing as expected   Education Group   Role of Occupational Therapist Patient Response Patient;Acceptance;Explanation;Verbal Demonstration   ADL Patient Response Patient;Acceptance;Explanation;Demonstration;Verbal Demonstration;Action Demonstration   Anticipated Discharge Equipment and Recommendations   DC Equipment Recommendations Unable to determine at this time   Discharge Recommendations Recommend post-acute placement for additional occupational therapy services prior to discharge home    Interdisciplinary Plan of Care Collaboration   IDT Collaboration with  Nursing   Patient Position at End of Therapy In Bed;Bed Alarm On;Call Light within Reach;Tray Table within Reach;Phone within Reach   Collaboration Comments report given   Session Information   Date / Session Number  8/10, 3 (3/3, 8/10)   Priority 2

## 2022-08-10 NOTE — PROGRESS NOTES
Bedside report received and patient care assumed. Pt is resting in bed, A&O4, with no complaints of pain, and is on RA.  All fall precautions are in place, belongings at bedside table.  Pt was updated on POC, no questions or concerns. Pt educated on use of call light for assistance.

## 2022-08-10 NOTE — THERAPY
Speech Language Pathology   Initial Assessment     Patient Name: Lana Phillips  AGE:  57 y.o., SEX:  female  Medical Record #: [id number]  Today's Date: 8/10/2022     Precautions  Precautions: Fall Risk, Swallow Precautions ( See Comments)  Comments: pt sleepy when not stimulated.    Assessment    Patient is 57 y.o. female with a diagnosis of large RICH. Ms. Phillips is a 57 y.o. female with no known past medical history was admitted to the ICU on 8/2/2022 for large right cerebral hemorrhage with mass-effect which required emergent decompressive craniotomy on 8/2/2022 for which patient is currently status post occipital craniectomy with evacuation of the left cerebellar hematoma.  Neurology and neurosurgery following.  Postsurgically, she was continued on hypertonic therapy and ultimately extubated on 8/3/2022. She did well post operatively. Sodium levels kept elevated per neurology recommendations with 3% saline, now off hypertonic saline with goal eunatremia. Na levels normalized. Patient a good rehab candidate, PM&R consulted. Patient is medically cleared to discharge to rehab vs SNF     Interval Problem Update  8/9: Ms. Phillips seen and evaluated in the IMCU.  She has been working with therapies and is looking forward to going to rehab as she feels her balance is quite off.  Her daughter is at bedside.  She lives with her daughter and 2 sons and plans on going back there after a 2 to 3-week stint at rehab.  Her sodium levels are appropriate on salt tabs and hydrochlorothiazide to be stopped given the propensity for hyponatremia.  She will be changed to oral diabetic medications.   Additional factors influencing patient status/progress: Pt attempts all tasks with min cues/prompting.     Cognitive/Linguistic Eval- Part of NCE and nonstandard cognitive linguistic eval completed and revealing mild to moderate deficits. Per NCSE [id number]-39/12=average; attention-8/8=average; memory-6/12=moderate;  "calculations-4/4=average; similarities-5/8=average; and judgement-2/6=moderate. Pt with eyes closed intermittently while verbally responding and stating \"tired\" near end of eval with probable fatigue. Pt with large cursive writing at approx 1/2 inch with good legibility. Legible clock with good organization of numbers. Moderate cues during reading compreh task at the 3-4 sentence level with 1/3 accuracy. Pt demonstrating flat affect. Continued SLP recommended at next level of care.         Plan  Target sustained attention, reading compreh at the 3-4 sent level, and further assess functional writing at the 1-2 sent level.   Recommend Speech Therapy 5 times per week until therapy goals are met for the following treatments:  Cognitive-Linguistic Training.    Discharge Recommendations: Anticipate that the patient will have no further speech therapy needs after discharge from the hospital       08/10/22 1425   Verbal Expression   Verbal Expression / Aphasia Eval (WDL) X   Confabulations Supervision (5)   Skilled Intervention Verbal Cueing   Comments Pt stating x2 \"they are trying to kill me here\" with educ and support provided to redirect pt.   Auditory Comprehension   Auditory Comprehension (WDL) X   Understands Simple, Structured Conversation  Supervision (5)   Skilled Intervention Verbal Cueing   Reading Comprehension   Reading Comprehension (WDL) X   Reading Short Paragraphs  Moderate (3)   Skilled Intervention Verbal Cueing   Written Expression   Written Expression (WDL) X   Formulates: Sentence Minimal (4)   Overall Legibility Minimal (4)   Skilled Intervention Verbal Cueing   Cognitive-Linguistic   Cognitive-Linguistic (WDL) X   Orientation Level Oriented x 4   Sustained Attention   (mild to moderate)   Short Term Memory Moderate (3)   Simple Reasoning / Problem Solving Moderate (3)   [place] Reasoning Minimal (4)   Social / Pragmatic Communication   Comments flat affect   Aphasia Compensatory Strategies "   Compensatory Strategies Used To Communicate   (Parts of NCSE and nonstandard cognitive linguistic eval.)   Outcome Measures   Outcome Measures Utilized   (Parts of NCSE and nonstandard cognitive linguistic eval.)   Short Term Goals   Short Term Goal # 1 8/10 Pt will maintain eye opening and sustained attention to task for 30 min with min to no cues 90% of the time.   Goal Outcome # 1 Progressing slower than expected   Short Term Goal # 1 B  8/10 Pt will orally read and compreh at the 3-4 sent level with min cues and 75% accuracy during simple functional reading.  (goal not met)

## 2022-08-10 NOTE — PROGRESS NOTES
Late Entry    Bedside report from JOHN Mensah and patient care assumed at 0645.  Assessment completed and plan of care discussed with patient including but not limited to medical transfer orders, mobilizing, bathing, and medications.  Patient refused bathing and linen change.  She stated she showered the day prior.  Told her to let me know if she changes her mind later in shift.

## 2022-08-10 NOTE — CARE PLAN
"  Problem: Pain - Standard  Goal: Alleviation of pain or a reduction in pain to the patient’s comfort goal  Outcome: Met  Note: Pt denies pain/discomfort      Problem: Knowledge Deficit - Standard  Goal: Patient and family/care givers will demonstrate understanding of plan of care, disease process/condition, diagnostic tests and medications  Outcome: Met  Note: Pt able to verbalize basic understanding of disease process.      Problem: Neuro Status  Goal: Neuro status will remain stable or improve  Outcome: Met  Note: Pt maintains neurological status within defined limits.     The patient is Stable - Low risk of patient condition declining or worsening    Shift Goals  Clinical Goals: No decline in neurological status  Patient Goals: \"Go to rehab\"  Family Goals: TOMMIE    Progress made toward(s) clinical / shift goals:  Maintained  "

## 2022-08-10 NOTE — CARE PLAN
The patient is Stable - Low risk of patient condition declining or worsening    Shift Goals  Clinical Goals: Safety, Neuro assessments, Mobility  Patient Goals: Discharge  Family Goals: Unable to Assess    Progress made toward(s) clinical / shift goals:  Met    Patient is not progressing towards the following goals:      Problem: Pain - Standard  Goal: Alleviation of pain or a reduction in pain to the patient’s comfort goal  Note: Pain assessed q4hrs and PRN.  Patient denied pain throughout shift.        Problem: Fall Risk  Goal: Patient will remain free from falls  8/10/2022 1443 by Eleonora Dubon R.N.  Note: Bed alarm and chair alarm on.  Patient educated on fall risks and importance of using call light.  Fall precautions in place: treaded slipper socks, bed is in low position, personal belongings, wastebasket, call light, and phone are within patient's reach.

## 2022-08-10 NOTE — DISCHARGE PLANNING
PAS completed submitted to insurance for consideration for IRF level of care with [facility].

## 2022-08-10 NOTE — PROGRESS NOTES
Hospital Medicine Daily Progress Note    Date of Service  8/9/2022    Chief Complaint  Lana Phillips is a 57 y.o. female admitted 8/2/2022 with vomiting and hypersomnolence.     Hospital Course  Ms. Phillips is a 57 y.o. female with no known past medical history was admitted to the ICU on 8/2/2022 for large right cerebral hemorrhage with mass-effect which required emergent decompressive craniotomy on 8/2/2022 for which patient is currently status post occipital craniectomy with evacuation of the left cerebellar hematoma.  Neurology and neurosurgery following.  Postsurgically, she was continued on hypertonic therapy and ultimately extubated on 8/3/2022. She did well post operatively. Sodium levels kept elevated per neurology recommendations with 3% saline, now off hypertonic saline with goal eunatremia. Na levels normalized. Patient a good rehab candidate, PM&R consulted. Patient is medically cleared to discharge to rehab vs SNF    Interval Problem Update  8/9: Ms. Phillips seen and evaluated in the IMCU.  She has been working with therapies and is looking forward to going to rehab as she feels her balance is quite off.  Her daughter is at bedside.  She lives with her daughter and 2 sons and plans on going back there after a 2 to 3-week stint at rehab.  Her sodium levels are appropriate on salt tabs and hydrochlorothiazide to be stopped given the propensity for hyponatremia.  She will be changed to oral diabetic medications.    I have discussed this patient's plan of care and discharge plan at IDT rounds today with Case Management, Nursing, Nursing leadership, and other members of the IDT team.    Consultants/Specialty  neurosurgery  Critical care  Code Status  Full Code    Disposition  Patient is medically cleared for discharge.   Anticipate discharge to to an inpatient rehabilitation hospital.  I have placed the appropriate orders for post-discharge needs.    Review of Systems  Review of Systems   Constitutional: Negative for  chills and fever.   Respiratory: Negative for shortness of breath.    Cardiovascular: Negative for chest pain.   Gastrointestinal:        Eating well   Neurological: Positive for headaches.        Off balance when walking   All other systems reviewed and are negative.       Physical Exam  Temp:  [36.8 °C (98.2 °F)-37.2 °C (99 °F)] 36.8 °C (98.2 °F)  Pulse:  [71-91] 80  Resp:  [16-18] 18  BP: (130-137)/(71-87) 137/86  SpO2:  [93 %-94 %] 93 %    Physical Exam  Vitals and nursing note reviewed.   Constitutional:       General: She is not in acute distress.     Appearance: She is not toxic-appearing.   HENT:      Head: Normocephalic.      Comments: Craniotomy scars without infection     Mouth/Throat:      Mouth: Mucous membranes are dry.      Pharynx: Oropharynx is clear.   Eyes:      General: No scleral icterus.     Conjunctiva/sclera: Conjunctivae normal.   Cardiovascular:      Rate and Rhythm: Normal rate and regular rhythm.   Pulmonary:      Effort: Pulmonary effort is normal.      Breath sounds: Normal breath sounds.      Comments: Room air  Abdominal:      General: There is no distension.      Tenderness: There is no abdominal tenderness.   Musculoskeletal:      Cervical back: Normal range of motion and neck supple.      Right lower leg: No edema.      Left lower leg: No edema.   Skin:     General: Skin is warm and dry.   Neurological:      General: No focal deficit present.      Mental Status: She is alert and oriented to person, place, and time.   Psychiatric:         Mood and Affect: Mood normal.         Behavior: Behavior normal.         Fluids    Intake/Output Summary (Last 24 hours) at 8/9/2022 1705  Last data filed at 8/9/2022 0500  Gross per 24 hour   Intake 420 ml   Output --   Net 420 ml       Laboratory  Recent Labs     08/07/22  0415 08/08/22  0415 08/09/22  0315   WBC 8.2 7.8 9.8   RBC 3.93* 4.27 4.38   HEMOGLOBIN 11.8* 12.9 13.1   HEMATOCRIT 35.3* 38.7 40.2   MCV 89.8 90.6 91.8   MCH 30.0 30.2 29.9    MCHC 33.4* 33.3* 32.6*   RDW 41.1 41.0 41.1   PLATELETCT 191 227 253   MPV 10.2 10.1 10.3     Recent Labs     08/07/22  0415 08/07/22  0600 08/08/22  0415 08/09/22  0315   SODIUM 142 140 137 138   POTASSIUM 3.0*  --  3.2* 3.8   CHLORIDE 101  --  97 101   CO2 27  --  29 28   GLUCOSE 131*  --  228* 135*   BUN 8  --  8 12   CREATININE 0.33*  --  0.36* 0.42*   CALCIUM 8.1*  --  8.4* 8.4*                   Imaging  EC-ECHOCARDIOGRAM COMPLETE W/O CONT   Final Result      DX-CHEST-PORTABLE (1 VIEW)   Final Result      1.  Persistent hypoinflation with mild worsening bibasilar atelectasis.   2.  Supportive tubing as described above.      IR-PICC LINE PLACEMENT W/ GUIDANCE > AGE 5   Final Result                  Ultrasound-guided PICC placement performed by qualified nursing staff as    above.          DX-CHEST-PORTABLE (1 VIEW)   Final Result      Bibasilar underinflation atelectasis which could obscure an additional process.      CT-HEAD W/O   Final Result      1.  Postsurgical changes status post occipital craniectomy with evacuation of the left cerebellar hematoma. There is a small amount of residual blood and postsurgical pneumocephalus.   2.  There is improved mass effect on the fourth ventricle.   3.  Lateral ventricles are stable in size.      CT-CTA HEAD WITH & W/O-POST PROCESS   Final Result         1. No hemodynamically significant narrowing of the major intracranial vessels.      2. Redemonstration of large parenchymal hemorrhage in the left cerebellum      CT-CTA NECK WITH & W/O-POST PROCESSING   Final Result      1. No evidence of flow-limiting stenosis or dissection in the cervical carotid or cervical vertebral arteries.      CT-HEAD W/O   Final Result      Large 4.6 x 3.4 cm intraparenchymal hematoma centered in the left cerebellar hemisphere. Associated mass effect results in effacement of the basal cisterns and fourth ventricle. No evidence of hydrocephalus.      Findings were discussed with Dr. JANET ROJAS  EILEEN on 8/2/2022 2:45 PM.           Assessment/Plan  * Cerebellar hemorrhage, acute (HCC)- (present on admission)  Assessment & Plan  Neurosurgery consult: Dr Vences s/p 8/2 posterior fossa craniectomy for evacuation of intraparenchymal hemorrhage with great operative result seen on postoperative CT  SBP goal < 160  Neuro checks q4h  Status post 3% saline. Okay to continue salt tabs. Goal eunatremia.  Neurosurgery and neurology following  PT/OT recommend acute rehab, PMR consulted    Hypertension- (present on admission)  Assessment & Plan  Was severely hypertensive to 260 on presentation  Strict SBP < 160  Status post nicardipine drip  Continue lisinopril 40 mg daily, continue amlodipine 10 mg daily  Stop HCTZ given her previously low Na and ongoing need for salt tabs. If her BP goes up then consider adding Labetalol.   Prn's for SBP>160    Type 2 diabetes mellitus with hyperglycemia, without long-term current use of insulin (HCC)- (present on admission)  Assessment & Plan  Known diagnosis but not previously on medications.   HbA1c 7.1  She has been on sliding scale  Initiate Metformin 850 mg BID and Januvia 50 mg   Stop sliding scale.     Hypokalemia- (present on admission)  Assessment & Plan  Replete as necessary       VTE prophylaxis: enoxaparin ppx    I have performed a physical exam and reviewed and updated ROS and Plan today (8/9/2022). In review of yesterday's note (8/8/2022), there are no changes except as documented above.

## 2022-08-10 NOTE — PREADMISSION SCREENING NOTE
Pre-Admission Screening Form    Patient Information:   Name: Lana Phillips     MRN: [id number]       : 1965      Age: 57 y.o.   Gender: female      Race:  [1]       Marital Status:  [5]  Family Contact: AlanDolly Phillips,Nelida Phillips,Malu Chávez        Relationship: Daughter [2]  Daughter [2]  Sister [14]  Sister [14]  Home Phone:     [phone]           Cell Phone: [phone]  Advanced Directives: None  Code Status:  FULL  Current Attending Provider: Titus Shi M.D.  Referring Physician: Dr. George    Physiatrist Consult: Dr. Skaggs       Referral Date: 2022  Primary Payor Source:  [employer]  Secondary Payor Source:      Medical Information:   Date of Admission to Acute Care [place]  Room Number: MOW192/00  Rehabilitation Diagnosis: 0001.4 - Stroke: No Paresis  Immunization History   Administered Date(s) Administered    MODERNA SARS-COV-2 VACCINE (12+) 2021, 2021     No Known Allergies  History reviewed. No pertinent past medical history.  Past Surgical History:   Procedure Laterality Date    CRANIOTOMY  2022    Procedure: Posterior fossa craniectomy for evacuation of intraparenchymal hemorrhage;  Surgeon: Pedro Pablo Vences M.D.;  Location: SURGERY [facility];  Service: Neurosurgery       History Leading to Admission, Conditions that Caused the Need for Rehab (CMS):     Pedro Pablo Vences M.D.  Physician  Surgery Neurosurgery  OP Report      Signed  Date of Service:  2022 10:03 PM              DATE OF SERVICE:  2022      PREOPERATIVE DIAGNOSIS:  Left cerebellar intraparenchymal hemorrhage causing   brainstem compression.     POSTOPERATIVE DIAGNOSIS:  Left cerebellar intraparenchymal hemorrhage causing   brainstem compression.     PROCEDURES PERFORMED:  1.  Suboccipital craniectomy.  2.  Evacuation of left cerebellar intraparenchymal hemorrhage.  3.  Duraplasty.  4.  C1 laminectomy.     SURGEON:   MD Sal Krueger D.O.  Physician  Physical Medicine & Rehab  Consults      Signed  Date of Service:  2022  8:40 AM          Consult Orders  IP Consult For Physiatry [[id number]] ordered by Baljinder George M.D. at 22 0728       Expand All Collapse All                                                    Physical Medicine and Rehabilitation Consultation                                                                            Date of initial consultation: 2022  Consulting provider: Baljinder George MD  Reason for consultation: assess for acute inpatient rehab appropriateness  LOS: 3 Day(s)     Chief complaint: Large left IPH     HPI: The patient is a 57 y.o. right hand dominant female with a past medical history of hypertension;  who presented on 2022  1:57 PM with altered mental status, headache, dizziness, nausea.  Patient was attending a  when she was found in the bathroom minimally responsive.  Patient was brought to the hospital, found to have systolic pressure of 259 CT head found large right cerebellar hemorrhage with compression of the third and fourth ventricles.  Patient was seen by neurosurgery, and taken for suboccipital craniectomy with evacuation of left cerebellar IPH, duraplasty, and C1 laminectomy by Dr. Pedro Pablo Vences MD on 2022.     The patient currently reports overall doing well, she largely denies ROS.  Patient is requesting a Coke with ice.  She has a modified diet of mildly thick liquids which was reviewed with her and the nurse will try to accommodate her request.     ROS  Pertinent positives are mentioned in the HPI, all others reviewed and are negative.     Social Hx:  2 SH  1 MARCIN, 1 FOS inside required  With: 18 and 19-year-old children -neither work.  Patient also has 2 other sons ages 27 and 28 that do not live with her     THERAPY:  Restrictions: Fall risk, swallow precautions  PT: Functional mobility   : Walking 3 feet x 1 at min  assist     OT: ADLs  8/4 supervision     SLP:   8/4: Dysphagia; minced and moist solids with mildly thick liquids     IMAGING:  CT head 8/2/2022  Large 4.6 x 3.4 cm intraparenchymal hematoma centered in the left cerebellar hemisphere. Associated mass effect results in effacement of the basal cisterns and fourth ventricle. No evidence of hydrocephalus.     PROCEDURES:  Pedro Pablo Vences 8/2/2022  1.  Suboccipital craniectomy.  2.  Evacuation of left cerebellar intraparenchymal hemorrhage.  3.  Duraplasty.  4.  C1 laminectomy.     PMH:    Past Medical History  History reviewed. No pertinent past medical history.       PSH:    Past Surgical History  Past Surgical History:  Procedure Laterality Date   CRANIOTOMY   8/2/2022    Procedure: Posterior fossa craniectomy for evacuation of intraparenchymal hemorrhage;  Surgeon: Pedro Pablo Vences M.D.;  Location: SURGERY [facility];  Service: Neurosurgery          FHX:  Non-pertinent to today's issues     Medications:    Current Facility-Administered Medications  Medication Dose   3% sodium chloride (HYPERTONIC SALINE) 500mL infusion 500 mL  500 mL   senna-docusate (PERICOLACE or SENOKOT S) 8.6-50 MG per tablet 1 Tablet  1 Tablet   senna-docusate (PERICOLACE or SENOKOT S) 8.6-50 MG per tablet 1 Tablet  1 Tablet   polyethylene glycol/lytes (MIRALAX) PACKET 1 Packet  1 Packet   ondansetron (ZOFRAN ODT) dispertab 4 mg  4 mg    Or   ondansetron (ZOFRAN) syringe/vial injection 4 mg  4 mg   magnesium hydroxide (MILK OF MAGNESIA) suspension 30 mL  30 mL   methocarbamol (ROBAXIN) tablet 750 mg  750 mg   acetaminophen (TYLENOL) tablet 500 mg  500 mg   cloNIDine (CATAPRES) tablet 0.1 mg  0.1 mg   docusate sodium (Colace) oral solution 100 mg  100 mg   lisinopril (PRINIVIL) tablet 20 mg  20 mg   niCARdipine (CARDENE) 25 mg in  mL Infusion  0-15 mg/hr   amLODIPine (NORVASC) tablet 10 mg  10 mg   scopolamine (TRANSDERM-SCOP) patch 1 Patch  1 Patch   dextrose 10 % BOLUS 250 mL  250  "mL   Respiratory Therapy Consult     midazolam (Versed) injection 5 mg  5 mg   acetaminophen (TYLENOL) suppository 650 mg  650 mg   MD Alert...ICU Electrolyte Replacement per Pharmacy     insulin regular (HumuLIN R,NovoLIN R) injection  1-6 Units   Pharmacy Consult Request ...Pain Management Review 1 Each  1 Each   MD ALERT...DO NOT ADMINISTER NSAIDS or ASPIRIN unless ORDERED By Neurosurgery 1 Each  1 Each   diphenhydrAMINE (BENADRYL) injection 25 mg  25 mg   labetalol (NORMODYNE/TRANDATE) injection 10 mg  10 mg   bisacodyl (DULCOLAX) suppository 10 mg  10 mg   sodium phosphate (Fleet) enema 133 mL  1 Each   enoxaparin (Lovenox) inj 40 mg  40 mg   hydrALAZINE (APRESOLINE) injection 10 mg  10 mg   benzocaine-menthol (Cepacol) lozenge 1 Lozenge  1 Lozenge        Allergies:  No Known Allergies        Physical Exam:  Vitals: BP (!) 158/74   Pulse 93   Temp 37 °C (98.6 °F) (Temporal)   Resp (!) 31   Ht 1.499 m (4' 11\")   Wt 67.8 kg (149 lb 7.6 oz)   SpO2 97%   Gen: NAD  Head: NC/AT  Eyes/ Nose/ Mouth: moist mucous membranes  Cardio: RRR, good distal perfusion, warm extremities  Pulm: normal respiratory effort, no cyanosis   Abd: Soft NTND, negative borborygmi   Ext: No peripheral edema. No calf tenderness. No clubbing.     Mental status: answers questions appropriately follows commands  Speech: fluent, no aphasia or dysarthria     Motor:                              Upper Extremity  Myotome R L  Shoulder flexion C5 5 5  Elbow flexion C5 5 5  Wrist extension C6 5 5  Elbow extension C7 5 5  Finger flexion C8 5 5  Finger abduction T1 5 5       Lower Extremity Myotome R L  Hip flexion L2 5 5  Knee extension L3 5 5  Ankle dorsiflexion L4 5 5  Toe extension L5 5 5  Ankle plantarflexion S1 5 5     Sensory:   intact to light touch through out       DTRs:  Right  Left   Brachioradialis  2+  2+  Patella tendon  2+ 2+     Tone: no spasticity noted, no cogwheeling noted     Coordination:   Altered finger randy left      "   Labs: Reviewed and significant for     Recent Labs    08/02/22  1407 08/03/22  0250 08/04/22  0600 08/05/22  0000  RBC 5.32 4.53 4.09* 3.75*  HEMOGLOBIN 15.8 13.6 12.3 11.4*  HEMATOCRIT 47.6* 40.5 38.8 35.6*  PLATELETCT 264 256 225 190  PROTHROMBTM 11.9*  --   --   --   APTT 25.8  --   --   --   INR 0.88  --   --   --        Recent Labs    08/03/22  0250 08/03/22  0939 08/04/22  0600 08/04/22  1250 08/05/22  0000 08/05/22  0420 08/05/22  0534  SODIUM 146*   < > 162*   < > 147*  147* 145 145  POTASSIUM 3.2*  --  3.4*  --  3.2*  --   --   CHLORIDE 109  --  130*  --  111  --   --   CO2 22  --  23  --  23  --   --   GLUCOSE 268*  --  171*  --  160*  --   --   BUN 10  --  14  --  10  --   --   CREATININE 0.53  --  0.32*  --  0.30*  --   --   CALCIUM 8.5  --  8.0*  --  8.1*  --   --    < > = values in this interval not displayed.       Recent Results  Recent Results (from the past 24 hour(s))  POCT glucose device results    Collection Time: 08/04/22 11:29 AM  Result Value Ref Range    POC Glucose, Blood 190 (H) 65 - 99 mg/dL  Sodium Serum (NA)    Collection Time: 08/04/22 12:50 PM  Result Value Ref Range    Sodium 149 (H) 135 - 145 mmol/L  PROCALCITONIN    Collection Time: 08/04/22 12:50 PM  Result Value Ref Range    Procalcitonin 0.23 <0.25 ng/mL  POCT glucose device results    Collection Time: 08/04/22  5:19 PM  Result Value Ref Range    POC Glucose, Blood 171 (H) 65 - 99 mg/dL  Sodium Serum (NA)    Collection Time: 08/04/22  5:51 PM  Result Value Ref Range    Sodium 148 (H) 135 - 145 mmol/L  POCT glucose device results    Collection Time: 08/04/22 11:54 PM  Result Value Ref Range    POC Glucose, Blood 156 (H) 65 - 99 mg/dL  Sodium Serum (NA)    Collection Time: 08/05/22 12:00 AM  Result Value Ref Range    Sodium 147 (H) 135 - 145 mmol/L  CBC without Differential    Collection Time: 08/05/22 12:00 AM  Result Value Ref Range    WBC 16.3 (H) 4.8 - 10.8 K/uL    RBC 3.75 (L) 4.20 - 5.40 M/uL    Hemoglobin 11.4 (L) 12.0 -  16.0 g/dL    Hematocrit 35.6 (L) 37.0 - 47.0 %    MCV 94.9 81.4 - 97.8 fL    MCH 30.4 27.0 - 33.0 pg    MCHC 32.0 (L) 33.6 - 35.0 g/dL    RDW 48.6 35.9 - 50.0 fL    Platelet Count 190 164 - 446 K/uL    MPV 10.9 9.0 - 12.9 fL  Comp Metabolic Panel    Collection Time: 08/05/22 12:00 AM  Result Value Ref Range    Sodium 147 (H) 135 - 145 mmol/L    Potassium 3.2 (L) 3.6 - 5.5 mmol/L    Chloride 111 96 - 112 mmol/L    Co2 23 20 - 33 mmol/L    Anion Gap 13.0 7.0 - 16.0    Glucose 160 (H) 65 - 99 mg/dL    Bun 10 8 - 22 mg/dL    Creatinine 0.30 (L) 0.50 - 1.40 mg/dL    Calcium 8.1 (L) 8.5 - 10.5 mg/dL    AST(SGOT) 22 12 - 45 U/L    ALT(SGPT) 15 2 - 50 U/L    Alkaline Phosphatase 77 30 - 99 U/L    Total Bilirubin 0.8 0.1 - 1.5 mg/dL    Albumin 3.4 3.2 - 4.9 g/dL    Total Protein 6.0 6.0 - 8.2 g/dL    Globulin 2.6 1.9 - 3.5 g/dL    A-G Ratio 1.3 g/dL  PHOSPHORUS    Collection Time: 08/05/22 12:00 AM  Result Value Ref Range    Phosphorus 2.0 (L) 2.5 - 4.5 mg/dL  MAGNESIUM    Collection Time: 08/05/22 12:00 AM  Result Value Ref Range    Magnesium 2.0 1.5 - 2.5 mg/dL  ESTIMATED GFR    Collection Time: 08/05/22 12:00 AM  Result Value Ref Range    GFR (CKD-EPI) 124 >60 mL/min/1.73 m 2  SODIUM SERUM (NA)    Collection Time: 08/05/22  4:20 AM  Result Value Ref Range    Sodium 145 135 - 145 mmol/L  Sodium Serum (NA)    Collection Time: 08/05/22  5:34 AM  Result Value Ref Range    Sodium 145 135 - 145 mmol/L  POCT glucose device results    Collection Time: 08/05/22  6:15 AM  Result Value Ref Range    POC Glucose, Blood 147 (H) 65 - 99 mg/dL             ASSESSMENT:  Patient is a 57 y.o. female admitted with left cerebellar IPH now s/p suboccipital craniectomy with evacuation of IPH     [facility] Code / Diagnosis to Support: 0001.4 - Stroke: No Paresis     Rehabilitation: Impaired ADLs and mobility  Patient is a good candidate for inpatient rehab based on needs for PT, OT, and speech therapy.  Patient will also benefit from family training.   Patient has a good discharge situation which will be home with adult children.      Barriers to transfer include: Insurance authorization, TCCs to verify disposition, medical clearance and bed availability      All cases are subject to administrative review and recommendations may change     Additional Recommendations:  -Good candidate for IPR.  Patient has deficits with mobility and ADLs secondary to left cerebellar IPH.  Patient does not have paresis, but does have ataxia on the left, balance deficits, and swallow dysfunction.  -TCC to submit to Medicaid insurance for prior authorization  -PMR to follow in the periphery for rehab appropriateness, please reach out with questions or request for medical management     Medical Complexity:     Left cerebellar IPH  -Large 4.6 x 3.4 cm IPH, status post suboccipital craniectomy with evacuation of hemorrhage by Dr. Vences on 8/2/2022  -Continue PT OT and SLP while in-house  -Tight blood pressure control  -Sodium goal 150-160 per neurology, on 3% sodium  -Keep head of bed 35 degrees or more per neurosurgery  -Okay for Lovenox per neurosurgery     Hypertension  -Lisinopril 40 mg tablet daily  -Amlodipine 10 mg daily  -Clonidine as needed  -Labetalol injection as needed  -Hydralazine injection as needed     Leukocytosis  -WBC 16.3, stable  -Patient appears nontoxic, etiology is likely reactive  -Patient is high risk for aspiration pneumonia, had atelectasis on last chest x-ray 8/3 repeat chest x-ray ordered today     DVT PPX: Lovenox 40 mg injection daily        Thank you for allowing us to participate in the care of this patient.      Patient was seen for 84 minutes on unit/floor of which > 50% of time was spent on counseling and coordination of care regarding the above, including prognosis, risk reduction, benefits of treatment, and options for next stage of care.     Sal Skaggs, DO   Physical Medicine and Rehabilitation      Please note that this dictation was created  "using voice recognition software. I have made every reasonable attempt to correct obvious errors, but there may be errors of grammar and possibly content that I did not discover before finalizing the note.        Co-morbidities:  as listed above and below   Potential Risk - Complications: Cognitive Impairment, Contractures, Deep Vein Thrombosis, Dysphagia, Incontinence, Malnutrition, Pain, Paralysis, Perceptual Impairment, Pneumonia, and Seizures  Level of Risk: High    Ongoing Medical Management Needed (Medical/Nursing Needs):   Patient Active Problem List    Diagnosis Date Noted    Type 2 diabetes mellitus with hyperglycemia, without long-term current use of insulin ([facility]) 08/09/2022    Intracranial hemorrhage ([facility]) 08/04/2022    Hypertension 08/03/2022    Cerebellar hemorrhage, acute ([facility]) 08/02/2022    Hypokalemia 02/08/2019    Dental infection 02/08/2019       Current Vital Signs:   Temperature: 36.2 °C (97.1 °F) Pulse: 76 Respiration: 18 Blood Pressure: 107/59  Weight: 66.1 kg (145 lb 11.6 oz) Height: 149.9 cm (4' 11\")  Pulse Oximetry: 94 % O2 (LPM): 0      Completed Laboratory Reports:  Recent Labs     08/08/22  0415 08/09/22  0315   WBC 7.8 9.8   HEMOGLOBIN 12.9 13.1   HEMATOCRIT 38.7 40.2   PLATELETCT 227 253   SODIUM 137 138   POTASSIUM 3.2* 3.8   BUN 8 12   CREATININE 0.36* 0.42*   ALBUMIN 3.9 3.7   GLUCOSE 228* 135*     Additional Labs: Not Applicable    Prior Living Situation:   Housing / Facility: 1 Story House  Steps Into Home: 0  Steps In Home: 0  Lives with - Patient's Self Care Capacity: Adult Children (18 and 20 y/o kids)  Equipment Owned: None    Prior Level of Function / Living Situation:   Physical Therapy: Prior Services: None  Housing / Facility: 1 Story House  Steps Into Home: 0  Steps In Home: 0  Bathroom Set up: Bathtub / Shower Combination  Equipment Owned: None  Lives with - Patient's Self Care Capacity: Adult Children (18 and 20 y/o kids)  Bed Mobility: Independent  Transfer Status: " Independent  Ambulation: Independent  Distance Ambulation (Feet):  (community)  Assistive Devices Used: None  Stairs: Independent  Current Level of Function:   Gait Level Of Assist: Minimal Assist  Assistive Device: Front Wheel Walker  Distance (Feet): 200  Deviation:  (LOB to left, 5x with min A to recover)  # of Stairs Climbed: 0  Weight Bearing Status: no restrictions  Skilled Intervention: Verbal Cuing, Sequencing  Supine to Sit: Supervised  Sit to Supine: Supervised  Scooting: Supervised  Rolling: Standby Assist  Skilled Intervention: Verbal Cuing  Comments: pt sat EOB, asked for pants then changed mind and asked for underwear, donned this in stting without need for assist, straightened her socks without assist.  Sit to Stand: Standby Assist  Bed, Chair, Wheelchair Transfer: Contact Guard Assist  Toilet Transfers: Contact Guard Assist  Transfer Method: Stand Step  Skilled Intervention: Verbal Cuing, Tactile Cuing  Sitting in Chair: NT  Sitting Edge of Bed: 10 min  Standing: 10 min  Occupational Therapy:   Self Feeding: Independent  Grooming / Hygiene: Independent  Bathing: Independent  Dressing: Independent  Toileting: Independent  Medication Management: Independent  Laundry: Independent  Kitchen Mobility: Independent  Finances: Independent  Home Management: Independent  Shopping: Independent  Prior Level Of Mobility: Independent Without Device in Community, Independent Without Device in Home  Prior Services: None  Housing / Facility: [address]  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)  Current Level of Function:   Upper Body Dressing: Minimal Assist  Lower Body Dressing: Minimal Assist  Toileting: Minimal Assist (A for balance during underpant management)  Skilled Intervention: Verbal Cuing, Tactile Cuing  Comments: improving can be impuslive and quick moving and inattentive to environment during standing tasks  Speech Language Pathology:   Problem List:  Cognitive-Linguistic Deficits, Dysphagia  Diet / Liquid Recommendation: Minced & Moist (5) - (Dysphagia II), Mildly Thick (2) - (Nectar Thick) (ok for sips H20 b/w meals after oral care)  Rehabilitation Prognosis/Potential: Good  Estimated Length of Stay: 10-14 days    Nursing:      Continent    Scope/Intensity of Services Recommended:  Physical Therapy: 1 hr / day  5 days / week. Therapeutic Interventions Required: Maximize Endurance, Mobility, Strength, and Safety  Occupational Therapy: 1 hr / day 5 days / week. Therapeutic Interventions Required: Maximize Self Care, ADLs, IADLs, and Energy Conservation  Speech & Language Pathology: 1 hr / day 5 days / week. Therapeutic Interventions Required: Maximize Cognition, Swallowing, and Safety  Rehabilitation Nursin/7. Therapeutic Interventions Required: Monitor Pain, Skin, Vital Signs, Intake and Output, Labs, Safety, Aspiration Risk, and Family Training  Rehabilitation Physician: 3 - 5 days / week. Therapeutic Interventions Required: Medical Management  Respiratory Care: evaluate . Therapeutic Interventions Required: Pulmonary Toileting and Aspiration Risk  Dietician: consult . Therapeutic Interventions Required: nutritional need     She requires 24-hour rehabilitation nursing to manage bowel and bladder function, skin care, nutrition and fluid intake, pulmonary hygiene, pain control, safety, medication management, and patient/family goals. In addition, rehabilitation nursing will reiterate and reinforce therapy skills and equipment use, including ADLs, as well as provide education to the patient and family. Lana Phillips is willing to participate in and is able to tolerate the proposed plan of care.    Rehabilitation Goals and Plan (Expected frequency & duration of treatment in the IRF):   Return to the Community, Modified Independent Level of Care, and Minimal Assist Level of Care  Anticipated Date of Rehabilitation Admission: 2022  Patient/Family oriented IRF  level of care/facility/plan: Yes  Patient/Family willing to participate in IRF care/facility/plan: Yes  Patient able to tolerate IRF level of care proposed: Yes  Patient has potential to benefit IRF level of care proposed: Yes  Comments: Not Applicable    Special Needs or Precautions - Medical Necessity:  Safety Concerns/Precautions:  Fall Risk / High Risk for Falls, Balance, and Cognition  Cardiac Precautions  Current Medications:    Current Facility-Administered Medications Ordered in Epic   Medication Dose Route Frequency Provider Last Rate Last Admin    docusate sodium (COLACE) capsule 100 mg  100 mg Oral BID Titus Shi M.D.   100 mg at 08/10/22 0458    metFORMIN (GLUCOPHAGE) tablet 850 mg  850 mg Oral BID WITH MEALS Titus Shi M.D.   850 mg at 08/10/22 0911    SITagliptin (JANUVIA) tablet 50 mg  50 mg Oral DAILY Titus Shi M.D.   50 mg at 08/10/22 0458    lisinopril (PRINIVIL) tablet 40 mg  40 mg Oral Q DAY Raffy Tiwari M.D.   40 mg at 08/10/22 0458    sodium chloride (SALT) tablet 1 g  1 g Oral TID WITH MEALS Raffy Tiwari M.D.   1 g at 08/10/22 1438    senna-docusate (PERICOLACE or SENOKOT S) 8.6-50 MG per tablet 1 Tablet  1 Tablet Oral Q24HRS PRCYNTHIA Brenner M.D.   1 Tablet at 08/04/22 1903    senna-docusate (PERICOLACE or SENOKOT S) 8.6-50 MG per tablet 1 Tablet  1 Tablet Oral Nightly Parish Brenner M.D.   1 Tablet at 08/09/22 2016    polyethylene glycol/lytes (MIRALAX) PACKET 1 Packet  1 Packet Oral BID PRN Parish Brenner M.D.        ondansetron (ZOFRAN ODT) dispertab 4 mg  4 mg Oral Q4HRS PRCYNTHIA Brenner M.D.        Or    ondansetron (ZOFRAN) syringe/vial injection 4 mg  4 mg Intravenous Q4HRS PRCYNTHIA Brenner M.D.        magnesium hydroxide (MILK OF MAGNESIA) suspension 30 mL  30 mL Oral QDAY PRCYNTHIA Brenner M.D.        methocarbamol (ROBAXIN) tablet 750 mg  750 mg Oral Q8HRS PRN Parish Brenner M.D.        acetaminophen (TYLENOL) tablet 500 mg  500 mg Oral  Q6HRS PRN Parish Brenner M.D.   500 mg at 08/08/22 1724    cloNIDine (CATAPRES) tablet 0.1 mg  0.1 mg Oral Q4HRS PRN Parish Brenner M.D.        amLODIPine (NORVASC) tablet 10 mg  10 mg Oral Q DAY Parish Brenner M.D.   10 mg at 08/10/22 0458    scopolamine (TRANSDERM-SCOP) patch 1 Patch  1 Patch Transdermal Q72HRS Parish Brenner M.D.   1 Patch at 08/09/22 0845    Respiratory Therapy Consult   Nebulization Continuous RT Baljinder George M.D.        acetaminophen (TYLENOL) suppository 650 mg  650 mg Rectal Q4HRS PRN Baljinder George M.D.   650 mg at 08/03/22 1012    MD Alert...ICU Electrolyte Replacement per Pharmacy   Other PHARMACY TO DOSE Baljinder George M.D.        Pharmacy Consult Request ...Pain Management Review 1 Each  1 Each Other PHARMACY TO DOSE CASEY Byrd.A.-C.        MD ALERT...DO NOT ADMINISTER NSAIDS or ASPIRIN unless ORDERED By Neurosurgery 1 Each  1 Each Other PRN Amanda Georges, P.A.-C.        diphenhydrAMINE (BENADRYL) injection 25 mg  25 mg Intravenous Q6HRS PRN Amanda Georges, P.A.-C.        labetalol (NORMODYNE/TRANDATE) injection 10 mg  10 mg Intravenous Q HOUR PRN Amanda Georges, P.A.-C.   10 mg at 08/06/22 2011    bisacodyl (DULCOLAX) suppository 10 mg  10 mg Rectal Q24HRS PRN Amanda Georges, P.A.-C.        sodium phosphate (Fleet) enema 133 mL  1 Each Rectal Once PRN Amanda Georges, P.A.-C.        enoxaparin (Lovenox) inj 40 mg  40 mg Subcutaneous DAILY AT 1800 Amanda Georges, P.A.-C.   40 mg at 08/09/22 1805    hydrALAZINE (APRESOLINE) injection 10 mg  10 mg Intravenous Q HOUR PRN Amanda Georges, P.A.-C.   10 mg at 08/06/22 0412    benzocaine-menthol (Cepacol) lozenge 1 Lozenge  1 Lozenge Mouth/Throat Q2HRS PRN Amanda Georges P.A.-C.         No current [facility]-ordered outpatient medications on file.     Diet:   DIET ORDERS (From admission to next 24h)       Start     Ordered    08/09/22 1821  Diet Order Diet: Consistent CHO (Diabetic)  ALL MEALS        Question:  Diet:  Answer:   Consistent CHO (Diabetic)    08/09/22 1821                    Anticipated Discharge Destination / Patient/Family Goal:  Destination: Home with Assistance Support System: Family   Anticipated home health services: OT, PT, SLP, and Nursing  Previously used  service/ provider: Not Applicable  Anticipated DME Needs: Walker  Outpatient Services: OT, PT, and SLP  Alternative resources to address additional identified needs:   Follow up for post acute service. Spoke with son Bryant discussed goals and expectation for IRF level of care. Discussed anticipated length of stay 10 to14 days. Discussed Covid visitation policy. Discussed prior authorization though [employer].  Bryant tells me that he will be able to provide 24/7 supervision and physical  assistance for return to community support. Primary caregivers will be an Aunt and sister Nelida they work but multiple family member will pitch in to support her return to home a 2 story home with 2 steps to enter and a flight of stair up to the bedroom and shower.  There is a half bath on the lower level. Bryant is agreeable to transfer to [facility] when medical cleared and insurance authorization is obtained. Will submit to insurance for consideration.     No future appointments.    Pre-Screen Completed: 8/10/2022 4:11 PM Michael Craig

## 2022-08-11 PROCEDURE — 700102 HCHG RX REV CODE 250 W/ 637 OVERRIDE(OP): Performed by: HOSPITALIST

## 2022-08-11 PROCEDURE — A9270 NON-COVERED ITEM OR SERVICE: HCPCS | Performed by: INTERNAL MEDICINE

## 2022-08-11 PROCEDURE — 770001 HCHG ROOM/CARE - MED/SURG/GYN PRIV*

## 2022-08-11 PROCEDURE — 700102 HCHG RX REV CODE 250 W/ 637 OVERRIDE(OP): Performed by: INTERNAL MEDICINE

## 2022-08-11 PROCEDURE — 700111 HCHG RX REV CODE 636 W/ 250 OVERRIDE (IP)

## 2022-08-11 PROCEDURE — 700102 HCHG RX REV CODE 250 W/ 637 OVERRIDE(OP): Performed by: STUDENT IN AN ORGANIZED HEALTH CARE EDUCATION/TRAINING PROGRAM

## 2022-08-11 PROCEDURE — A9270 NON-COVERED ITEM OR SERVICE: HCPCS | Performed by: HOSPITALIST

## 2022-08-11 PROCEDURE — 99231 SBSQ HOSP IP/OBS SF/LOW 25: CPT | Performed by: HOSPITALIST

## 2022-08-11 PROCEDURE — A9270 NON-COVERED ITEM OR SERVICE: HCPCS | Performed by: STUDENT IN AN ORGANIZED HEALTH CARE EDUCATION/TRAINING PROGRAM

## 2022-08-11 RX ORDER — SODIUM CHLORIDE 1 G/1
1 TABLET ORAL
Status: CANCELLED | OUTPATIENT
Start: 2022-08-11

## 2022-08-11 RX ORDER — LISINOPRIL 20 MG/1
40 TABLET ORAL
Status: CANCELLED | OUTPATIENT
Start: 2022-08-12

## 2022-08-11 RX ORDER — ENOXAPARIN SODIUM 100 MG/ML
40 INJECTION SUBCUTANEOUS DAILY
Status: CANCELLED | OUTPATIENT
Start: 2022-08-11

## 2022-08-11 RX ORDER — SCOLOPAMINE TRANSDERMAL SYSTEM 1 MG/1
1 PATCH, EXTENDED RELEASE TRANSDERMAL
Status: CANCELLED | OUTPATIENT
Start: 2022-08-12

## 2022-08-11 RX ORDER — AMLODIPINE BESYLATE 5 MG/1
10 TABLET ORAL
Status: CANCELLED | OUTPATIENT
Start: 2022-08-12

## 2022-08-11 RX ADMIN — DOCUSATE SODIUM 100 MG: 100 CAPSULE, LIQUID FILLED ORAL at 05:01

## 2022-08-11 RX ADMIN — SITAGLIPTIN 50 MG: 50 TABLET, FILM COATED ORAL at 05:01

## 2022-08-11 RX ADMIN — DOCUSATE SODIUM 100 MG: 100 CAPSULE, LIQUID FILLED ORAL at 17:01

## 2022-08-11 RX ADMIN — ENOXAPARIN SODIUM 40 MG: 40 INJECTION SUBCUTANEOUS at 17:01

## 2022-08-11 RX ADMIN — SENNOSIDES AND DOCUSATE SODIUM 1 TABLET: 50; 8.6 TABLET ORAL at 19:32

## 2022-08-11 RX ADMIN — SODIUM CHLORIDE 1 G: 1 TABLET ORAL at 17:01

## 2022-08-11 RX ADMIN — SODIUM CHLORIDE 1 G: 1 TABLET ORAL at 12:25

## 2022-08-11 RX ADMIN — SODIUM CHLORIDE 1 G: 1 TABLET ORAL at 08:44

## 2022-08-11 RX ADMIN — METFORMIN HYDROCHLORIDE 850 MG: 850 TABLET ORAL at 17:01

## 2022-08-11 RX ADMIN — METFORMIN HYDROCHLORIDE 850 MG: 850 TABLET ORAL at 08:45

## 2022-08-11 ASSESSMENT — COGNITIVE AND FUNCTIONAL STATUS - GENERAL
STANDING UP FROM CHAIR USING ARMS: A LITTLE
DRESSING REGULAR UPPER BODY CLOTHING: A LITTLE
PERSONAL GROOMING: A LITTLE
TURNING FROM BACK TO SIDE WHILE IN FLAT BAD: A LITTLE
MOBILITY SCORE: 18
CLIMB 3 TO 5 STEPS WITH RAILING: A LITTLE
MOVING TO AND FROM BED TO CHAIR: A LITTLE
SUGGESTED CMS G CODE MODIFIER MOBILITY: CK
DAILY ACTIVITIY SCORE: 18
MOVING FROM LYING ON BACK TO SITTING ON SIDE OF FLAT BED: A LITTLE
TOILETING: A LITTLE
EATING MEALS: A LITTLE
HELP NEEDED FOR BATHING: A LITTLE
SUGGESTED CMS G CODE MODIFIER DAILY ACTIVITY: CK
DRESSING REGULAR LOWER BODY CLOTHING: A LITTLE
WALKING IN HOSPITAL ROOM: A LITTLE

## 2022-08-11 ASSESSMENT — ENCOUNTER SYMPTOMS
FEVER: 0
SHORTNESS OF BREATH: 0
DIZZINESS: 0
HEADACHES: 1
ROS GI COMMENTS: TOLERATING A DIET.

## 2022-08-11 ASSESSMENT — PAIN DESCRIPTION - PAIN TYPE
TYPE: ACUTE PAIN

## 2022-08-11 ASSESSMENT — FIBROSIS 4 INDEX: FIB4 SCORE: 2.05

## 2022-08-11 NOTE — DISCHARGE PLANNING
Spoke with Karla @ [employer] [phone].  She has given verbal auth for [facility].

## 2022-08-11 NOTE — THERAPY
Physical Therapy   Daily Treatment     Patient Name: Lana Phillips  Age:  57 y.o., Sex:  female  Medical Record #: [id number]  Today's Date: 8/10/2022     Precautions  Precautions: Fall Risk;Swallow Precautions ( See Comments)  Comments: eyes closed intermittently with pt responding during cog eval.    Assessment    The pt presents today motivated to participate in tx session, and eager to get OOB.  She demo'd bed mobility spv w/ HOB flat and no rails, and STS eob w/ FWW SBA.  The pt demo'd gait 500' using FWW Sina for AD management and obstacle negotiation d/t poor attention to tasks via wide DANIEL, slow marquez and limited toe clearance, no LOB observed and distance limited by therapist.  She was able to ascend/descend 10 stairs w/ UE support CGA.  Recommend placement given pt's high risk of falls at this time d/t poor attention to tasks and poor AD management.  Will follow.      Plan    Continue current treatment plan.    DC Equipment Recommendations: Unable to determine at this time  Discharge Recommendations: Recommend post-acute placement for additional physical therapy services prior to discharge home      Subjective/Objective       08/10/22 1151   Cognition    Cognition / Consciousness X   Level of Consciousness Alert   Comments pt pleasant and cooperative, easily distracted requiring consistent cuing during mobility   Passive ROM Lower Body   Passive ROM Lower Body WDL   Active ROM Lower Body    Active ROM Lower Body  WDL   Strength Lower Body   Lower Body Strength  WDL   Sensation Lower Body   Lower Extremity Sensation   WDL   Comments sensation intact to LT/DP BLE   Neuro-Muscular Treatments   Neuro-Muscular Treatments Anterior weight shift;Weight Shift Right;Weight Shift Left   Comments stand w/ FWW   Other Treatments   Other Treatments Provided bed mobility, STS, gait, functional endurance   Balance   Sitting Balance (Static) Fair +   Sitting Balance (Dynamic) Fair +   Standing Balance (Static) Fair   Standing  Balance (Dynamic) Fair -   Weight Shift Sitting Fair   Weight Shift Standing Fair   Skilled Intervention Verbal Cuing;Tactile Cuing;Sequencing   Comments stand w/ FWW   Gait Analysis   Gait Level Of Assist Minimal Assist  (for obstacle avoidance and AD management)   Assistive Device Front Wheel Walker   Distance (Feet) 500   # of Times Distance was Traveled 1   Deviation Bradykinetic;Increased Base Of Support;Decreased Toe Off   # of Stairs Climbed 10   Level of Assist with Stairs Contact Guard Assist   Weight Bearing Status no restrictions   Vision Deficits Impacting Mobility denies   Skilled Intervention Verbal Cuing;Tactile Cuing;Sequencing;Facilitation   Comments distance limited by therapist, assist for AD management and obstacle avoidance   Bed Mobility    Supine to Sit Supervised   Sit to Supine Supervised   Scooting Supervised   Rolling Supervised   Skilled Intervention Verbal Cuing   Comments HOB flat, no rails   Functional Mobility   Sit to Stand Standby Assist   Bed, Chair, Wheelchair Transfer Contact Guard Assist   Transfer Method Stand Step   Mobility STS eob w/ FWW, eob<>hallway   Skilled Intervention Verbal Cuing;Tactile Cuing   Activity Tolerance   Sitting Edge of Bed 3min   Standing 12min   Patient / Family Goals    Patient / Family Goal #1 to return home   Goal #1 Outcome Goal not met   Short Term Goals    Short Term Goal # 1 pt will perform supine <> sit with HOB flat, no railing and SPV in 6 visits   Goal Outcome # 1 Goal met   Short Term Goal # 2 pt will perform sit <> stand and transfer between various surfaces with LRAD and SPV to improve mobility independence in 6 visit   Goal Outcome # 2 Goal not met   Short Term Goal # 3 pt will ambulate > 200 ft with LRAD and SPV to access community in 6 visits   Goal Outcome # 3 Goal not met   Education Group   Education Provided Role of Physical Therapist;Gait Training;Use of Assistive Device   Role of Physical Therapist Patient Response  Patient;Acceptance;Demonstration;Explanation;Action Demonstration   Gait Training Patient Response Patient;Acceptance;Demonstration;Explanation;Action Demonstration;Reinforcement Needed   Use of Assistive Device Patient Response Patient;Acceptance;Explanation;Demonstration;Action Demonstration;Reinforcement Needed   Additional Comments pt receptive of edu provided   Interdisciplinary Plan of Care Collaboration   IDT Collaboration with  Nursing   Patient Position at End of Therapy In Bed;Bed Alarm On;Tray Table within Reach;Call Light within Reach;Phone within Reach   Collaboration Comments regarding outcome of tx session   Session Information   Date / Session Number  8/10- 3 (3/5, 8/10)

## 2022-08-11 NOTE — PROGRESS NOTES
Report given to JOHN Marti. No further questions at this time. Patient transporting with a wheelchair with CNA.

## 2022-08-11 NOTE — DISCHARGE PLANNING
RN CM received notification from Virginia BRITO, Clinical Admissions Coordinator at [facility] indicating that transportation has been arranged for [id number] to [facility] with report to be called to [phone].    JOHN KAUFMAN called Vanessa PEÑA RN CM on Neuro, to inform her of the patient's acceptance to [facility] and the arranged transportation. Acknowledgement received.

## 2022-08-11 NOTE — CARE PLAN
The patient is Stable - Low risk of patient condition declining or worsening    Shift Goals  Clinical Goals: VSS, safety  Patient Goals: comfort, rehab  Family Goals: Unable to Assess      Problem: Skin Integrity  Goal: Skin integrity is maintained or improved  Outcome: Progressing     Problem: Fall Risk  Goal: Patient will remain free from falls  Outcome: Progressing     Problem: Craniotomy Surgery  Goal: Post-Operative Craniotomy Surgery: Patient will achieve optimal post-surgical outcomes  Outcome: Progressing     Problem: Neuro Status  Goal: Neuro status will remain stable or improve  Outcome: Progressing     Problem: Pain - Standard  Goal: Alleviation of pain or a reduction in pain to the patient’s comfort goal  Outcome: Progressing

## 2022-08-11 NOTE — DISCHARGE PLANNING
Lana Phillips MR#[id number] ,  transportation arranged for  08/12/2022 to [facility] please call report to [phone]

## 2022-08-11 NOTE — CARE PLAN
The patient is Stable - Low risk of patient condition declining or worsening    Shift Goals  Clinical Goals: SBP<160, safety  Patient Goals: rehab  Family Goals: n/a    Progress made toward(s) clinical / shift goals:    Problem: Fall Risk  Goal: Patient will remain free from falls  Outcome: Progressing  Note: Bed low and locked with strip alarm on bed and chair. Pt educated on fall precautions in place and importance to call prior to mobilizing.      Problem: Neuro Status  Goal: Neuro status will remain stable or improve  Outcome: Progressing  Note: Q4h neuro checks in place.        Patient is not progressing towards the following goals: n/a

## 2022-08-11 NOTE — DISCHARGE INSTRUCTIONS
"Discharge Instructions    Discharged to other by car with escort. Discharged via wheelchair, hospital escort: Yes.  Special equipment needed: Not Applicable    Be sure to schedule a follow-up appointment with your primary care doctor or any specialists as instructed.     Discharge Plan:   Diet Plan: Discussed  Activity Level: Discussed  Confirmed Follow up Appointment: Patient to Call and Schedule Appointment  Confirmed Symptoms Management: Discussed  Medication Reconciliation Updated: Yes    I understand that a diet low in cholesterol, fat, and sodium is recommended for good health. Unless I have been given specific instructions below for another diet, I accept this instruction as my diet prescription.   Other diet: Diabetic    Special Instructions: None    -Is this patient being discharged with medication to prevent blood clots?  No    Is patient discharged on Warfarin / Coumadin?   No     Stroke / CVA / TIA / Hemorrhagic Ischemia Discharge Instructions    You have had a stroke. Your risk factors have been identified as follows:  Age - Over 55  High blood pressure  Diabetes  Previous TIAs or \"mini strokes\"  High Cholesterol and lipids    It is important that you reduce your risk factors to avoid another stroke in the future. Here are some general guidelines to follow:    Eat healthy - avoid food high in fat  Maintain a healthy weight  Avoid alcohol and illegal drug use Get regular exercise  Avoid smoking  Take your medications as directed     For more information regarding risk factors, refer to pages 17-19 in your Stroke Patient Education Guide. Stroke Education Guide was given to patient.    Warning signs of a stroke include (which can also be found on page 3 of your Stroke Patient Education Guide):  Sudden numbness of weakness of the face, arm or leg (especially on one side of the body).  Sudden confusion, trouble speaking or understanding.  Sudden trouble seeing in one or both eyes.  Sudden trouble walking, " dizziness, loss of balance or coordination.  Sudden severe headache with no known cause.  It is very important to get treatment quickly when a stroke occurs. If you experience any of the above warning signs, call 911 immediately.     Some patients who have had a stroke will be going home on a blood thinner medication called Warfarin (Coumadin).  This medication requires very close monitoring and follow up.  This follow up can be provided by either your Primary Care Physician or by [facility] Outpatient Anticoagulation Service.  The Outpatient Anticoagulation Service is located at the [facility] at [facility] ([facility]).  If you do not know when your follow up appointment is scheduled, call [phone] to verify your appointment time.

## 2022-08-11 NOTE — PROGRESS NOTES
Hospital Medicine Daily Progress Note    Date of Service  8/11/2022    Chief Complaint  Lana Phillips is a 57 y.o. female admitted 8/2/2022 with vomiting and hypersomnolence.     Hospital Course  Ms. Phillips is a 57 y.o. female with no known past medical history was admitted to the ICU on 8/2/2022 for large right cerebral hemorrhage with mass-effect which required emergent decompressive craniotomy on 8/2/2022 for which patient is currently status post occipital craniectomy with evacuation of the left cerebellar hematoma.  Neurology and neurosurgery following.  Postsurgically, she was continued on hypertonic therapy and ultimately extubated on 8/3/2022. She did well post operatively. Sodium levels kept elevated per neurology recommendations with 3% saline, now off hypertonic saline with goal eunatremia. Na levels normalized. Patient a good rehab candidate, PM&R consulted. Patient is medically cleared to discharge to rehab vs SNF    Interval Problem Update  8/9: Ms. Phillips seen and evaluated in the [facility].  She has been working with therapies and is looking forward to going to rehab as she feels her balance is quite off.  Her daughter is at bedside.  She lives with her daughter and 2 sons and plans on going back there after a 2 to 3-week stint at rehab.  Her sodium levels are appropriate on salt tabs and hydrochlorothiazide to be stopped given the propensity for hyponatremia.  She will be changed to oral diabetic medications.  8/10: Ms. Phillips was evaluated and examined in the [facility]. She continues to look forward to inpatient rehab. Her blood pressure has been low hundreds to 120's today. I contacted the rehab liaison Michael and gave him the numbers to call for family members.    8/11: Ms. Phillips was seen and evaluated in the [facility]. She is looking forward to going to Rehab which will be set up tomorrow. Her blood pressure has fluctuated from 101 systolic to 147.     I have discussed this patient's plan of care and discharge plan at [facility]  rounds today with Case Management, Nursing, Nursing leadership, and other members of the IDT team.    Consultants/Specialty  neurosurgery  Critical care  Code Status  Full Code    Disposition  Patient is medically cleared for discharge.   Anticipate discharge to to an inpatient rehabilitation hospital.  I have placed the appropriate orders for post-discharge needs.    Review of Systems  Review of Systems   Constitutional:  Negative for fever.   Respiratory:  Negative for shortness of breath.    Cardiovascular:  Negative for chest pain.   Gastrointestinal:         Tolerating a diet.   Neurological:  Positive for headaches. Negative for dizziness.        Her balance remains off when walking   All other systems reviewed and are negative.     Physical Exam  Temp:  [36.2 °C (97.2 °F)-36.9 °C (98.4 °F)] 36.9 °C (98.4 °F)  Pulse:  [66-81] 79  Resp:  [14-18] 16  BP: (101-147)/(65-90) 138/85  SpO2:  [93 %-94 %] 94 %    Physical Exam  Vitals and nursing note reviewed.   Constitutional:       General: She is not in acute distress.     Appearance: Normal appearance. She is normal weight. She is not toxic-appearing.   HENT:      Head: Normocephalic.      Comments: Craniotomy scars without infection     Mouth/Throat:      Mouth: Mucous membranes are dry.      Pharynx: Oropharynx is clear.   Eyes:      General: No scleral icterus.     Conjunctiva/sclera: Conjunctivae normal.   Cardiovascular:      Rate and Rhythm: Normal rate and regular rhythm.      Heart sounds: No murmur heard.  Pulmonary:      Effort: Pulmonary effort is normal.      Breath sounds: Normal breath sounds.      Comments: Room air  Abdominal:      General: There is no distension.      Tenderness: There is no abdominal tenderness.   Musculoskeletal:      Cervical back: Neck supple.      Right lower leg: No edema.      Left lower leg: No edema.   Skin:     General: Skin is warm and dry.   Neurological:      General: No focal deficit present.      Mental Status: She  is alert and oriented to person, place, and time.   Psychiatric:         Mood and Affect: Mood normal.         Behavior: Behavior normal.      Comments: Flat affect  Compliant with exam       Fluids    Intake/Output Summary (Last 24 hours) at 8/11/2022 1621  Last data filed at 8/10/2022 2000  Gross per 24 hour   Intake 400 ml   Output --   Net 400 ml         Laboratory  Recent Labs     08/09/22  0315   WBC 9.8   RBC 4.38   HEMOGLOBIN 13.1   HEMATOCRIT 40.2   MCV 91.8   MCH 29.9   MCHC 32.6*   RDW 41.1   PLATELETCT 253   MPV 10.3       Recent Labs     08/09/22  0315   SODIUM 138   POTASSIUM 3.8   CHLORIDE 101   CO2 28   GLUCOSE 135*   BUN 12   CREATININE 0.42*   CALCIUM 8.4*                     Imaging  EC-ECHOCARDIOGRAM COMPLETE W/O CONT   Final Result      DX-CHEST-PORTABLE (1 VIEW)   Final Result      1.  Persistent hypoinflation with mild worsening bibasilar atelectasis.   2.  Supportive tubing as described above.      IR-PICC LINE PLACEMENT W/ GUIDANCE > AGE 5   Final Result                  Ultrasound-guided PICC placement performed by qualified nursing staff as    above.          DX-CHEST-PORTABLE (1 VIEW)   Final Result      Bibasilar underinflation atelectasis which could obscure an additional process.      CT-HEAD W/O   Final Result      1.  Postsurgical changes status post occipital craniectomy with evacuation of the left cerebellar hematoma. There is a small amount of residual blood and postsurgical pneumocephalus.   2.  There is improved mass effect on the fourth ventricle.   3.  Lateral ventricles are stable in size.      CT-CTA HEAD WITH & W/O-POST PROCESS   Final Result         1. No hemodynamically significant narrowing of the major intracranial vessels.      2. Redemonstration of large parenchymal hemorrhage in the left cerebellum      CT-CTA NECK WITH & W/O-POST PROCESSING   Final Result      1. No evidence of flow-limiting stenosis or dissection in the cervical carotid or cervical vertebral  arteries.      CT-HEAD W/O   Final Result      Large 4.6 x 3.4 cm intraparenchymal hematoma centered in the left cerebellar hemisphere. Associated mass effect results in effacement of the basal cisterns and fourth ventricle. No evidence of hydrocephalus.      Findings were discussed with Dr. JANET ARELLANO on 8/2/2022 2:45 PM.           Assessment/Plan  * Cerebellar hemorrhage, acute (HCC)- (present on admission)  Assessment & Plan  Neurosurgery consult: Dr Vences s/p 8/2 posterior fossa craniectomy for evacuation of intraparenchymal hemorrhage with great operative result seen on postoperative CT  SBP goal < 160  Neuro checks q4h  Status post 3% saline. Okay to continue salt tabs. Goal eunatremia.  Neurosurgery and neurology following  PT/OT recommend acute rehab, PMR consulted    Hypertension- (present on admission)  Assessment & Plan  Was severely hypertensive to 260 on presentation  Strict SBP < 160  Status post nicardipine drip  Continue lisinopril 40 mg daily, continue amlodipine 10 mg daily  Stopped HCTZ given her previously low Na and ongoing need for salt tabs.     Type 2 diabetes mellitus with hyperglycemia, without long-term current use of insulin (HCC)- (present on admission)  Assessment & Plan  Known diagnosis but not previously on medications.   HbA1c 7.1  She has been on sliding scale  Initiate Metformin 850 mg BID and Januvia 50 mg   Stopped sliding scale.     Hypokalemia- (present on admission)  Assessment & Plan  Replete as necessary       VTE prophylaxis: enoxaparin ppx    I have performed a physical exam and reviewed and updated ROS and Plan today (8/11/2022). In review of yesterday's note (8/10/2022), there are no changes except as documented above.

## 2022-08-11 NOTE — PROGRESS NOTES
Bedside report received and patient care assumed. Pt is resting in bed, A&O4, with no complaints of pain, and is on RA. Pt is medical. All fall precautions are in place, belongings at bedside table.  Pt was updated on POC, no questions or concerns. Pt educated on use of call light for assistance.

## 2022-08-12 ENCOUNTER — HOSPITAL ENCOUNTER (INPATIENT)
Facility: REHABILITATION | Age: 57
LOS: 6 days | DRG: 057 | End: 2022-08-18
Attending: PHYSICAL MEDICINE & REHABILITATION | Admitting: PHYSICAL MEDICINE & REHABILITATION
Payer: COMMERCIAL

## 2022-08-12 VITALS
WEIGHT: 143.52 LBS | DIASTOLIC BLOOD PRESSURE: 77 MMHG | HEIGHT: 59 IN | BODY MASS INDEX: 28.93 KG/M2 | RESPIRATION RATE: 17 BRPM | HEART RATE: 88 BPM | SYSTOLIC BLOOD PRESSURE: 124 MMHG | OXYGEN SATURATION: 97 % | TEMPERATURE: 97.9 F

## 2022-08-12 DIAGNOSIS — E78.49 OTHER HYPERLIPIDEMIA: ICD-10-CM

## 2022-08-12 DIAGNOSIS — I61.4 CEREBELLAR HEMORRHAGE, ACUTE (HCC): ICD-10-CM

## 2022-08-12 DIAGNOSIS — E11.65 TYPE 2 DIABETES MELLITUS WITH HYPERGLYCEMIA, WITHOUT LONG-TERM CURRENT USE OF INSULIN (HCC): ICD-10-CM

## 2022-08-12 DIAGNOSIS — I10 PRIMARY HYPERTENSION: ICD-10-CM

## 2022-08-12 PROBLEM — Z74.09 IMPAIRED MOBILITY AND ADLS: Status: ACTIVE | Noted: 2022-08-12

## 2022-08-12 PROBLEM — I61.9 HEMORRHAGIC STROKE (HCC): Status: ACTIVE | Noted: 2022-08-12

## 2022-08-12 PROBLEM — Z78.9 IMPAIRED MOBILITY AND ADLS: Status: ACTIVE | Noted: 2022-08-12

## 2022-08-12 LAB
FLUAV RNA SPEC QL NAA+PROBE: NEGATIVE
FLUBV RNA SPEC QL NAA+PROBE: NEGATIVE
GLUCOSE BLD STRIP.AUTO-MCNC: 116 MG/DL (ref 65–99)
GLUCOSE BLD STRIP.AUTO-MCNC: 120 MG/DL (ref 65–99)
GLUCOSE BLD STRIP.AUTO-MCNC: 129 MG/DL (ref 65–99)
RSV RNA SPEC QL NAA+PROBE: NEGATIVE
SARS-COV-2 RNA RESP QL NAA+PROBE: NOTDETECTED
SPECIMEN SOURCE: NORMAL

## 2022-08-12 PROCEDURE — 700102 HCHG RX REV CODE 250 W/ 637 OVERRIDE(OP): Performed by: HOSPITALIST

## 2022-08-12 PROCEDURE — A9270 NON-COVERED ITEM OR SERVICE: HCPCS | Performed by: HOSPITALIST

## 2022-08-12 PROCEDURE — 700102 HCHG RX REV CODE 250 W/ 637 OVERRIDE(OP): Performed by: STUDENT IN AN ORGANIZED HEALTH CARE EDUCATION/TRAINING PROGRAM

## 2022-08-12 PROCEDURE — 99239 HOSP IP/OBS DSCHRG MGMT >30: CPT | Performed by: INTERNAL MEDICINE

## 2022-08-12 PROCEDURE — A9270 NON-COVERED ITEM OR SERVICE: HCPCS | Performed by: STUDENT IN AN ORGANIZED HEALTH CARE EDUCATION/TRAINING PROGRAM

## 2022-08-12 PROCEDURE — 99223 1ST HOSP IP/OBS HIGH 75: CPT | Performed by: PHYSICAL MEDICINE & REHABILITATION

## 2022-08-12 PROCEDURE — 0241U HCHG SARS-COV-2 COVID-19 NFCT DS RESP RNA 4 TRGT MIC: CPT

## 2022-08-12 PROCEDURE — A9270 NON-COVERED ITEM OR SERVICE: HCPCS | Performed by: INTERNAL MEDICINE

## 2022-08-12 PROCEDURE — 82962 GLUCOSE BLOOD TEST: CPT

## 2022-08-12 PROCEDURE — 700111 HCHG RX REV CODE 636 W/ 250 OVERRIDE (IP): Performed by: PHYSICAL MEDICINE & REHABILITATION

## 2022-08-12 PROCEDURE — 700102 HCHG RX REV CODE 250 W/ 637 OVERRIDE(OP): Performed by: PHYSICAL MEDICINE & REHABILITATION

## 2022-08-12 PROCEDURE — 700102 HCHG RX REV CODE 250 W/ 637 OVERRIDE(OP): Performed by: INTERNAL MEDICINE

## 2022-08-12 PROCEDURE — 94760 N-INVAS EAR/PLS OXIMETRY 1: CPT

## 2022-08-12 PROCEDURE — A9270 NON-COVERED ITEM OR SERVICE: HCPCS | Performed by: PHYSICAL MEDICINE & REHABILITATION

## 2022-08-12 PROCEDURE — 770010 HCHG ROOM/CARE - REHAB SEMI PRIVAT*

## 2022-08-12 RX ORDER — ONDANSETRON 4 MG/1
4 TABLET, ORALLY DISINTEGRATING ORAL 4 TIMES DAILY PRN
Status: DISCONTINUED | OUTPATIENT
Start: 2022-08-12 | End: 2022-08-18 | Stop reason: HOSPADM

## 2022-08-12 RX ORDER — ATORVASTATIN CALCIUM 40 MG/1
80 TABLET, FILM COATED ORAL EVERY EVENING
Status: DISCONTINUED | OUTPATIENT
Start: 2022-08-12 | End: 2022-08-17

## 2022-08-12 RX ORDER — BISACODYL 10 MG
10 SUPPOSITORY, RECTAL RECTAL
Status: DISCONTINUED | OUTPATIENT
Start: 2022-08-12 | End: 2022-08-18 | Stop reason: HOSPADM

## 2022-08-12 RX ORDER — ALUMINA, MAGNESIA, AND SIMETHICONE 2400; 2400; 240 MG/30ML; MG/30ML; MG/30ML
20 SUSPENSION ORAL
Status: DISCONTINUED | OUTPATIENT
Start: 2022-08-12 | End: 2022-08-18 | Stop reason: HOSPADM

## 2022-08-12 RX ORDER — AMOXICILLIN 250 MG
1 CAPSULE ORAL NIGHTLY
Qty: 30 TABLET | Refills: 0 | Status: SHIPPED
Start: 2022-08-12 | End: 2022-11-10

## 2022-08-12 RX ORDER — DEXTROSE MONOHYDRATE 25 G/50ML
25 INJECTION, SOLUTION INTRAVENOUS
Status: DISCONTINUED | OUTPATIENT
Start: 2022-08-12 | End: 2022-08-18 | Stop reason: HOSPADM

## 2022-08-12 RX ORDER — SODIUM CHLORIDE 1 G/1
1 TABLET ORAL
Qty: 90 TABLET | Refills: 3 | Status: ON HOLD
Start: 2022-08-12 | End: 2022-08-18 | Stop reason: SDUPTHER

## 2022-08-12 RX ORDER — ACETAMINOPHEN 325 MG/1
650 TABLET ORAL EVERY 4 HOURS PRN
Status: DISCONTINUED | OUTPATIENT
Start: 2022-08-12 | End: 2022-08-18 | Stop reason: HOSPADM

## 2022-08-12 RX ORDER — AMLODIPINE BESYLATE 5 MG/1
10 TABLET ORAL
Status: DISCONTINUED | OUTPATIENT
Start: 2022-08-12 | End: 2022-08-12

## 2022-08-12 RX ORDER — POLYETHYLENE GLYCOL 3350 17 G/17G
1 POWDER, FOR SOLUTION ORAL
Status: DISCONTINUED | OUTPATIENT
Start: 2022-08-12 | End: 2022-08-18 | Stop reason: HOSPADM

## 2022-08-12 RX ORDER — AMOXICILLIN 250 MG
2 CAPSULE ORAL 2 TIMES DAILY
Status: DISCONTINUED | OUTPATIENT
Start: 2022-08-12 | End: 2022-08-18 | Stop reason: HOSPADM

## 2022-08-12 RX ORDER — SCOLOPAMINE TRANSDERMAL SYSTEM 1 MG/1
1 PATCH, EXTENDED RELEASE TRANSDERMAL
Status: DISCONTINUED | OUTPATIENT
Start: 2022-08-15 | End: 2022-08-18 | Stop reason: HOSPADM

## 2022-08-12 RX ORDER — AMLODIPINE BESYLATE 5 MG/1
10 TABLET ORAL
Status: DISCONTINUED | OUTPATIENT
Start: 2022-08-13 | End: 2022-08-18 | Stop reason: HOSPADM

## 2022-08-12 RX ORDER — HYDROXYZINE HYDROCHLORIDE 25 MG/1
50 TABLET, FILM COATED ORAL EVERY 6 HOURS PRN
Status: DISCONTINUED | OUTPATIENT
Start: 2022-08-12 | End: 2022-08-18 | Stop reason: HOSPADM

## 2022-08-12 RX ORDER — ENOXAPARIN SODIUM 100 MG/ML
40 INJECTION SUBCUTANEOUS DAILY
Status: DISCONTINUED | OUTPATIENT
Start: 2022-08-12 | End: 2022-08-18 | Stop reason: HOSPADM

## 2022-08-12 RX ORDER — LISINOPRIL 20 MG/1
40 TABLET ORAL
Status: DISCONTINUED | OUTPATIENT
Start: 2022-08-13 | End: 2022-08-18 | Stop reason: HOSPADM

## 2022-08-12 RX ORDER — POLYVINYL ALCOHOL 14 MG/ML
1 SOLUTION/ DROPS OPHTHALMIC PRN
Status: DISCONTINUED | OUTPATIENT
Start: 2022-08-12 | End: 2022-08-18 | Stop reason: HOSPADM

## 2022-08-12 RX ORDER — LACTULOSE 20 G/30ML
30 SOLUTION ORAL
Status: DISCONTINUED | OUTPATIENT
Start: 2022-08-12 | End: 2022-08-18 | Stop reason: HOSPADM

## 2022-08-12 RX ORDER — AMLODIPINE BESYLATE 10 MG/1
10 TABLET ORAL DAILY
Qty: 30 TABLET | Status: ON HOLD
Start: 2022-08-13 | End: 2022-08-18 | Stop reason: SDUPTHER

## 2022-08-12 RX ORDER — SODIUM CHLORIDE 1 G/1
1 TABLET ORAL
Status: DISCONTINUED | OUTPATIENT
Start: 2022-08-12 | End: 2022-08-13

## 2022-08-12 RX ORDER — ECHINACEA PURPUREA EXTRACT 125 MG
2 TABLET ORAL PRN
Status: DISCONTINUED | OUTPATIENT
Start: 2022-08-12 | End: 2022-08-18 | Stop reason: HOSPADM

## 2022-08-12 RX ORDER — TRAZODONE HYDROCHLORIDE 50 MG/1
50 TABLET ORAL
Status: DISCONTINUED | OUTPATIENT
Start: 2022-08-12 | End: 2022-08-18 | Stop reason: HOSPADM

## 2022-08-12 RX ORDER — LANOLIN ALCOHOL/MO/W.PET/CERES
3 CREAM (GRAM) TOPICAL NIGHTLY PRN
Status: DISCONTINUED | OUTPATIENT
Start: 2022-08-12 | End: 2022-08-18 | Stop reason: HOSPADM

## 2022-08-12 RX ORDER — ONDANSETRON 2 MG/ML
4 INJECTION INTRAMUSCULAR; INTRAVENOUS 4 TIMES DAILY PRN
Status: DISCONTINUED | OUTPATIENT
Start: 2022-08-12 | End: 2022-08-18 | Stop reason: HOSPADM

## 2022-08-12 RX ORDER — PSEUDOEPHEDRINE HCL 30 MG
100 TABLET ORAL 2 TIMES DAILY
Qty: 60 CAPSULE | Status: SHIPPED
Start: 2022-08-12 | End: 2022-11-10

## 2022-08-12 RX ORDER — LISINOPRIL 40 MG/1
40 TABLET ORAL DAILY
Qty: 30 TABLET | Status: ON HOLD
Start: 2022-08-13 | End: 2022-08-18 | Stop reason: SDUPTHER

## 2022-08-12 RX ADMIN — DOCUSATE SODIUM 50 MG AND SENNOSIDES 8.6 MG 2 TABLET: 8.6; 5 TABLET, FILM COATED ORAL at 20:25

## 2022-08-12 RX ADMIN — AMLODIPINE BESYLATE 10 MG: 10 TABLET ORAL at 04:37

## 2022-08-12 RX ADMIN — METFORMIN HYDROCHLORIDE 850 MG: 850 TABLET ORAL at 17:24

## 2022-08-12 RX ADMIN — ENOXAPARIN SODIUM 40 MG: 40 INJECTION SUBCUTANEOUS at 17:24

## 2022-08-12 RX ADMIN — SCOPOLAMINE 1 PATCH: 1.5 PATCH, EXTENDED RELEASE TRANSDERMAL at 09:03

## 2022-08-12 RX ADMIN — SITAGLIPTIN 50 MG: 50 TABLET, FILM COATED ORAL at 04:37

## 2022-08-12 RX ADMIN — SODIUM CHLORIDE 1 G: 1 TABLET ORAL at 17:24

## 2022-08-12 RX ADMIN — LISINOPRIL 40 MG: 20 TABLET ORAL at 04:37

## 2022-08-12 RX ADMIN — SODIUM CHLORIDE 1 G: 1 TABLET ORAL at 11:22

## 2022-08-12 RX ADMIN — ATORVASTATIN CALCIUM 80 MG: 40 TABLET, FILM COATED ORAL at 20:25

## 2022-08-12 ASSESSMENT — LIFESTYLE VARIABLES
ON A TYPICAL DAY WHEN YOU DRINK ALCOHOL HOW MANY DRINKS DO YOU HAVE: 0
TOTAL SCORE: 0
TOTAL SCORE: 0
HOW MANY TIMES IN THE PAST YEAR HAVE YOU HAD 5 OR MORE DRINKS IN A DAY: 0
HAVE YOU EVER FELT YOU SHOULD CUT DOWN ON YOUR DRINKING: NO
TOTAL SCORE: 0
AVERAGE NUMBER OF DAYS PER WEEK YOU HAVE A DRINK CONTAINING ALCOHOL: 0
HAVE PEOPLE ANNOYED YOU BY CRITICIZING YOUR DRINKING: NO
CONSUMPTION TOTAL: NEGATIVE
ALCOHOL_USE: NO
EVER HAD A DRINK FIRST THING IN THE MORNING TO STEADY YOUR NERVES TO GET RID OF A HANGOVER: NO
EVER FELT BAD OR GUILTY ABOUT YOUR DRINKING: NO

## 2022-08-12 ASSESSMENT — PATIENT HEALTH QUESTIONNAIRE - PHQ9
1. LITTLE INTEREST OR PLEASURE IN DOING THINGS: NOT AT ALL
SUM OF ALL RESPONSES TO PHQ9 QUESTIONS 1 AND 2: 0
2. FEELING DOWN, DEPRESSED, IRRITABLE, OR HOPELESS: NOT AT ALL

## 2022-08-12 ASSESSMENT — PAIN DESCRIPTION - PAIN TYPE
TYPE: ACUTE PAIN

## 2022-08-12 ASSESSMENT — FIBROSIS 4 INDEX: FIB4 SCORE: 2.05

## 2022-08-12 NOTE — CARE PLAN
The patient is Stable - Low risk of patient condition declining or worsening    Shift Goals  Clinical Goals: safety  Patient Goals: discharge      Progress made toward(s) clinical / shift goals:  Bed alarm in use, pt calls appropriately, assistive devices out of sight, hourly purposeful rounding done.       Problem: Fall Risk  Goal: Patient will remain free from falls  Outcome: Progressing     Problem: Neuro Status  Goal: Neuro status will remain stable or improve  Outcome: Progressing       Patient is not progressing towards the following goals:

## 2022-08-12 NOTE — PROGRESS NOTES
Two RNs skin assessment done by Lola and Lacy. Posterior head with sutures, TOYIN. No other open wound noted. Cold sore at right upper lip. Govind score of 18. RN wound protocol not indicated at this time.

## 2022-08-12 NOTE — PROGRESS NOTES
4 Eyes Skin Assessment Completed by JOHN Marti and JOHN Altamirano.    Head posterior Incision TOYIN with sutures  Ears WDL  Nose WDL  Mouth WDL  Neck WDL  Breast/Chest WDL  Shoulder Blades WDL  Spine WDL  (R) Arm/Elbow/Hand WDL  (L) Arm/Elbow/Hand WDL  Abdomen WDL  Groin WDL  Scrotum/Coccyx/Buttocks WDL  (R) Leg WDL  (L) Leg WDL  (R) Heel/Foot/Toe WDL  (L) Heel/Foot/Toe WDL          Devices In Places Pulse Ox      Interventions In Place Pillows and Pressure Redistribution Mattress    Possible Skin Injury No    Pictures Uploaded Into Epic N/A  Wound Consult Placed N/A  RN Wound Prevention Protocol Ordered No

## 2022-08-12 NOTE — PROGRESS NOTES
Discharging patient home per MD orders. Patient demonstrated understanding of discharge instructions and educational materials, as well as, follow up appointments, medications, and prescriptions. Patient is voiding without difficulty. Patient denies pain. O2 is greater than 90%. All questions have been answered. Patient has been discharged off the unit with a hospital escort

## 2022-08-12 NOTE — CARE PLAN
The patient is Stable - Low risk of patient condition declining or worsening    Shift Goals  Clinical Goals: safety    Progress made toward(s) clinical / shift goals:      Problem: Pain - Standard  Goal: Alleviation of pain or a reduction in pain to the patient’s comfort goal  Outcome: Progressing  Patient denied any pain upon admission.      Problem: Bowel Elimination  Goal: Patient will participate in bowel management program  Outcome: Progressing  Patient remains continent of bowel. Had BM today. Denied s/s of constipation.        Patient is not progressing towards the following goals:

## 2022-08-12 NOTE — H&P
"REHABILITATION HISTORY AND PHYSICAL/POST ADMISSION EVALUATION    2022  2:00 PM  Lana Phillips  [id number]/  Admission  2022  9:34 AM  [facility] Code/Reason for admission: 0001.4 - Stroke: No Paresis   Etiologic diagnosis/problem: Hemorrhagic stroke (HCC)  Chief Complaint: \" I want to go home\"    HPI:  Per Dr. Skaggs's consult, \"The patient is a 57 y.o. right hand dominant female with a past medical history of hypertension not on medications;  who presented on 2022  1:57 PM with altered mental status, headache, dizziness, nausea.  Patient was attending a  when she was found in the bathroom minimally responsive.  Patient was brought to the hospital, found to have systolic pressure of 259. CT head found large right cerebellar hemorrhage with compression of the third and fourth ventricles.  Patient was seen by neurosurgery, and taken for suboccipital craniectomy with evacuation of left cerebellar IPH, duraplasty, and C1 laminectomy by Dr. Pedro Pablo Vences MD on 2022.\"    In addition to above, HgbA1c 7.2, , ECHO EF 65 %, RSVP 65 mmHg.       Patient current reports she wants to go home. She also doesn't want bed alarms and has been getting up to go to the bathroom by herself.  She denies any headache, dizziness, or focal weakness.  She denies any changes in her thinking and feels like her normal self.  She apparently was at her mother's  when this happened and her mother had just  from a stroke.  She does not want to talk to the psychologist.  She denies being aware that she had a history of hypertension as she had not seen a doctor.    Patient was evaluated by Rehab Medicine physician and Physical Therapy, Occupational Therapy, and Speech Therapy and determined to be appropriate for acute inpatient rehab and was transferred to [facility] on 2022  9:34 AM.    With this acute therapeutic intervention, this patient hopes to improve her functional status, and return to " independent living with the supportive care of family.    REVIEW OF SYSTEMS:     A complete review of systems was performed and was negative in detail with the exception of items mentioned elsewhere in this document.    PMH:  No past medical history on file.    PSH:  Past Surgical History:   Procedure Laterality Date    CRANIOTOMY  8/2/2022    Procedure: Posterior fossa craniectomy for evacuation of intraparenchymal hemorrhage;  Surgeon: Pedro Pablo Vences M.D.;  Location: SURGERY [facility];  Service: Neurosurgery       Family History   Problem Relation Age of Onset    Stroke Mother     Heart Disease Father         MEDICATIONS:  Current Facility-Administered Medications   Medication Dose    hydrOXYzine HCl (ATARAX) tablet 50 mg  50 mg    melatonin tablet 3 mg  3 mg    Respiratory Therapy Consult      Pharmacy Consult Request ...Pain Management Review 1 Each  1 Each    acetaminophen (Tylenol) tablet 650 mg  650 mg    senna-docusate (PERICOLACE or SENOKOT S) 8.6-50 MG per tablet 2 Tablet  2 Tablet    And    polyethylene glycol/lytes (MIRALAX) PACKET 1 Packet  1 Packet    And    magnesium hydroxide (MILK OF MAGNESIA) suspension 30 mL  30 mL    And    bisacodyl (DULCOLAX) suppository 10 mg  10 mg    lactulose 20 GM/30ML solution 30 mL  30 mL    docusate sodium (ENEMEEZ) enema 283 mg  283 mg    artificial tears ophthalmic solution 1 Drop  1 Drop    benzocaine-menthol (Cepacol) lozenge 1 Lozenge  1 Lozenge    mag hydrox-al hydrox-simeth (MAALOX PLUS ES or MYLANTA DS) suspension 20 mL  20 mL    ondansetron (ZOFRAN ODT) dispertab 4 mg  4 mg    Or    ondansetron (ZOFRAN) syringe/vial injection 4 mg  4 mg    traZODone (DESYREL) tablet 50 mg  50 mg    sodium chloride (OCEAN) 0.65 % nasal spray 2 Spray  2 Spray    insulin regular (HumuLIN R,NovoLIN R) injection  1-6 Units    And    dextrose 50% (D50W) injection 25 g  25 g    enoxaparin (Lovenox) inj 40 mg  40 mg    [START ON 8/13/2022] lisinopril (PRINIVIL) tablet 40 mg  40 mg     metFORMIN (GLUCOPHAGE) tablet 850 mg  850 mg    [START ON 8/13/2022] SITagliptin (JANUVIA) tablet 50 mg  50 mg    sodium chloride (SALT) tablet 1 g  1 g    [START ON 8/15/2022] scopolamine (TRANSDERM-SCOP) patch 1 Patch  1 Patch    [START ON 8/13/2022] amLODIPine (NORVASC) tablet 10 mg  10 mg       ALLERGIES:  Patient has no known allergies.    PSYCHOSOCIAL HISTORY:  2 SH  1 MARCIN, 1 FOS inside required  With: 18 and 19-year-old children -neither work.  Patient also has 2 other sons ages 27 and 28 that do not live with her.    Patient reports she is been a  for 10 years.  She is currently working for laundry service.    She denies tobacco alcohol and drugs.    LEVEL OF FUNCTION PRIOR TO DISABILTY:  Independent, working    LEVEL OF FUNCTION PRIOR TO ADMISSION to [facility]:  PT:    Gait Level Of Assist: Minimal Assist  Assistive Device: Front Wheel Walker  Distance (Feet): 200  Deviation:  (LOB to left, 5x with min A to recover)  # of Stairs Climbed: 0  Weight Bearing Status: no restrictions  Skilled Intervention: Verbal Cuing, Sequencing  Supine to Sit: Supervised  Sit to Supine: Supervised  Scooting: Supervised  Rolling: Standby Assist  Skilled Intervention: Verbal Cuing  Comments: pt sat EOB, asked for pants then changed mind and asked for underwear, donned this in stting without need for assist, straightened her socks without assist.  Sit to Stand: Standby Assist  Bed, Chair, Wheelchair Transfer: Contact Guard Assist  Toilet Transfers: Contact Guard Assist  Transfer Method: Stand Step  Skilled Intervention: Verbal Cuing, Tactile Cuing  Sitting in Chair: NT  Sitting Edge of Bed: 10 min  Standing: 10 min      OT:    Upper Body Dressing: Minimal Assist  Lower Body Dressing: Minimal Assist  Toileting: Minimal Assist (A for balance during underpant management)  Skilled Intervention: Verbal Cuing, Tactile Cuing  Comments: improving can be impuslive and quick moving and inattentive to  "environment during standing tasks    SLP:    Problem List: Cognitive-Linguistic Deficits, Dysphagia  Diet / Liquid Recommendation: Minced & Moist (5) - (Dysphagia II), Mildly Thick (2) - (Nectar Thick) (ok for sips H20 b/w meals after oral care)    CURRENT LEVEL OF FUNCTION:   Same as level of function prior to admission to [facility]    PHYSICAL EXAM:     VITAL SIGNS:   height is 1.499 m (4' 11\") and weight is 65.5 kg (144 lb 6.4 oz). Her oral temperature is 37.1 °C (98.8 °F). Her blood pressure is 110/76 and her pulse is 77. Her respiration is 12 and oxygen saturation is 97%.     GENERAL: No apparent distress  HEENT: Normocephalic/atraumatic, EOMI, and PERRL, right greater than left horizontal nystagmus  CARDIAC: Regular rate and rhythm, normal S1, S2, no murmurs, no peripheral edema   LUNGS: Clear to auscultation, normal respiratory effort, on room air   ABDOMINAL: bowel sounds present, soft, nontender, and nondistended    EXTREMITIES: no edema  MSK: No joint swelling  Psych: Flat affect, mildly tearful    NEURO:    Mental status: alert  Speech: fluent, no aphasia or dysarthria    CRANIAL NERVES:  2,3: visual acuity grossly intact, PERRL  3,4,6: EOMI bilaterally, no diplopia, right greater than left horizontal nystagmus  5: intact in all branches  7: no facial asymmetry  8: hearing grossly intact  9,10: symmetric palate elevation  11: SCM/Trapezius strength 5/5 bilaterally  12: tongue protrudes midline    Motor:  Shoulder flexors:  Right -  5/5, Left -  5/5  Elbow flexors:  Right -  5/5, Left -  5/5  Elbow extensors:  Right -  5/5, Left -  5/5  Symmetrical   Hip flexors:  Right -  5/5, Left -  5/5  Knee ext:  Right -  5/5, Left -  5/5  Dorsiflexors:  Right -  5/5, Left -  5/5  EHL:  Right -  5/5, Left -  5/5  Plantar flexors:  Right -  5/5, Left -  5/5     Sensory:   intact to light touch through out    Coordination:   Slightly decreased left finger to nose with endrange " dysmetria      RADIOLOGY:               Results for orders placed during the hospital encounter of 08/02/22    CT-CTA NECK WITH & W/O-POST PROCESSING    Impression  1. No evidence of flow-limiting stenosis or dissection in the cervical carotid or cervical vertebral arteries.                   8/2/2022 2:12 PM     HISTORY/REASON FOR EXAM:  Mental status change, unknown cause.        TECHNIQUE/EXAM DESCRIPTION AND NUMBER OF VIEWS:  CT of the head without contrast.     The study was performed on a GE CT scanner. Contiguous 5 mm axial sections were obtained from the skull base through the vertex.     Up to date radiation dose reduction adjustments have been utilized to meet ALARA standards for radiation dose reduction.     COMPARISON:  None available     FINDINGS:  There is a large 4.6 x 3.4 cm intraparenchymal hematoma centered in the left cerebellar hemisphere. There is adjacent hypoattenuation consistent with edema. Posterior fossa structures and basal cisterns are effaced secondary to edema/mass effect. No   evidence of hydrocephalus. The calvaria are normal in appearance.     Mastoid air cells and visualized paranasal sinuses are clear.     The visualized portions of the orbits are normal in appearance.     The brain parenchyma is normal in appearance.        IMPRESSION:     Large 4.6 x 3.4 cm intraparenchymal hematoma centered in the left cerebellar hemisphere. Associated mass effect results in effacement of the basal cisterns and fourth ventricle. No evidence of hydrocephalus.                           LABS:    Lab Results   Component Value Date/Time    SODIUM 138 08/09/2022 03:15 AM    POTASSIUM 3.8 08/09/2022 03:15 AM    CHLORIDE 101 08/09/2022 03:15 AM    CO2 28 08/09/2022 03:15 AM    GLUCOSE 135 (H) 08/09/2022 03:15 AM    BUN 12 08/09/2022 03:15 AM    CREATININE 0.42 (L) 08/09/2022 03:15 AM      Lab Results   Component Value Date/Time    WBC 9.8 08/09/2022 03:15 AM    RBC 4.38 08/09/2022 03:15 AM     HEMOGLOBIN 13.1 08/09/2022 03:15 AM    HEMATOCRIT 40.2 08/09/2022 03:15 AM    MCV 91.8 08/09/2022 03:15 AM    MCH 29.9 08/09/2022 03:15 AM    MCHC 32.6 (L) 08/09/2022 03:15 AM    MPV 10.3 08/09/2022 03:15 AM    NEUTSPOLYS 71.00 08/02/2022 02:07 PM    LYMPHOCYTES 17.50 (L) 08/02/2022 02:07 PM    MONOCYTES 2.60 08/02/2022 02:07 PM    EOSINOPHILS 1.80 08/02/2022 02:07 PM    BASOPHILS 0.00 08/02/2022 02:07 PM          PRIMARY REHAB DIAGNOSIS:    This patient is a 57 y.o. female admitted for acute inpatient rehabilitation with Hemorrhagic stroke (HCC).    IMPAIRMENTS:   Cognitive  ADLs/IADLs  Mobility  Swallow    SECONDARY DIAGNOSIS/MEDICAL CO-MORBIDITIES AFFECTING FUNCTION:    Brain swelling  Dizziness  Hypertension  Diabetes  Hyperlipidemia    RELEVANT CHANGES SINCE PREADMISSION EVALUATION:    Status unchanged    The patient's rehabilitation potential is Excellent  The patient's medical prognosis is excellent    PLAN:   Discussion and Recommendations, discussed with the patient and/or family:   1. The patient requires an acute inpatient rehabilitation program with a coordinated program of care at an intensity and frequency not available at a lower level of care. This recommendation is substantiated by the patient's medical physicians who recommend that the patient's intervention and assessment of medical issues needs to be done at an acute level of care for patient's safety and maximum outcome.     2. A coordinated program of care will be supplied by an interdisciplinary team of physical therapy, occupational therapy, rehab physician, rehab nursing, and, if needed, speech therapy and rehab psychology. Rehab team presents a patient-specific rehabilitation and education program concentrating on prevention of future problems related to accessibility, mobility, skin, bowel, bladder, sexuality, and psychosocial and medical/surgical problems.     3. Need for Rehabilitation Physician: The rehab physician will be evaluating  the patient on a multi-weekly basis to help coordinate the program of care. The rehab physician communicates between medical physicians, therapists, and nurses to maximize the patient's potential outcome. Specific areas in which the rehab physician will be providing daily assessment include the following:   A. Assessing the patient's heart rate and blood pressure response (vitals monitoring) to activity and making adjustments in medications or conservative measures as needed.   B. The rehab physician will be assessing the frequency at which the program can be increased to allow the patient to reach optimal functional outcome.   C. The rehab physician will also provide assessments in daily skin care, especially in light of patient's impairments in mobility.   D. The rehab physician will provide special expertise in understanding how to work with functional impairment and recommend appropriate interventions, compensatory techniques, and education that will facilitate the patient's outcome.     4. Rehab R.N.   The rehab RN will be working with patient to carry over in room mobility and activities of daily living when the patient is not in 3 hours of skilled therapy. Rehab nursing will be working in conjunction with rehab physician to address all the medical issues above and continue to assess laboratory work and discuss abnormalities with the treating physicians, assess vitals, and response to activity, and discuss and report abnormalities with the rehab physician. Rehab RN will also continue daily skin care, supervise bladder/bowel program, instruct in medication administration, and ensure patient safety.     5. Therapies to treat at intensity and frequency of (may change after completion of evaluation by all therapeutic disciplines):       PT:  Physical therapy to address mobility, transfer, gait training and evaluation for adaptive equipment needs 1hour/day at least 5 days/week for the duration of the ELOS (see  below)       OT:  Occupational therapy to address ADLs, self-care, home management training, functional mobility/transfers and assistive device evaluation, and community re-integration 1hour/day at least 5 days/week for the duration of the ELOS (see below).        ST/Dysphagia:  Speech therapy to address speech, language, and cognitive deficits as well as swallowing difficulties with retraining/dysphagia management and community re-integration with comprehension, expression, cognitive training 1hour/day at least 5 days/week for the duration of the ELOS (see below).     6. Medical management / Rehabilitation Issues/Adverse Potential affecting function as part of rehabilitation plan.    Brain swelling  Continue salt tabs to keep sodium above 140    Dizziness  Continue scopolamine patch    Hypertension  Amlodipine  Lisinopril  Consult hospitalist    Diabetes with hyperglycemia  New diagnosis  Hemoglobin A1c of 7.2  Metformin  Januvia  Sliding scale insulin  Consult hospitalist    Hyperlipidemia    Start high-dose statin    I performed a complete drug regimen review and did not identify any potential clinically significant medication issues.    The patient's CODE STATUS was confirmed as FULL CODE on admission, with the patient and/or family at bedside.    REHABILITATION ISSUES/ADVERSE POTENTIAL:  1.  CVA (Cerebrovascular Accident): Continue lipid and blood pressure management. Patient demonstrates functional deficits in strength, balance, coordination, and ADL's. Patient is admitted to [facility] for comprehensive rehabilitation therapy as described below.   Rehabilitation nursing monitors bowel and bladder control, educates on medication administration, co-morbidities and monitors patient safety.    2.  DVT prophylaxis:  Patient is on Lovenox for anticoagulation upon transfer. Encourage OOB. Monitor daily for signs and symptoms of DVT including but not limited to swelling and pain to prevent  the development of DVT that may interfere with therapies.    3.  Pain: No issues with pain currently / Controlled with as needed oral analgesics.    4.  Nutrition/Dysphagia: Dietician monitors nutrient intake, recommend supplements prn and provide nutrition education to pt/family to promote optimal nutrition for wound healing/recovery.     5.  Bladder/bowel:  Start bowel and bladder program, to prevent constipation, urinary retention (which may lead to UTI), and urinary incontinence (which will impact upon pt's functional independence).   - TV Q3h while awake with post void bladder scans, I&O cath for PVRs >400  - up to commode after meal     6.  Skin/dermal ulcer prophylaxis: Monitor for new skin conditions with q.2 h. turns as required to prevent the development of skin breakdown.     7.  GI prophylaxis: Omeprazole to prevent gastritis or GI bleed that would interfere with therapies.    8. Respiratory therapy: RT performs O2 management prn, breathing retraining, pulmonary hygiene and bronchospasm management prn to optimize participation in therapies.    Pt was seen today for 72 min, and entire time spent in face-to-face contact was >50% in counseling and coordination of care as detailed in A/P above.        GOALS/EXPECTED LEVEL OF FUNCTION BASED ON CURRENT MEDICAL AND FUNCTIONAL STATUS (may change based on patient's medical status and rate of impairment recovery):  Transfers:   Supervision  Mobility/Gait:   Supervision  ADL's:   Supervision  Cognition:  Least Verbal Cues  Swallowing:  Regular with thins    DISPOSITION: Discharge to pre-morbid independent living setting with the supportive care of patient's family.      ELOS: 10-12 days    Chikis Sanchez M.D.  Physical Medicine and Rehabilitation

## 2022-08-12 NOTE — CARE PLAN
Problem: Skin Integrity  Goal: Skin integrity is maintained or improved  Outcome: Met     Problem: Fall Risk  Goal: Patient will remain free from falls  Outcome: Met     Problem: Safety - Medical Restraint  Goal: Remains free of injury from restraints (Restraint for Interference with Medical Device)  Outcome: Met  Goal: Free from restraint(s) (Restraint for Interference with Medical Device)  Outcome: Met     Problem: Craniotomy Surgery  Goal: Post-Operative Craniotomy Surgery: Patient will achieve optimal post-surgical outcomes  Outcome: Met     Problem: Incision Care  Goal: Optimal post surgical incision care  Outcome: Met     Problem: Neuro Status  Goal: Neuro status will remain stable or improve  Outcome: Met     Problem: Pain - Standard  Goal: Alleviation of pain or a reduction in pain to the patient’s comfort goal  Outcome: Met   The patient is Stable - Low risk of patient condition declining or worsening    Shift Goals  Clinical Goals: DC to rehab by 1000  Patient Goals: Rehab  Family Goals: n/a    Progress made toward(s) clinical / shift goals:  Yes Pt left with med transport to Rehab. Report given to Lynn LOPEZ at [facility]    Patient is not progressing towards the following goals:

## 2022-08-12 NOTE — PROGRESS NOTES
Report received from night shift RN. Assume care. Pt. AAOx4 pt is bed,  Assessment completed. VSS. Denies pain, good CMS and pulses to dawit LE, denies numbness and tingling, Pt was update for the care for the day. White board updated, All question answered. Pt has call light within reach,  bed is in the lowest position. Pt has no other needs at this time.

## 2022-08-12 NOTE — DISCHARGE SUMMARY
Discharge Summary    CHIEF COMPLAINT ON ADMISSION  Chief Complaint   Patient presents with    Fatigue     SInce this afternoon at a . Pt falling asleep during triage, arousable to painful stimuli. Pt oriented x 4 when awake.     N/V     This afternoon. Hx T2 DM. FSBS in triage 230       Reason for Admission  Weakness    Admission Date  2022     CODE STATUS  Full Code    HPI & HOSPITAL COURSE  Ms. Phillips is a 57 y.o. female with no known past medical history was admitted to the ICU on 2022 for large right cerebral hemorrhage with mass-effect which required emergent decompressive craniotomy on 2022 for which patient is currently status post occipital craniectomy with evacuation of the left cerebellar hematoma. Postsurgically, she was continued on hypertonic therapy and ultimately extubated on 8/3/2022. She did well post operatively. Sodium levels kept elevated per neurology recommendations with 3% saline, now off hypertonic saline with goal eunatremia. Na levels normalized. Blood pressure has been well controlled.  She has known DM but on on medication. Her A1c is 7.1% and she was started on metformin and Januvia.  Neurosurgery recommends follow up with Dr. Vences in 2 weeks and nylon sutures to be removed POD #14. She is accepted to acute rehab for therapy.    Therefore, she is discharged in good and stable condition to an inpatient rehabilitation hospital.    The patient met 2-midnight criteria for an inpatient stay at the time of discharge.      FOLLOW UP ITEMS POST DISCHARGE  Neurosurgery recommends follow up with Dr. Vences in 2 weeks and nylon sutures to be removed POD #14.   F/u DM management, she was started on metformin and januvia  Maintain normotensive BP    DISCHARGE DIAGNOSES  Principal Problem:    Cerebellar hemorrhage, acute (HCC) POA: Yes  Active Problems:    Hypokalemia POA: Yes    Hypertension POA: Yes    Intracranial hemorrhage (HCC) POA: Yes    Type 2 diabetes mellitus with  hyperglycemia, without long-term current use of insulin (HCC) POA: Yes  Resolved Problems:    Hyperglycemia POA: Yes      Overview:             FOLLOW UP  No follow-up provider specified.    MEDICATIONS ON DISCHARGE     Medication List        START taking these medications        Instructions   amLODIPine 10 MG Tabs  Start taking on: August 13, 2022  Commonly known as: NORVASC   Take 1 Tablet by mouth every day.  Dose: 10 mg     docusate sodium 100 MG Caps   Take 100 mg by mouth 2 times a day.  Dose: 100 mg     lisinopril 40 MG tablet  Start taking on: August 13, 2022  Commonly known as: PRINIVIL   Take 1 Tablet by mouth every day.  Dose: 40 mg     metFORMIN 850 MG Tabs  Commonly known as: GLUCOPHAGE   Take 1 Tablet by mouth 2 times a day with meals.  Dose: 850 mg     senna-docusate 8.6-50 MG Tabs  Commonly known as: PERICOLACE or SENOKOT S   Take 1 Tablet by mouth every evening.  Dose: 1 Tablet     SITagliptin 50 MG Tabs  Start taking on: August 13, 2022  Commonly known as: JANUVIA   Take 1 Tablet by mouth every day.  Dose: 50 mg     sodium chloride 1 GM Tabs  Commonly known as: SALT   Take 1 Tablet by mouth 3 times a day with meals.  Dose: 1 g              Allergies  No Known Allergies    DIET  Orders Placed This Encounter   Procedures    Diet Order Diet: Consistent CHO (Diabetic)     Standing Status:   Standing     Number of Occurrences:   1     Order Specific Question:   Diet:     Answer:   Consistent CHO (Diabetic) [4]       ACTIVITY  As tolerated.  Weight bearing as tolerated    LINES, DRAINS, AND WOUNDS  This is an automated list. Peripheral IVs will be removed prior to discharge.  Peripheral IV 08/02/22 20 G Anterior;Right Forearm (Active)   Site Assessment Clean;Dry;Intact 08/11/22 1930   Dressing Type Transparent 08/11/22 1930   Line Status Scrubbed the hub prior to access;Flushed;Saline locked 08/11/22 1930   Dressing Status Clean;Dry;Intact 08/11/22 1930   Dressing Intervention N/A 08/11/22 1930    Dressing Change Due 08/13/22 08/11/22 1930   Date Primary Tubing Changed 08/09/22 08/09/22 2010   Date Secondary Tubing Changed 08/02/22 08/02/22 2200   NEXT Primary Tubing Change  08/06/22 08/03/22 0800   Date IV Connector(s) Changed 08/06/22 08/03/22 0800   Infiltration Grading ([facility], [facility]) 0 08/11/22 1930   Phlebitis Scale ([facility] Only) 0 08/11/22 1930       PICC Double Lumen 08/03/22 Lumen 1: Purple Lumen 2: Red Right Basilic (Active)   Site Assessment Clean;Dry;Intact 08/11/22 1930   Lumen 1 Status Flushed;Normal saline locked;Scrubbed the hub prior to access 08/11/22 1930   Lumen 2 Status Flushed;Normal saline locked;Scrubbed the hub prior to access 08/11/22 1930   Line Secured at (cm) 1 cm 08/03/22 1202   Extremity Circumference (cm) 32 cm 08/03/22 1202   Dressing Type Biopatch;Transparent 08/11/22 1930   Dressing Status Clean;Intact;Dry 08/11/22 1930   Dressing Intervention N/A 08/11/22 1930   Dressing Change Due 08/13/22 08/11/22 1930   Date Primary Tubing Changed 08/03/22 08/04/22 0800   Date Secondary Tubing Changed 08/04/22 08/04/22 0800   Date IV Connector(s) Changed 08/03/22 08/04/22 0800   NEXT Primary Tubing Change  08/06/22 08/04/22 0800   NEXT Secondary Tubing Change  08/05/22 08/04/22 0800   NEXT IV Connector(s) Change 08/13/22 08/11/22 1930   Line Necessity Assessed Medication with pH <4.1 and >9.0 08/03/22 1202   $ Double Lumen PICC Charge Single kit used 08/03/22 1202      ETT (Active)     Wound 08/02/22 Incision Head bacitracin zinc ointment, telfa (Active)   Site Assessment Clean;Dry;Intact 08/11/22 1930   Periwound Assessment Clean;Dry;Intact 08/11/22 1930   Margins Attached edges 08/11/22 1930   Closure Open to air;Sutures 08/11/22 1140   Drainage Amount None 08/11/22 1140   Drainage Description Serosanguineous 08/04/22 0800   Dressing Options Open to Air 08/11/22 1930   Dressing Changed Observed 08/11/22 1930   Dressing Status Open to Air 08/11/22 1140      PICC Double Lumen 08/03/22  Lumen 1: Purple Lumen 2: Red Right Basilic (Active)   Site Assessment Clean;Dry;Intact 08/11/22 1930   Lumen 1 Status Flushed;Normal saline locked;Scrubbed the hub prior to access 08/11/22 1930   Lumen 2 Status Flushed;Normal saline locked;Scrubbed the hub prior to access 08/11/22 1930   Line Secured at (cm) 1 cm 08/03/22 1202   Extremity Circumference (cm) 32 cm 08/03/22 1202   Dressing Type Biopatch;Transparent 08/11/22 1930   Dressing Status Clean;Intact;Dry 08/11/22 1930   Dressing Intervention N/A 08/11/22 1930   Dressing Change Due 08/13/22 08/11/22 1930   Date Primary Tubing Changed 08/03/22 08/04/22 0800   Date Secondary Tubing Changed 08/04/22 08/04/22 0800   Date IV Connector(s) Changed 08/03/22 08/04/22 0800   NEXT Primary Tubing Change  08/06/22 08/04/22 0800   NEXT Secondary Tubing Change  08/05/22 08/04/22 0800   NEXT IV Connector(s) Change 08/13/22 08/11/22 1930   Line Necessity Assessed Medication with pH <4.1 and >9.0 08/03/22 1202   $ Double Lumen PICC Charge Single kit used 08/03/22 1202     Peripheral IV 08/02/22 20 G Anterior;Right Forearm (Active)   Site Assessment Clean;Dry;Intact 08/11/22 1930   Dressing Type Transparent 08/11/22 1930   Line Status Scrubbed the hub prior to access;Flushed;Saline locked 08/11/22 1930   Dressing Status Clean;Dry;Intact 08/11/22 1930   Dressing Intervention N/A 08/11/22 1930   Dressing Change Due 08/13/22 08/11/22 1930   Date Primary Tubing Changed 08/09/22 08/09/22 2010   Date Secondary Tubing Changed 08/02/22 08/02/22 2200   NEXT Primary Tubing Change  08/06/22 08/03/22 0800   Date IV Connector(s) Changed 08/06/22 08/03/22 0800   Infiltration Grading ([facility], [facility]) 0 08/11/22 1930   Phlebitis Scale ([facility] Only) 0 08/11/22 1930               MENTAL STATUS ON TRANSFER      AOx4       CONSULTATIONS  Neurology  Neurosurgery    PROCEDURES    DATE OF SERVICE:  08/02/2022      PREOPERATIVE DIAGNOSIS:  Left cerebellar intraparenchymal hemorrhage causing   brainstem  compression.     POSTOPERATIVE DIAGNOSIS:  Left cerebellar intraparenchymal hemorrhage causing   brainstem compression.     PROCEDURES PERFORMED:  1.  Suboccipital craniectomy.  2.  Evacuation of left cerebellar intraparenchymal hemorrhage.  3.  Duraplasty.  4.  C1 laminectomy.    EC-ECHOCARDIOGRAM COMPLETE W/O CONT   Final Result      DX-CHEST-PORTABLE (1 VIEW)   Final Result      1.  Persistent hypoinflation with mild worsening bibasilar atelectasis.   2.  Supportive tubing as described above.      IR-PICC LINE PLACEMENT W/ GUIDANCE > AGE 5   Final Result                  Ultrasound-guided PICC placement performed by qualified nursing staff as    above.          DX-CHEST-PORTABLE (1 VIEW)   Final Result      Bibasilar underinflation atelectasis which could obscure an additional process.      CT-HEAD W/O   Final Result      1.  Postsurgical changes status post occipital craniectomy with evacuation of the left cerebellar hematoma. There is a small amount of residual blood and postsurgical pneumocephalus.   2.  There is improved mass effect on the fourth ventricle.   3.  Lateral ventricles are stable in size.      CT-CTA HEAD WITH & W/O-POST PROCESS   Final Result         1. No hemodynamically significant narrowing of the major intracranial vessels.      2. Redemonstration of large parenchymal hemorrhage in the left cerebellum      CT-CTA NECK WITH & W/O-POST PROCESSING   Final Result      1. No evidence of flow-limiting stenosis or dissection in the cervical carotid or cervical vertebral arteries.      CT-HEAD W/O   Final Result      Large 4.6 x 3.4 cm intraparenchymal hematoma centered in the left cerebellar hemisphere. Associated mass effect results in effacement of the basal cisterns and fourth ventricle. No evidence of hydrocephalus.      Findings were discussed with Dr. JANET ARELLANO on 8/2/2022 2:45 PM.            LABORATORY  Lab Results   Component Value Date    SODIUM 138 08/09/2022    POTASSIUM 3.8  08/09/2022    CHLORIDE 101 08/09/2022    CO2 28 08/09/2022    GLUCOSE 135 (H) 08/09/2022    BUN 12 08/09/2022    CREATININE 0.42 (L) 08/09/2022        Lab Results   Component Value Date    WBC 9.8 08/09/2022    HEMOGLOBIN 13.1 08/09/2022    HEMATOCRIT 40.2 08/09/2022    PLATELETCT 253 08/09/2022        Total time of the discharge process exceeds 35 minutes.

## 2022-08-12 NOTE — PROGRESS NOTES
Rec'd report from day shift RN. Assumed pt care. Assessment completed. AA&OX4. Denies pain at this time. No s/s of discomfort or distress. Pt ambulates to the bathroom with SBA and use of FWW. [place], denies numbness or tingling. Bed in lowest position, bed locked, bed alarm on for safety, treaded socks in place, RN and CNA numbers provided, call light within reach.

## 2022-08-13 LAB
25(OH)D3 SERPL-MCNC: 7 NG/ML (ref 30–100)
ALBUMIN SERPL BCP-MCNC: 3.8 G/DL (ref 3.2–4.9)
ALBUMIN/GLOB SERPL: 1.2 G/DL
ALP SERPL-CCNC: 89 U/L (ref 30–99)
ALT SERPL-CCNC: 48 U/L (ref 2–50)
ANION GAP SERPL CALC-SCNC: 10 MMOL/L (ref 7–16)
AST SERPL-CCNC: 22 U/L (ref 12–45)
BASOPHILS # BLD AUTO: 0.9 % (ref 0–1.8)
BASOPHILS # BLD: 0.09 K/UL (ref 0–0.12)
BILIRUB SERPL-MCNC: 0.5 MG/DL (ref 0.1–1.5)
BUN SERPL-MCNC: 11 MG/DL (ref 8–22)
CALCIUM SERPL-MCNC: 8.8 MG/DL (ref 8.5–10.5)
CHLORIDE SERPL-SCNC: 101 MMOL/L (ref 96–112)
CO2 SERPL-SCNC: 27 MMOL/L (ref 20–33)
CREAT SERPL-MCNC: 0.42 MG/DL (ref 0.5–1.4)
EOSINOPHIL # BLD AUTO: 0.22 K/UL (ref 0–0.51)
EOSINOPHIL NFR BLD: 2.2 % (ref 0–6.9)
ERYTHROCYTE [DISTWIDTH] IN BLOOD BY AUTOMATED COUNT: 43.2 FL (ref 35.9–50)
GFR SERPLBLD CREATININE-BSD FMLA CKD-EPI: 114 ML/MIN/1.73 M 2
GLOBULIN SER CALC-MCNC: 3.1 G/DL (ref 1.9–3.5)
GLUCOSE BLD STRIP.AUTO-MCNC: 135 MG/DL (ref 65–99)
GLUCOSE BLD STRIP.AUTO-MCNC: 135 MG/DL (ref 65–99)
GLUCOSE BLD STRIP.AUTO-MCNC: 152 MG/DL (ref 65–99)
GLUCOSE BLD STRIP.AUTO-MCNC: 159 MG/DL (ref 65–99)
GLUCOSE SERPL-MCNC: 121 MG/DL (ref 65–99)
HCT VFR BLD AUTO: 40.5 % (ref 37–47)
HGB BLD-MCNC: 13.3 G/DL (ref 12–16)
IMM GRANULOCYTES # BLD AUTO: 0.37 K/UL (ref 0–0.11)
IMM GRANULOCYTES NFR BLD AUTO: 3.7 % (ref 0–0.9)
LYMPHOCYTES # BLD AUTO: 2.49 K/UL (ref 1–4.8)
LYMPHOCYTES NFR BLD: 24.6 % (ref 22–41)
MAGNESIUM SERPL-MCNC: 2 MG/DL (ref 1.5–2.5)
MCH RBC QN AUTO: 30.2 PG (ref 27–33)
MCHC RBC AUTO-ENTMCNC: 32.8 G/DL (ref 33.6–35)
MCV RBC AUTO: 91.8 FL (ref 81.4–97.8)
MONOCYTES # BLD AUTO: 0.75 K/UL (ref 0–0.85)
MONOCYTES NFR BLD AUTO: 7.4 % (ref 0–13.4)
NEUTROPHILS # BLD AUTO: 6.2 K/UL (ref 2–7.15)
NEUTROPHILS NFR BLD: 61.2 % (ref 44–72)
NRBC # BLD AUTO: 0 K/UL
NRBC BLD-RTO: 0 /100 WBC
PLATELET # BLD AUTO: 284 K/UL (ref 164–446)
PMV BLD AUTO: 10.2 FL (ref 9–12.9)
POTASSIUM SERPL-SCNC: 3.8 MMOL/L (ref 3.6–5.5)
PROT SERPL-MCNC: 6.9 G/DL (ref 6–8.2)
RBC # BLD AUTO: 4.41 M/UL (ref 4.2–5.4)
SODIUM SERPL-SCNC: 138 MMOL/L (ref 135–145)
WBC # BLD AUTO: 10.1 K/UL (ref 4.8–10.8)

## 2022-08-13 PROCEDURE — 82306 VITAMIN D 25 HYDROXY: CPT

## 2022-08-13 PROCEDURE — 99222 1ST HOSP IP/OBS MODERATE 55: CPT | Performed by: HOSPITALIST

## 2022-08-13 PROCEDURE — 97535 SELF CARE MNGMENT TRAINING: CPT

## 2022-08-13 PROCEDURE — 83735 ASSAY OF MAGNESIUM: CPT

## 2022-08-13 PROCEDURE — A9270 NON-COVERED ITEM OR SERVICE: HCPCS | Performed by: PHYSICAL MEDICINE & REHABILITATION

## 2022-08-13 PROCEDURE — 700111 HCHG RX REV CODE 636 W/ 250 OVERRIDE (IP): Performed by: PHYSICAL MEDICINE & REHABILITATION

## 2022-08-13 PROCEDURE — 99233 SBSQ HOSP IP/OBS HIGH 50: CPT | Performed by: PHYSICAL MEDICINE & REHABILITATION

## 2022-08-13 PROCEDURE — 700102 HCHG RX REV CODE 250 W/ 637 OVERRIDE(OP): Performed by: HOSPITALIST

## 2022-08-13 PROCEDURE — 97112 NEUROMUSCULAR REEDUCATION: CPT

## 2022-08-13 PROCEDURE — 85025 COMPLETE CBC W/AUTO DIFF WBC: CPT

## 2022-08-13 PROCEDURE — 82962 GLUCOSE BLOOD TEST: CPT | Mod: 91

## 2022-08-13 PROCEDURE — 97162 PT EVAL MOD COMPLEX 30 MIN: CPT

## 2022-08-13 PROCEDURE — 92523 SPEECH SOUND LANG COMPREHEN: CPT

## 2022-08-13 PROCEDURE — 97166 OT EVAL MOD COMPLEX 45 MIN: CPT

## 2022-08-13 PROCEDURE — 770010 HCHG ROOM/CARE - REHAB SEMI PRIVAT*

## 2022-08-13 PROCEDURE — 80053 COMPREHEN METABOLIC PANEL: CPT

## 2022-08-13 PROCEDURE — 700102 HCHG RX REV CODE 250 W/ 637 OVERRIDE(OP): Performed by: PHYSICAL MEDICINE & REHABILITATION

## 2022-08-13 PROCEDURE — 36415 COLL VENOUS BLD VENIPUNCTURE: CPT

## 2022-08-13 PROCEDURE — A9270 NON-COVERED ITEM OR SERVICE: HCPCS | Performed by: HOSPITALIST

## 2022-08-13 RX ORDER — SODIUM CHLORIDE 1 G/1
2 TABLET ORAL 2 TIMES DAILY WITH MEALS
Status: DISCONTINUED | OUTPATIENT
Start: 2022-08-13 | End: 2022-08-15

## 2022-08-13 RX ORDER — SODIUM CHLORIDE 1 G/1
1 TABLET ORAL ONCE
Status: COMPLETED | OUTPATIENT
Start: 2022-08-13 | End: 2022-08-13

## 2022-08-13 RX ADMIN — INSULIN HUMAN 1 UNITS: 100 INJECTION, SOLUTION PARENTERAL at 08:35

## 2022-08-13 RX ADMIN — SITAGLIPTIN 50 MG: 50 TABLET, FILM COATED ORAL at 08:28

## 2022-08-13 RX ADMIN — SODIUM CHLORIDE 1 G: 1 TABLET ORAL at 08:28

## 2022-08-13 RX ADMIN — INSULIN HUMAN 1 UNITS: 100 INJECTION, SOLUTION PARENTERAL at 21:09

## 2022-08-13 RX ADMIN — ENOXAPARIN SODIUM 40 MG: 40 INJECTION SUBCUTANEOUS at 17:17

## 2022-08-13 RX ADMIN — LISINOPRIL 40 MG: 20 TABLET ORAL at 05:11

## 2022-08-13 RX ADMIN — SODIUM CHLORIDE 2 G: 1 TABLET ORAL at 17:17

## 2022-08-13 RX ADMIN — DOCUSATE SODIUM 50 MG AND SENNOSIDES 8.6 MG 2 TABLET: 8.6; 5 TABLET, FILM COATED ORAL at 08:27

## 2022-08-13 RX ADMIN — SODIUM CHLORIDE 1 G: 1 TABLET ORAL at 14:27

## 2022-08-13 RX ADMIN — METFORMIN HYDROCHLORIDE 850 MG: 850 TABLET ORAL at 17:17

## 2022-08-13 RX ADMIN — METFORMIN HYDROCHLORIDE 850 MG: 850 TABLET ORAL at 08:28

## 2022-08-13 RX ADMIN — AMLODIPINE BESYLATE 10 MG: 5 TABLET ORAL at 05:11

## 2022-08-13 RX ADMIN — ATORVASTATIN CALCIUM 80 MG: 40 TABLET, FILM COATED ORAL at 21:07

## 2022-08-13 RX ADMIN — DOCUSATE SODIUM 50 MG AND SENNOSIDES 8.6 MG 2 TABLET: 8.6; 5 TABLET, FILM COATED ORAL at 21:07

## 2022-08-13 RX ADMIN — SODIUM CHLORIDE 1 G: 1 TABLET ORAL at 11:17

## 2022-08-13 ASSESSMENT — ACTIVITIES OF DAILY LIVING (ADL)
TOILETING_LEVEL_OF_ASSIST_DESCRIPTION: SUPERVISION FOR SAFETY;VERBAL CUEING
TOILET_TRANSFER_DESCRIPTION: ADAPTIVE EQUIPMENT;SUPERVISION FOR SAFETY;VERBAL CUEING
TOILETING: INDEPENDENT
BED_CHAIR_WHEELCHAIR_TRANSFER_DESCRIPTION: INITIAL PREPARATION FOR TASK;SUPERVISION FOR SAFETY
BED_CHAIR_WHEELCHAIR_TRANSFER_DESCRIPTION: ADAPTIVE EQUIPMENT;SUPERVISION FOR SAFETY;VERBAL CUEING

## 2022-08-13 ASSESSMENT — BRIEF INTERVIEW FOR MENTAL STATUS (BIMS)
ASKED TO RECALL SOCK: YES, NO CUE REQUIRED
WHAT YEAR IS IT: CORRECT
WHAT DAY OF THE WEEK IS IT: CORRECT
WHAT MONTH IS IT: ACCURATE WITHIN 5 DAYS
ASKED TO RECALL BLUE: YES, NO CUE REQUIRED
ASKED TO RECALL BLUE: YES, NO CUE REQUIRED
ASKED TO RECALL BED: YES, NO CUE REQUIRED
WHAT YEAR IS IT: CORRECT
INITIAL REPETITION OF BED BLUE SOCK - FIRST ATTEMPT: 3
ASKED TO RECALL BED: YES, NO CUE REQUIRED
BIMS SUMMARY SCORE: 15
ASKED TO RECALL SOCK: YES, NO CUE REQUIRED
BIMS SUMMARY SCORE: 14
WHAT MONTH IS IT: ACCURATE WITHIN 5 DAYS
INITIAL REPETITION OF BED BLUE SOCK - FIRST ATTEMPT: 2
WHAT DAY OF THE WEEK IS IT: CORRECT

## 2022-08-13 ASSESSMENT — GAIT ASSESSMENTS
DEVIATION: ATAXIC;INCREASED BASE OF SUPPORT;BRADYKINETIC;DECREASED HEEL STRIKE;DECREASED TOE OFF
GAIT LEVEL OF ASSIST: MINIMAL ASSIST
DISTANCE (FEET): 60

## 2022-08-13 ASSESSMENT — PAIN DESCRIPTION - PAIN TYPE: TYPE: ACUTE PAIN

## 2022-08-13 NOTE — ASSESSMENT & PLAN NOTE
She had been started on Lipitor 80 mg daily for . Drop the dose to 20 mg daily.  Her stroke was hemorrhagic.

## 2022-08-13 NOTE — ASSESSMENT & PLAN NOTE
Hba1c: 7.1 (8/2)  BS: good  On Metformin and Januvia  Monitor accchecks and cover with SSI  Glycohemoglobin 7.1 in past 2 weeks.

## 2022-08-13 NOTE — THERAPY
Physical Therapy   Initial Evaluation     Patient Name: Lana Phillips  Age:  57 y.o., Sex:  female  Medical Record #: [id number]  Today's Date: 8/13/2022     Subjective    Patient agreeable to participate, reports that she is very tired this afternoon. Patient's sons present at beginning of session and very supportive.      Objective       08/13/22 1301   PT Charge Group   PT Neuromuscular Re-Education / Balance 1   PT Evaluation PT Evaluation Mod   PT Total Time Spent   PT Individual Total Time Spent (Mins) 60   Prior Living Situation   Prior Services None   Housing / Facility 2 Story House   Steps Into Home 1   Steps In Home 14   Rail Right Rail  (Steps in Home)   Elevator No   Lives with - Patient's Self Care Capacity Adult Children  (2 sons, 18 and 19)   Prior Level of Functional Mobility   Bed Mobility Independent   Transfer Status Independent   Ambulation Independent   Distance Ambulation (Feet) 1000   Assistive Devices Used None   Stairs Independent   Prior Functioning: Everyday Activities   Self Care Independent   Indoor Mobility (Ambulation) Independent   Stairs Independent   Functional Cognition Independent   Prior Device Use None of the given options   Strength Lower Body   Comments Globally 5/5 B LE   Sensation Lower Body   Comments Patient reports intact light touch sensation   Balance Assessment   Sitting Balance (Static) Good   Sitting Balance (Dynamic) Fair   Standing Balance (Static) Fair   Standing Balance (Dynamic) Poor +   Weight Shift Sitting Good   Weight Shift Standing Fair   Bed Mobility    Supine to Sit Supervised   Sit to Supine Supervised   Sit to Stand Standby Assist   Scooting Supervised   Rolling Supervised   Neurological Concerns   Equilibrium Reaction Impaired   Coordination Lower Body    Heel To Shin Left Impaired   Comments impaired coordination and dysmetria noted with L UE in finger to nose test   Roll Left and Right   Assistance Needed Supervision   CARE Score - Roll Left and Right 4    Roll Left and Right Discharge Goal   Discharge Goal 6   Sit to Lying   Assistance Needed Supervision   CARE Score - Sit to Lying 4   Sit to Lying Discharge Goal   Discharge Goal 6   Lying to Sitting on Side of Bed   Assistance Needed Supervision   CARE Score - Lying to Sitting on Side of Bed 4   Lying to Sitting on Side of Bed Discharge Goal   Discharge Goal 6   Sit to Stand   Assistance Needed Supervision   CARE Score - Sit to Stand 4   Sit to Stand Discharge Goal   Discharge Goal 6   Chair/Bed-to-Chair Transfer   Assistance Needed Incidental touching   CARE Score - Chair/Bed-to-Chair Transfer 4   Chair/Bed-to-Chair Transfer Discharge Goal   Discharge Goal 6   Car Transfer   Reason if not Attempted Environmental limitations   CARE Score - Car Transfer 10   Car Transfer Discharge Goal   Discharge Goal 6   Walk 10 Feet   Physical Assistance Level 25% or less   Comment no AD   CARE Score - Walk 10 Feet 3   Walk 10 Feet Discharge Goal   Discharge Goal 6   Walk 50 Feet with Two Turns   Physical Assistance Level 25% or less   Comment no AD   CARE Score - Walk 50 Feet with Two Turns 3   Walk 50 Feet with Two Turns Discharge Goal   Discharge Goal 6   Walk 150 Feet   Comment patient unsafe to ambulate without an AD for 150' (per her prior level of function)   Reason if not Attempted Safety concerns   CARE Score - Walk 150 Feet 88   Walk 150 Feet Discharge Goal   Discharge Goal 6   Walking 10 Feet on Uneven Surfaces   Physical Assistance Level 26%-50%   CARE Score - Walking 10 Feet on Uneven Surfaces 3   Walking 10 Feet on Uneven Surfaces Discharge Goal   Discharge Goal 6   1 Step (Curb)   Physical Assistance Level 25% or less   CARE Score - 1 Step (Curb) 3   1 Step (Curb) Discharge Goal   Discharge Goal 6   4 Steps   Physical Assistance Level 25% or less   CARE Score - 4 Steps 3   4 Steps Discharge Goal   Discharge Goal 6   12 Steps   Physical Assistance Level 25% or less   CARE Score - 12 Steps 3   12 Steps Discharge  Goal   Discharge Goal 6   Picking Up Object   Assistance Needed Incidental touching   CARE Score - Picking Up Object 4   Picking Up Object Discharge Goal   Discharge Goal 6   Wheel 50 Feet with Two Turns   Reason if not Attempted Activity not applicable   CARE Score - Wheel 50 Feet with Two Turns 9   Wheel 50 Feet with Two Turns Discharge Goal   Discharge Goal 9   Wheel 150 Feet   Reason if not Attempted Activity not applicable   CARE Score - Wheel 150 Feet 9   Wheel 150 Feet Discharge Goal   Discharge Goal 9   Gait Functional Level of Assist    Gait Level Of Assist Minimal Assist   Assistive Device None (assessed without an AD per her prior level of function)      Distance (Feet) 60    +2 x 200' with FWW and CGA   # of Times Distance was Traveled 1   Deviation Ataxic;Increased Base Of Support;Bradykinetic;Decreased Heel Strike;Decreased Toe Off  (lateral lean, occasional minor LOB to the L)   Stairs Functional Level of Assist   Level of Assist with Stairs Contact Guard Assist   # of Stairs Climbed 12   Stairs Description Extra time;Hand rails;Supervision for safety;Safety concerns   Transfer Functional Level of Assist   Bed, Chair, Wheelchair Transfer Contact Guard Assist   Bed Chair Wheelchair Transfer Description Initial preparation for task;Supervision for safety  (no AD)   Problem List    Problems Impaired Ambulation;Impaired Balance;Impaired Coordination;Decreased Activity Tolerance;Safety Awareness Deficits / Cognition   Current Discharge Plan   Current Discharge Plan Return to Prior Living Situation   Interdisciplinary Plan of Care Collaboration   IDT Collaboration with  Occupational Therapist   Patient Position at End of Therapy In Bed;Bed Alarm On;Tray Table within Reach;Phone within Reach;Call Light within Reach   Collaboration Comments Collaborated with OT re: POC   Benefit   Therapy Benefit Patient Would Benefit from Inpatient Rehabilitation Physical Therapy to Maximize Functional [place] with  ADLs, IADLs and Mobility.   Strengths & Barriers   Strengths Adequate strength;Independent prior level of function;Motivated for self care and independence;Pleasant and cooperative;Supportive family;Willingly participates in therapeutic activities   Barriers Decreased endurance;Home accessibility;Impaired activity tolerance;Impaired balance;Impaired insight/denial of deficits;Impulsive     Gait training with FWW 2 x 200' with CGA.    [place] Balance Scale performed, patient scored 42/56.     Assessment  Patient is 57 y.o. female with a diagnosis of R cerebellar hemorrhagic CVA as well as suboccipital craniectomy with evacuation of cerebellar ICH, duraplasty and C1 laminectomy.  Patient was previously independent, living with her two adult sons and working full-time. She was previously not using an AD.   She presents to rehab with deficits in dynamic balance, gait and overall activity tolerance. She demonstrates impulsivity and poor safety awareness, particularly with walker management around obstacles. She scored 42/56 on [place] but would benefit from a more dynamic balance assessment to identify true fall risk and balance deficits.     Plan  Recommend Physical Therapy  minutes per day 5-7 days per week for 7-10 days for the following treatments:  PT Gait Training, PT Therapeutic Exercises, PT Neuro Re-Ed/Balance, and PT Therapeutic Activity.    Passport items to be completed:  Get in/out of bed safely, in/out of a vehicle, safely use mobility device, walk or wheel around home/community, navigate up and down stairs, show how to get up/down from the ground, ensure home is accessible, demonstrate HEP, complete caregiver training    Goals:  Long term and short term goals have been discussed with patient and they are in agreement.    Physical Therapy Problems (Active)       Problem: Balance       Dates: Start: 08/13/22         Goal: STG-Within one week, patient will participate in FGA or Mini-BestTest to assess fall  risk.        Dates: Start: 08/13/22               Problem: Mobility       Dates: Start: 08/13/22         Goal: STG-Within one week, patient will ambulate 250 feet with least restrictive assistive device and SBA.        Dates: Start: 08/13/22            Goal: STG-Within one week, patient will ambulate up/down flight of stairs with rails and SBA.        Dates: Start: 08/13/22               Problem: Mobility Transfers       Dates: Start: 08/13/22         Goal: STG-Within one week, patient will transfer in/out of car with CGA.        Dates: Start: 08/13/22            Goal: STG-Within one week, patient will ambulate at least 50 feet on uneven terrain with least restrictive assistive device and CGA.        Dates: Start: 08/13/22               Problem: PT-Long Term Goals       Dates: Start: 08/13/22         Goal: LTG-By discharge, patient will ambulate 300 feet with least restrictive assistive device and mod I.        Dates: Start: 08/13/22            Goal: LTG-By discharge, patient will ambulate up/down flight of stairs with single rail and mod I.        Dates: Start: 08/13/22            Goal: LTG-By discharge, patient will transfer in/out of a car with mod I.        Dates: Start: 08/13/22            Goal: LTG-By discharge, patient will ambulate at least 100 feet on uneven terrain with least restrictive assistive device and mod I.       Dates: Start: 08/13/22

## 2022-08-13 NOTE — THERAPY
"Speech Language Pathology   Initial Assessment     Patient Name: Lana Phillips  AGE:  57 y.o., SEX:  female  Medical Record #: [id number]  Today's Date: 2022     Subjective    Pt willing to participate in speech language evaluation. Per chart review:  \"Per Dr. Skaggs's consult, \"The patient is a 57 y.o. right hand dominant female with a past medical history of hypertension not on medications;  who presented on 2022  1:57 PM with altered mental status, headache, dizziness, nausea.  Patient was attending a  when she was found in the bathroom minimally responsive.  Patient was brought to the hospital, found to have systolic pressure of 259. CT head found large right cerebellar hemorrhage with compression of the third and fourth ventricles.  Patient was seen by neurosurgery, and taken for suboccipital craniectomy with evacuation of left cerebellar IPH, duraplasty, and C1 laminectomy by Dr. Pedro Pablo Vences MD on 2022.\"     In addition to above, HgbA1c 7.2, , ECHO EF 65 %, RSVP 65 mmHg.         Patient current reports she wants to go home. She also doesn't want bed alarms and has been getting up to go to the bathroom by herself.  She denies any headache, dizziness, or focal weakness.  She denies any changes in her thinking and feels like her normal self.  She apparently was at her mother's  when this happened and her mother had just  from a stroke.  She does not want to talk to the psychologist.  She denies being aware that she had a history of hypertension as she had not seen a doctor.\"        Objective       22 0831   Evaluation Charges   Charges Yes   SLP Speech Language Evaluation Speech Sound Language Comprehension   SLP Total Time Spent   SLP Individual Total Time Spent (Mins) 60   Prior Living Situation   Prior Services None   Lives with - Patient's Self Care Capacity Adult Children  (Pt reports son and dtr live with her and are able to help)   Prior Level Of Function " "  Communication Within Functional Limits   Swallow Within Functional Limits   Dentition Intact   Dentures None   Hearing Within Functional Limits for Evaluation   Hearing Aid None   Vision Within Functional Limits for Evaluation   Patient's Primary Language English   Occupation (Pre-Hospital Vocational) 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[employer])   Receptive Language / Auditory Comprehension   Receptive Language / Auditory Comprehension WDL   Expressive Language   Expressive Language (WDL) X   Word Finding Deficits Minimal (4)   Paraphasias Minimal (4)   Cognition   Cognitive-Linguistic (WDL) X   Attention to Task Minimal (4)   Simple Attention Minimal (4)   Verbal Short Term Memory 5 Minutes   Insight into Deficits Moderate (3)   Clock Drawing Within Functional Limits   ABS (Agitated Behavior Scale)   Agitated Behavior Scale Performed Yes   Short Attention Span, Easy Distractibility, Inability to Concentrate 2   Impulsive, Impatient, Low Tolerance for Pain or Frustration 2   Uncooperative, Resistant to Care, Demanding 1   Violent and/or Threatening Violence Toward People or Property 1   Explosive and/or Unpredictable Anger 1   Rocking, Rubbing, Moaning, Other Self-Stimulating Behavior 1   Pulling at Tubes, Restraints, etc. 1   Wandering from Treatment Area 1   Restlessness, Pacing, Excessive Movement 1   Repetitive Behaviors, Motor and/or Verbal 1   Rapid, Loud or Excessive Talking 2   Sudden Changes of Mood 2   Easily Initiated - Excessive Crying and/or Laughter 1   Self-Abusiveness, Physical and/or Verbal 1   Agitated Behavior Scale Total Score 18   Level of Severity No Agitation   Cognitive Pattern Assessment   Cognitive Pattern Assessment Used BIMS   Brief Interview for Mental Status (BIMS)   Repetition of Three Words (First Attempt) 3   Temporal Orientation: Year Correct   Temporal Orientation: Month Accurate within 5 days   Temporal Orientation: Day Correct   Recall: \"Sock\" Yes, no cue " "required   Recall: \"Blue\" Yes, no cue required   Recall: \"Bed\" Yes, no cue required   BIMS Summary Score 15   Functional Level of Assist   Eating Independent   Comprehension Modified Independent   Comprehension Description Verbal cues   Expression Minimal Assist   Expression Description Verbal cueing   Social Interaction Minimal Assist   Social Interaction Description Increased time;Schedule;Verbal cues   Problem Solving Moderate Assist   Problem Solving Description Bed/chair alarm;Increased time;Seat belt;Therapy schedule;Verbal cueing   Memory Moderate Assist   Memory Description Bed/chair alarm;Increased time;Seat belt;Therapy schedule;Verbal cueing   Outcome Measures   Outcome Measures Utilized SCCAN   SCCAN (Scales of Cognitive and Communicative Ability for Neurorehabilitation)   Oral Expression - Raw Score 15   Oral Expression - Scale Performance Score 79   Orientation - Raw Score 11   Orientation - Scale Performance Score 92   Memory - Raw Score 12   Memory - Scale Performance Score 63   Speech Comprehension - Raw Score 9   Speech Comprehension - Scale Performance Score 69   Reading Comprehension - Raw Score 7   Reading Comprehension - Scale Performance Score 58   Writing - Raw Score 5   Writing - Scale Performance Score 71   Attention - Raw Score 13   Attention - Scale Performance Score 81   Problem Solving - Raw Score 20   Problem Solving - Scale Performance Score 87   SCCAN Total Raw Score 73   SCCAN Degree of Severity Mild Impairment   Problem List   Problem List Cognitive-Linguistic Deficits;Attention Deficit;Memory Deficit   Current Discharge Plan   Current Discharge Plan Return to Prior Living Situation   Benefit   Therapy Benefit Patient would benefit from Inpatient Rehab Speech-Language Pathology to address above identified deficits.   Interdisciplinary Plan of Care Collaboration   IDT Collaboration with  Certified Nursing Assistant   Patient Position at End of Therapy In Bed;Bed Alarm On;Call Light " within Reach;Tray Table within Reach;Phone within Reach   Collaboration Comments POC   Strengths & Barriers   Strengths Able to follow instructions;Independent prior level of function;Supportive family   Barriers Impaired functional cognition;Impulsive   Speech Language Pathologist Assigned   Assigned SLP / Extension LC 60 cog only       Assessment    Patient is 57 y.o. female with a diagnosis of hemorrhagic stroke.  Additional factors influencing patient status/progress (ie: cognitive factors, co-morbidities, social support, etc): attention, memory, and problem solving deficits, supportive family.      Cognitive Linguistic:  The SCCAN (Scales of Cognitive and Communicative Ability for Neurorehabilitation) was administered with performance as follows:     Oral Expression - Raw Score: 15  Oral Expression - Scale Performance Score: 79  Orientation - Raw Score: 11  Orientation - Scale Performance Score: 92  Memory - Raw Score: 12  Memory - Scale Performance Score: 63  Speech Comprehension - Raw Score: 9  Speech Comprehension - Scale Performance Score: 69  Reading Comprehension - Raw Score: 7  Reading Comprehension - Scale Performance Score: 58  Writing - Raw Score: 5  Writing - Scale Performance Score: 71  Attention - Raw Score: 13  Attention - Scale Performance Score: 61  Problem Solving - Raw Score: 20  Problem Solving - Scale Performance Score: 87  SCCAN Total Raw Score: 73  SCCAN Degree of Severity: Mild Impairment     Recommend skilled ST to address deficits in attention, memory and problem solving given pt's independent PLOF.     Plan  Recommend Speech Therapy 30-60 minutes per day 5-7 days per week for 2 weeks for the following treatments:  SLP Self Care / ADL Training , SLP Cognitive Skill Development, and SLP Group Treatment.    Passport items to be completed:  Express basic needs, understand food/liquid recommendations, consistently follow swallow precautions, manage finances, manage medications, arrive to  therapy appointments on time, complete daily memory log entries, solve problems related to safety situations, review education related to hospitalization, complete caregiver training     Goals:  Long term and short term goals have been discussed with patient and they are in agreement.    Speech Therapy Problems (Active)       Problem: Memory STGs       Dates: Start: 08/13/22         Goal: STG-Within one week, patient will       Dates: Start: 08/13/22       Description: 1) Individualized goal:   recall novel health/safety information using memory strategies/external memory aids with 80% accuracy and MIN A.   2) Interventions:  SLP Self Care / ADL Training , SLP Cognitive Skill Development, and SLP Group Treatment          Goal Note filed on 08/13/22 0925 by Maida Jurado MS,CCC-SLP       1) Individualized goal:   recall novel health/safety information using memory strategies/external memory aids with 80% accuracy and MIN A.   2) Interventions:  SLP Self Care / ADL Training , SLP Cognitive Skill Development, and SLP Group Treatment                   Problem: Problem Solving STGs       Dates: Start: 08/13/22         Goal: STG-Within one week, patient will       Dates: Start: 08/13/22       Description: 1) Individualized goal:  complete medication and financial management tasks with 80% acc and MIN A.   2) Interventions:  SLP Self Care / ADL Training , SLP Cognitive Skill Development, and SLP Group Treatment             Goal: STG-Within one week, patient will       Dates: Start: 08/13/22       Description: 1) Individualized goal:  complete alternating and divided attention tasks with 80% accuracy and MIN A.   2) Interventions:  SLP Self Care / ADL Training , SLP Cognitive Skill Development, and SLP Group Treatment                Problem: Speech/Swallowing LTGs       Dates: Start: 08/13/22         Goal: LTG-By discharge, patient will solve complex problem       Dates: Start: 08/13/22       Description: 1)  Individualized goal:  with 80% accuracy and MOD I for a safe D/C to PLOF.   2) Interventions:  SLP Self Care / ADL Training , SLP Cognitive Skill Development, and SLP Group Treatment

## 2022-08-13 NOTE — CARE PLAN
The patient is Stable - Low risk of patient condition declining or worsening    Shift Goals  Clinical Goals: Rest and safety  Patient Goals: Rest  Family Goals: N/A    Problem: Pain - Standard  Goal: Alleviation of pain or a reduction in pain to the patient’s comfort goal  Outcome: Progressing: denies pain, no analgesics administered. No non-verbal descriptors of pain, such as grimacing or moaning with movement, noted.     Problem: Diabetes Management  Goal: Patient's ability to maintain appropriate glucose levels will be maintained or improve  Outcome: Progressing: FSBG = 152, 135, and 135. SS insulin administered per MAR.

## 2022-08-13 NOTE — PROGRESS NOTES
Shift report received from RICKY Huber RN, POC discussed. Pt is awake and sitting up in the shower with OT w/ no s/s distress noted. Will continue to monitor.

## 2022-08-13 NOTE — THERAPY
"Occupational Therapy   Initial Evaluation     Patient Name: Lana Phillips  Age:  57 y.o., Sex:  female  Medical Record #: [id number]  Today's Date: 8/13/2022     Subjective    Pt sleeping deeply, but easily aroused and agreeable to OT eval and tx. \"The only reason I agreed to therapy is because I would get 3 meals a day.\" \"I want to go home. My son doesn't do anything, he can help me.\" Referring to 19 year old son.     Objective       08/13/22 0701   OT Charge Group   OT Self Care / ADL 1   OT Evaluation OT Evaluation Mod   OT Total Time Spent   OT Individual Total Time Spent (Mins) 60   Prior Living Situation   Prior Services Home-Independent   Housing / Facility 2 Story House   Steps Into Home 1   Steps In Home 14   Rail Left Rail (Steps in Home)   Elevator No   Bathroom Set up Bathtub / Shower Combination;Shower Curtain   Equipment Owned Front-Wheel Walker;4-Wheel Walker   Lives with - Patient's Self Care Capacity Alone and Able to Care For Self;Child Less than 18 Years of Age;Adult Children   Comments Pt lives in [place] with 18-19 year old sons in 2 story home with 1 MARCIN and only a half-bath on first floor.   Prior Level of ADL Function   Self Feeding Independent   Grooming / Hygiene Independent   Bathing Independent   Dressing Independent   Toileting Independent   Prior Level of IADL Function   Medication Management Independent   Laundry Independent   Kitchen Mobility Independent   Finances Independent   Home Management Independent   Shopping Independent   Prior Level Of Mobility Independent Without Device in Community;Independent With Steps in Community;Independent Without Device in Home;Independent With Steps in Home   Driving / Transportation Driving Independent   Occupation (Pre-Hospital Vocational) Employed Full Time  ([employer])   Leisure Interests Family   Comments Plans on returning home with 19 year old son as caregiver if needed and wants to return to work in a week.   Prior Functioning: Everyday " "Activities   Self Care Independent   Indoor Mobility (Ambulation) Independent   Stairs Independent   Functional Cognition Independent   Prior Device Use None of the given options   Vitals   Pulse 87   Patient BP Position Standing 1 minute   Blood Pressure 114/79   Pulse Oximetry 96 %   O2 (LPM) 0   O2 Delivery Device None - Room Air   Vitals Comments Orthostatics as follows supine: 110/64 HR 71, sittin/72 HR 78. Asymptomatic.   Pain   Intervention Declines   Cognition    Cognition / Consciousness X   Level of Consciousness Alert   Safety Awareness Impaired;Impulsive   Attention Impaired   Comments Pt required multiple cues to reduce velocity during all transitional movements and during dynamic sitting balance ADLs with multiple LOB in all directions.   ABS (Agitated Behavior Scale)   Agitated Behavior Scale Performed No   Cognitive Pattern Assessment   Cognitive Pattern Assessment Used BIMS   Brief Interview for Mental Status (BIMS)   Repetition of Three Words (First Attempt) 2   Temporal Orientation: Year Correct   Temporal Orientation: Month Accurate within 5 days   Temporal Orientation: Day Correct   Recall: \"Sock\" Yes, no cue required   Recall: \"Blue\" Yes, no cue required   Recall: \"Bed\" Yes, no cue required   BIMS Summary Score 14   Vision Screen   Vision Tested   Visual Acuity Able to read employee name [employer] without difficulty;Able to read clock/calander on wall without difficulty   Tracking Able to track stimulus in all quads without difficulty   Convergence   (Mild delayed convergence in R-eye)   Visual Fields Intact   Passive ROM Upper Body   Passive ROM Upper Body WDL   Comments BUES   Active ROM Upper Body   Active ROM Upper Body  WDL   Dominant Hand Ambidextrous   Comments BUES   Strength Upper Body   Upper Body Strength  WDL   Comments BUES   Sensation Upper Body   Upper Extremity Sensation  WDL   Comments BUES   Upper Body Muscle Tone   Upper Body Muscle Tone  WDL   Comments BUES   Balance " Assessment   Sitting Balance (Static) Good   Sitting Balance (Dynamic) Fair +   Standing Balance (Static) Fair -   Standing Balance (Dynamic) Poor +   Weight Shift Sitting Good   Weight Shift Standing Fair   Comments As supported by GB or FWW   Bed Mobility    Supine to Sit Supervised   Sit to Supine Supervised   Sit to Stand Standby Assist   Scooting Supervised   Rolling Supervised   Coordination Upper Body   Coordination WDL   Comments BUES, good FNF and opposition   Eating   Assistance Needed Independent   CARE Score - Eating 6   Eating Discharge Goal   Discharge Goal 6   Oral Hygiene   Assistance Needed Supervision   Physical Assistance Level No physical assistance   CARE Score - Oral Hygiene 4   Oral Hygiene Discharge Goal   Discharge Goal 6   Shower/Bathe Self   Assistance Needed Supervision;Set-up / clean-up   Physical Assistance Level No physical assistance   CARE Score - Shower/Bathe Self 4   Shower/Bathe Self Discharge Goal   Discharge Goal 6   Upper Body Dressing   Assistance Needed Supervision   CARE Score - Upper Body Dressing 4   Upper Body Dressing Discharge Goal   Discharge Goal 6   Lower Body Dressing   Assistance Needed Physical assistance   Physical Assistance Level 25% or less   CARE Score - Lower Body Dressing 3   Lower Body Dressing Discharge Goal   Discharge Goal 6   Putting On/Taking Off Footwear   Assistance Needed Supervision   CARE Score - Putting On/Taking Off Footwear 4   Putting On/Taking Off Footwear Discharge Goal   Discharge Goal 6   Toileting Hygiene   Assistance Needed Physical assistance   Physical Assistance Level 25% or less   CARE Score - Toileting Hygiene 3   Toileting Hygiene Discharge Goal   Discharge Goal 6   Toilet Transfer   Assistance Needed Physical assistance   Physical Assistance Level 25% or less   CARE Score - Toilet Transfer 3   Toilet Transfer Discharge Goal   Discharge Goal 6   Hearing, Speech, and Vision   Ability to Hear Adequate   Ability to See in Adequate  Light Adequate   Expression of Ideas and Wants Without difficulty   Understanding Verbal and Non-Verbal Content Understands   Functional Level of Assist   Eating Independent   Grooming Standby Assist   Grooming Description Standing at sink;Verbal cueing;Supervision for safety  (SBA for safety with poor balance)   Bathing Standby Assist   Bathing Description Grab bar;Hand held shower;Tub bench;Supervision for safety;Verbal cueing  (v/c to slow velocity with washing and use GB in standing with SBA for safety when washing rear. Setup to don shower cap to protect incision.)   Upper Body Dressing Supervision   Upper Body Dressing Description Supervision for safety  (to don/doff pullover shirt)   Lower Body Dressing Contact Guard Assist   Lower Body Dressing Description Supervision for safety;Verbal cueing  (due to LOB in posterior during hip hike with cues to reduce velocity)   Toileting Contact Guard Assist   Toileting Description Supervision for safety;Verbal cueing  (For balance with mild retro LOB during hip hike.)   Bed, Chair, Wheelchair Transfer Contact Guard Assist   Bed Chair Wheelchair Transfer Description Adaptive equipment;Supervision for safety;Verbal cueing  (Vc to slow velocity during stand step with FWW)   Toilet Transfers Contact Guard Assist   Toilet Transfer Description Adaptive equipment;Supervision for safety;Verbal cueing  (Vc to slow velocity during stand step with FWW)   Tub / Shower Transfers Contact Guard Assist   Tub Shower Transfer Description Supervision for safety;Verbal cueing;Adaptive equipment;Shower bench  (Vc to slow velocity during stand step with FWW)   Problem List   Problem List Decreased Active Daily Living Skills;Decreased Homemaking Skills;Decreased Functional Mobility;Safety Awareness Deficits / Cognition;Impaired Postural Control / Balance;Limited Knowledge of Post Op Precautions   Precautions   Precautions Fall Risk;Other (See Comments)   Comments staples over C-spine,  impulsive   Current Discharge Plan   Current Discharge Plan Return to Prior Living Situation   Benefit    Therapy Benefit Patient Would Benefit from Inpatient Rehab Occupational Therapy to Maximize [place] with ADLs, IADLs and Functional Mobility.   Interdisciplinary Plan of Care Collaboration   IDT Collaboration with  Certified Nursing Assistant;Physical Therapist;Speech Therapist;Nursing   Patient Position at End of Therapy Seated;Chair Alarm On;Call Light within Reach;Tray Table within Reach   Collaboration Comments POC/CLOF   Equipment Needs   Assistive Device / DME Front-Wheel Walker;Grab Bars By Toilet;Grab Bars In Shower / Tub;Tub Transfer Bench   Adaptive Equipment None   Strengths & Barriers   Strengths Adequate strength;Alert and oriented;Independent prior level of function;Motivated for self care and independence;Pleasant and cooperative;Willingly participates in therapeutic activities   Barriers Difficulty following instructions;Generalized weakness;Impaired balance;Impaired carryover of learning;Impaired insight/denial of deficits;Impaired functional cognition;Impulsive;Limited mobility;Poor family support       Assessment  Patient is 57 y.o. female with a diagnosis of Hemorrhagic stroke with no Paresis. She presented to acute on 2022 at 1:57 PM with altered mental status, headache, dizziness, nausea.  Patient was attending a  when she was found in the bathroom minimally responsive.  Patient was brought to the hospital, found to have systolic pressure of 259. CT head found large right cerebellar hemorrhage with compression of the third and fourth ventricles.  Patient was seen by neurosurgery, and taken for suboccipital craniectomy with evacuation of left cerebellar IPH, duraplasty, and C1 laminectomy by Dr. Pedro Pablo Vences MD on 2022  Additional factors influencing patient status / progress (ie: cognitive factors, co-morbidities, social support, etc): Pt with PMH of hypertension not  on medications.  During evaluation pt was limited by poor attention to safety with high velocity mobility and transfers, poor standing balance with LOB in multiple directions with high fall risk, and noted flat affect. Educated pt on therapy schedule, rehab expectations, and goal setting.    Plan  Recommend Occupational Therapy  minutes per day 5-7 days per week for 10 days for the following treatments:  OT Group Therapy, OT Self Care/ADL, OT Cognitive Skill Dev, OT Community Reintegration, OT Manual Ther Technique, OT Neuro Re-Ed/Balance, OT Therapeutic Activity, OT Evaluation, and OT Therapeutic Exercise.    Passport items to be completed:  Perform bathroom transfers, complete dressing, complete feeding, get ready for the day, prepare a simple meal, participate in household tasks, adapt home for safety needs, demonstrate home exercise program, complete caregiver training     Goals:  Long term and short term goals have been discussed with patient and they are in agreement.    Occupational Therapy Goals (Active)       Problem: Dressing       Dates: Start: 08/13/22         Goal: STG-Within one week, patient will dress LB at SPV level without LOB.       Dates: Start: 08/13/22               Problem: Functional Transfers       Dates: Start: 08/13/22         Goal: STG-Within one week, patient will transfer to toilet at SBA level without LOB.       Dates: Start: 08/13/22               Problem: IADL's       Dates: Start: 08/13/22         Goal: STG-Within one week, patient will access kitchen area at SBA level without LOB.       Dates: Start: 08/13/22               Problem: OT Long Term Goals       Dates: Start: 08/13/22         Goal: LTG-By discharge, patient will complete basic self care tasks at Mod I level using AE/DME PRN.       Dates: Start: 08/13/22            Goal: LTG-By discharge, patient will perform bathroom transfers at Mod I level using AE/DME PRN.       Dates: Start: 08/13/22            Goal: LTG-By  discharge, patient will complete basic home management at [facility]-Mod I level using AE/DME PRN.       Dates: Start: 08/13/22

## 2022-08-13 NOTE — ASSESSMENT & PLAN NOTE
CT Head: showed large right cerebellar hemorrhage with compression of the third and fourth ventricles  S/P suboccipital craniectomy with evacuation of left cerebellar IPH, duraplasty, and C1 laminectomy  On Lipitor    Suggestion was to keep sodium above 140 (for brain swelling):  Na: 138 (8/9) --> 138 (8/13) --> 135 (8/14) --> 138 (8/16)  On salt tabs: 1 g tid --> 2g bid (8/13) --> will increase to 2g tid (8/14)  Sodium was 138 on 8/17. Refrain from further increases in salt tabs given the duration of time post-operatively and her clinical condition. Stop daily BMP as she continues to have sodiums in the high 130's despite salt consumption.   Decrease salt tabs with goal of tapering off.

## 2022-08-13 NOTE — PROGRESS NOTES
Report received and assumed care at 1900. Pt is A&Ox4. VSS. Denies pain. SBA when OOB.  Bed alarm in place and turned on.  Incision to posterior head is TOYIN w/sutures and is CDI.  Pt updated on POC, needs met and questions answered. Call light within reach and working properly.

## 2022-08-13 NOTE — CARE PLAN
The patient is Stable - Low risk of patient condition declining or worsening    Shift Goals  Clinical Goals: Rest and safety  Patient Goals: Rest  Family Goals: N/A    Progress made toward(s) clinical / shift goals:    Problem: Fall Risk - Rehab  Goal: Patient will remain free from falls  Outcome: Progressing  Note: Pt impulsive, did not call before trying to get OOB to go to the BR. Bed alarm on bed turned on, strip alarm in place. Reminded pt to call when she needs assistance and use the BR.      Problem: Diabetes Management  Goal: Patient's ability to maintain appropriate glucose levels will be maintained or improve  Outcome: Progressing  Note: Continue FSBS as ordered. Nighttime FSBS was 120. Sliding scale insulin coverage not needed tonight.

## 2022-08-13 NOTE — PROGRESS NOTES
"Rehab Progress Note     Encounter Date: 8/13/2022    CC: s/p hemorrhagic stroke     Interval Events (Subjective)  Vital signs reviewed, WNL.  Admission labs reviewed, sodium 138, goal is 140.  1 additional salt tab administered today.  Patient seen and examined at bedside with family.  Patient reports she feels well, completed speech therapy and is looking forward to PT today.  Patient is asking about how she can fill out paperwork for disability because she \"cannot do anything\".Discussed with patient case management can assist on Monday.  Patient denies headache, lightheadedness, dizziness or changes in vision.  Denies fatigue.  Denies numbness tingling or weakness.    Physical Exam:  Vitals: /67   Pulse 81   Temp 37.1 °C (98.8 °F) (Oral)   Resp 18   Ht 1.499 m (4' 11\")   Wt 65.5 kg (144 lb 6.4 oz)   SpO2 95%   Gen: NAD, seated comfortably in manual wheelchair, family in room  Head:  NC/AT, + 1 episode nystagmus nystagmus when looking to the left.  Eyes/ Nose/ Mouth: PERRLA, moist mucous membranes  Cardio: RRR, good distal perfusion, warm extremities  Pulm: normal respiratory effort, no cyanosis, breathing comfortably on room air  Abd: Soft NTND, negative borborygmi   Ext: No peripheral edema. No calf tenderness. No clubbing.    Mental status:  A&Ox4 (person, place, date, situation) answers questions appropriately follows commands  Speech: fluent, no aphasia or dysarthria        Recent Results (from the past 72 hour(s))   COV-2, FLU A/B, AND RSV BY PCR (2-4 HOURS CEPHEID): Collect NP swab in [facility]    Collection Time: 08/12/22 10:10 AM    Specimen: Nasopharyngeal; Respirate   Result Value Ref Range    Influenza virus A RNA Negative Negative    Influenza virus B, PCR Negative Negative    RSV, PCR Negative Negative    SARS-CoV-2 by PCR NotDetected     SARS-CoV-2 Source NP Swab    POCT glucose device results    Collection Time: 08/12/22 11:22 AM   Result Value Ref Range    POC Glucose, Blood 116 (H) 65 - 99 " mg/dL   POCT glucose device results    Collection Time: 08/12/22  5:22 PM   Result Value Ref Range    POC Glucose, Blood 129 (H) 65 - 99 mg/dL   POCT glucose device results    Collection Time: 08/12/22  8:24 PM   Result Value Ref Range    POC Glucose, Blood 120 (H) 65 - 99 mg/dL   CBC with Differential    Collection Time: 08/13/22  5:37 AM   Result Value Ref Range    WBC 10.1 4.8 - 10.8 K/uL    RBC 4.41 4.20 - 5.40 M/uL    Hemoglobin 13.3 12.0 - 16.0 g/dL    Hematocrit 40.5 37.0 - 47.0 %    MCV 91.8 81.4 - 97.8 fL    MCH 30.2 27.0 - 33.0 pg    MCHC 32.8 (L) 33.6 - 35.0 g/dL    RDW 43.2 35.9 - 50.0 fL    Platelet Count 284 164 - 446 K/uL    MPV 10.2 9.0 - 12.9 fL    Neutrophils-Polys 61.20 44.00 - 72.00 %    Lymphocytes 24.60 22.00 - 41.00 %    Monocytes 7.40 0.00 - 13.40 %    Eosinophils 2.20 0.00 - 6.90 %    Basophils 0.90 0.00 - 1.80 %    Immature Granulocytes 3.70 (H) 0.00 - 0.90 %    Nucleated RBC 0.00 /100 WBC    Neutrophils (Absolute) 6.20 2.00 - 7.15 K/uL    Lymphs (Absolute) 2.49 1.00 - 4.80 K/uL    Monos (Absolute) 0.75 0.00 - 0.85 K/uL    Eos (Absolute) 0.22 0.00 - 0.51 K/uL    Baso (Absolute) 0.09 0.00 - 0.12 K/uL    Immature Granulocytes (abs) 0.37 (H) 0.00 - 0.11 K/uL    NRBC (Absolute) 0.00 K/uL   Comp Metabolic Panel (CMP)    Collection Time: 08/13/22  5:37 AM   Result Value Ref Range    Sodium 138 135 - 145 mmol/L    Potassium 3.8 3.6 - 5.5 mmol/L    Chloride 101 96 - 112 mmol/L    Co2 27 20 - 33 mmol/L    Anion Gap 10.0 7.0 - 16.0    Glucose 121 (H) 65 - 99 mg/dL    Bun 11 8 - 22 mg/dL    Creatinine 0.42 (L) 0.50 - 1.40 mg/dL    Calcium 8.8 8.5 - 10.5 mg/dL    AST(SGOT) 22 12 - 45 U/L    ALT(SGPT) 48 2 - 50 U/L    Alkaline Phosphatase 89 30 - 99 U/L    Total Bilirubin 0.5 0.1 - 1.5 mg/dL    Albumin 3.8 3.2 - 4.9 g/dL    Total Protein 6.9 6.0 - 8.2 g/dL    Globulin 3.1 1.9 - 3.5 g/dL    A-G Ratio 1.2 g/dL   Magnesium    Collection Time: 08/13/22  5:37 AM   Result Value Ref Range    Magnesium 2.0 1.5  - 2.5 mg/dL   Vitamin D, 25-hydroxy (blood)    Collection Time: 08/13/22  5:37 AM   Result Value Ref Range    25-Hydroxy   Vitamin D 25 7 (L) 30 - 100 ng/mL   ESTIMATED GFR    Collection Time: 08/13/22  5:37 AM   Result Value Ref Range    GFR (CKD-EPI) 114 >60 mL/min/1.73 m 2   POCT glucose device results    Collection Time: 08/13/22  7:55 AM   Result Value Ref Range    POC Glucose, Blood 152 (H) 65 - 99 mg/dL       Current Facility-Administered Medications   Medication Frequency    hydrOXYzine HCl (ATARAX) tablet 50 mg Q6HRS PRN    melatonin tablet 3 mg HS PRN    Respiratory Therapy Consult Continuous RT    Pharmacy Consult Request ...Pain Management Review 1 Each PHARMACY TO DOSE    acetaminophen (Tylenol) tablet 650 mg Q4HRS PRN    senna-docusate (PERICOLACE or SENOKOT S) 8.6-50 MG per tablet 2 Tablet BID    And    polyethylene glycol/lytes (MIRALAX) PACKET 1 Packet QDAY PRN    And    magnesium hydroxide (MILK OF MAGNESIA) suspension 30 mL QDAY PRN    And    bisacodyl (DULCOLAX) suppository 10 mg QDAY PRN    lactulose 20 GM/30ML solution 30 mL QDAY PRN    docusate sodium (ENEMEEZ) enema 283 mg QDAY PRN    artificial tears ophthalmic solution 1 Drop PRN    benzocaine-menthol (Cepacol) lozenge 1 Lozenge Q2HRS PRN    mag hydrox-al hydrox-simeth (MAALOX PLUS ES or MYLANTA DS) suspension 20 mL Q2HRS PRN    ondansetron (ZOFRAN ODT) dispertab 4 mg 4X/DAY PRN    Or    ondansetron (ZOFRAN) syringe/vial injection 4 mg 4X/DAY PRN    traZODone (DESYREL) tablet 50 mg QHS PRN    sodium chloride (OCEAN) 0.65 % nasal spray 2 Spray PRN    insulin regular (HumuLIN R,NovoLIN R) injection 4X/DAY ACHS    And    dextrose 50% (D50W) injection 25 g Q15 MIN PRN    enoxaparin (Lovenox) inj 40 mg DAILY AT 1800    lisinopril (PRINIVIL) tablet 40 mg Q DAY    metFORMIN (GLUCOPHAGE) tablet 850 mg BID WITH MEALS    SITagliptin (JANUVIA) tablet 50 mg DAILY    sodium chloride (SALT) tablet 1 g TID WITH MEALS    [START ON 8/15/2022] scopolamine  (TRANSDERM-SCOP) patch 1 Patch Q72HRS    amLODIPine (NORVASC) tablet 10 mg Q DAY    atorvastatin (LIPITOR) tablet 80 mg Q EVENING       Orders Placed This Encounter   Procedures    Diet Order Diet: Consistent CHO (Diabetic)     Standing Status:   Standing     Number of Occurrences:   1     Order Specific Question:   Diet:     Answer:   Consistent CHO (Diabetic) [4]       Assessment:  Active Hospital Problems    Diagnosis     *Hemorrhagic stroke (HCC)     Impaired mobility and ADLs     Other hyperlipidemia     Type 2 diabetes mellitus with hyperglycemia, without long-term current use of insulin (HCC)     Intracranial hemorrhage (HCC)     Hypertension        Medical Decision Making and Plan:  Adapted from Dr. Sanchez's H and P:  R cerebellar hemorrhagic stroke  - s/p suboccipital  craniectomy with evacuation of left cerebellar IPH duraplasty and C1 laminectomy    -initiate comprehensive IRF level therapy with 3hrs of therapy 5 days per week with PT/OT/SLP      Brain swelling  Continue salt tabs to keep sodium above 140  - 8/13 Na 138 while on salt tab 1g TID, will give additional 1 gm today, may need to switch to 4mg total daily  - recheck Na in AM   - will discuss with hospitalist      Dizziness  Continue scopolamine patch     Hypertension  Amlodipine  Lisinopril  Consult Hospitalist, BP well controlled, mildly hypotensive but asymptomatic     Diabetes with hyperglycemia  New diagnosis  Hemoglobin A1c of 7.2  Metformin  Januvia  Sliding scale insulin  Consult hospitalist, blood sugars well controlled.     Hyperlipidemia    Start high-dose statin    DVT ppx: Lovenox     Total time:  35 minutes.  I spent greater than 50% of the time for patient care and coordination on this date, including unit/floor time, and face-to-face time with the patient as per assessment and plan above.    Valeria Lerner D.O.

## 2022-08-14 LAB
ANION GAP SERPL CALC-SCNC: 12 MMOL/L (ref 7–16)
BUN SERPL-MCNC: 14 MG/DL (ref 8–22)
CALCIUM SERPL-MCNC: 8.6 MG/DL (ref 8.5–10.5)
CHLORIDE SERPL-SCNC: 100 MMOL/L (ref 96–112)
CO2 SERPL-SCNC: 23 MMOL/L (ref 20–33)
CREAT SERPL-MCNC: 0.43 MG/DL (ref 0.5–1.4)
GFR SERPLBLD CREATININE-BSD FMLA CKD-EPI: 113 ML/MIN/1.73 M 2
GLUCOSE BLD STRIP.AUTO-MCNC: 119 MG/DL (ref 65–99)
GLUCOSE BLD STRIP.AUTO-MCNC: 119 MG/DL (ref 65–99)
GLUCOSE BLD STRIP.AUTO-MCNC: 138 MG/DL (ref 65–99)
GLUCOSE BLD STRIP.AUTO-MCNC: 139 MG/DL (ref 65–99)
GLUCOSE SERPL-MCNC: 138 MG/DL (ref 65–99)
POTASSIUM SERPL-SCNC: 3.7 MMOL/L (ref 3.6–5.5)
SODIUM SERPL-SCNC: 135 MMOL/L (ref 135–145)

## 2022-08-14 PROCEDURE — 700102 HCHG RX REV CODE 250 W/ 637 OVERRIDE(OP): Performed by: HOSPITALIST

## 2022-08-14 PROCEDURE — 700102 HCHG RX REV CODE 250 W/ 637 OVERRIDE(OP): Performed by: PHYSICAL MEDICINE & REHABILITATION

## 2022-08-14 PROCEDURE — 36415 COLL VENOUS BLD VENIPUNCTURE: CPT

## 2022-08-14 PROCEDURE — A9270 NON-COVERED ITEM OR SERVICE: HCPCS | Performed by: PHYSICAL MEDICINE & REHABILITATION

## 2022-08-14 PROCEDURE — 80048 BASIC METABOLIC PNL TOTAL CA: CPT

## 2022-08-14 PROCEDURE — 97129 THER IVNTJ 1ST 15 MIN: CPT

## 2022-08-14 PROCEDURE — 97130 THER IVNTJ EA ADDL 15 MIN: CPT

## 2022-08-14 PROCEDURE — A9270 NON-COVERED ITEM OR SERVICE: HCPCS | Performed by: HOSPITALIST

## 2022-08-14 PROCEDURE — 700111 HCHG RX REV CODE 636 W/ 250 OVERRIDE (IP): Performed by: PHYSICAL MEDICINE & REHABILITATION

## 2022-08-14 PROCEDURE — 770010 HCHG ROOM/CARE - REHAB SEMI PRIVAT*

## 2022-08-14 PROCEDURE — 99232 SBSQ HOSP IP/OBS MODERATE 35: CPT | Performed by: HOSPITALIST

## 2022-08-14 PROCEDURE — 82962 GLUCOSE BLOOD TEST: CPT

## 2022-08-14 RX ADMIN — SODIUM CHLORIDE 2 G: 1 TABLET ORAL at 07:45

## 2022-08-14 RX ADMIN — METFORMIN HYDROCHLORIDE 850 MG: 850 TABLET ORAL at 17:22

## 2022-08-14 RX ADMIN — DOCUSATE SODIUM 50 MG AND SENNOSIDES 8.6 MG 2 TABLET: 8.6; 5 TABLET, FILM COATED ORAL at 19:59

## 2022-08-14 RX ADMIN — DOCUSATE SODIUM 50 MG AND SENNOSIDES 8.6 MG 2 TABLET: 8.6; 5 TABLET, FILM COATED ORAL at 07:45

## 2022-08-14 RX ADMIN — AMLODIPINE BESYLATE 10 MG: 5 TABLET ORAL at 06:20

## 2022-08-14 RX ADMIN — SITAGLIPTIN 50 MG: 50 TABLET, FILM COATED ORAL at 07:45

## 2022-08-14 RX ADMIN — METFORMIN HYDROCHLORIDE 850 MG: 850 TABLET ORAL at 07:45

## 2022-08-14 RX ADMIN — ATORVASTATIN CALCIUM 80 MG: 40 TABLET, FILM COATED ORAL at 19:59

## 2022-08-14 RX ADMIN — SODIUM CHLORIDE 2 G: 1 TABLET ORAL at 17:22

## 2022-08-14 RX ADMIN — LISINOPRIL 40 MG: 20 TABLET ORAL at 06:20

## 2022-08-14 RX ADMIN — ENOXAPARIN SODIUM 40 MG: 40 INJECTION SUBCUTANEOUS at 17:57

## 2022-08-14 ASSESSMENT — ENCOUNTER SYMPTOMS
NERVOUS/ANXIOUS: 0
FEVER: 0
DIARRHEA: 0
ABDOMINAL PAIN: 0
NAUSEA: 0
VOMITING: 0
CHILLS: 0
SHORTNESS OF BREATH: 0

## 2022-08-14 ASSESSMENT — FIBROSIS 4 INDEX: FIB4 SCORE: 0.64

## 2022-08-14 ASSESSMENT — PAIN DESCRIPTION - PAIN TYPE: TYPE: ACUTE PAIN

## 2022-08-14 NOTE — THERAPY
Speech Language Pathology  Daily Treatment     Patient Name: Lana Phillips  Age:  57 y.o., Sex:  female  Medical Record #: [id number]  Today's Date: 8/14/2022     Precautions  Precautions: Fall Risk, Other (See Comments)  Comments: staples over C-spine, impulsive    Subjective    Pt pleasant and cooperative during ST session.      Objective       08/14/22 1431   Treatment Charges   Charges Yes   SLP Cognitive Skill Development First 15 Minutes 1   SLP Cognitive Skill Development Additional 15 Minutes 3   SLP Total Time Spent   SLP Individual Total Time Spent (Mins) 60   Cognition   Attention to Task Supervision (5)   Moderate Attention Minimal (4)   Insight into Deficits Moderate (3)   Medication Management  Minimal (4)   Sleep/Wake Cycle   Sleep & Rest Awake   Interdisciplinary Plan of Care Collaboration   IDT Collaboration with  Certified Nursing Assistant   Patient Position at End of Therapy Seated;Call Light within Reach;Tray Table within Reach;Phone within Reach   Collaboration Comments CLOF       Assessment    Pt completed medication list with MIN verbal cues to fully write the med's purpose rather than an abbreviation (e.g., pt wrote diet for diabetes). SLP made corrections to med list. Pt completed alternating attention activities with 100% accuracy IND. Pt completed 'Who has more?' Attention and money math task including bills and coins with 95% acc and SPV cues. Daily log generated w/ pt recalling date, breakfast and lunch meals IND, MIN cues to recall ST session information in order to write. Upon return to her room, pt req'd verbal cues to stop and put on breaks before standing.    Strengths: Able to follow instructions, Independent prior level of function, Supportive family  Barriers: Impaired functional cognition, Impulsive    Plan    Functional medication sort, continue to address safety awareness and memory.     Passport items to be completed:  Express basic needs, understand food/liquid recommendations,  consistently follow swallow precautions, manage finances, manage medications, arrive to therapy appointments on time, complete daily memory log entries, solve problems related to safety situations, review education related to hospitalization, complete caregiver training     Speech Therapy Problems (Active)       Problem: Memory STGs       Dates: Start: 08/13/22         Goal: STG-Within one week, patient will       Dates: Start: 08/13/22       Description: 1) Individualized goal:   recall novel health/safety information using memory strategies/external memory aids with 80% accuracy and MIN A.   2) Interventions:  SLP Self Care / ADL Training , SLP Cognitive Skill Development, and SLP Group Treatment          Goal Note filed on 08/13/22 0925 by Maida Jurado MS,CCC-SLP       1) Individualized goal:   recall novel health/safety information using memory strategies/external memory aids with 80% accuracy and MIN A.   2) Interventions:  SLP Self Care / ADL Training , SLP Cognitive Skill Development, and SLP Group Treatment                   Problem: Problem Solving STGs       Dates: Start: 08/13/22         Goal: STG-Within one week, patient will       Dates: Start: 08/13/22       Description: 1) Individualized goal:  complete medication and financial management tasks with 80% acc and MIN A.   2) Interventions:  SLP Self Care / ADL Training , SLP Cognitive Skill Development, and SLP Group Treatment             Goal: STG-Within one week, patient will       Dates: Start: 08/13/22       Description: 1) Individualized goal:  complete alternating and divided attention tasks with 80% accuracy and MIN A.   2) Interventions:  SLP Self Care / ADL Training , SLP Cognitive Skill Development, and SLP Group Treatment                Problem: Speech/Swallowing LTGs       Dates: Start: 08/13/22         Goal: LTG-By discharge, patient will solve complex problem       Dates: Start: 08/13/22       Description: 1) Individualized  goal:  with 80% accuracy and MOD I for a safe D/C to PLOF.   2) Interventions:  SLP Self Care / ADL Training , SLP Cognitive Skill Development, and SLP Group Treatment

## 2022-08-14 NOTE — CARE PLAN
"  Problem: Fall Risk - Rehab  Goal: Patient will remain free from falls  Note: Priscila Zhang Fall risk Assessment Score: 13      Moderate fall risk Interventions  - Bed and strip alarm   - Yellow sign by the door   - Yellow wrist band \"Fall risk\"  - Room near to the nurse station  - Do not leave patient unattended in the bathroom  - Fall risk education provided    HS [id number], insulin given as per sliding scale order.       The patient is Watcher - Medium risk of patient condition declining or worsening    Shift Goals  Clinical Goals: Rest and safety  Patient Goals: Rest  Family Goals: N/A      "

## 2022-08-14 NOTE — DISCHARGE PLANNING
CASE MANAGEMENT INITIAL ASSESSMENT    Admit Date:  8/12/2022     Case Management has reviewed the medical chart and will meet with patient and family to discuss role of case management / discharge planning / team conference.     Patient is a  57 y.o. female transferred from [facility].    Attending Physician: Dr. Sanchez   PCP: Case Management to confirm   Neurosurgeon: Dr. Vences     Diagnosis: Hemorrhagic stroke (HCC) [I61.9]    Co-morbidities:   Patient Active Problem List    Diagnosis Date Noted    Hemorrhagic stroke (HCC) 08/12/2022    Impaired mobility and ADLs 08/12/2022    Other hyperlipidemia 08/12/2022    Type 2 diabetes mellitus with hyperglycemia, without long-term current use of insulin (HCC) 08/09/2022    Intracranial hemorrhage (HCC) 08/04/2022    Hypertension 08/03/2022    Cerebellar hemorrhage, acute (HCC) 08/02/2022    Hypokalemia 02/08/2019    Dental infection 02/08/2019     Prior Living Situation:  Housing / Facility: 2 Story House  Lives with - Patient's Self Care Capacity: Adult Children (2 sons, 18 and 19)    Prior Level of Function:  Medication Management: Independent  Finances: Independent  Home Management: Independent  Shopping: Independent  Prior Level Of Mobility: Independent Without Device in Community, Independent With Steps in Community, Independent Without Device in Home, Independent With Steps in Home  Driving / Transportation: Driving Independent    Support Systems:  Primary : Dolly Phillips (daughter) [phone], Nelida Phillips (daughter) [phone]    Previous Services Utilized:   Equipment Owned: Front-Wheel Walker, 4-Wheel Walker  Prior Services: None    Other Information:  Occupation (Pre-Hospital Vocational): Employed Full Time ( and identifier at [employer])  Primary Payor Source: Medicaid, Other (Comments) ([employer] Medicaid)  Other MDs: Dr. Pedro Pablo Vences    Patient / Family Goal:  Patient / Family Goal: Case Management will meet with patient to discuss  goals    Plan:  1. Continue to follow patient through hospitalization and provide discharge planning in collaboration with patient, family, physicians and ancillary services.     2. Utilize community resources to ensure a safe discharge.

## 2022-08-14 NOTE — CARE PLAN
The patient is Stable - Low risk of patient condition declining or worsening    Shift Goals  Clinical Goals: safety  Patient Goals: Rest  Family Goals: N/A    Problem: Pain - Standard  Goal: Alleviation of pain or a reduction in pain to the patient’s comfort goal  Outcome: Progressing: Pt denies pain or discomfort. No non-verbal descriptors of pain such as grimacing or moaning with movement noted. No analgesics or other pain relieving measures administered.      Problem: Bladder / Voiding  Goal: Patient will establish and maintain regular urinary output  Outcome: Progressing: Pt voids ad raffy to toilet.      Problem: Skin Integrity  Goal: Patient's skin integrity will be maintained or improve  Outcome: Progressing: surgical incision posterior head with sutures in CDI, approximated, TOYIN, no s/s infection noted.

## 2022-08-14 NOTE — PROGRESS NOTES
[facility] Medicine Daily Progress Note    Date of Service  8/14/2022    Chief Complaint:  Hypertension  Diabetes  Sodium maintenance    Interval History:  No significant events or changes since last visit.    Review of Systems  Review of Systems   Constitutional:  Negative for chills and fever.   Respiratory:  Negative for shortness of breath.    Cardiovascular:  Negative for chest pain.   Gastrointestinal:  Negative for abdominal pain, diarrhea, nausea and vomiting.   Psychiatric/Behavioral:  The patient is not nervous/anxious.       Physical Exam  Temp:  [36.8 °C (98.3 °F)-37.1 °C (98.8 °F)] 37 °C (98.6 °F)  Pulse:  [65-82] 74  Resp:  [18] 18  BP: ()/(53-74) 127/73  SpO2:  [92 %-98 %] 98 %    Physical Exam  Vitals and nursing note reviewed.   Constitutional:       Appearance: She is not diaphoretic.   HENT:      Mouth/Throat:      Pharynx: No oropharyngeal exudate or posterior oropharyngeal erythema.   Eyes:      Extraocular Movements: Extraocular movements intact.   Neck:      Vascular: No carotid bruit or JVD.   Cardiovascular:      Rate and Rhythm: Normal rate and regular rhythm.      Heart sounds: Normal heart sounds.   Pulmonary:      Effort: Pulmonary effort is normal.      Breath sounds: No wheezing or rales.   Abdominal:      General: There is no distension.      Palpations: Abdomen is soft.      Tenderness: There is no abdominal tenderness.   Musculoskeletal:      Right lower leg: No edema.      Left lower leg: No edema.   Skin:     General: Skin is warm and dry.   Neurological:      Mental Status: She is alert and oriented to person, place, and time.   Psychiatric:         Mood and Affect: Mood normal.         Behavior: Behavior normal.       Fluids    Intake/Output Summary (Last 24 hours) at 8/14/2022 0956  Last data filed at 8/13/2022 2107  Gross per 24 hour   Intake 360 ml   Output --   Net 360 ml       Laboratory  Recent Labs     08/13/22  0537   WBC 10.1   RBC 4.41   HEMOGLOBIN 13.3    HEMATOCRIT 40.5   MCV 91.8   MCH 30.2   MCHC 32.8*   RDW 43.2   PLATELETCT 284   MPV 10.2     Recent Labs     08/13/22  0537 08/14/22  0514   SODIUM 138 135   POTASSIUM 3.8 3.7   CHLORIDE 101 100   CO2 27 23   GLUCOSE 121* 138*   BUN 11 14   CREATININE 0.42* 0.43*   CALCIUM 8.8 8.6                   Imaging    Assessment/Plan  * Hemorrhagic stroke (HCC)- (present on admission)  Assessment & Plan  CT Head: showed large right cerebellar hemorrhage with compression of the third and fourth ventricles  S/P suboccipital craniectomy with evacuation of left cerebellar IPH, duraplasty, and C1 laminectomy  On Lipitor    Suggestion was to keep sodium above 140 (for brain swelling):  Na: 138 (8/9) --> 138 (8/13)  On salt tabs: 1 g tid --> 2g bid (8/13 evening)  Cont to monitor    Other hyperlipidemia- (present on admission)  Assessment & Plan  On Lipitor    Type 2 diabetes mellitus with hyperglycemia, without long-term current use of insulin (HCC)- (present on admission)  Assessment & Plan  Hba1c: 7.1 (8/2)  BS: good  On Metformin  On Januvia  Monitor    Hypertension- (present on admission)  Assessment & Plan  BP ok  On Norvasc  On Lisinopril  Monitor

## 2022-08-15 LAB
GLUCOSE BLD STRIP.AUTO-MCNC: 100 MG/DL (ref 65–99)
GLUCOSE BLD STRIP.AUTO-MCNC: 106 MG/DL (ref 65–99)
GLUCOSE BLD STRIP.AUTO-MCNC: 111 MG/DL (ref 65–99)
GLUCOSE BLD STRIP.AUTO-MCNC: 97 MG/DL (ref 65–99)

## 2022-08-15 PROCEDURE — A9270 NON-COVERED ITEM OR SERVICE: HCPCS | Performed by: HOSPITALIST

## 2022-08-15 PROCEDURE — 82962 GLUCOSE BLOOD TEST: CPT | Mod: 91

## 2022-08-15 PROCEDURE — 99232 SBSQ HOSP IP/OBS MODERATE 35: CPT | Performed by: PHYSICAL MEDICINE & REHABILITATION

## 2022-08-15 PROCEDURE — 700102 HCHG RX REV CODE 250 W/ 637 OVERRIDE(OP): Performed by: PHYSICAL MEDICINE & REHABILITATION

## 2022-08-15 PROCEDURE — 97130 THER IVNTJ EA ADDL 15 MIN: CPT

## 2022-08-15 PROCEDURE — 97535 SELF CARE MNGMENT TRAINING: CPT

## 2022-08-15 PROCEDURE — 97116 GAIT TRAINING THERAPY: CPT

## 2022-08-15 PROCEDURE — 700111 HCHG RX REV CODE 636 W/ 250 OVERRIDE (IP): Performed by: PHYSICAL MEDICINE & REHABILITATION

## 2022-08-15 PROCEDURE — 99232 SBSQ HOSP IP/OBS MODERATE 35: CPT | Performed by: HOSPITALIST

## 2022-08-15 PROCEDURE — 97110 THERAPEUTIC EXERCISES: CPT

## 2022-08-15 PROCEDURE — 770010 HCHG ROOM/CARE - REHAB SEMI PRIVAT*

## 2022-08-15 PROCEDURE — A9270 NON-COVERED ITEM OR SERVICE: HCPCS | Performed by: PHYSICAL MEDICINE & REHABILITATION

## 2022-08-15 PROCEDURE — 97129 THER IVNTJ 1ST 15 MIN: CPT

## 2022-08-15 PROCEDURE — 97112 NEUROMUSCULAR REEDUCATION: CPT

## 2022-08-15 PROCEDURE — 97530 THERAPEUTIC ACTIVITIES: CPT

## 2022-08-15 PROCEDURE — 700102 HCHG RX REV CODE 250 W/ 637 OVERRIDE(OP): Performed by: HOSPITALIST

## 2022-08-15 RX ORDER — SODIUM CHLORIDE 1 G/1
2 TABLET ORAL
Status: DISCONTINUED | OUTPATIENT
Start: 2022-08-15 | End: 2022-08-17

## 2022-08-15 RX ORDER — ERGOCALCIFEROL 1.25 MG/1
50000 CAPSULE ORAL
Status: DISCONTINUED | OUTPATIENT
Start: 2022-08-15 | End: 2022-08-18 | Stop reason: HOSPADM

## 2022-08-15 RX ADMIN — METFORMIN HYDROCHLORIDE 850 MG: 850 TABLET ORAL at 07:27

## 2022-08-15 RX ADMIN — METFORMIN HYDROCHLORIDE 850 MG: 850 TABLET ORAL at 17:27

## 2022-08-15 RX ADMIN — ENOXAPARIN SODIUM 40 MG: 40 INJECTION SUBCUTANEOUS at 17:51

## 2022-08-15 RX ADMIN — SODIUM CHLORIDE 2 G: 1 TABLET ORAL at 17:27

## 2022-08-15 RX ADMIN — LISINOPRIL 40 MG: 20 TABLET ORAL at 05:36

## 2022-08-15 RX ADMIN — AMLODIPINE BESYLATE 10 MG: 5 TABLET ORAL at 05:37

## 2022-08-15 RX ADMIN — ERGOCALCIFEROL 50000 UNITS: 1.25 CAPSULE ORAL at 11:16

## 2022-08-15 RX ADMIN — ATORVASTATIN CALCIUM 80 MG: 40 TABLET, FILM COATED ORAL at 20:32

## 2022-08-15 RX ADMIN — SODIUM CHLORIDE 2 G: 1 TABLET ORAL at 07:27

## 2022-08-15 RX ADMIN — SITAGLIPTIN 50 MG: 50 TABLET, FILM COATED ORAL at 08:19

## 2022-08-15 RX ADMIN — SODIUM CHLORIDE 2 G: 1 TABLET ORAL at 11:16

## 2022-08-15 RX ADMIN — SCOPALAMINE 1 PATCH: 1 PATCH, EXTENDED RELEASE TRANSDERMAL at 11:21

## 2022-08-15 RX ADMIN — DOCUSATE SODIUM 50 MG AND SENNOSIDES 8.6 MG 2 TABLET: 8.6; 5 TABLET, FILM COATED ORAL at 08:19

## 2022-08-15 ASSESSMENT — GAIT ASSESSMENTS
GAIT LEVEL OF ASSIST: STANDBY ASSIST
DISTANCE (FEET): 200
DEVIATION: INCREASED BASE OF SUPPORT
DEVIATION: INCREASED BASE OF SUPPORT
GAIT LEVEL OF ASSIST: STANDBY ASSIST

## 2022-08-15 ASSESSMENT — ENCOUNTER SYMPTOMS
NAUSEA: 0
SHORTNESS OF BREATH: 0
DIARRHEA: 0
ABDOMINAL PAIN: 0
VOMITING: 0
CHILLS: 0
NERVOUS/ANXIOUS: 0
FEVER: 0

## 2022-08-15 ASSESSMENT — ACTIVITIES OF DAILY LIVING (ADL)
TOILET_TRANSFER_DESCRIPTION: GRAB BAR;INCREASED TIME;SUPERVISION FOR SAFETY
BED_CHAIR_WHEELCHAIR_TRANSFER_DESCRIPTION: INITIAL PREPARATION FOR TASK;SUPERVISION FOR SAFETY
TOILETING_LEVEL_OF_ASSIST_DESCRIPTION: SET-UP OF EQUIPMENT;SUPERVISION FOR SAFETY
BED_CHAIR_WHEELCHAIR_TRANSFER_DESCRIPTION: INCREASED TIME;SUPERVISION FOR SAFETY
TOILET_TRANSFER_DESCRIPTION: SET-UP OF EQUIPMENT;SUPERVISION FOR SAFETY

## 2022-08-15 NOTE — THERAPY
Physical Therapy   Daily Treatment     Patient Name: Lana Phillips  Age:  57 y.o., Sex:  female  Medical Record #: [id number]  Today's Date: 8/15/2022     Precautions  Precautions: Fall Risk, Other (See Comments)  Comments: impulsive, staples over c-spine    Subjective    Pt was in bed upon arrival, agreeable to session.  Pt requested to talk to CM for insurance issue.     Objective       08/15/22 1301   PT Charge Group   PT Gait Training 1   PT Neuromuscular Re-Education / Balance 1   Supervising Physical Therapist Diane Cabral   Pain   Intervention Declines   Pain 0 - 10 Group   Pain Rating Scale (NPRS) 0   Gait Functional Level of Assist    Gait Level Of Assist Standby Assist   Assistive Device None  (gait belt)   Distance (Feet)   (indoor 250 ftx2+outdoor 100 ft)   Deviation Increased Base Of Support  (poor pathfinding, lateral sway)   Transfer Functional Level of Assist   Bed, Chair, Wheelchair Transfer Supervised   Bed Chair Wheelchair Transfer Description Increased time;Supervision for safety  (no AD)   Toilet Transfers Supervised   Toilet Transfer Description Grab bar;Increased time;Supervision for safety  (no AD)   Bed Mobility    Supine to Sit Supervised   Sit to Supine Supervised   Sit to Stand Supervised   Scooting Supervised   Neuro-Muscular Treatments   Neuro-Muscular Treatments Anterior weight shift;Weight Shift Right;Weight Shift Left   Comments see note for detail   Interdisciplinary Plan of Care Collaboration   IDT Collaboration with  Physician   Patient Position at End of Therapy Seated;Call Light within Reach;Tray Table within Reach;Phone within Reach   Collaboration Comments MD rounds     Static standing balance w/ FA/FT 30 secs each. FT w/ eyes close 30 secs w/ 1 LOB. Tandem stance 1 min each side. Standing on 1 leg each side ~ 5 secs and could not stay in position  Dynamic standing balance w/ trunk turning to L/R x4, turning 360 degree to both side x4.    Assessment    Pt tolerated session well,  demonstrated mild L lateral sway and was able to utilize furniture while she's feeling a bit off balance. Pt stated she's feeling fine and would like to be d/c ASAP.     Strengths: Adequate strength, Independent prior level of function, Motivated for self care and independence, Pleasant and cooperative, Supportive family, Willingly participates in therapeutic activities  Barriers: Decreased endurance, Home accessibility, Impaired activity tolerance, Impaired balance, Impaired insight/denial of deficits, Impulsive    Plan    Gait training progressing distance and dynamic gait activities, higher level standing balance, endurance, incorporate cognitive tasks into mobility, stair negotiation, safety.     Passport items to be completed:  Get in/out of bed safely, in/out of a vehicle, safely use mobility device, walk or wheel around home/community, navigate up and down stairs, show how to get up/down from the ground, ensure home is accessible, demonstrate HEP, complete caregiver training    Physical Therapy Problems (Active)       Problem: Balance       Dates: Start: 08/13/22         Goal: STG-Within one week, patient will participate in FGA or Mini-BestTest to assess fall risk.        Dates: Start: 08/13/22               Problem: Mobility       Dates: Start: 08/13/22         Goal: STG-Within one week, patient will ambulate 250 feet with least restrictive assistive device and SBA.        Dates: Start: 08/13/22            Goal: STG-Within one week, patient will ambulate up/down flight of stairs with rails and SBA.        Dates: Start: 08/13/22               Problem: Mobility Transfers       Dates: Start: 08/13/22         Goal: STG-Within one week, patient will transfer in/out of car with CGA.        Dates: Start: 08/13/22            Goal: STG-Within one week, patient will ambulate at least 50 feet on uneven terrain with least restrictive assistive device and CGA.        Dates: Start: 08/13/22               Problem: PT-Long  Term Goals       Dates: Start: 08/13/22         Goal: LTG-By discharge, patient will ambulate 300 feet with least restrictive assistive device and mod I.        Dates: Start: 08/13/22            Goal: LTG-By discharge, patient will ambulate up/down flight of stairs with single rail and mod I.        Dates: Start: 08/13/22            Goal: LTG-By discharge, patient will transfer in/out of a car with mod I.        Dates: Start: 08/13/22            Goal: LTG-By discharge, patient will ambulate at least 100 feet on uneven terrain with least restrictive assistive device and mod I.       Dates: Start: 08/13/22

## 2022-08-15 NOTE — THERAPY
Speech Language Pathology  Daily Treatment     Patient Name: Lana Phillips  Age:  57 y.o., Sex:  female  Medical Record #: [id number]  Today's Date: 8/15/2022     Precautions  Precautions: Fall Risk, Other (See Comments)  Comments: impulsive, staples over c-spine    Subjective    Pt pleasant and cooperative during tx.       Objective       08/15/22 1501   Treatment Charges   SLP Cognitive Skill Development First 15 Minutes 1   SLP Cognitive Skill Development Additional 15 Minutes 3   SLP Total Time Spent   SLP Individual Total Time Spent (Mins) 60   Cognition   Functional Memory Activities Minimal (4)   Safety Awareness Moderate (3)   Medication Management  Minimal (4)       Assessment    Functional med sort completed with 100% accuracy.  Pt unaware of medication names or purpose.  Med list reviewed.  Pt recalled daily events given min verbal cues.  Pt recalled safety strategies independently; however pt demonstrated impulsivity when transferring.  Pt stood up from wheelchair before locking the brakes.  Importance of locking brakes was reviewed.  Pt verbalized understanding, but will likely need more reinforcement of safety strategies.  Find the THEs: 13/15 independently; 100% given min verbal cues.     Strengths: Able to follow instructions, Independent prior level of function, Supportive family  Barriers: Impaired functional cognition, Impulsive    Plan    Med management (names/use), recall, safety awareness, finance.          Speech Therapy Problems (Active)       Problem: Memory STGs       Dates: Start: 08/13/22         Goal: STG-Within one week, patient will       Dates: Start: 08/13/22       Description: 1) Individualized goal:   recall novel health/safety information using memory strategies/external memory aids with 80% accuracy and MIN A.   2) Interventions:  SLP Self Care / ADL Training , SLP Cognitive Skill Development, and SLP Group Treatment          Goal Note filed on 08/13/22 0916 by Maida Jurado,  MS,CCC-SLP       1) Individualized goal:   recall novel health/safety information using memory strategies/external memory aids with 80% accuracy and MIN A.   2) Interventions:  SLP Self Care / ADL Training , SLP Cognitive Skill Development, and SLP Group Treatment                   Problem: Problem Solving STGs       Dates: Start: 08/13/22         Goal: STG-Within one week, patient will       Dates: Start: 08/13/22       Description: 1) Individualized goal:  complete medication and financial management tasks with 80% acc and MIN A.   2) Interventions:  SLP Self Care / ADL Training , SLP Cognitive Skill Development, and SLP Group Treatment             Goal: STG-Within one week, patient will       Dates: Start: 08/13/22       Description: 1) Individualized goal:  complete alternating and divided attention tasks with 80% accuracy and MIN A.   2) Interventions:  SLP Self Care / ADL Training , SLP Cognitive Skill Development, and SLP Group Treatment                Problem: Speech/Swallowing LTGs       Dates: Start: 08/13/22         Goal: LTG-By discharge, patient will solve complex problem       Dates: Start: 08/13/22       Description: 1) Individualized goal:  with 80% accuracy and MOD I for a safe D/C to PLOF.   2) Interventions:  SLP Self Care / ADL Training , SLP Cognitive Skill Development, and SLP Group Treatment

## 2022-08-15 NOTE — PROGRESS NOTES
"Rehab Progress Note     Encounter Date: 8/15/2022    CC: Doing okay    Interval Events (Subjective)  Vital signs: Stable.  Bowel: Last bowel movement 8/15  Bladder: Voiding volitionally PVR 62, 66  As needed: None    Patient seen and examined in her room.  About to work with therapy.  States that she is doing well.  Has no specific concerns though she does wish to talk to the .  Discussed with her that we will start vitamin D.  Patient in agreement.  States she is voiding and having regular bowel movements without issue.    14 point ROS reviewed and negative except as stated above.     Objective:  VITAL SIGNS: /66   Pulse 73   Temp 36.4 °C (97.5 °F) (Oral)   Resp 20   Ht 1.499 m (4' 11\")   Wt 65.2 kg (143 lb 11.2 oz)   SpO2 99%   BMI 29.02 kg/m²     GEN: No apparent distress, sitting at edge of bed independently.  HEENT: Head normocephalic, atraumatic.  Sclera nonicteric bilaterally, no ocular discharge appreciated bilaterally.  CV: Extremities warm and well-perfused, no peripheral edema appreciated bilaterally.  PULMONARY: Breathing nonlabored on room air, no respiratory accessory muscle use.  Not requiring supplemental oxygen.  ABD: Soft, nontender.  SKIN: No appreciable skin breakdown on exposed areas of skin.  NEURO: Awake alert.  Conversational.  Logical thought content.  Moving all extremities volitionally.  Ambulatory.  PSYCH: Mood and affect within normal limits.    Recent Results (from the past 72 hour(s))   POCT glucose device results    Collection Time: 08/12/22 11:22 AM   Result Value Ref Range    POC Glucose, Blood 116 (H) 65 - 99 mg/dL   POCT glucose device results    Collection Time: 08/12/22  5:22 PM   Result Value Ref Range    POC Glucose, Blood 129 (H) 65 - 99 mg/dL   POCT glucose device results    Collection Time: 08/12/22  8:24 PM   Result Value Ref Range    POC Glucose, Blood 120 (H) 65 - 99 mg/dL   CBC with Differential    Collection Time: 08/13/22  5:37 AM   Result " Value Ref Range    WBC 10.1 4.8 - 10.8 K/uL    RBC 4.41 4.20 - 5.40 M/uL    Hemoglobin 13.3 12.0 - 16.0 g/dL    Hematocrit 40.5 37.0 - 47.0 %    MCV 91.8 81.4 - 97.8 fL    MCH 30.2 27.0 - 33.0 pg    MCHC 32.8 (L) 33.6 - 35.0 g/dL    RDW 43.2 35.9 - 50.0 fL    Platelet Count 284 164 - 446 K/uL    MPV 10.2 9.0 - 12.9 fL    Neutrophils-Polys 61.20 44.00 - 72.00 %    Lymphocytes 24.60 22.00 - 41.00 %    Monocytes 7.40 0.00 - 13.40 %    Eosinophils 2.20 0.00 - 6.90 %    Basophils 0.90 0.00 - 1.80 %    Immature Granulocytes 3.70 (H) 0.00 - 0.90 %    Nucleated RBC 0.00 /100 WBC    Neutrophils (Absolute) 6.20 2.00 - 7.15 K/uL    Lymphs (Absolute) 2.49 1.00 - 4.80 K/uL    Monos (Absolute) 0.75 0.00 - 0.85 K/uL    Eos (Absolute) 0.22 0.00 - 0.51 K/uL    Baso (Absolute) 0.09 0.00 - 0.12 K/uL    Immature Granulocytes (abs) 0.37 (H) 0.00 - 0.11 K/uL    NRBC (Absolute) 0.00 K/uL   Comp Metabolic Panel (CMP)    Collection Time: 08/13/22  5:37 AM   Result Value Ref Range    Sodium 138 135 - 145 mmol/L    Potassium 3.8 3.6 - 5.5 mmol/L    Chloride 101 96 - 112 mmol/L    Co2 27 20 - 33 mmol/L    Anion Gap 10.0 7.0 - 16.0    Glucose 121 (H) 65 - 99 mg/dL    Bun 11 8 - 22 mg/dL    Creatinine 0.42 (L) 0.50 - 1.40 mg/dL    Calcium 8.8 8.5 - 10.5 mg/dL    AST(SGOT) 22 12 - 45 U/L    ALT(SGPT) 48 2 - 50 U/L    Alkaline Phosphatase 89 30 - 99 U/L    Total Bilirubin 0.5 0.1 - 1.5 mg/dL    Albumin 3.8 3.2 - 4.9 g/dL    Total Protein 6.9 6.0 - 8.2 g/dL    Globulin 3.1 1.9 - 3.5 g/dL    A-G Ratio 1.2 g/dL   Magnesium    Collection Time: 08/13/22  5:37 AM   Result Value Ref Range    Magnesium 2.0 1.5 - 2.5 mg/dL   Vitamin D, 25-hydroxy (blood)    Collection Time: 08/13/22  5:37 AM   Result Value Ref Range    25-Hydroxy   Vitamin D 25 7 (L) 30 - 100 ng/mL   ESTIMATED GFR    Collection Time: 08/13/22  5:37 AM   Result Value Ref Range    GFR (CKD-EPI) 114 >60 mL/min/1.73 m 2   POCT glucose device results    Collection Time: 08/13/22  7:55 AM    Result Value Ref Range    POC Glucose, Blood 152 (H) 65 - 99 mg/dL   POCT glucose device results    Collection Time: 08/13/22 11:16 AM   Result Value Ref Range    POC Glucose, Blood 135 (H) 65 - 99 mg/dL   POCT glucose device results    Collection Time: 08/13/22  5:17 PM   Result Value Ref Range    POC Glucose, Blood 135 (H) 65 - 99 mg/dL   POCT glucose device results    Collection Time: 08/13/22  9:06 PM   Result Value Ref Range    POC Glucose, Blood 159 (H) 65 - 99 mg/dL   Basic Metabolic Panel    Collection Time: 08/14/22  5:14 AM   Result Value Ref Range    Sodium 135 135 - 145 mmol/L    Potassium 3.7 3.6 - 5.5 mmol/L    Chloride 100 96 - 112 mmol/L    Co2 23 20 - 33 mmol/L    Glucose 138 (H) 65 - 99 mg/dL    Bun 14 8 - 22 mg/dL    Creatinine 0.43 (L) 0.50 - 1.40 mg/dL    Calcium 8.6 8.5 - 10.5 mg/dL    Anion Gap 12.0 7.0 - 16.0   ESTIMATED GFR    Collection Time: 08/14/22  5:14 AM   Result Value Ref Range    GFR (CKD-EPI) 113 >60 mL/min/1.73 m 2   POCT glucose device results    Collection Time: 08/14/22  7:44 AM   Result Value Ref Range    POC Glucose, Blood 139 (H) 65 - 99 mg/dL   POCT glucose device results    Collection Time: 08/14/22 11:23 AM   Result Value Ref Range    POC Glucose, Blood 119 (H) 65 - 99 mg/dL   POCT glucose device results    Collection Time: 08/14/22  5:21 PM   Result Value Ref Range    POC Glucose, Blood 119 (H) 65 - 99 mg/dL   POCT glucose device results    Collection Time: 08/14/22  7:56 PM   Result Value Ref Range    POC Glucose, Blood 138 (H) 65 - 99 mg/dL   POCT glucose device results    Collection Time: 08/15/22  7:25 AM   Result Value Ref Range    POC Glucose, Blood 106 (H) 65 - 99 mg/dL       Current Facility-Administered Medications   Medication Frequency    sodium chloride (SALT) tablet 2 g TID WITH MEALS    hydrOXYzine HCl (ATARAX) tablet 50 mg Q6HRS PRN    melatonin tablet 3 mg HS PRN    Respiratory Therapy Consult Continuous RT    Pharmacy Consult Request ...Pain Management  Review 1 Each PHARMACY TO DOSE    acetaminophen (Tylenol) tablet 650 mg Q4HRS PRN    senna-docusate (PERICOLACE or SENOKOT S) 8.6-50 MG per tablet 2 Tablet BID    And    polyethylene glycol/lytes (MIRALAX) PACKET 1 Packet QDAY PRN    And    magnesium hydroxide (MILK OF MAGNESIA) suspension 30 mL QDAY PRN    And    bisacodyl (DULCOLAX) suppository 10 mg QDAY PRN    lactulose 20 GM/30ML solution 30 mL QDAY PRN    docusate sodium (ENEMEEZ) enema 283 mg QDAY PRN    artificial tears ophthalmic solution 1 Drop PRN    benzocaine-menthol (Cepacol) lozenge 1 Lozenge Q2HRS PRN    mag hydrox-al hydrox-simeth (MAALOX PLUS ES or MYLANTA DS) suspension 20 mL Q2HRS PRN    ondansetron (ZOFRAN ODT) dispertab 4 mg 4X/DAY PRN    Or    ondansetron (ZOFRAN) syringe/vial injection 4 mg 4X/DAY PRN    traZODone (DESYREL) tablet 50 mg QHS PRN    sodium chloride (OCEAN) 0.65 % nasal spray 2 Spray PRN    insulin regular (HumuLIN R,NovoLIN R) injection 4X/DAY ACHS    And    dextrose 50% (D50W) injection 25 g Q15 MIN PRN    enoxaparin (Lovenox) inj 40 mg DAILY AT 1800    lisinopril (PRINIVIL) tablet 40 mg Q DAY    metFORMIN (GLUCOPHAGE) tablet 850 mg BID WITH MEALS    SITagliptin (JANUVIA) tablet 50 mg DAILY    scopolamine (TRANSDERM-SCOP) patch 1 Patch Q72HRS    amLODIPine (NORVASC) tablet 10 mg Q DAY    atorvastatin (LIPITOR) tablet 80 mg Q EVENING       Orders Placed This Encounter   Procedures    Diet Order Diet: Consistent CHO (Diabetic)     Standing Status:   Standing     Number of Occurrences:   1     Order Specific Question:   Diet:     Answer:   Consistent CHO (Diabetic) [4]       Assessment:  Active Hospital Problems    Diagnosis     *Hemorrhagic stroke (HCC)     Other hyperlipidemia     Type 2 diabetes mellitus with hyperglycemia, without long-term current use of insulin (HCC)     Hypertension        Medical Decision Making and Plan:  R cerebellar hemorrhagic stroke  - s/p suboccipital  craniectomy with evacuation of left cerebellar  IPH duraplasty and C1 laminectomy    -initiate comprehensive IRF level therapy with 3hrs of therapy 5 days per week with PT/OT/SLP     Brain swelling  Continue salt tabs to keep sodium above 140  - 8/13 Na 138 while on salt tab 1g TID, will give additional 1 gm today, may need to switch to 4mg total daily  8/15: Continue salt tabs 2 g 3 times daily  8/16: BMP     Dizziness  Continue scopolamine patch     Hypertension  Amlodipine  Lisinopril  Consult hospitalist     Diabetes with hyperglycemia  New diagnosis  Hemoglobin A1c of 7.2  Metformin  Januvia  Sliding scale insulin  Consult hospitalist     Hyperlipidemia    Start high-dose statin    Bladder  Low PVRs  Voiding volitionally    Bowel  Last bowel movement 8/15    DVT prophylaxis  Lovenox 40 mg daily    Vitamin D deficiency  7 8/13/2022  8/15: Start high-dose vitamin D supplementation - ergocalciferol x2 doses then daily supplementation    Total time:  25 minutes.  I spent greater than 50% of the time for patient care and coordination on this date, including unit/floor time, and face-to-face time with the patient as per assessment and plan above.    Nika Mccormack D.O.

## 2022-08-15 NOTE — THERAPY
"Physical Therapy   Daily Treatment     Patient Name: Lana Phillips  Age:  57 y.o., Sex:  female  Medical Record #: [id number]  Today's Date: 8/15/2022     Precautions  Precautions: Fall Risk, Other (See Comments)  Comments: impulsive, staples over c-spine    Subjective    Patient seated in room chair, requesting to speak to , agreeable to therapy.     Objective       08/15/22 0831   PT Charge Group   PT Gait Training 1   PT Neuromuscular Re-Education / Balance 1   PT Total Time Spent   PT Individual Total Time Spent (Mins) 30   Precautions   Precautions Fall Risk   Comments impulsive, staples over c-spine   Gait Functional Level of Assist    Gait Level Of Assist Standby Assist   Assistive Device None   Distance (Feet) 200   # of Times Distance was Traveled 2   Deviation Increased Base Of Support  (mild path deviation, toe out foot position)   Stairs Functional Level of Assist   Level of Assist with Stairs Standby Assist   # of Stairs Climbed 12  (6\" steps; trials of no rails, BUEs on B rails, single UE on R rail)   Stairs Description Hand rails;Supervision for safety  (reciprocal pattern)   Transfer Functional Level of Assist   Bed, Chair, Wheelchair Transfer Supervised   Bed Chair Wheelchair Transfer Description Initial preparation for task;Supervision for safety  (no AD)   Bed Mobility    Sit to Stand Supervised   Interdisciplinary Plan of Care Collaboration   IDT Collaboration with  Occupational Therapist   Patient Position at End of Therapy Seated;Call Light within Reach;Tray Table within Reach;Phone within Reach   Collaboration Comments CLOF     Dynamic Gait Index performed with no AD and SBA, scored 20/24.    Initiated passport items and discussed with patient. Patient reports that her adult sons and sister will be available to assist at home as needed.    Assessment    Patient tolerated session well, scored 20/24 on DGI indicating lower fall risk (cutoff score of <19). Mild path deviation during " ambulation however patient demonstrates timely and effective balance strategies.    Strengths: Adequate strength, Independent prior level of function, Motivated for self care and independence, Pleasant and cooperative, Supportive family, Willingly participates in therapeutic activities  Barriers: Decreased endurance, Home accessibility, Impaired activity tolerance, Impaired balance, Impaired insight/denial of deficits, Impulsive    Plan    Gait training progressing distance and dynamic gait activities, higher level standing balance, endurance, incorporate cognitive tasks into mobility, stair negotiation, safety.    Passport items to be completed:  Get in/out of bed safely, in/out of a vehicle, safely use mobility device, walk or wheel around home/community, navigate up and down stairs, show how to get up/down from the ground, ensure home is accessible, demonstrate HEP, complete caregiver training    Physical Therapy Problems (Active)       Problem: Balance       Dates: Start: 08/13/22         Goal: STG-Within one week, patient will participate in FGA or Mini-BestTest to assess fall risk.        Dates: Start: 08/13/22               Problem: Mobility       Dates: Start: 08/13/22         Goal: STG-Within one week, patient will ambulate 250 feet with least restrictive assistive device and SBA.        Dates: Start: 08/13/22            Goal: STG-Within one week, patient will ambulate up/down flight of stairs with rails and SBA.        Dates: Start: 08/13/22               Problem: Mobility Transfers       Dates: Start: 08/13/22         Goal: STG-Within one week, patient will transfer in/out of car with CGA.        Dates: Start: 08/13/22            Goal: STG-Within one week, patient will ambulate at least 50 feet on uneven terrain with least restrictive assistive device and CGA.        Dates: Start: 08/13/22               Problem: PT-Long Term Goals       Dates: Start: 08/13/22         Goal: LTG-By discharge, patient will  ambulate 300 feet with least restrictive assistive device and mod I.        Dates: Start: 08/13/22            Goal: LTG-By discharge, patient will ambulate up/down flight of stairs with single rail and mod I.        Dates: Start: 08/13/22            Goal: LTG-By discharge, patient will transfer in/out of a car with mod I.        Dates: Start: 08/13/22            Goal: LTG-By discharge, patient will ambulate at least 100 feet on uneven terrain with least restrictive assistive device and mod I.       Dates: Start: 08/13/22

## 2022-08-15 NOTE — CARE PLAN
"  Problem: Diabetes Management  Goal: Patient's ability to maintain appropriate glucose levels will be maintained or improve  Note: HS FS-138, no Insulin given. HS snack given. Will monitor.     Problem: Fall Risk - Rehab  Goal: Patient will remain free from falls  Note: Priscila hZang Fall risk Assessment Score: 12      Moderate fall risk Interventions  - Bed and strip alarm   - Yellow sign by the door   - Yellow wrist band \"Fall risk\"  - Room near to the nurse station  - Do not leave patient unattended in the bathroom  - Fall risk education provided           The patient is Stable - Low risk of patient condition declining or worsening    Shift Goals  Clinical Goals: safety  Patient Goals: Rest  Family Goals: N/A        "

## 2022-08-15 NOTE — DISCHARGE PLANNING
CM met with patient to answer questions about applying for disability and she needs assistance getting through to her HR department to pay for her health insurance.  Patient explained HR is EST so CM will need to call in the am.  CM available as needs arise.

## 2022-08-15 NOTE — PROGRESS NOTES
[facility] Daily Progress Note    Date of Service  8/15/2022    Chief Complaint:  Hypertension  Diabetes  Sodium maintenance    Interval History:  Discussed about he sodium levels being lower than we would like and have increased her salt tab dose.    Review of Systems  Review of Systems   Constitutional:  Negative for chills and fever.   Respiratory:  Negative for shortness of breath.    Cardiovascular:  Negative for chest pain.   Gastrointestinal:  Negative for abdominal pain, diarrhea, nausea and vomiting.   Psychiatric/Behavioral:  The patient is not nervous/anxious.       Physical Exam  Temp:  [36.4 °C (97.5 °F)-37.3 °C (99.2 °F)] 36.4 °C (97.5 °F)  Pulse:  [70-81] 73  Resp:  [18-20] 20  BP: (102-135)/(66-82) 114/66  SpO2:  [97 %-99 %] 99 %    Physical Exam  Vitals and nursing note reviewed.   Constitutional:       Appearance: Normal appearance.   HENT:      Head: Atraumatic.   Eyes:      Conjunctiva/sclera: Conjunctivae normal.      Pupils: Pupils are equal, round, and reactive to light.   Cardiovascular:      Rate and Rhythm: Normal rate and regular rhythm.      Heart sounds: No murmur heard.  Pulmonary:      Effort: Pulmonary effort is normal.      Breath sounds: No stridor. No wheezing or rales.   Abdominal:      General: There is no distension.      Palpations: Abdomen is soft.      Tenderness: There is no abdominal tenderness.   Musculoskeletal:      Cervical back: Normal range of motion and neck supple.      Right lower leg: No edema.      Left lower leg: No edema.   Skin:     General: Skin is warm and dry.      Findings: No rash.   Neurological:      Mental Status: She is alert and oriented to person, place, and time.   Psychiatric:         Mood and Affect: Mood normal.         Behavior: Behavior normal.       Fluids    Intake/Output Summary (Last 24 hours) at 8/15/2022 0916  Last data filed at 8/14/2022 1959  Gross per 24 hour   Intake 360 ml   Output --   Net 360 ml         Laboratory  Recent  Labs     08/13/22  0537   WBC 10.1   RBC 4.41   HEMOGLOBIN 13.3   HEMATOCRIT 40.5   MCV 91.8   MCH 30.2   MCHC 32.8*   RDW 43.2   PLATELETCT 284   MPV 10.2       Recent Labs     08/13/22  0537 08/14/22  0514   SODIUM 138 135   POTASSIUM 3.8 3.7   CHLORIDE 101 100   CO2 27 23   GLUCOSE 121* 138*   BUN 11 14   CREATININE 0.42* 0.43*   CALCIUM 8.8 8.6                     Imaging    Assessment/Plan  * Hemorrhagic stroke (HCC)- (present on admission)  Assessment & Plan  CT Head: showed large right cerebellar hemorrhage with compression of the third and fourth ventricles  S/P suboccipital craniectomy with evacuation of left cerebellar IPH, duraplasty, and C1 laminectomy  On Lipitor    Suggestion was to keep sodium above 140 (for brain swelling):  Na: 138 (8/9) --> 138 (8/13) --> 135 (8/14)  On salt tabs: 1 g tid --> 2g bid (8/13) --> will increase to 2g tid (8/14)  Cont to monitor    Other hyperlipidemia- (present on admission)  Assessment & Plan  On Lipitor    Type 2 diabetes mellitus with hyperglycemia, without long-term current use of insulin (HCC)- (present on admission)  Assessment & Plan  Hba1c: 7.1 (8/2)  BS: good  On Metformin  On Januvia  Monitor    Hypertension- (present on admission)  Assessment & Plan  BP ok  On Norvasc  On Lisinopril  Monitor

## 2022-08-15 NOTE — THERAPY
Occupational Therapy  Daily Treatment     Patient Name: Lana Phillips  Age:  57 y.o., Sex:  female  Medical Record #: [id number]  Today's Date: 8/15/2022     Precautions  Precautions: Fall Risk, Other (See Comments)  Comments: impulsive, staples over c-spine    Safety   ADL Safety : Impaired, Impulsive, Requires Supervision for Safety, Impaired Insight into Safety, Requires Cueing for Safety  Bathroom Safety: Impaired, Impulsive, Requires Supervision for Safety, Impaired Insight into Safety, Requires Cuing for Safety    Subjective    Patient was seated in bedside chair and requested a shower.       Objective       08/15/22 0901   OT Charge Group   OT Self Care / ADL 2   OT Therapy Activity 1   OT Therapeutic Exercise  1   OT Total Time Spent   OT Individual Total Time Spent (Mins) 60   Precautions   Precautions Fall Risk;Other (See Comments)   Comments impulsive, staples over c-spine   Safety    ADL Safety  Impaired;Impulsive;Requires Supervision for Safety;Impaired Insight into Safety;Requires Cueing for Safety   Bathroom Safety Impaired;Impulsive;Requires Supervision for Safety;Impaired Insight into Safety;Requires Cuing for Safety   Cognition    Safety Awareness Impaired;Impulsive   Attention Impaired   Functional Level of Assist   Grooming Standby Assist;Standing   Grooming Description Supervision for safety;Standing at sink   Bathing Supervision   Bathing Description Grab bar;Hand held shower;Supervision for safety;Set-up of equipment;Verbal cueing   Upper Body Dressing Supervision   Lower Body Dressing Standby Assist   Lower Body Dressing Description Set-up of equipment;Supervision for safety   Toileting Standby Assist   Toileting Description Set-up of equipment;Supervision for safety   Toilet Transfers Standby Assist   Toilet Transfer Description Set-up of equipment;Supervision for safety   Tub / Shower Transfers Standby Assist   Tub Shower Transfer Description Supervision for safety;Shower bench   IADL Treatments    IADL Treatments Kitchen mobility education   Kitchen Mobility Education educated on safe kitchen mobility.  Able to walk around kitchen with SBA/supervision and no UE support while retrieving items from high/low cupboards, counter and various appliances   Sitting Lower Body Exercises   Sitting Lower Body Exercises Yes   Nustep Time (See Comments)  (nustep level 3 x 10 minutes with UE/LE)   Interdisciplinary Plan of Care Collaboration   Patient Position at End of Therapy Seated;Call Light within Reach;Tray Table within Reach;Phone within Reach     Functional mobility without AD with CGA/SBA.      Stepped in/out of dry tub/shower with hands on wall for support and SBA.      Assessment    Patient demonstrated impaired cognition as she began taking shower with socks, shorts and shirt on and did not remove until 1-2 minutes later.  Demonstrates impaired safety awareness and insight into deficits as well.  Strengths: Adequate strength, Alert and oriented, Independent prior level of function, Motivated for self care and independence, Pleasant and cooperative, Willingly participates in therapeutic activities  Barriers: Difficulty following instructions, Generalized weakness, Impaired balance, Impaired carryover of learning, Impaired insight/denial of deficits, Impaired functional cognition, Impulsive, Limited mobility, Poor family support    Plan    ADLs, IADLs, functional mobility without AD; functional cognition, safety awareness    Occupational Therapy Goals (Active)       Problem: Dressing       Dates: Start: 08/13/22         Goal: STG-Within one week, patient will dress LB at SPV level without LOB.       Dates: Start: 08/13/22               Problem: Functional Transfers       Dates: Start: 08/13/22         Goal: STG-Within one week, patient will transfer to toilet at SBA level without LOB.       Dates: Start: 08/13/22               Problem: IADL's       Dates: Start: 08/13/22         Goal: STG-Within one week,  patient will access kitchen area at SBA level without LOB.       Dates: Start: 08/13/22               Problem: OT Long Term Goals       Dates: Start: 08/13/22         Goal: LTG-By discharge, patient will complete basic self care tasks at Mod I level using AE/DME PRN.       Dates: Start: 08/13/22            Goal: LTG-By discharge, patient will perform bathroom transfers at Mod I level using AE/DME PRN.       Dates: Start: 08/13/22            Goal: LTG-By discharge, patient will complete basic home management at SPV-Mod I level using AE/DME PRN.       Dates: Start: 08/13/22

## 2022-08-16 LAB
ANION GAP SERPL CALC-SCNC: 11 MMOL/L (ref 7–16)
BUN SERPL-MCNC: 9 MG/DL (ref 8–22)
CALCIUM SERPL-MCNC: 8.9 MG/DL (ref 8.5–10.5)
CHLORIDE SERPL-SCNC: 102 MMOL/L (ref 96–112)
CO2 SERPL-SCNC: 25 MMOL/L (ref 20–33)
CREAT SERPL-MCNC: 0.42 MG/DL (ref 0.5–1.4)
GFR SERPLBLD CREATININE-BSD FMLA CKD-EPI: 114 ML/MIN/1.73 M 2
GLUCOSE BLD STRIP.AUTO-MCNC: 102 MG/DL (ref 65–99)
GLUCOSE BLD STRIP.AUTO-MCNC: 102 MG/DL (ref 65–99)
GLUCOSE BLD STRIP.AUTO-MCNC: 93 MG/DL (ref 65–99)
GLUCOSE BLD STRIP.AUTO-MCNC: 95 MG/DL (ref 65–99)
GLUCOSE SERPL-MCNC: 98 MG/DL (ref 65–99)
POTASSIUM SERPL-SCNC: 4.2 MMOL/L (ref 3.6–5.5)
SODIUM SERPL-SCNC: 138 MMOL/L (ref 135–145)

## 2022-08-16 PROCEDURE — A9270 NON-COVERED ITEM OR SERVICE: HCPCS | Performed by: PHYSICAL MEDICINE & REHABILITATION

## 2022-08-16 PROCEDURE — 770010 HCHG ROOM/CARE - REHAB SEMI PRIVAT*

## 2022-08-16 PROCEDURE — 97530 THERAPEUTIC ACTIVITIES: CPT

## 2022-08-16 PROCEDURE — 97116 GAIT TRAINING THERAPY: CPT

## 2022-08-16 PROCEDURE — 97129 THER IVNTJ 1ST 15 MIN: CPT

## 2022-08-16 PROCEDURE — 97110 THERAPEUTIC EXERCISES: CPT

## 2022-08-16 PROCEDURE — 97112 NEUROMUSCULAR REEDUCATION: CPT

## 2022-08-16 PROCEDURE — 700102 HCHG RX REV CODE 250 W/ 637 OVERRIDE(OP): Performed by: HOSPITALIST

## 2022-08-16 PROCEDURE — 97130 THER IVNTJ EA ADDL 15 MIN: CPT

## 2022-08-16 PROCEDURE — 700102 HCHG RX REV CODE 250 W/ 637 OVERRIDE(OP): Performed by: PHYSICAL MEDICINE & REHABILITATION

## 2022-08-16 PROCEDURE — 700111 HCHG RX REV CODE 636 W/ 250 OVERRIDE (IP): Performed by: PHYSICAL MEDICINE & REHABILITATION

## 2022-08-16 PROCEDURE — 36415 COLL VENOUS BLD VENIPUNCTURE: CPT

## 2022-08-16 PROCEDURE — 82962 GLUCOSE BLOOD TEST: CPT | Mod: 91

## 2022-08-16 PROCEDURE — 99232 SBSQ HOSP IP/OBS MODERATE 35: CPT | Performed by: PHYSICAL MEDICINE & REHABILITATION

## 2022-08-16 PROCEDURE — A9270 NON-COVERED ITEM OR SERVICE: HCPCS | Performed by: HOSPITALIST

## 2022-08-16 PROCEDURE — 99232 SBSQ HOSP IP/OBS MODERATE 35: CPT | Performed by: HOSPITALIST

## 2022-08-16 PROCEDURE — 80048 BASIC METABOLIC PNL TOTAL CA: CPT

## 2022-08-16 RX ADMIN — LISINOPRIL 40 MG: 20 TABLET ORAL at 05:53

## 2022-08-16 RX ADMIN — METFORMIN HYDROCHLORIDE 850 MG: 850 TABLET ORAL at 17:14

## 2022-08-16 RX ADMIN — SODIUM CHLORIDE 2 G: 1 TABLET ORAL at 11:21

## 2022-08-16 RX ADMIN — METFORMIN HYDROCHLORIDE 850 MG: 850 TABLET ORAL at 08:08

## 2022-08-16 RX ADMIN — DOCUSATE SODIUM 50 MG AND SENNOSIDES 8.6 MG 2 TABLET: 8.6; 5 TABLET, FILM COATED ORAL at 08:08

## 2022-08-16 RX ADMIN — SODIUM CHLORIDE 2 G: 1 TABLET ORAL at 08:08

## 2022-08-16 RX ADMIN — AMLODIPINE BESYLATE 10 MG: 5 TABLET ORAL at 05:53

## 2022-08-16 RX ADMIN — DOCUSATE SODIUM 50 MG AND SENNOSIDES 8.6 MG 2 TABLET: 8.6; 5 TABLET, FILM COATED ORAL at 21:19

## 2022-08-16 RX ADMIN — SITAGLIPTIN 50 MG: 50 TABLET, FILM COATED ORAL at 08:08

## 2022-08-16 RX ADMIN — ATORVASTATIN CALCIUM 80 MG: 40 TABLET, FILM COATED ORAL at 20:59

## 2022-08-16 RX ADMIN — ENOXAPARIN SODIUM 40 MG: 40 INJECTION SUBCUTANEOUS at 17:14

## 2022-08-16 RX ADMIN — SODIUM CHLORIDE 2 G: 1 TABLET ORAL at 17:14

## 2022-08-16 ASSESSMENT — ENCOUNTER SYMPTOMS
DIZZINESS: 0
SHORTNESS OF BREATH: 0
NAUSEA: 1
HEADACHES: 0
BLURRED VISION: 0
PALPITATIONS: 0
ABDOMINAL PAIN: 0
NERVOUS/ANXIOUS: 0
VOMITING: 0
SORE THROAT: 0
DIARRHEA: 0

## 2022-08-16 ASSESSMENT — GAIT ASSESSMENTS
DISTANCE (FEET): 675
DEVIATION: INCREASED BASE OF SUPPORT
GAIT LEVEL OF ASSIST: CONTACT GUARD ASSIST

## 2022-08-16 ASSESSMENT — ACTIVITIES OF DAILY LIVING (ADL): BED_CHAIR_WHEELCHAIR_TRANSFER_DESCRIPTION: INCREASED TIME;SUPERVISION FOR SAFETY

## 2022-08-16 NOTE — THERAPY
"Physical Therapy   Daily Treatment     Patient Name: Lana Phillips  Age:  57 y.o., Sex:  female  Medical Record #: [id number]  Today's Date: 2022     Precautions  Precautions: Fall Risk, Other (See Comments)  Comments: impulsive, staples over c-spine    Subjective    Patient is agreeable to participate, states she likes to play poker for fun but is willing to go for a walk outside. Patient makes multiple comments throughout therapy session about \"now that I'm disabled, I don't care if they lay me off at work\", \"I'm useless now\", \"maybe my sister can care for my sons, I can't drive or do anything for them now\", \"I have to take all these meds now, what is it even for\". When cued to correct listing to L when ambulating outside to prevent loss of balance off the curb, patient talks about how she was at her mother's  when she had her stroke (her mother had a stroke, fell, broke both hips, and passed after 2 hours off the ventilator) and \"if they'd have waited another half an hour, I would be totally paralyzed right now, and I might be gone\". She reports she hasn't really talked to anyone, states she talks to her sons and her sister. Reported comments to RN and charge RN; RN states she'll put in for a psych consult.      Objective       22 0831   PT Charge Group   PT Gait Training 2   PT Therapeutic Exercise 1   PT Neuromuscular Re-Education / Balance 1   PT Total Time Spent   PT Individual Total Time Spent (Mins) 60   Gait Functional Level of Assist    Gait Level Of Assist Contact Guard Assist   Assistive Device None   Distance (Feet) 675  (675 ft outside, another 175 ft inside)   # of Times Distance was Traveled 1   Deviation Increased Base Of Support  (lateral sway, lists to the L)   Transfer Functional Level of Assist   Bed, Chair, Wheelchair Transfer Supervised   Bed Chair Wheelchair Transfer Description Increased time;Supervision for safety   Sitting Lower Body Exercises   Nustep Resistance Level 4 "   Comments 10 minutes, 289 steps, encouragement to keep going as she wanted to quit after 1min 30sec   Neuro-Muscular Treatments   Neuro-Muscular Treatments Anterior weight shift;Weight Shift Right;Weight Shift Left;Tactile Cuing;Verbal Cuing;Compensatory Strategies   Comments Wii game on TV requiring standing, stepping in place. tolerates standing approx 5 mins before she fatigues and requests seated rest break. Requires some hand over hand assist to coordinate controller and Wii screen pointer   Interdisciplinary Plan of Care Collaboration   IDT Collaboration with  Nursing   Patient Position at End of Therapy In Bed;Call Light within Reach;Tray Table within Reach;Phone within Reach   Collaboration Comments see note/ subjective section       Assessment    Patient is slightly impulsive, gets bored easily with Nustep and Wii activities requiring redirection to task. With walking, she needs some cues for safety to prevent losses of balance off the curb. She may be going through grief process in the wake of her CVA, may benefit from psych consult due to types of comments she made to me today. RN is aware.    Strengths: Adequate strength, Independent prior level of function, Motivated for self care and independence, Pleasant and cooperative, Supportive family, Willingly participates in therapeutic activities  Barriers: Decreased endurance, Home accessibility, Impaired activity tolerance, Impaired balance, Impaired insight/denial of deficits, Impulsive    Plan    Gait training progressing distance and dynamic gait activities, higher level standing balance, endurance, incorporate cognitive tasks into mobility, stair negotiation, safety.    Passport items to be completed:  Get in/out of bed safely, in/out of a vehicle, safely use mobility device, walk or wheel around home/community, navigate up and down stairs, show how to get up/down from the ground, ensure home is accessible, demonstrate HEP, complete caregiver  training    Physical Therapy Problems (Active)       Problem: Balance       Dates: Start: 08/13/22         Goal: STG-Within one week, patient will participate in FGA or Mini-BestTest to assess fall risk.        Dates: Start: 08/13/22               Problem: Mobility       Dates: Start: 08/13/22         Goal: STG-Within one week, patient will ambulate 250 feet with least restrictive assistive device and SBA.        Dates: Start: 08/13/22            Goal: STG-Within one week, patient will ambulate up/down flight of stairs with rails and SBA.        Dates: Start: 08/13/22               Problem: Mobility Transfers       Dates: Start: 08/13/22         Goal: STG-Within one week, patient will transfer in/out of car with CGA.        Dates: Start: 08/13/22            Goal: STG-Within one week, patient will ambulate at least 50 feet on uneven terrain with least restrictive assistive device and CGA.        Dates: Start: 08/13/22               Problem: PT-Long Term Goals       Dates: Start: 08/13/22         Goal: LTG-By discharge, patient will ambulate 300 feet with least restrictive assistive device and mod I.        Dates: Start: 08/13/22            Goal: LTG-By discharge, patient will ambulate up/down flight of stairs with single rail and mod I.        Dates: Start: 08/13/22            Goal: LTG-By discharge, patient will transfer in/out of a car with mod I.        Dates: Start: 08/13/22            Goal: LTG-By discharge, patient will ambulate at least 100 feet on uneven terrain with least restrictive assistive device and mod I.       Dates: Start: 08/13/22

## 2022-08-16 NOTE — CARE PLAN
"The patient is Stable - Low risk of patient condition declining or worsening    Shift Goals  Clinical Goals: safety  Patient Goals: Rest  Family Goals: N/A    Progress made toward(s) clinical / shift goals:    Problem: Bladder / Voiding  Goal: Patient will establish and maintain regular urinary output  Outcome: Progressing  Note: Patient voiding adequate amounts of clear yellow urine.  Denies flank pain or dysuria; afebrile.  Will continue to monitor.      Problem: Bowel Elimination  Goal: Patient will participate in bowel management program  Outcome: Progressing  Note: Patient uses bathroom during bowel elimination. Will continue to monitor      Problem: Skin Integrity  Goal: Patient's skin integrity will be maintained or improve  Outcome: Progressing  Note: Patient's skin remains intact and free from new or accidental injury this shift.  Will continue to monitor.      Problem: Diabetes Management  Goal: Patient's ability to maintain appropriate glucose levels will be maintained or improve  Outcome: Progressing  Note:  mg/dl. No diabetic reactions noted.       Patient is not progressing towards the following goals:      Problem: Fall Risk - Rehab  Goal: Patient will remain free from falls  Note: Priscila Zhang Fall risk Assessment Score: 12      Moderate fall risk Interventions  - Bed and strip alarm   - Yellow sign by the door   - Yellow wrist band \"Fall risk\"  - Room near to the nurse station  - Do not leave patient unattended in the bathroom  - Fall risk education provided    Pt does not use call light consistently and appropriately. Does not Wait for assistance, attempts self transfer. Educated on fall prevention and emphasized to use the call light and wait for assistance.         "

## 2022-08-16 NOTE — CARE PLAN
Problem: Diabetes Management  Goal: Patient's ability to maintain appropriate glucose levels will be maintained or improve  Note: Glucose levels have been maintained and monitored this shift per orders.      Problem: Psychosocial  Goal: Patient's ability to identify and develop effective coping behaviors will improve  Note: Patient refused psych consult and denies depression or suicidal thoughts after further reassessment.    The patient is Watcher - Medium risk of patient condition declining or worsening

## 2022-08-16 NOTE — THERAPY
"Speech Language Pathology  Daily Treatment     Patient Name: Lana Phillips  Age:  57 y.o., Sex:  female  Medical Record #: [id number]  Today's Date: 8/16/2022     Precautions  Precautions: Fall Risk, Other (See Comments)  Comments: impulsive, staples over c-spine    Subjective    Pt pleasant and cooperative, flat affect, willing to participate in all tasks that were presented.      Objective       08/16/22 1033   Treatment Charges   SLP Cognitive Skill Development First 15 Minutes 1   SLP Cognitive Skill Development Additional 15 Minutes 3   SLP Total Time Spent   SLP Individual Total Time Spent (Mins) 60       Assessment    Pt requesting assistance with use of computer, pt reporting not familiar with [employer] products, SLP assisted in turning up volume as for pt unable to locate. 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 Pt completed who has more dollars and coins with correct calculations on 38/40 trials indep, increased to 100% provided additional review. Pt completed attention task to locate target word indep locating 10/11 targets, again increasing to 100% with additional review. Finding housing activity completed with 100% accuracy indep. Pt completes task with a rapid rate of speed however is successful overall. Pt unable to identify any barriers to dc or deficits following CVA. Pts greatest deficit likely insight and awareness at this time. Pt reporting \"im going to need to practice my driving\" SLP provided education re: unsafe to drive until cleared by MD. Pt was okay with hearing this. SLP rec following up with her two adult children that she lives with to allow for additional assistance and support at time of dc. Rec completion of brief family training prior to dc home.     Strengths: Able to follow instructions, Independent prior level of function, Supportive family  Barriers: Impaired functional cognition, Impulsive    Plan    Consider " planning trip to [facility]   Stroke education     Speech Therapy Problems (Active)       Problem: Memory STGs       Dates: Start: 08/13/22         Goal: STG-Within one week, patient will       Dates: Start: 08/13/22       Description: 1) Individualized goal:   recall novel health/safety information using memory strategies/external memory aids with 80% accuracy and MIN A.   2) Interventions:  SLP Self Care / ADL Training , SLP Cognitive Skill Development, and SLP Group Treatment          Goal Note filed on 08/13/22 0925 by Maida Jurado MS,CCC-SLP       1) Individualized goal:   recall novel health/safety information using memory strategies/external memory aids with 80% accuracy and MIN A.   2) Interventions:  SLP Self Care / ADL Training , SLP Cognitive Skill Development, and SLP Group Treatment                   Problem: Problem Solving STGs       Dates: Start: 08/13/22         Goal: STG-Within one week, patient will       Dates: Start: 08/13/22       Description: 1) Individualized goal:  complete medication and financial management tasks with 80% acc and MIN A.   2) Interventions:  SLP Self Care / ADL Training , SLP Cognitive Skill Development, and SLP Group Treatment             Goal: STG-Within one week, patient will       Dates: Start: 08/13/22       Description: 1) Individualized goal:  complete alternating and divided attention tasks with 80% accuracy and MIN A.   2) Interventions:  SLP Self Care / ADL Training , SLP Cognitive Skill Development, and SLP Group Treatment                Problem: Speech/Swallowing LTGs       Dates: Start: 08/13/22         Goal: LTG-By discharge, patient will solve complex problem       Dates: Start: 08/13/22       Description: 1) Individualized goal:  with 80% accuracy and MOD I for a safe D/C to PLOF.   2) Interventions:  SLP Self Care / ADL Training , SLP Cognitive Skill Development, and SLP Group Treatment

## 2022-08-16 NOTE — PROGRESS NOTES
"Rehab Progress Note     Encounter Date: 8/16/2022    CC: Doing okay    Interval Events (Subjective)  Vital signs: Stable.  Bowel: Last movement 8/15  Bladder: Voiding volitionally  PRN Rx: None    Patient seen and examined in her room today with her nurse at bedside.  Her incision was evaluated.  Patient states that she is looking forward to going home.  States that she is going to go home with her sister and her children.  She feels as though she is doing well.  She would like to speak to the  regarding some of her work payment for her leave.  Discussed with her her labs.  Patient otherwise is doing well and has no questions or concerns.  Denies systemic complaints.  Nurse states that physical therapist reported some depression.  Patient states that she is doing okay, has no intention of self-harm or harming others and does not feel as though she requires neuropsychology evaluation.    14 point ROS reviewed and negative except as stated above.     Objective:  VITAL SIGNS: /64   Pulse 73   Temp 36.4 °C (97.5 °F) (Oral)   Resp 20   Ht 1.499 m (4' 11\")   Wt 65.2 kg (143 lb 11.2 oz)   SpO2 92%   BMI 29.02 kg/m²     GEN: No apparent distress, sitting up using her laptop in the corner of the room.  HEENT: Head normocephalic, atraumatic.  Sclera nonicteric bilaterally, no ocular discharge appreciated bilaterally.  CV: Extremities warm and well-perfused, no peripheral edema appreciated bilaterally.  PULMONARY: Breathing nonlabored on room air, no respiratory accessory muscle use.  Not requiring supplemental oxygen.  ABD: Soft, nontender.  SKIN: No appreciable skin breakdown on exposed areas of skin.  Examined suboccipital incision.  Well-healed.  Staples in place.  PSYCH: Flat affect.  NEURO: Awake alert.  Conversational.  Logical thought content.  Answers questions appropriately.  She is ambulatory without assistive device.  Moving all extremities volitionally.      Recent Results (from the past " 72 hour(s))   POCT glucose device results    Collection Time: 08/13/22 11:16 AM   Result Value Ref Range    POC Glucose, Blood 135 (H) 65 - 99 mg/dL   POCT glucose device results    Collection Time: 08/13/22  5:17 PM   Result Value Ref Range    POC Glucose, Blood 135 (H) 65 - 99 mg/dL   POCT glucose device results    Collection Time: 08/13/22  9:06 PM   Result Value Ref Range    POC Glucose, Blood 159 (H) 65 - 99 mg/dL   Basic Metabolic Panel    Collection Time: 08/14/22  5:14 AM   Result Value Ref Range    Sodium 135 135 - 145 mmol/L    Potassium 3.7 3.6 - 5.5 mmol/L    Chloride 100 96 - 112 mmol/L    Co2 23 20 - 33 mmol/L    Glucose 138 (H) 65 - 99 mg/dL    Bun 14 8 - 22 mg/dL    Creatinine 0.43 (L) 0.50 - 1.40 mg/dL    Calcium 8.6 8.5 - 10.5 mg/dL    Anion Gap 12.0 7.0 - 16.0   ESTIMATED GFR    Collection Time: 08/14/22  5:14 AM   Result Value Ref Range    GFR (CKD-EPI) 113 >60 mL/min/1.73 m 2   POCT glucose device results    Collection Time: 08/14/22  7:44 AM   Result Value Ref Range    POC Glucose, Blood 139 (H) 65 - 99 mg/dL   POCT glucose device results    Collection Time: 08/14/22 11:23 AM   Result Value Ref Range    POC Glucose, Blood 119 (H) 65 - 99 mg/dL   POCT glucose device results    Collection Time: 08/14/22  5:21 PM   Result Value Ref Range    POC Glucose, Blood 119 (H) 65 - 99 mg/dL   POCT glucose device results    Collection Time: 08/14/22  7:56 PM   Result Value Ref Range    POC Glucose, Blood 138 (H) 65 - 99 mg/dL   POCT glucose device results    Collection Time: 08/15/22  7:25 AM   Result Value Ref Range    POC Glucose, Blood 106 (H) 65 - 99 mg/dL   POCT glucose device results    Collection Time: 08/15/22 11:18 AM   Result Value Ref Range    POC Glucose, Blood 97 65 - 99 mg/dL   POCT glucose device results    Collection Time: 08/15/22  5:27 PM   Result Value Ref Range    POC Glucose, Blood 111 (H) 65 - 99 mg/dL   POCT glucose device results    Collection Time: 08/15/22  8:36 PM   Result Value  Ref Range    POC Glucose, Blood 100 (H) 65 - 99 mg/dL   Basic Metabolic Panel    Collection Time: 08/16/22  5:26 AM   Result Value Ref Range    Sodium 138 135 - 145 mmol/L    Potassium 4.2 3.6 - 5.5 mmol/L    Chloride 102 96 - 112 mmol/L    Co2 25 20 - 33 mmol/L    Glucose 98 65 - 99 mg/dL    Bun 9 8 - 22 mg/dL    Creatinine 0.42 (L) 0.50 - 1.40 mg/dL    Calcium 8.9 8.5 - 10.5 mg/dL    Anion Gap 11.0 7.0 - 16.0   ESTIMATED GFR    Collection Time: 08/16/22  5:26 AM   Result Value Ref Range    GFR (CKD-EPI) 114 >60 mL/min/1.73 m 2   POCT glucose device results    Collection Time: 08/16/22  8:08 AM   Result Value Ref Range    POC Glucose, Blood 102 (H) 65 - 99 mg/dL       Current Facility-Administered Medications   Medication Frequency    sodium chloride (SALT) tablet 2 g TID WITH MEALS    vitamin D2 (Ergocalciferol) (Drisdol) capsule 50,000 Units Q7 DAYS    menthol (Halls) lozenge 1 Lozenge Q2HRS PRN    hydrOXYzine HCl (ATARAX) tablet 50 mg Q6HRS PRN    melatonin tablet 3 mg HS PRN    Respiratory Therapy Consult Continuous RT    Pharmacy Consult Request ...Pain Management Review 1 Each PHARMACY TO DOSE    acetaminophen (Tylenol) tablet 650 mg Q4HRS PRN    senna-docusate (PERICOLACE or SENOKOT S) 8.6-50 MG per tablet 2 Tablet BID    And    polyethylene glycol/lytes (MIRALAX) PACKET 1 Packet QDAY PRN    And    magnesium hydroxide (MILK OF MAGNESIA) suspension 30 mL QDAY PRN    And    bisacodyl (DULCOLAX) suppository 10 mg QDAY PRN    lactulose 20 GM/30ML solution 30 mL QDAY PRN    docusate sodium (ENEMEEZ) enema 283 mg QDAY PRN    artificial tears ophthalmic solution 1 Drop PRN    mag hydrox-al hydrox-simeth (MAALOX PLUS ES or MYLANTA DS) suspension 20 mL Q2HRS PRN    ondansetron (ZOFRAN ODT) dispertab 4 mg 4X/DAY PRN    Or    ondansetron (ZOFRAN) syringe/vial injection 4 mg 4X/DAY PRN    traZODone (DESYREL) tablet 50 mg QHS PRN    sodium chloride (OCEAN) 0.65 % nasal spray 2 Spray PRN    insulin regular (HumuLIN  R,NovoLIN R) injection 4X/DAY ACHS    And    dextrose 50% (D50W) injection 25 g Q15 MIN PRN    enoxaparin (Lovenox) inj 40 mg DAILY AT 1800    lisinopril (PRINIVIL) tablet 40 mg Q DAY    metFORMIN (GLUCOPHAGE) tablet 850 mg BID WITH MEALS    SITagliptin (JANUVIA) tablet 50 mg DAILY    scopolamine (TRANSDERM-SCOP) patch 1 Patch Q72HRS    amLODIPine (NORVASC) tablet 10 mg Q DAY    atorvastatin (LIPITOR) tablet 80 mg Q EVENING       Orders Placed This Encounter   Procedures    Diet Order Diet: Consistent CHO (Diabetic)     Standing Status:   Standing     Number of Occurrences:   1     Order Specific Question:   Diet:     Answer:   Consistent CHO (Diabetic) [4]       Assessment:  Active Hospital Problems    Diagnosis     *Hemorrhagic stroke (HCC)     Other hyperlipidemia     Type 2 diabetes mellitus with hyperglycemia, without long-term current use of insulin (HCC)     Hypertension        Medical Decision Making and Plan:  R cerebellar hemorrhagic stroke  - s/p suboccipital  craniectomy with evacuation of left cerebellar IPH duraplasty and C1 laminectomy    -initiate comprehensive IRF level therapy with 3hrs of therapy 5 days per week with PT/OT/SLP     Brain swelling  Continue salt tabs to keep sodium above 140  - 8/13 Na 138 while on salt tab 1g TID, will give additional 1 gm today, may need to switch to 4mg total daily  8/15: Continue salt tabs 2 g 3 times daily  8/16: BMP -sodium improving to 138 with goal of above 140.  Monitor.  8/18 BMP     Dizziness  Continue scopolamine patch     Hypertension  Amlodipine  Lisinopril  Consult hospitalist     Diabetes with hyperglycemia  New diagnosis  Hemoglobin A1c of 7.2  Metformin  Januvia  Sliding scale insulin  Consult hospitalist     Hyperlipidemia    Start high-dose statin    Bladder  Low PVRs  Voiding volitionally    Bowel  Last bowel movement 8/15    DVT prophylaxis  Lovenox 40 mg daily    Vitamin D deficiency  7 8/13/2022  8/15: Start high-dose vitamin D  supplementation - ergocalciferol x2 doses then daily supplementation    Total time:  25 minutes.  I spent greater than 50% of the time for patient care and coordination on this date, including unit/floor time, and face-to-face time with the patient as per assessment and plan above.    Nika Mccormack D.O.

## 2022-08-16 NOTE — DIETARY
Nutrition Services: DM diet Education   Day 4 of admit.  Lana Phillips is a 57 y.o. female with admitting DX of 0001.1 Stroke:  No Paresis    Per MD progress notes, pt has new dx of DM. RD able to visit pt at bedside to offer DM diet education. Pt agreed that she has dx of type 2 DM. She said that she has not had DM diet ed in the past. She refused DM diet ed now.     Please re-consult RD as indicated.

## 2022-08-16 NOTE — CARE PLAN
The patient is Stable - Low risk of patient condition declining or worsening    Shift Goals  Clinical Goals: safety  Patient Goals: Rest  Family Goals: N/A    Problem: Infection  Goal: Patient will remain free from infection  Outcome: Progressing: Pt afebrile, WBC's WNL, surgical incision posterior head/neck with sutures is CDI, aproximated, lesley.      Problem: Bladder / Voiding  Goal: Patient will establish and maintain regular urinary output  Outcome: Progressing: Pt voids ad raffy. Continent.

## 2022-08-16 NOTE — THERAPY
"Occupational Therapy  Daily Treatment     Patient Name: Lana Phillips  Age:  57 y.o., Sex:  female  Medical Record #: [id number]  Today's Date: 8/16/2022     Precautions  Precautions: Fall Risk, Other (See Comments)  Comments: impulsive, staples over c-spine    Safety   ADL Safety : Impaired, Impulsive, Requires Supervision for Safety, Impaired Insight into Safety, Requires Cueing for Safety  Bathroom Safety: Impaired, Impulsive, Requires Supervision for Safety, Impaired Insight into Safety, Requires Cuing for Safety    Subjective    Pt in bed upon arrival wrapped up in blankets, pleasant and agreeable to OT session.      \"It is so cold in my room\"      Objective       08/16/22 0931   OT Charge Group   OT Therapy Activity 2   OT Total Time Spent   OT Individual Total Time Spent (Mins) 30   Interdisciplinary Plan of Care Collaboration   Patient Position at End of Therapy In Bed;Tray Table within Reach;Call Light within Reach;Phone within Reach     Cognitive function activities:    Spot-it: pt completed 10 rounds of spot-it, following instruction pt required increased time to identify first matching symbols. All others pt able to find in a relatively quick manner.        Memory: pt completed 1 round of memory with 6 pairs to match. Following orientation and set-up pt able to complete activity quickly and reports that this was easy.       \"What it wrong?\"- pt completed 3 cards identifying 3-5 items that were being utilized wrong, in the wrong context, or an overall unsafe situation. Pt was able to identify 12/13 without verbal cueing other than encouragement to continue to try to identify what was wrong. Pt did require increased time for each pictures. Pictures were of a people in a living room, in a kitchen, and in a grocery store.     Assessment    Pt tolerated session well with a focus on overall functional cognition and safety awareness throughout various table top activities. Pt required increased time for various parts " of activities though with time was able to complete all. Would benefit from in context functional cog activities.    Strengths: Adequate strength, Alert and oriented, Independent prior level of function, Motivated for self care and independence, Pleasant and cooperative, Willingly participates in therapeutic activities  Barriers: Difficulty following instructions, Generalized weakness, Impaired balance, Impaired carryover of learning, Impaired insight/denial of deficits, Impaired functional cognition, Impulsive, Limited mobility, Poor family support    Plan    ADLs, IADLs, functional mobility without AD; functional cognition, safety awareness      Occupational Therapy Goals (Active)       Problem: Dressing       Dates: Start: 08/13/22         Goal: STG-Within one week, patient will dress LB at SPV level without LOB.       Dates: Start: 08/13/22               Problem: Functional Transfers       Dates: Start: 08/13/22         Goal: STG-Within one week, patient will transfer to toilet at SBA level without LOB.       Dates: Start: 08/13/22               Problem: IADL's       Dates: Start: 08/13/22         Goal: STG-Within one week, patient will access kitchen area at SBA level without LOB.       Dates: Start: 08/13/22               Problem: OT Long Term Goals       Dates: Start: 08/13/22         Goal: LTG-By discharge, patient will complete basic self care tasks at Mod I level using AE/DME PRN.       Dates: Start: 08/13/22            Goal: LTG-By discharge, patient will perform bathroom transfers at Mod I level using AE/DME PRN.       Dates: Start: 08/13/22            Goal: LTG-By discharge, patient will complete basic home management at SPV-Mod I level using AE/DME PRN.       Dates: Start: 08/13/22

## 2022-08-16 NOTE — PROGRESS NOTES
"Physical therapist mentioned that patient stated suicidal ideations during their session. Writer talked to patient and patient refused psych consult. Patient stated that it was \"just talk\".  "

## 2022-08-16 NOTE — PROGRESS NOTES
"[facility] Medicine Daily Progress Note    Date of Service  2022    Chief Complaint:  Hypertension  Diabetes  Sodium maintenance    History of present illness:  Lana Phillips is a 57-year-old female with a history of hypertension, diabetes that was admitted to [facility]  with altered mental status, headache, nausea and dizziness.  She was at her mother's  but then found in the bathroom minimally responsive.  She was brought to the hospital found to have a systolic blood pressure of 259.  Head CT showed a large right cerebellar hemorrhage with compression of the third and fourth ventricles.  Neurosurgery took the patient for a suboccipital craniectomy with evacuation of left cerebellar IPH and duraplasty and a C1 laminectomy.    Interval History:  2022: Sodium: 138.  Afebrile, heart rate respiratory rate and blood pressure within acceptable ranges on vital signs.  Oxygen saturation acceptable on room air. \"I'm ready to go home.\" Speech clear but not much of a conversationalist.  Pleasant.          Review of Systems  Review of Systems   Constitutional:  Negative for malaise/fatigue.   HENT:  Negative for sore throat.    Eyes:  Negative for blurred vision.   Respiratory:  Negative for shortness of breath.    Cardiovascular:  Negative for palpitations and leg swelling.   Gastrointestinal:  Positive for nausea. Negative for abdominal pain, diarrhea and vomiting.   Neurological:  Negative for dizziness and headaches.   Psychiatric/Behavioral:  The patient is not nervous/anxious.       Physical Exam  Temp:  [36.2 °C (97.2 °F)-37.2 °C (98.9 °F)] 36.4 °C (97.5 °F)  Pulse:  [73-86] 73  Resp:  [18-20] 20  BP: (106-123)/(59-69) 123/64  SpO2:  [92 %-96 %] 92 %    Physical Exam  Vitals and nursing note reviewed.   Constitutional:       Appearance: Normal appearance.   HENT:      Head: Atraumatic.      Nose: No congestion.   Eyes:      Conjunctiva/sclera: Conjunctivae normal.   Cardiovascular:      Rate and Rhythm: " Normal rate and regular rhythm.      Heart sounds: No murmur heard.  Pulmonary:      Effort: Pulmonary effort is normal.      Breath sounds: No stridor. No wheezing or rales.   Abdominal:      General: There is no distension.      Palpations: Abdomen is soft.      Tenderness: There is no abdominal tenderness.   Musculoskeletal:      Cervical back: Normal range of motion and neck supple.      Right lower leg: No edema.      Left lower leg: No edema.   Skin:     General: Skin is warm and dry.      Findings: No rash.   Neurological:      Mental Status: She is alert and oriented to person, place, and time.      Cranial Nerves: No cranial nerve deficit.   Psychiatric:         Mood and Affect: Mood normal.         Behavior: Behavior normal.       Fluids    Intake/Output Summary (Last 24 hours) at 8/16/2022 1048  Last data filed at 8/16/2022 1000  Gross per 24 hour   Intake 1200 ml   Output --   Net 1200 ml       Laboratory        Recent Labs     08/14/22  0514 08/16/22  0526   SODIUM 135 138   POTASSIUM 3.7 4.2   CHLORIDE 100 102   CO2 23 25   GLUCOSE 138* 98   BUN 14 9   CREATININE 0.43* 0.42*   CALCIUM 8.6 8.9                   Imaging    Assessment/Plan  * Hemorrhagic stroke (HCC)- (present on admission)  Assessment & Plan  CT Head: showed large right cerebellar hemorrhage with compression of the third and fourth ventricles  S/P suboccipital craniectomy with evacuation of left cerebellar IPH, duraplasty, and C1 laminectomy  On Lipitor    Suggestion was to keep sodium above 140 (for brain swelling):  Na: 138 (8/9) --> 138 (8/13) --> 135 (8/14) --> 138 (8/16)  On salt tabs: 1 g tid --> 2g bid (8/13) --> will increase to 2g tid (8/14)  Cont to monitor    Other hyperlipidemia- (present on admission)  Assessment & Plan  On Lipitor    Type 2 diabetes mellitus with hyperglycemia, without long-term current use of insulin (HCC)- (present on admission)  Assessment & Plan  Hba1c: 7.1 (8/2)  BS: good  On Metformin and  Januvia  Monitor accchecks and cover with SSI  Glycohemoglobin 7.1 in past 2 weeks.    Hypertension- (present on admission)  Assessment & Plan  Continue active treatment with Norvasc 10mg qd and Lisinopril 40mg qd  Monitor vitals.

## 2022-08-16 NOTE — DISCHARGE PLANNING
CM reached out to patients HR dept to inquire about how to pay her health insurance and to find out what other benefits patient has.  CM was told patient needs to call.  CM to follow up with patient and is available as needs arise.

## 2022-08-16 NOTE — THERAPY
Occupational Therapy  Daily Treatment     Patient Name: Lana Phillips  Age:  57 y.o., Sex:  female  Medical Record #: [id number]  Today's Date: 8/16/2022     Precautions  Precautions: Fall Risk  Comments: impulsive, staples over c-spine    Safety   ADL Safety : Impaired, Impulsive, Requires Supervision for Safety, Impaired Insight into Safety, Requires Cueing for Safety  Bathroom Safety: Impaired, Impulsive, Requires Supervision for Safety, Impaired Insight into Safety, Requires Cuing for Safety    Subjective    Patient was in bed upon OT arrival, but stated she was ready and waiting for OT to begin.       Objective       08/16/22 1331   OT Charge Group   OT Therapy Activity 3   OT Therapeutic Exercise  1   OT Total Time Spent   OT Individual Total Time Spent (Mins) 60   Precautions   Precautions Fall Risk   Functional Level of Assist   Eating Independent   Eating Description   (to drink coffee)   Grooming Independent;Standing  (to wash hands)   Bed, Chair, Wheelchair Transfer Supervised   IADL Treatments   IADL Treatments Meal preparation;Home management   Meal Preparation scrambled an egg using stove independently with good safety.  Turned burner on to appropriate temperature to cook and turned off without cues.   Home Management Retrieved all needed items without any cues of what tools she needed to cook an egg.  Rinsed dishes off and placed in  independently.   Sitting Lower Body Exercises   Nustep Time (See Comments)  (level 3 x 10 minutes with UE/LE)   Bed Mobility    Supine to Sit Independent   Sit to Supine Independent   Scooting Independent   Interdisciplinary Plan of Care Collaboration   Patient Position at End of Therapy In Bed;Call Light within Reach;Tray Table within Reach;Phone within Reach     Functional mobility without AD in room, hallways, gyms and dining room supervised.      Worked on standing tolerance during wii game.  Stood for 10 minutes total.      Assessment    Patient continues to want  to d/c home as soon as possible.  Is ready to d/c home from OT perspective with supervision.  Strengths: Adequate strength, Alert and oriented, Independent prior level of function, Motivated for self care and independence, Pleasant and cooperative, Willingly participates in therapeutic activities  Barriers: Difficulty following instructions, Generalized weakness, Impaired balance, Impaired carryover of learning, Impaired insight/denial of deficits, Impaired functional cognition, Impulsive, Limited mobility, Poor family support    Plan    ADLs, IADLs, functional mobility without AD; functional cognition, safety awareness    Occupational Therapy Goals (Active)       Problem: Dressing       Dates: Start: 08/13/22         Goal: STG-Within one week, patient will dress LB at SPV level without LOB.       Dates: Start: 08/13/22               Problem: Functional Transfers       Dates: Start: 08/13/22         Goal: STG-Within one week, patient will transfer to toilet at SBA level without LOB.       Dates: Start: 08/13/22               Problem: IADL's       Dates: Start: 08/13/22         Goal: STG-Within one week, patient will access kitchen area at SBA level without LOB.       Dates: Start: 08/13/22               Problem: OT Long Term Goals       Dates: Start: 08/13/22         Goal: LTG-By discharge, patient will complete basic self care tasks at Mod I level using AE/DME PRN.       Dates: Start: 08/13/22            Goal: LTG-By discharge, patient will perform bathroom transfers at Mod I level using AE/DME PRN.       Dates: Start: 08/13/22            Goal: LTG-By discharge, patient will complete basic home management at SPV-Mod I level using AE/DME PRN.       Dates: Start: 08/13/22

## 2022-08-16 NOTE — CARE PLAN
Problem: Memory STGs  Goal: STG-Within one week, patient will  Description: 1) Individualized goal:   recall novel health/safety information using memory strategies/external memory aids with 80% accuracy and MIN A.   2) Interventions:  SLP Self Care / ADL Training , SLP Cognitive Skill Development, and SLP Group Treatment      Outcome: Progressing     Problem: Problem Solving STGs  Goal: STG-Within one week, patient will  Description: 1) Individualized goal:  complete medication and financial management tasks with 80% acc and MIN A.   2) Interventions:  SLP Self Care / ADL Training , SLP Cognitive Skill Development, and SLP Group Treatment      Outcome: Met  Goal: STG-Within one week, patient will  Description: 1) Individualized goal:  complete alternating and divided attention tasks with 80% accuracy and MIN A.   2) Interventions:  SLP Self Care / ADL Training , SLP Cognitive Skill Development, and SLP Group Treatment      Outcome: Met

## 2022-08-17 LAB
GLUCOSE BLD STRIP.AUTO-MCNC: 128 MG/DL (ref 65–99)
GLUCOSE BLD STRIP.AUTO-MCNC: 84 MG/DL (ref 65–99)
GLUCOSE BLD STRIP.AUTO-MCNC: 88 MG/DL (ref 65–99)
GLUCOSE BLD STRIP.AUTO-MCNC: 90 MG/DL (ref 65–99)

## 2022-08-17 PROCEDURE — 97130 THER IVNTJ EA ADDL 15 MIN: CPT

## 2022-08-17 PROCEDURE — 700102 HCHG RX REV CODE 250 W/ 637 OVERRIDE(OP): Performed by: HOSPITALIST

## 2022-08-17 PROCEDURE — 700111 HCHG RX REV CODE 636 W/ 250 OVERRIDE (IP): Performed by: PHYSICAL MEDICINE & REHABILITATION

## 2022-08-17 PROCEDURE — 770010 HCHG ROOM/CARE - REHAB SEMI PRIVAT*

## 2022-08-17 PROCEDURE — 97112 NEUROMUSCULAR REEDUCATION: CPT

## 2022-08-17 PROCEDURE — 97129 THER IVNTJ 1ST 15 MIN: CPT

## 2022-08-17 PROCEDURE — 82962 GLUCOSE BLOOD TEST: CPT

## 2022-08-17 PROCEDURE — 97110 THERAPEUTIC EXERCISES: CPT

## 2022-08-17 PROCEDURE — 99233 SBSQ HOSP IP/OBS HIGH 50: CPT | Performed by: PHYSICAL MEDICINE & REHABILITATION

## 2022-08-17 PROCEDURE — 97530 THERAPEUTIC ACTIVITIES: CPT

## 2022-08-17 PROCEDURE — 90791 PSYCH DIAGNOSTIC EVALUATION: CPT | Performed by: PSYCHOLOGIST

## 2022-08-17 PROCEDURE — 99232 SBSQ HOSP IP/OBS MODERATE 35: CPT | Performed by: HOSPITALIST

## 2022-08-17 PROCEDURE — A9270 NON-COVERED ITEM OR SERVICE: HCPCS | Performed by: HOSPITALIST

## 2022-08-17 PROCEDURE — 97116 GAIT TRAINING THERAPY: CPT

## 2022-08-17 PROCEDURE — A9270 NON-COVERED ITEM OR SERVICE: HCPCS | Performed by: PHYSICAL MEDICINE & REHABILITATION

## 2022-08-17 PROCEDURE — 97535 SELF CARE MNGMENT TRAINING: CPT

## 2022-08-17 PROCEDURE — 700102 HCHG RX REV CODE 250 W/ 637 OVERRIDE(OP): Performed by: PHYSICAL MEDICINE & REHABILITATION

## 2022-08-17 RX ORDER — ATORVASTATIN CALCIUM 10 MG/1
20 TABLET, FILM COATED ORAL EVERY EVENING
Status: DISCONTINUED | OUTPATIENT
Start: 2022-08-17 | End: 2022-08-18 | Stop reason: HOSPADM

## 2022-08-17 RX ORDER — SODIUM CHLORIDE 1 G/1
1 TABLET ORAL
Status: DISCONTINUED | OUTPATIENT
Start: 2022-08-17 | End: 2022-08-18 | Stop reason: HOSPADM

## 2022-08-17 RX ADMIN — SITAGLIPTIN 50 MG: 50 TABLET, FILM COATED ORAL at 08:05

## 2022-08-17 RX ADMIN — ATORVASTATIN CALCIUM 20 MG: 10 TABLET, FILM COATED ORAL at 21:25

## 2022-08-17 RX ADMIN — DOCUSATE SODIUM 50 MG AND SENNOSIDES 8.6 MG 2 TABLET: 8.6; 5 TABLET, FILM COATED ORAL at 08:05

## 2022-08-17 RX ADMIN — DOCUSATE SODIUM 50 MG AND SENNOSIDES 8.6 MG 2 TABLET: 8.6; 5 TABLET, FILM COATED ORAL at 21:25

## 2022-08-17 RX ADMIN — AMLODIPINE BESYLATE 10 MG: 5 TABLET ORAL at 05:43

## 2022-08-17 RX ADMIN — LISINOPRIL 40 MG: 20 TABLET ORAL at 05:44

## 2022-08-17 RX ADMIN — SODIUM CHLORIDE 2 G: 1 TABLET ORAL at 08:05

## 2022-08-17 RX ADMIN — SODIUM CHLORIDE 1 G: 1 TABLET ORAL at 17:32

## 2022-08-17 RX ADMIN — ENOXAPARIN SODIUM 40 MG: 40 INJECTION SUBCUTANEOUS at 17:32

## 2022-08-17 RX ADMIN — SODIUM CHLORIDE 2 G: 1 TABLET ORAL at 11:12

## 2022-08-17 RX ADMIN — METFORMIN HYDROCHLORIDE 850 MG: 850 TABLET ORAL at 17:32

## 2022-08-17 RX ADMIN — METFORMIN HYDROCHLORIDE 850 MG: 850 TABLET ORAL at 08:05

## 2022-08-17 ASSESSMENT — ENCOUNTER SYMPTOMS
FOCAL WEAKNESS: 0
FEVER: 0
SENSORY CHANGE: 0
CHILLS: 0
DIZZINESS: 0
SHORTNESS OF BREATH: 0
HEADACHES: 0

## 2022-08-17 ASSESSMENT — GAIT ASSESSMENTS
GAIT LEVEL OF ASSIST: CONTACT GUARD ASSIST
DEVIATION: INCREASED BASE OF SUPPORT;BRADYKINETIC
DISTANCE (FEET): 250

## 2022-08-17 NOTE — DISCHARGE PLANNING
CM spoke with patients daughter Dolly to update on IDT and DC date of 8/21/22.  Dolly stated her two brothers can help at DC and patients sister can also help.  CM will confirm with patient.  Answered questions.  CM is available as needs arise.

## 2022-08-17 NOTE — THERAPY
Speech Language Pathology  Daily Treatment     Patient Name: Lana Phillips  Age:  57 y.o., Sex:  female  Medical Record #: [id number]  Today's Date: 8/17/2022     Precautions  Precautions: Fall Risk  Comments: impulsive    Subjective    Patient was willing to participate in ST.      Objective       08/17/22 1402   Treatment Charges   SLP Cognitive Skill Development First 15 Minutes 1   SLP Cognitive Skill Development Additional 15 Minutes 3   SLP Total Time Spent   SLP Individual Total Time Spent (Mins) 60   Cognition   Attention to Task Supervision (5)   Simple Attention Supervision (5)   Executive Functioning / Organization Minimal (4)   Functional Math / Financial Management Minimal (4)       Assessment    Completed cognitive task number 4. Final math off by 300$. Mod cues needed to ID error and self correct. Continues to require cues to slow pace and stay organized.   Initiated stroke ed.     Completed calendar organization task with min cues. Able to answer questions related to calendar with 100% accuracy.     Attempted to stand from wheelchair without locking breaks. Education provided.     Strengths: Able to follow instructions, Independent prior level of function, Supportive family  Barriers: Impaired insight/denial of deficits, Impaired functional cognition, Impulsive    Plan    Executive function, safety, stroke ed      Speech Therapy Problems (Active)       Problem: Memory STGs       Dates: Start: 08/13/22         Goal: STG-Within one week, patient will       Dates: Start: 08/13/22       Description: 1) Individualized goal:   recall novel health/safety information using memory strategies/external memory aids with 80% accuracy and MIN A.   2) Interventions:  SLP Self Care / ADL Training , SLP Cognitive Skill Development, and SLP Group Treatment          Goal Note filed on 08/13/22 0925 by Maida Jurado MS,CCC-SLP       1) Individualized goal:   recall novel health/safety information using memory  strategies/external memory aids with 80% accuracy and MIN A.   2) Interventions:  SLP Self Care / ADL Training , SLP Cognitive Skill Development, and SLP Group Treatment                   Problem: Problem Solving STGs       Dates: Start: 08/16/22         Goal: STG-Within one week, patient will complete complex organization tasks with 100% accuracy provided SPV.        Dates: Start: 08/16/22               Problem: Speech/Swallowing LTGs       Dates: Start: 08/13/22         Goal: LTG-By discharge, patient will solve complex problem       Dates: Start: 08/13/22       Description: 1) Individualized goal:  with 80% accuracy and MOD I for a safe D/C to PLOF.   2) Interventions:  SLP Self Care / ADL Training , SLP Cognitive Skill Development, and SLP Group Treatment

## 2022-08-17 NOTE — CARE PLAN
Problem: PT-Long Term Goals  Goal: LTG-By discharge, patient will ambulate 300 feet with least restrictive assistive device and mod I.   Outcome: Not Met  Goal: LTG-By discharge, patient will ambulate up/down flight of stairs with single rail and mod I.   Outcome: Not Met  Goal: LTG-By discharge, patient will transfer in/out of a car with mod I.   Outcome: Not Met  Goal: LTG-By discharge, patient will ambulate at least 100 feet on uneven terrain with least restrictive assistive device and mod I.  Outcome: Not Met     Problem: Balance  Goal: STG-Within one week, patient will participate in FGA or Mini-BestTest to assess fall risk.   Outcome: Met  Will perform today 8/17/22 during therapy session     Problem: Mobility  Goal: STG-Within one week, patient will ambulate 250 feet with least restrictive assistive device and SBA.   Outcome: Met  Goal: STG-Within one week, patient will ambulate up/down flight of stairs with rails and SBA.   Outcome: Met     Problem: Mobility Transfers  Goal: STG-Within one week, patient will transfer in/out of car with CGA.   Outcome: Met  Goal: STG-Within one week, patient will ambulate at least 50 feet on uneven terrain with least restrictive assistive device and CGA.   Outcome: Met

## 2022-08-17 NOTE — DISCHARGE PLANNING
CM assisted patient to call her HR dept. To ask about short term disability insurance and how long she can be off work.  Patient has up to 13 weeks of STD.  Will pay up to 60% of pay and max of $5000/week.  Patient can pay for her insurance once a statement is generated.  Patient pleased with answers.  CM available as needs arise.

## 2022-08-17 NOTE — CARE PLAN
Problem: Skin Integrity  Goal: Patient's skin integrity will be maintained or improve  Note: Skin has been kept clean, dry, and intact. Incision is free of s/s of infection, approximated and scabbing.      Problem: Fall Risk - Rehab  Goal: Patient will remain free from falls  Note: Patient remains free from falls this shift. Patient was educated on using the call light to prevent falls. Patients bed is in the lowest position. The patients belongings are placed in near proximity to the patient.     The patient is Stable - Low risk of patient condition declining or worsening

## 2022-08-17 NOTE — PROGRESS NOTES
PSYCHOLOGICAL CONSULTATION:  Reason for admission: 0001.1 Stroke:  No Paresis  Reason for consult: Suicidal ideation  Requesting Physician: Laura    Legal status: Legal Status: Voluntary    Chief Complaint: Stroke recovery    HPI: Lana Phillips is a 57 y.o. right hand dominant female with a past medical history of hypertension not on medications;  who presented on 2022  1:57 PM with altered mental status, headache, dizziness, nausea.  Patient was attending a  when she was found in the bathroom minimally responsive.  Patient was brought to the hospital, found to have systolic pressure of 259. CT head found large right cerebellar hemorrhage with compression of the third and fourth ventricles.  Patient was seen by neurosurgery, and taken for suboccipital craniectomy with evacuation of left cerebellar IPH, duraplasty, and C1 laminectomy by Dr. Pedro Pablo Vences MD on 2022.    Psychology was consulted by nursing due to possible expressions of SI.    Risk Assessment: current symptoms as reported by pt.  Suicidal Ideation: Denies    Homicidal Ideation: Denies      Psychiatric Review of Systems:current symptoms as reported by pt.  Depression: Denies   Mindy: Denies   Anxiety/Panic Attacks: Denies   PTSD symptom: Denies   Psychosis: Denies   Other:        Medical Review of Systems: as reported by pt. All systems reviewed. Only those found to be + are noted below. All others are negative.   Neurological:    TBIs: Denies   SZs:Denies   Strokes:Endorses   Other:   Other medical symptoms:   Thyroid:Denies   Diabetes: Denies   Cardiovascular disease: Endorses    Past Psychiatric Hx: None reported    Family Psychiatric Hx: None reported    Social Hx:   Social History     Socioeconomic History    Marital status:    Tobacco Use    Smoking status: Never    Smokeless tobacco: Never   Substance and Sexual Activity    Alcohol use: No    Drug use: No        Medical Hx: Chart reviewed. Only those findings of potential  interest to psychiatry are noted below:  No past medical history on file.  Medical Conditions:     Allergies: Patient has no known allergies.  Medications (currently prescribed at [facility]):    Current Facility-Administered Medications:     atorvastatin (LIPITOR) tablet 20 mg, 20 mg, Oral, Q EVENING, Titus Shi M.D.    sodium chloride (SALT) tablet 2 g, 2 g, Oral, TID WITH MEALS, Quirino Mojica M.D., 2 g at 08/17/22 1112    vitamin D2 (Ergocalciferol) (Drisdol) capsule 50,000 Units, 50,000 Units, Oral, Q7 DAYS, Nika Mccormack D.O., 50,000 Units at 08/15/22 1116    menthol (Halls) lozenge 1 Lozenge, 1 Lozenge, Oral, Q2HRS PRN, Chikis Sanchez M.D.    hydrOXYzine HCl (ATARAX) tablet 50 mg, 50 mg, Oral, Q6HRS PRN, Chikis Sanchez M.D.    melatonin tablet 3 mg, 3 mg, Oral, HS PRN, Chikis Sanchez M.D.    Respiratory Therapy Consult, , Nebulization, Continuous RT, Chikis Sanchez M.D.    Pharmacy Consult Request ...Pain Management Review 1 Each, 1 Each, Other, PHARMACY TO DOSE, Chikis Sanchez M.D.    acetaminophen (Tylenol) tablet 650 mg, 650 mg, Oral, Q4HRS PRN, Chikis Sanchez M.D.    senna-docusate (PERICOLACE or SENOKOT S) 8.6-50 MG per tablet 2 Tablet, 2 Tablet, Oral, BID, 2 Tablet at 08/17/22 0805 **AND** polyethylene glycol/lytes (MIRALAX) PACKET 1 Packet, 1 Packet, Oral, QDAY PRN **AND** magnesium hydroxide (MILK OF MAGNESIA) suspension 30 mL, 30 mL, Oral, QDAY PRN **AND** bisacodyl (DULCOLAX) suppository 10 mg, 10 mg, Rectal, QDAY PRN, Chikis Sanchez M.D.    lactulose 20 GM/30ML solution 30 mL, 30 mL, Oral, QDAY PRN, Chikis Sanchez M.D.    docusate sodium (ENEMEEZ) enema 283 mg, 283 mg, Rectal, QDAY PRN, Chikis Snachez M.D.    artificial tears ophthalmic solution 1 Drop, 1 Drop, Both Eyes, PRN, Chikis Sanchez M.D.    mag hydrox-al hydrox-simeth (MAALOX PLUS ES or MYLANTA DS) suspension 20 mL, 20 mL, Oral, Q2HRS PRN, Chikis Sanchez M.D.    ondansetron (ZOFRAN ODT)  dispertab 4 mg, 4 mg, Oral, 4X/DAY PRN **OR** ondansetron (ZOFRAN) syringe/vial injection 4 mg, 4 mg, Intramuscular, 4X/DAY PRN, Chikis Sanchez M.D.    traZODone (DESYREL) tablet 50 mg, 50 mg, Oral, QHS PRN, Chikis Sanchez M.D.    sodium chloride (OCEAN) 0.65 % nasal spray 2 Spray, 2 Spray, Nasal, PRN, Chikis Sanchez M.D.    insulin regular (HumuLIN R,NovoLIN R) injection, 1-6 Units, Subcutaneous, 4X/DAY ACHS, 1 Units at 08/13/22 2109 **AND** POC blood glucose manual result, , , Q AC AND BEDTIME(S) **AND** NOTIFY MD and PharmD, , , Once **AND** Administer 20 grams of glucose (approximately 8 ounces of fruit juice) every 15 minutes PRN FSBG less than 70 mg/dL, , , PRN **AND** dextrose 50% (D50W) injection 25 g, 25 g, Intravenous, Q15 MIN PRN, Chikis Sanchez M.D.    enoxaparin (Lovenox) inj 40 mg, 40 mg, Subcutaneous, DAILY AT 1800, Chikis Sanchez M.D., 40 mg at 08/16/22 1714    lisinopril (PRINIVIL) tablet 40 mg, 40 mg, Oral, Q DAY, Chikis Sanchez M.D., 40 mg at 08/17/22 0544    metFORMIN (GLUCOPHAGE) tablet 850 mg, 850 mg, Oral, BID WITH MEALS, Chikis Sanchez M.D., 850 mg at 08/17/22 0805    SITagliptin (JANUVIA) tablet 50 mg, 50 mg, Oral, DAILY, Chikis Sanchez M.D., 50 mg at 08/17/22 0805    scopolamine (TRANSDERM-SCOP) patch 1 Patch, 1 Patch, Transdermal, Q72HRS, Chikis Sanchez M.D., 1 Patch at 08/15/22 1121    amLODIPine (NORVASC) tablet 10 mg, 10 mg, Oral, Q DAY, Chikis Sanchez M.D., 10 mg at 08/17/22 0543  Labs:  Recent Results (from the past 24 hour(s))   POCT glucose device results    Collection Time: 08/16/22  5:13 PM   Result Value Ref Range    POC Glucose, Blood 93 65 - 99 mg/dL   POCT glucose device results    Collection Time: 08/16/22  8:56 PM   Result Value Ref Range    POC Glucose, Blood 102 (H) 65 - 99 mg/dL   POCT glucose device results    Collection Time: 08/17/22  8:05 AM   Result Value Ref Range    POC Glucose, Blood 128 (H) 65 - 99 mg/dL   POCT glucose  "device results    Collection Time: 22 11:12 AM   Result Value Ref Range    POC Glucose, Blood 84 65 - 99 mg/dL          Cranial Imaging Hx: Head CT on 22 MPRESSION:     1.  Postsurgical changes status post occipital craniectomy with evacuation of the left cerebellar hematoma. There is a small amount of residual blood and postsurgical pneumocephalus.  2.  There is improved mass effect on the fourth ventricle.  3.  Lateral ventricles are stable in size.  Other:    Psychiatric Examination:  Vitals: Blood pressure 116/68, pulse 84, temperature 36.4 °C (97.6 °F), temperature source Oral, resp. rate 18, height 1.499 m (4' 11\"), weight 65.2 kg (143 lb 11.2 oz), SpO2 95 %, not currently breastfeeding.  Musculoskeletal: Sitting in bed normal psychomotor activity, no tics or unusual mannerisms noted  Appearance and Eye Contact: Easily established rapport though pt was somewhat guarded WDWN, appropriate dress and grooming. Behavior is calm, cooperative,  appropriate eye contact  Attention/Alertness: Alert  Thought Process: Linear, Logical, and Goal Directed    Thought Content: No psychotic processes noted  Speech: Clear with normal rate and rhythm  Mood: \"okay\"            Affect: somewhat flat         SI/HI: Denies    Memory: Recent and remote memory appear intact     Orientation: alert, oriented to person, place and time  Insight into symptoms: within normal limits  Judgement into symptoms:within normal limits    Neuropsychological Testing:   Not formally tested.          ASSESSMENT: Lana Phillips is a 57 y.o. right hand dominant female with a past medical history of hypertension not on medications;  who presented on 2022  1:57 PM with altered mental status, headache, dizziness, nausea.  Patient was attending a  when she was found in the bathroom minimally responsive.  Patient was brought to the hospital, found to have systolic pressure of 259. CT head found large right cerebellar hemorrhage with compression " of the third and fourth ventricles.  Patient was seen by neurosurgery, and taken for suboccipital craniectomy with evacuation of left cerebellar IPH, duraplasty, and C1 laminectomy by Dr. Pedro Pablo Vences MD on 8/2/2022.    Psychology was consulted by nursing due to possible expressions of SI. Session focused on assessment of SI and any further safety concerns. Pt denied any suicidal ideation, plan or intent and denied any history of SI. Of note, pt previously refused psychological support during initial eval with attending physician. Psychology only consulted due to SI being reported by nursing staff. Pt had questions about how quickly she can return to work. Pt works in the uniform industry. Current provider stated that it is typical to wait 3 to 6 months following a stroke before returning to work but that she would have to discuss that with her attending physician. As pt does not endorse SI at this time and is able to articulate intentions for future plans, pt is not considered a risk to self or others at this time and psychology is signing off.     DSM5 Diagnostic Considerations: Encounter for conditions otherwise ruled out      PLAN:  Records reviewed: yes  Discussed patient with other provider: Laura  Signing off  Thank you for the consult.    Hortensia De La Rosa, Ph.D.

## 2022-08-17 NOTE — CARE PLAN
Problem: Dressing  Goal: STG-Within one week, patient will dress LB at SPV level without LOB.  Outcome: Met  Note: Supervised     Problem: Functional Transfers  Goal: STG-Within one week, patient will transfer to toilet at SBA level without LOB.  Outcome: Met  Note: Supervised     Problem: IADL's  Goal: STG-Within one week, patient will access kitchen area at SBA level without LOB.  Outcome: Met  Note: Supervised/independent simple meal prep, cleanup

## 2022-08-17 NOTE — PROGRESS NOTES
"Hospital Medicine Daily Progress Note    Date of Service  2022    Chief Complaint  Lana Phillips is a 57 y.o. female admitted 2022 with stroke    Hospital Course  Ms. Phillips is a 57-year-old female with a history of hypertension, diabetes that was admitted to [facility]  with altered mental status, headache, nausea and dizziness.  She was at her mother's  but then found in the bathroom minimally responsive.  She was brought to the hospital found to have a systolic blood pressure of 259.  Head CT showed a large right cerebellar hemorrhage with compression of the third and fourth ventricles.  Neurosurgery took the patient for a suboccipital craniectomy with evacuation of left cerebellar IPH and duraplasty and a C1 laminectomy. She was treated with an IV 3% saline drip and eventually transitioned to oral salt tabs. Blood pressure medications were adjusted.     Interval Problem Update  2022: Sodium: 138.  Afebrile, heart rate respiratory rate and blood pressure within acceptable ranges on vital signs.  Oxygen saturation acceptable on room air. \"I'm ready to go home.\" Speech clear but not much of a conversationalist.  Pleasant.    : Ms. Phillips was seen and evaluated at rehab. Her blood pressure has been in the low hundreds systolic over 60's-70's. She remains on salt tabs 2 grams TID and her sodium levels have persistently been in the mid to high 130's therefore decrease the dose with the goal of tapering off. She denies a headache and looks forward to going home.        I have discussed this patient's plan of care and discharge plan at IDT rounds today with Case Management, Nursing, Nursing leadership, and other members of the IDT team.    Consultants/Specialty  Physiatry     Code Status  Full Code    Disposition  Patient is medically cleared for discharge.   Anticipate discharge to to home with close outpatient follow-up.  I have placed the appropriate orders for post-discharge needs.    Review of " Systems  Review of Systems   Constitutional:  Negative for chills and fever.   Respiratory:  Negative for shortness of breath.    Cardiovascular:  Negative for chest pain.   Neurological:  Negative for dizziness, sensory change, focal weakness and headaches.   All other systems reviewed and are negative.     Physical Exam  Temp:  [36.4 °C (97.6 °F)-37.2 °C (99 °F)] 36.4 °C (97.6 °F)  Pulse:  [75-84] 84  Resp:  [18-20] 18  BP: (110-129)/(61-83) 116/68  SpO2:  [93 %-95 %] 95 %    Physical Exam  Vitals and nursing note reviewed.   Constitutional:       General: She is not in acute distress.     Appearance: She is not toxic-appearing.   HENT:      Head: Normocephalic and atraumatic.   Cardiovascular:      Rate and Rhythm: Normal rate and regular rhythm.   Pulmonary:      Effort: Pulmonary effort is normal.      Breath sounds: Normal breath sounds.   Abdominal:      General: There is no distension.      Tenderness: There is no abdominal tenderness.   Musculoskeletal:      Cervical back: Normal range of motion and neck supple.      Right lower leg: No edema.   Skin:     General: Skin is warm and dry.   Neurological:      General: No focal deficit present.      Mental Status: She is alert and oriented to person, place, and time.   Psychiatric:      Comments: Flat affect  Compliant with exam       Fluids    Intake/Output Summary (Last 24 hours) at 8/17/2022 1659  Last data filed at 8/17/2022 1300  Gross per 24 hour   Intake 920 ml   Output --   Net 920 ml       Laboratory      Recent Labs     08/16/22  0526   SODIUM 138   POTASSIUM 4.2   CHLORIDE 102   CO2 25   GLUCOSE 98   BUN 9   CREATININE 0.42*   CALCIUM 8.9                   Imaging  No orders to display        Assessment/Plan  * Hemorrhagic stroke (HCC)- (present on admission)  Assessment & Plan  CT Head: showed large right cerebellar hemorrhage with compression of the third and fourth ventricles  S/P suboccipital craniectomy with evacuation of left cerebellar IPH,  duraplasty, and C1 laminectomy  On Lipitor    Suggestion was to keep sodium above 140 (for brain swelling):  Na: 138 (8/9) --> 138 (8/13) --> 135 (8/14) --> 138 (8/16)  On salt tabs: 1 g tid --> 2g bid (8/13) --> will increase to 2g tid (8/14)  Sodium was 138 on 8/17. Refrain from further increases in salt tabs given the duration of time post-operatively and her clinical condition. Stop daily BMP as she continues to have sodiums in the high 130's despite salt consumption.   Decrease salt tabs with goal of tapering off.    Hypertension- (present on admission)  Assessment & Plan  Continue active treatment with Norvasc 10mg qd and Lisinopril 40mg qd  SBP low hundreds    Type 2 diabetes mellitus with hyperglycemia, without long-term current use of insulin (HCC)- (present on admission)  Assessment & Plan  Hba1c: 7.1 (8/2)  BS: good  On Metformin and Januvia  Monitor accchecks and cover with SSI  Glycohemoglobin 7.1 in past 2 weeks.    Other hyperlipidemia- (present on admission)  Assessment & Plan  She had been started on Lipitor 80 mg daily for . Drop the dose to 20 mg daily.  Her stroke was hemorrhagic.       VTE prophylaxis: enoxaparin ppx    I have performed a physical exam and reviewed and updated ROS and Plan today (8/17/2022). In review of yesterday's note (8/16/2022), there are no changes except as documented above.

## 2022-08-17 NOTE — THERAPY
"Physical Therapy   Daily Treatment     Patient Name: Lana Phillips  Age:  57 y.o., Sex:  female  Medical Record #: [id number]  Today's Date: 8/17/2022     Precautions  Precautions: (P) Fall Risk  Comments: (P) impulsive    Subjective    \"This is boring, when are we done and go back to my room?\"     Objective       08/17/22 0901   PT Charge Group   PT Gait Training 1   PT Therapeutic Exercise 2   PT Neuromuscular Re-Education / Balance 1   PT Total Time Spent   PT Individual Total Time Spent (Mins) 60   Precautions   Precautions Fall Risk   Comments impulsive   Gait Functional Level of Assist    Gait Level Of Assist Contact Guard Assist   Assistive Device None   Distance (Feet) 250   # of Times Distance was Traveled 1   Deviation Increased Base Of Support;Bradykinetic   Standing Lower Body Exercises   Standing Lower Body Exercises Yes   Neuro-Muscular Treatments   Neuro-Muscular Treatments Anterior weight shift;Weight Shift Right;Weight Shift Left;Tactile Cuing;Verbal Cuing;Compensatory Strategies   Comments standing on foam for wii game \"the price is right\", requires considerable cues/ encouragement to participate with standing activities rather than sitting to play game   Outcome Measures   Outcome Measures Utilized FGA   FGA (Functional Gait Assessment)   Gait Level Surface 2   Change in Gait Speed 1   Gait with Horizontal Head Turns 1   Gait with Vertical Head Turns 2   Gait and Pivot Turns 2   Step Over Obstacle 2   Gait with Narrow Base of Support 1   Gait with Eyes Closed 1   Ambulating Backwards 1   Steps 2   Functional Gait Score 15   Interdisciplinary Plan of Care Collaboration   Patient Position at End of Therapy Edge of Bed;Call Light within Reach;Tray Table within Reach;Phone within Reach     Performed fall recovery with supervision assist, used hand on mat table to stabilize herself with getting up from the floor.    Assessment    Patient is a moderate fall risk, is impulsive, easily distracted. She is " "difficult to motivate, states activities are \"boring\". Questionable insight into importance of standing therex, balance exercise, cardiovascular endurance. Prefers to play only poker.     Strengths: Adequate strength, Independent prior level of function, Motivated for self care and independence, Pleasant and cooperative, Supportive family, Willingly participates in therapeutic activities  Barriers: Decreased endurance, Home accessibility, Impaired activity tolerance, Impaired balance, Impaired insight/denial of deficits, Impulsive    Plan    Standing strengthening, balance with dynamic tasks, cardiovascular endurance    Passport items to be completed:  Get in/out of bed safely, in/out of a vehicle, safely use mobility device, walk or wheel around home/community, navigate up and down stairs, show how to get up/down from the ground, ensure home is accessible, demonstrate HEP, complete caregiver training    Physical Therapy Problems (Active)       Problem: PT-Long Term Goals       Dates: Start: 08/13/22         Goal: LTG-By discharge, patient will ambulate 300 feet with least restrictive assistive device and mod I.        Dates: Start: 08/13/22            Goal: LTG-By discharge, patient will ambulate up/down flight of stairs with single rail and mod I.        Dates: Start: 08/13/22            Goal: LTG-By discharge, patient will transfer in/out of a car with mod I.        Dates: Start: 08/13/22            Goal: LTG-By discharge, patient will ambulate at least 100 feet on uneven terrain with least restrictive assistive device and mod I.       Dates: Start: 08/13/22              "

## 2022-08-17 NOTE — THERAPY
"Occupational Therapy  Daily Treatment     Patient Name: Lana Phillips  Age:  57 y.o., Sex:  female  Medical Record #: [id number]  Today's Date: 8/17/2022     Precautions  Precautions: (P) Fall Risk  Comments: (P) impulsive    Safety   ADL Safety : Impaired, Impulsive, Requires Supervision for Safety, Impaired Insight into Safety, Requires Cueing for Safety  Bathroom Safety: Impaired, Impulsive, Requires Supervision for Safety, Impaired Insight into Safety, Requires Cuing for Safety    Subjective    Patient asleep in bed.  Upon awakening, patient stated, \"I haven't even had breakfast yet.  I haven't had coffee.\" Was agreeable to OT and a shower.     Objective       08/17/22 0701   OT Charge Group   OT Self Care / ADL 2   OT Therapy Activity 1   OT Therapeutic Exercise  1   OT Total Time Spent   OT Individual Total Time Spent (Mins) 60   Precautions   Precautions Fall Risk   Comments impulsive   Functional Level of Assist   Eating Independent   Grooming Independent;Standing   Bathing Supervision   Bathing Description Grab bar;Supervision for safety   Upper Body Dressing Supervision   Upper Body Dressing Description Supervision for safety   Lower Body Dressing Supervision   Lower Body Dressing Description Supervision for safety   Toileting Supervision   Bed, Chair, Wheelchair Transfer Supervised   Toilet Transfers Supervised   Tub / Shower Transfers Supervised   Sitting Upper Body Exercises   Sitting Upper Body Exercises Yes   Upper Extremity Bike Minutes / Rest Breaks (See Comments)  (motomed cycle gear 4 x 10 minutes)   Bed Mobility    Supine to Sit Independent   Scooting Independent   Interdisciplinary Plan of Care Collaboration   Patient Position at End of Therapy Edge of Bed;Call Light within Reach;Tray Table within Reach;Phone within Reach     Functional mobility in room, bathroom, hallways, gym and outdoors around perimeter of building.  Required SBA outdoors and supervision indoors without AD.    Assessment    Patient " tolerated all activities well.  Requires supervision for all tasks, mostly due to impaired dynamic balance.  Strengths: Adequate strength, Alert and oriented, Independent prior level of function, Motivated for self care and independence, Pleasant and cooperative, Willingly participates in therapeutic activities  Barriers: Difficulty following instructions, Generalized weakness, Impaired balance, Impaired carryover of learning, Impaired insight/denial of deficits, Impaired functional cognition, Impulsive, Limited mobility, Poor family support    Plan    ADLs, IADLs, functional mobility without AD; functional cognition, safety awareness    Occupational Therapy Goals (Active)       Problem: Dressing       Dates: Start: 08/13/22         Goal: STG-Within one week, patient will dress LB at SPV level without LOB.       Dates: Start: 08/13/22               Problem: Functional Transfers       Dates: Start: 08/13/22         Goal: STG-Within one week, patient will transfer to toilet at SBA level without LOB.       Dates: Start: 08/13/22               Problem: IADL's       Dates: Start: 08/13/22         Goal: STG-Within one week, patient will access kitchen area at SBA level without LOB.       Dates: Start: 08/13/22               Problem: OT Long Term Goals       Dates: Start: 08/13/22         Goal: LTG-By discharge, patient will complete basic self care tasks at Mod I level using AE/DME PRN.       Dates: Start: 08/13/22            Goal: LTG-By discharge, patient will perform bathroom transfers at Mod I level using AE/DME PRN.       Dates: Start: 08/13/22            Goal: LTG-By discharge, patient will complete basic home management at SPV-Mod I level using AE/DME PRN.       Dates: Start: 08/13/22

## 2022-08-17 NOTE — CARE PLAN
"  Problem: Pain - Standard  Goal: Alleviation of pain or a reduction in pain to the patient’s comfort goal  Outcome: Progressing  Note: Assessed for pain and discomfort, denies pain and discomfort , needs anticipated and attended.     Problem: Fall Risk - Rehab  Goal: Patient will remain free from falls  Outcome: Progressing  Note: Priscila Zhang Fall risk Assessment Score: 12    Moderate fall risk Interventions  - Bed and strip alarm   - Yellow sign by the door   - Yellow wrist band \"Fall risk\"  - Room near to the nurse station  - Do not leave patient unattended in the bathroom  - Fall risk education provided         The patient is Watcher - Medium risk of patient condition declining or worsening    Shift Goals  Clinical Goals: safety  Patient Goals: Rest  Family Goals: N/A    Progress made toward(s) clinical / shift goals:  Pt free from fall and injury.    "

## 2022-08-18 ENCOUNTER — PHARMACY VISIT (OUTPATIENT)
Dept: PHARMACY | Facility: MEDICAL CENTER | Age: 57
End: 2022-08-18
Payer: COMMERCIAL

## 2022-08-18 VITALS
HEART RATE: 76 BPM | DIASTOLIC BLOOD PRESSURE: 64 MMHG | BODY MASS INDEX: 28.97 KG/M2 | HEIGHT: 59 IN | RESPIRATION RATE: 17 BRPM | SYSTOLIC BLOOD PRESSURE: 112 MMHG | OXYGEN SATURATION: 92 % | TEMPERATURE: 97.3 F | WEIGHT: 143.7 LBS

## 2022-08-18 LAB
GLUCOSE BLD STRIP.AUTO-MCNC: 105 MG/DL (ref 65–99)
GLUCOSE BLD STRIP.AUTO-MCNC: 95 MG/DL (ref 65–99)

## 2022-08-18 PROCEDURE — RXMED WILLOW AMBULATORY MEDICATION CHARGE: Performed by: PHYSICAL MEDICINE & REHABILITATION

## 2022-08-18 PROCEDURE — 94760 N-INVAS EAR/PLS OXIMETRY 1: CPT

## 2022-08-18 PROCEDURE — 97110 THERAPEUTIC EXERCISES: CPT

## 2022-08-18 PROCEDURE — 97130 THER IVNTJ EA ADDL 15 MIN: CPT

## 2022-08-18 PROCEDURE — 700102 HCHG RX REV CODE 250 W/ 637 OVERRIDE(OP): Performed by: HOSPITALIST

## 2022-08-18 PROCEDURE — A9270 NON-COVERED ITEM OR SERVICE: HCPCS | Performed by: HOSPITALIST

## 2022-08-18 PROCEDURE — 700102 HCHG RX REV CODE 250 W/ 637 OVERRIDE(OP): Performed by: PHYSICAL MEDICINE & REHABILITATION

## 2022-08-18 PROCEDURE — 97530 THERAPEUTIC ACTIVITIES: CPT

## 2022-08-18 PROCEDURE — 97129 THER IVNTJ 1ST 15 MIN: CPT

## 2022-08-18 PROCEDURE — 82962 GLUCOSE BLOOD TEST: CPT

## 2022-08-18 PROCEDURE — 99239 HOSP IP/OBS DSCHRG MGMT >30: CPT | Performed by: PHYSICAL MEDICINE & REHABILITATION

## 2022-08-18 PROCEDURE — 97116 GAIT TRAINING THERAPY: CPT

## 2022-08-18 PROCEDURE — A9270 NON-COVERED ITEM OR SERVICE: HCPCS | Performed by: PHYSICAL MEDICINE & REHABILITATION

## 2022-08-18 RX ORDER — ATORVASTATIN CALCIUM 20 MG/1
20 TABLET, FILM COATED ORAL EVERY EVENING
Qty: 30 TABLET | Refills: 2 | Status: SHIPPED | OUTPATIENT
Start: 2022-08-18 | End: 2022-10-24 | Stop reason: SDUPTHER

## 2022-08-18 RX ORDER — SCOLOPAMINE TRANSDERMAL SYSTEM 1 MG/1
1 PATCH, EXTENDED RELEASE TRANSDERMAL
Qty: 10 PATCH | Refills: 2 | Status: SHIPPED | OUTPATIENT
Start: 2022-08-21 | End: 2022-11-10

## 2022-08-18 RX ORDER — LISINOPRIL 40 MG/1
40 TABLET ORAL DAILY
Qty: 30 TABLET | Refills: 2 | Status: SHIPPED | OUTPATIENT
Start: 2022-08-18 | End: 2022-10-24 | Stop reason: SDUPTHER

## 2022-08-18 RX ORDER — AMLODIPINE BESYLATE 10 MG/1
10 TABLET ORAL DAILY
Qty: 30 TABLET | Refills: 2 | Status: SHIPPED | OUTPATIENT
Start: 2022-08-18 | End: 2022-10-24 | Stop reason: SDUPTHER

## 2022-08-18 RX ORDER — SODIUM CHLORIDE 1 G/1
1 TABLET ORAL
Qty: 90 TABLET | Refills: 2 | Status: SHIPPED | OUTPATIENT
Start: 2022-08-18 | End: 2022-11-10

## 2022-08-18 RX ADMIN — SITAGLIPTIN 50 MG: 50 TABLET, FILM COATED ORAL at 08:01

## 2022-08-18 RX ADMIN — SODIUM CHLORIDE 1 G: 1 TABLET ORAL at 12:09

## 2022-08-18 RX ADMIN — SODIUM CHLORIDE 1 G: 1 TABLET ORAL at 08:01

## 2022-08-18 RX ADMIN — METFORMIN HYDROCHLORIDE 850 MG: 850 TABLET ORAL at 08:01

## 2022-08-18 RX ADMIN — SCOPALAMINE 1 PATCH: 1 PATCH, EXTENDED RELEASE TRANSDERMAL at 08:57

## 2022-08-18 ASSESSMENT — ACTIVITIES OF DAILY LIVING (ADL)
TOILET_TRANSFER_LEVEL_OF_ASSIST: REQUIRES SUPERVISION WITH TOILET TRANSFER
TOILETING_LEVEL_OF_ASSIST: REQUIRES SUPERVISION WITH TOILETING
SHOWER_TRANSFER_LEVEL_OF_ASSIST: REQUIRES SUPERVISION WITH SHOWER TRANSFER

## 2022-08-18 ASSESSMENT — GAIT ASSESSMENTS
DISTANCE (FEET): 250
DEVIATION: INCREASED BASE OF SUPPORT;BRADYKINETIC;DECREASED HEEL STRIKE
GAIT LEVEL OF ASSIST: STANDBY ASSIST

## 2022-08-18 NOTE — CARE PLAN
The patient is Stable - Low risk of patient condition declining or worsening      Problem: Knowledge Deficit - Standard  Goal: Patient and family/care givers will demonstrate understanding of plan of care, disease process/condition, diagnostic tests and medications  Outcome: Met     Problem: Pain - Standard  Goal: Alleviation of pain or a reduction in pain to the patient’s comfort goal  Outcome: Met     Problem: Bladder / Voiding  Goal: Patient will establish and maintain regular urinary output  Outcome: Met  Goal: Patient will establish and maintain bladder regimen  Outcome: Met       Problem: Bowel Elimination  Goal: Patient will participate in bowel management program  Outcome: Met     Problem: Skin Integrity  Goal: Patient's skin integrity will be maintained or improve  Outcome: Met     Problem: Infection  Goal: Patient will remain free from infection  Outcome: Met     Problem: VTE Prevention  Goal: Patient will remain free from venous thromboembolism (VTE)  Outcome: Met     Problem: Diabetes Management  Goal: Patient's ability to maintain appropriate glucose levels will be maintained or improve  Outcome: Met     Problem: Fall Risk - Rehab  Goal: Patient will remain free from falls  Outcome: Met     Problem: Provide Safe Environment  Goal: Suicide environmental safety, protocols, policies, and practices will be implemented  Outcome: Met     Problem: Psychosocial  Goal: Patient's ability to identify and develop effective coping behaviors will improve  Outcome: Met  Goal: Patient's ability to identify and utilize available support systems will improve  Outcome: Met

## 2022-08-18 NOTE — CARE PLAN
Problem: PT-Long Term Goals  Goal: LTG-By discharge, patient will ambulate 300 feet with least restrictive assistive device and mod I.   Outcome: Met  Goal: LTG-By discharge, patient will transfer in/out of a car with mod I.   Outcome: Met  Goal: LTG-By discharge, patient will ambulate at least 100 feet on uneven terrain with least restrictive assistive device and mod I.  Outcome: Met     Problem: PT-Long Term Goals  Goal: LTG-By discharge, patient will ambulate up/down flight of stairs with single rail and mod I.   Outcome: Discharged - Not Met

## 2022-08-18 NOTE — CARE PLAN
The patient is Stable - Low risk of patient condition declining or worsening    Shift Goals  Clinical Goals: safety  Patient Goals: Rest  Family Goals: N/A    Progress made toward(s) clinical / shift goals:    Problem: Pain - Standard  Goal: Alleviation of pain or a reduction in pain to the patient’s comfort goal  Note: Patient able to verbalize needs.  Denies pain or discomfort this shift and no s/s same noted.  Will continue to monitor.      Problem: Skin Integrity  Goal: Patient's skin integrity will be maintained or improve  Note: Patient's skin remains intact and free from new or accidental injury this shift.  Will continue to monitor.      BS 88 mg/dl. No S/S hypo/hyperglycemia noted. Offered HS snacks but refused.    Patient is not progressing towards the following goals:    Pt does not use call light consistently, Does not wait for assistance, attempts self transfer despite education on safety and  fall prevention. Teaching reinforced.      98.5

## 2022-08-18 NOTE — THERAPY
"Physical Therapy   Daily Treatment     Patient Name: Lana Phillips  Age:  57 y.o., Sex:  female  Medical Record #: [id number]  Today's Date: 8/18/2022     Precautions  Precautions: Fall Risk  Comments: impulsive    Subjective    Patient is seated at edge of bed with family in room, patient wants to return to home today. Educated patient and family about noted gait/ balance disturbances noted on functional gait assessment with head turns, obstacle navigation, gait speed/ ability to change gait speed, and stairs. Sister verbalizes understanding.     Objective       08/18/22 1301   PT Charge Group   PT Gait Training 1   PT Therapeutic Exercise 1   PT Total Time Spent   PT Individual Total Time Spent (Mins) 30   Pain   Intervention Declines   Gait Functional Level of Assist    Gait Level Of Assist Standby Assist   Assistive Device None   Distance (Feet) 250   # of Times Distance was Traveled 2   Deviation Increased Base Of Support;Bradykinetic;Decreased Heel Strike  (sister reports \"this is much slower than her normal\", patient is resistant to trying to walk faster)   Stairs Functional Level of Assist   Level of Assist with Stairs Standby Assist   # of Stairs Climbed 4   Stairs Description Hand rails;Extra time;Safety concerns;Verbal cueing  (had R knee buckle slightly when leading with LLE, patient was able to recover balance independnetly and with RUE on railing)   Sitting Lower Body Exercises   Nustep Resistance Level 4  (5 minutes at approx 35 steps per minute, 146 total steps)   Standing Lower Body Exercises   Marching 1 set of 10   Heel Rise 1 set of 10   Neuro-Muscular Treatments   Comments demonstrated to sister slowing down with head turns during gait, navigating obstacles. Sister reports patient is not moving at her usual quality of movement.   Interdisciplinary Plan of Care Collaboration   IDT Collaboration with  Family / Caregiver;   Patient Position at End of Therapy In Bed;Call Light within Reach;Tray " Table within Reach;Phone within Reach;Family / Friend in Room       Assessment    Patient is impulsive today, requires cues for safety with whole body turning/ ambulating with head turns, with navigating obstacles, with stairs, with gait speed. Her sister verbalizes understanding that patient's gait is altered from baseline, notes abnormalities.     Strengths: Adequate strength, Independent prior level of function, Motivated for self care and independence, Pleasant and cooperative, Supportive family, Willingly participates in therapeutic activities  Barriers: Decreased endurance, Home accessibility, Impaired activity tolerance, Impaired balance, Impaired insight/denial of deficits, Impulsive    Plan    D/c irfpai    Passport to be completed:  Get in/out of bed safely, in/out of a vehicle, safely use mobility device, walk or wheel around home/community, navigate up and down stairs, show how to get up/down from the ground, ensure home is accessible, demonstrate HEP, complete caregiver training    Physical Therapy Problems (Active)       Problem: PT-Long Term Goals       Dates: Start: 08/13/22         Goal: LTG-By discharge, patient will ambulate 300 feet with least restrictive assistive device and mod I.        Dates: Start: 08/13/22            Goal: LTG-By discharge, patient will ambulate up/down flight of stairs with single rail and mod I.        Dates: Start: 08/13/22            Goal: LTG-By discharge, patient will transfer in/out of a car with mod I.        Dates: Start: 08/13/22            Goal: LTG-By discharge, patient will ambulate at least 100 feet on uneven terrain with least restrictive assistive device and mod I.       Dates: Start: 08/13/22

## 2022-08-18 NOTE — THERAPY
Speech Language Pathology  Daily Treatment     Patient Name: Lana Phillips  Age:  57 y.o., Sex:  female  Medical Record #: [id number]  Today's Date: 8/18/2022     Precautions  Precautions: Fall Risk  Comments: impulsive    Subjective    Pt in bed sleeping upon arrival, pt reporting desire to leave today. Sisters present for family training.      Objective       08/18/22 1033   Treatment Charges   SLP Cognitive Skill Development First 15 Minutes 1   SLP Cognitive Skill Development Additional 15 Minutes 1   SLP Total Time Spent   SLP Individual Total Time Spent (Mins) 30       Assessment    Pt pleasant and cooperative, family present and supportive, Discussed cognitive function with MILD impairments. Rec writing information down to assist in recall, use of calendars for appointments, supervision with medications, not safe to drive until cleared by MD. Discussed limited insight and awareness to deficits therefore 24 hour supervision rec. Family and pt receptive, all questions answered, pt to dc home with family following completion of family training with all disciplines.     Strengths: Able to follow instructions, Independent prior level of function, Supportive family  Barriers: Impaired insight/denial of deficits, Impaired functional cognition, Impulsive    Plan    Pt to dc home today with family support    Speech Therapy Problems (Active)       Problem: Memory STGs       Dates: Start: 08/13/22         Goal: STG-Within one week, patient will       Dates: Start: 08/13/22       Description: 1) Individualized goal:   recall novel health/safety information using memory strategies/external memory aids with 80% accuracy and MIN A.   2) Interventions:  SLP Self Care / ADL Training , SLP Cognitive Skill Development, and SLP Group Treatment          Goal Note filed on 08/13/22 0925 by Maida Jurado MS,CCC-SLP       1) Individualized goal:   recall novel health/safety information using memory strategies/external memory aids  with 80% accuracy and MIN A.   2) Interventions:  SLP Self Care / ADL Training , SLP Cognitive Skill Development, and SLP Group Treatment                   Problem: Problem Solving STGs       Dates: Start: 08/16/22         Goal: STG-Within one week, patient will complete complex organization tasks with 100% accuracy provided SPV.        Dates: Start: 08/16/22               Problem: Speech/Swallowing LTGs       Dates: Start: 08/13/22         Goal: LTG-By discharge, patient will solve complex problem       Dates: Start: 08/13/22       Description: 1) Individualized goal:  with 80% accuracy and MOD I for a safe D/C to PLOF.   2) Interventions:  SLP Self Care / ADL Training , SLP Cognitive Skill Development, and SLP Group Treatment

## 2022-08-18 NOTE — DISCHARGE PLANNING
MANDEEP spoke with patient and her sister this am  to update on IDT and DC date of this Sunday, 8/21/22.  Answered questions.  Patient would like to DC home today.  Patient does not need any DME.  CM will send referral to home health.  Patients sister is a trained caregiver and has worked for [facility] recently.  MANDEEP VOALT'ed Team.  Awaiting response.

## 2022-08-18 NOTE — THERAPY
Occupational Therapy  Daily Treatment     Patient Name: Lana Phillips  Age:  57 y.o., Sex:  female  Medical Record #: [id number]  Today's Date: 8/18/2022     Precautions  Precautions: Fall Risk  Comments: impulsive    Safety   ADL Safety : Impaired, Impulsive, Requires Supervision for Safety, Impaired Insight into Safety, Requires Cueing for Safety  Bathroom Safety: Impaired, Impulsive, Requires Supervision for Safety, Impaired Insight into Safety, Requires Cuing for Safety    Subjective    Patient was in bathroom upon OT arrival to room.  Agreeable to family training.     Objective       08/18/22 1101   OT Charge Group   OT Therapy Activity 2   OT Total Time Spent   OT Individual Total Time Spent (Mins) 30   Interdisciplinary Plan of Care Collaboration   IDT Collaboration with  Family / Caregiver;Physician   Patient Position at End of Therapy In Bed;Call Light within Reach;Tray Table within Reach;Phone within Reach;Family / Friend in Room   Collaboration Comments Dr. Mccormack discussed d/c with patient and family during session; family present for family training     Family training with patient's two sister's regarding ADLs/IADLs.  Recommended supervision for all tasks and informed them of patient being a fall risk.  Recommended seated showers on a shower chair and where one can be purchased.  Patient demonstrated a supervised dry tub/shower transfer with hands on wall for support.      Assessment    Patient tolerated session well and sister's verbalized understanding of patient's CLOF and recommendations from OT.  Patient is excited to d/c home today.    Strengths: Adequate strength, Alert and oriented, Independent prior level of function, Motivated for self care and independence, Pleasant and cooperative, Willingly participates in therapeutic activities  Barriers: Difficulty following instructions, Generalized weakness, Impaired balance, Impaired carryover of learning, Impaired insight/denial of deficits, Impaired  functional cognition, Impulsive, Limited mobility, Poor family support    Plan    D/C home today    Occupational Therapy Goals (Active)       Problem: OT Long Term Goals       Dates: Start: 08/13/22         Goal: LTG-By discharge, patient will complete basic self care tasks at Mod I level using AE/DME PRN.       Dates: Start: 08/13/22            Goal: LTG-By discharge, patient will perform bathroom transfers at Mod I level using AE/DME PRN.       Dates: Start: 08/13/22            Goal: LTG-By discharge, patient will complete basic home management at [facility]-Mod I level using AE/DME PRN.       Dates: Start: 08/13/22

## 2022-08-18 NOTE — CARE PLAN
Problem: Speech/Swallowing LTGs  Goal: LTG-By discharge, patient will solve complex problem  Description: 1) Individualized goal:  with 80% accuracy and MOD I for a safe D/C to PLOF.   2) Interventions:  SLP Self Care / ADL Training , SLP Cognitive Skill Development, and SLP Group Treatment      Outcome: Met     Problem: Memory STGs  Goal: STG-Within one week, patient will  Description: 1) Individualized goal:   recall novel health/safety information using memory strategies/external memory aids with 80% accuracy and MIN A.   2) Interventions:  SLP Self Care / ADL Training , SLP Cognitive Skill Development, and SLP Group Treatment      Outcome: Met     Problem: Problem Solving STGs  Goal: STG-Within one week, patient will complete complex organization tasks with 100% accuracy provided SPV.   Outcome: Met

## 2022-08-18 NOTE — PROGRESS NOTES
This patient, and family with patient permission, received individualized medication counseling regarding the current regimen they are receiving in this facility. Potential adverse effects, monitoring parameters, and proper administration were covered in preparation for their discharge. This patient is being treated for hypertension and it is recommended that they monitor and record with a blood pressure device. The patient has been instructed to contact their primary care physician if the HR or blood pressure is abnormal. Blood sugar monitoring was discussed as well as the signs and symptoms of hypoglycemia as the patient is currently on januvia. Our pharmacy hours and phone number were provided for follow up questions should they arise.  Baljinder Mckeon  [facility]

## 2022-08-18 NOTE — DISCHARGE SUMMARY
"Rehab Discharge Summary    Admission Date: 2022    Discharge Date: 2022    Attending Provider: Irene Sanders MD/PhD    Admission Diagnosis:   Active Hospital Problems    Diagnosis     *Hemorrhagic stroke (HCC)     Other hyperlipidemia     Type 2 diabetes mellitus with hyperglycemia, without long-term current use of insulin (HCC)     Hypertension        Discharge Diagnosis:  Active Hospital Problems    Diagnosis     *Hemorrhagic stroke (HCC)     Other hyperlipidemia     Type 2 diabetes mellitus with hyperglycemia, without long-term current use of insulin (HCC)     Hypertension        HPI per H&P:  Per Dr. Skaggs's consult, \"The patient is a 57 y.o. right hand dominant female with a past medical history of hypertension not on medications;  who presented on 2022  1:57 PM with altered mental status, headache, dizziness, nausea.  Patient was attending a  when she was found in the bathroom minimally responsive.  Patient was brought to the hospital, found to have systolic pressure of 259. CT head found large right cerebellar hemorrhage with compression of the third and fourth ventricles.  Patient was seen by neurosurgery, and taken for suboccipital craniectomy with evacuation of left cerebellar IPH, duraplasty, and C1 laminectomy by Dr. Pedro Pablo Vences MD on 2022.\"     In addition to above, HgbA1c 7.2, , ECHO EF 65 %, RSVP 65 mmHg.         Patient current reports she wants to go home. She also doesn't want bed alarms and has been getting up to go to the bathroom by herself.  She denies any headache, dizziness, or focal weakness.  She denies any changes in her thinking and feels like her normal self.  She apparently was at her mother's  when this happened and her mother had just  from a stroke.  She does not want to talk to the psychologist.  She denies being aware that she had a history of hypertension as she had not seen a doctor.     Patient was evaluated by Rehab " Medicine physician and Physical Therapy, Occupational Therapy, and Speech Therapy and determined to be appropriate for acute inpatient rehab and was transferred to [facility] on 8/12/2022  9:34 AM.     With this acute therapeutic intervention, this patient hopes to improve her functional status, and return to independent living with the supportive care of family.       Patient was admitted to [facility] on 8/12/2022.     Hospital Course by Problem List:  R cerebellar hemorrhagic stroke  - s/p suboccipital  craniectomy with evacuation of left cerebellar IPH duraplasty and C1 laminectomy    -initiate comprehensive IRF level therapy with 3hrs of therapy 5 days per week with PT/OT/SLP  - Psychology consult     Brain swelling  Continue salt tabs to keep sodium above 140  - 8/13 Na 138 while on salt tab 1g TID, will give additional 1 gm today, may need to switch to 4mg total daily  8/15: Continue salt tabs 2 g 3 times daily  8/16: BMP -sodium improving to 138 with goal of above 140.  Monitor.     Dizziness  Continue scopolamine patch     Hypertension  Amlodipine  Lisinopril  Consult hospitalist     Diabetes with hyperglycemia  New diagnosis  Hemoglobin A1c of 7.2  Metformin  Januvia  Sliding scale insulin  Consult hospitalist     Hyperlipidemia    Start high-dose statin     Bladder  Low PVRs  Voiding volitionally     Bowel  Last bowel movement 8/15     DVT prophylaxis  Lovenox 40 mg daily     Vitamin D deficiency  7 8/13/2022  8/15: Start high-dose vitamin D supplementation - ergocalciferol x2 doses then daily supplementation       Functional Status at Discharge  Eating:  Independent  Eating Description:   (to drink coffee)  Grooming:  Independent, Standing  Grooming Description:  Supervision for safety, Standing at sink  Bathing:  Supervision  Bathing Description:  Grab bar, Supervision for safety  Upper Body Dressing:  Supervision  Upper Body Dressing Description:   Supervision for safety  Lower Body Dressing:  Supervision  Lower Body Dressing Description:  Supervision for safety  Discharge Location : Home  Patient Discharging with Assist of: Family   Level of Supervision Required: Intermittent Supervision  Recommended Equipment for Discharge: Shower Chair  Recommended Services Upon Discharge: Home Health Occupational Therapy  Long Term Goals Met: 0  Long Term Goals Not Met: 3  Reason(s) for Goals Not Met: requires supervision for balance/safety  Criteria for Termination of Services: Maximum Function Achieved for Inpatient Rehabilitation  Walk:  Contact Guard Assist  Distance Walked:  250  Number of Times Distance Was Traveled:  1  Assistive Device:  None  Gait Deviation:  Increased Base Of Support, Bradykinetic  Wheelchair:     Distance Propelled:      Wheelchair Description:     Stairs Standby Assist  Stairs Description Hand rails, Supervision for safety (reciprocal pattern)     Comprehension:  Modified Independent  Comprehension Description:  Verbal cues  Expression:  Supervision  Expression Description:  Verbal cueing  Social Interaction:  Supervision (flat affect)  Social Interaction Description:  Increased time, Schedule, Verbal cues  Problem Solving:  Supervision  Problem Solving Description:  Bed/chair alarm, Increased time, Seat belt, Therapy schedule, Verbal cueing  Memory:  Minimal Assist  Memory Description:  Bed/chair alarm, Increased time, Seat belt, Therapy schedule, Verbal cueing       INika D.O., personally performed a complete drug regimen review and no potential clinically significant medication issues were identified.   Discharge Medication:     Medication List        START taking these medications        Instructions   atorvastatin 20 MG Tabs  Commonly known as: LIPITOR   Take 1 Tablet by mouth every evening.  Dose: 20 mg     scopolamine 1 mg/72hr Pt72  Start taking on: August 21, 2022  Commonly known as: TRANSDERM-SCOP   Place 1 Patch on  the skin every 72 hours.  Dose: 1 Patch     vitamin D 1000 UNIT Tabs  Commonly known as: VITAMIND D3   Take 2 Tablets by mouth every day.  Dose: 2,000 Units            CONTINUE taking these medications        Instructions   amLODIPine 10 MG Tabs  Commonly known as: NORVASC   Take 1 Tablet by mouth every day.  Dose: 10 mg     docusate sodium 100 MG Caps   Take 100 mg by mouth 2 times a day.  Dose: 100 mg     lisinopril 40 MG tablet  Commonly known as: PRINIVIL   Take 1 Tablet by mouth every day.  Dose: 40 mg     metFORMIN 850 MG Tabs  Commonly known as: GLUCOPHAGE   Take 1 Tablet by mouth 2 times a day with meals.  Dose: 850 mg     senna-docusate 8.6-50 MG Tabs  Commonly known as: PERICOLACE or SENOKOT S   Take 1 Tablet by mouth every evening.  Dose: 1 Tablet     SITagliptin 50 MG Tabs  Commonly known as: JANUVIA   Take 1 Tablet by mouth every day.  Dose: 50 mg     sodium chloride 1 GM Tabs  Commonly known as: SALT   Take 1 Tablet by mouth 3 times a day with meals.  Dose: 1 g              Discharge Diet:  Regular, thins    Discharge Activity:  As tolerated per PT OT    Disposition:  Patient to discharge home with family support and community resources.     Equipment:  Per PT/OT    Follow-up & Discharge Instructions:  Follow up with your primary care provider (PCP) within 7-10 days of discharge to review your medications and take over your care.     If you develop chest pain, fever, chills, change in neurologic function (weakness, sensation changes, vision changes), or other concerning sxs, seek immediate medical attention or call 911.      Condition on Discharge:  Good    More than 35 minutes was spent on discharging this patient, including face-to-face time, prescription management, and the dictation of this note.    Nika Mccormack D.O.    Date of Service: 8/18/2022

## 2022-08-18 NOTE — DISCHARGE INSTRUCTIONS
[facility] NURSING DISCHARGE INSTRUCTIONS    Blood Pressure: 112/64  Weight: 65.2 kg (143 lb 11.2 oz)  Nursing recommendations for Lana Phillips at time of discharge are as follows:  Lana and her sister verbalized understanding of all discharge instructions and prescriptions.     Review all your home medications and newly ordered medications with your doctor and/or pharmacist. Follow medication instructions as directed by your doctor and/or pharmacist.    Pain Management:   Discharge Pain Medication Instructions:  Comfort Goal: Comfort with Movement  Notify your primary care provider if pain is unrelieved with these measures, if the pain is new, or increased in intensity.    Discharge Skin Characteristics: Warm, Dry  Discharge Skin Exam: ClearSkin / Wound Care Instructions: Please contact your primary care physician for any change in skin integrity.     If You Have Surgical Incisions / Wounds:  Monitor surgical site(s) for signs of increased swelling, redness or symptoms of drainage from the site or fever as this could indicate signs and symptoms of infection. If these symptoms are noted, notifiy your primary care provider.      Discharge Safety Instructions: Should Not Be Left Alone In The House     Discharge Safety Concerns: Impaired Judgement  The interdisciplinary team has made recommendation that you should not be left alone  in the house due to impaired judgment  Anti-embolic stockings are not required to increase circulation to the lower extremities.    Discharge Diet: Diabetic Diet     Discharge Liquids: Thins  Discharge Bowel Function: Continent  Please contact your primary care physician for any changes in bowel habits.  Discharge Bowel Program:    Discharge Bladder Function: Continent  Discharge Urinary Devices: None      Nursing Discharge Plan:      Fall Prevention in the Home, Adult  Falls can cause injuries. They can happen to people of all ages. There are many things you can do to  make your home safe and to help prevent falls. Ask for help when making these changes, if needed.  What actions can I take to prevent falls?  General Instructions  Use good lighting in all rooms. Replace any light bulbs that burn out.  Turn on the lights when you go into a dark area. Use night-lights.  Keep items that you use often in easy-to-reach places. Lower the shelves around your home if necessary.  Set up your furniture so you have a clear path. Avoid moving your furniture around.  Do not have throw rugs and other things on the floor that can make you trip.  Avoid walking on wet floors.  If any of your floors are uneven, fix them.  Add color or contrast paint or tape to clearly lisa and help you see:  Any grab bars or handrails.  First and last steps of stairways.  Where the edge of each step is.  If you use a stepladder:  Make sure that it is fully opened. Do not climb a closed stepladder.  Make sure that both sides of the stepladder are locked into place.  Ask someone to hold the stepladder for you while you use it.  If there are any pets around you, be aware of where they are.  What can I do in the bathroom?         Keep the floor dry. Clean up any water that spills onto the floor as soon as it happens.  Remove soap buildup in the tub or shower regularly.  Use non-skid mats or decals on the floor of the tub or shower.  Attach bath mats securely with double-sided, non-slip rug tape.  If you need to sit down in the shower, use a plastic, non-slip stool.  Install grab bars by the toilet and in the tub and shower. Do not use towel bars as grab bars.  What can I do in the bedroom?  Make sure that you have a light by your bed that is easy to reach.  Do not use any sheets or blankets that are too big for your bed. They should not hang down onto the floor.  Have a firm chair that has side arms. You can use this for support while you get dressed.  What can I do in the kitchen?  Clean up any spills right away.  If  you need to reach something above you, use a strong step stool that has a grab bar.  Keep electrical cords out of the way.  Do not use floor polish or wax that makes floors slippery. If you must use wax, use non-skid floor wax.  What can I do with my stairs?  Do not leave any items on the stairs.  Make sure that you have a light switch at the top of the stairs and the bottom of the stairs. If you do not have them, ask someone to add them for you.  Make sure that there are handrails on both sides of the stairs, and use them. Fix handrails that are broken or loose. Make sure that handrails are as long as the stairways.  Install non-slip stair treads on all stairs in your home.  Avoid having throw rugs at the top or bottom of the stairs. If you do have throw rugs, attach them to the floor with carpet tape.  Choose a carpet that does not hide the edge of the steps on the stairway.  Check any carpeting to make sure that it is firmly attached to the stairs. Fix any carpet that is loose or worn.  What can I do on the outside of my home?  Use bright outdoor lighting.  Regularly fix the edges of walkways and driveways and fix any cracks.  Remove anything that might make you trip as you walk through a door, such as a raised step or threshold.  Trim any bushes or trees on the path to your home.  Regularly check to see if handrails are loose or broken. Make sure that both sides of any steps have handrails.  Install guardrails along the edges of any raised decks and porches.  Clear walking paths of anything that might make someone trip, such as tools or rocks.  Have any leaves, snow, or ice cleared regularly.  Use sand or salt on walking paths during winter.  Clean up any spills in your garage right away. This includes grease or oil spills.  What other actions can I take?  Wear shoes that:  Have a low heel. Do not wear high heels.  Have rubber bottoms.  Are comfortable and fit you well.  Are closed at the toe. Do not wear  open-toe sandals.  Use tools that help you move around (mobility aids) if they are needed. These include:  Canes.  Walkers.  Scooters.  Crutches.  Review your medicines with your doctor. Some medicines can make you feel dizzy. This can increase your chance of falling.  Ask your doctor what other things you can do to help prevent falls.  Where to find more information  Centers for Disease Control and Prevention, [place]AD[place]: https://cdc.gov  National [place] on Aging: https://pw6owkd.bong.nih.gov  Contact a doctor if:  You are afraid of falling at home.  You feel weak, drowsy, or dizzy at home.  You fall at home.  Summary  There are many simple things that you can do to make your home safe and to help prevent falls.  Ways to make your home safe include removing tripping hazards and installing grab bars in the bathroom.  Ask for help when making these changes in your home.  This information is not intended to replace advice given to you by your health care provider. Make sure you discuss any questions you have with your health care provider.  Document Released: 10/14/2010 Document Revised: 04/09/2020 Document Reviewed: 08/02/2018  [employer] Patient Education © 2020 [employer]    Diabetes Mellitus and Nutrition, Adult  When you have diabetes (diabetes mellitus), it is very important to have healthy eating habits because your blood sugar (glucose) levels are greatly affected by what you eat and drink. Eating healthy foods in the appropriate amounts, at about the same times every day, can help you:  Control your blood glucose.  Lower your risk of heart disease.  Improve your blood pressure.  Reach or maintain a healthy weight.  Every person with diabetes is different, and each person has different needs for a meal plan. Your health care provider may recommend that you work with a diet and nutrition specialist (dietitian) to make a meal plan that is best for you. Your meal plan may vary depending on factors such as:  The  calories you need.  The medicines you take.  Your weight.  Your blood glucose, blood pressure, and cholesterol levels.  Your activity level.  Other health conditions you have, such as heart or kidney disease.  How do carbohydrates affect me?  Carbohydrates, also called carbs, affect your blood glucose level more than any other type of food. Eating carbs naturally raises the amount of glucose in your blood. Carb counting is a method for keeping track of how many carbs you eat. Counting carbs is important to keep your blood glucose at a healthy level, especially if you use insulin or take certain oral diabetes medicines.  It is important to know how many carbs you can safely have in each meal. This is different for every person. Your dietitian can help you calculate how many carbs you should have at each meal and for each snack.  Foods that contain carbs include:  Bread, cereal, rice, pasta, and crackers.  Potatoes and corn.  Peas, beans, and lentils.  Milk and yogurt.  Fruit and juice.  Desserts, such as cakes, cookies, ice cream, and candy.  How does alcohol affect me?  Alcohol can cause a sudden decrease in blood glucose (hypoglycemia), especially if you use insulin or take certain oral diabetes medicines. Hypoglycemia can be a life-threatening condition. Symptoms of hypoglycemia (sleepiness, dizziness, and confusion) are similar to symptoms of having too much alcohol.  If your health care provider says that alcohol is safe for you, follow these guidelines:  Limit alcohol intake to no more than 1 drink per day for nonpregnant women and 2 drinks per day for men. One drink equals 12 oz of beer, 5 oz of wine, or 1½ oz of hard liquor.  Do not drink on an empty stomach.  Keep yourself hydrated with water, diet soda, or unsweetened iced tea.  Keep in mind that regular soda, juice, and other mixers may contain a lot of sugar and must be counted as carbs.  What are tips for following this plan?    Reading food  "labels  Start by checking the serving size on the \"Nutrition Facts\" label of packaged foods and drinks. The amount of calories, carbs, fats, and other nutrients listed on the label is based on one serving of the item. Many items contain more than one serving per package.  Check the total grams (g) of carbs in one serving. You can calculate the number of servings of carbs in one serving by dividing the total carbs by 15. For example, if a food has 30 g of total carbs, it would be equal to 2 servings of carbs.  Check the number of grams (g) of saturated and trans fats in one serving. Choose foods that have low or no amount of these fats.  Check the number of milligrams (mg) of salt (sodium) in one serving. Most people should limit total sodium intake to less than 2,300 mg per day.  Always check the nutrition information of foods labeled as \"low-fat\" or \"nonfat\". These foods may be higher in added sugar or refined carbs and should be avoided.  Talk to your dietitian to identify your daily goals for nutrients listed on the label.  Shopping  Avoid buying canned, premade, or processed foods. These foods tend to be high in fat, sodium, and added sugar.  Shop around the outside edge of the grocery store. This includes fresh fruits and vegetables, bulk grains, fresh meats, and fresh dairy.  Cooking  Use low-heat cooking methods, such as baking, instead of high-heat cooking methods like deep frying.  Cook using healthy oils, such as olive, canola, or sunflower oil.  Avoid cooking with butter, cream, or high-fat meats.  Meal planning  Eat meals and snacks regularly, preferably at the same times every day. Avoid going long periods of time without eating.  Eat foods high in fiber, such as fresh fruits, vegetables, beans, and whole grains. Talk to your dietitian about how many servings of carbs you can eat at each meal.  Eat 4-6 ounces (oz) of lean protein each day, such as lean meat, chicken, fish, eggs, or tofu. One oz of lean " protein is equal to:  1 oz of meat, chicken, or fish.  1 egg.  ¼ cup of tofu.  Eat some foods each day that contain healthy fats, such as avocado, nuts, seeds, and fish.  Lifestyle  Check your blood glucose regularly.  Exercise regularly as told by your health care provider. This may include:  150 minutes of moderate-intensity or vigorous-intensity exercise each week. This could be brisk walking, biking, or water aerobics.  Stretching and doing strength exercises, such as yoga or weightlifting, at least 2 times a week.  Take medicines as told by your health care provider.  Do not use any products that contain nicotine or tobacco, such as cigarettes and e-cigarettes. If you need help quitting, ask your health care provider.  Work with a counselor or diabetes educator to identify strategies to manage stress and any emotional and social challenges.  Questions to ask a health care provider  Do I need to meet with a diabetes educator?  Do I need to meet with a dietitian?  What number can I call if I have questions?  When are the best times to check my blood glucose?  Where to find more information:  American Diabetes Association: diabetes.org  Academy of Nutrition and Dietetics: www.eatright.org  National [place] of Diabetes and Digestive and Kidney Diseases (NIH): www.niddk.nih.gov  Summary  A healthy meal plan will help you control your blood glucose and maintain a healthy lifestyle.  Working with a diet and nutrition specialist (dietitian) can help you make a meal plan that is best for you.  Keep in mind that carbohydrates (carbs) and alcohol have immediate effects on your blood glucose levels. It is important to count carbs and to use alcohol carefully.  This information is not intended to replace advice given to you by your health care provider. Make sure you discuss any questions you have with your health care provider.  Document Released: 09/14/2006 Document Revised: 11/30/2018 Document Reviewed:  01/22/2018  [employer]vier Patient Education © 2020 [employer]      Case Management Discharge Instructions:   Discharge Location:    Agency Name/Address/Phone:    Home Health:    Outpatient Services:    DME Provider/Phone:    Medical Equipment Ordered:    Prescription Faxed to:        Discharge Medication Instructions:  Below are the medications your physician expects you to take upon discharge:        Occupational Therapy Discharge Instructions for Lana Phillips    8/18/2022    Level of Assist Required for Eating: Able to Complete Eating without Assist  Level of Assist Required for Grooming: Requires Supervision with Grooming  Level of Assist Required for Dressing: Requires Supervision with Dressing  Level of Assist Required for Toileting: Requires Supervision with Toileting  Level of Assist Required for Toilet Transfer: Requires Supervision with Toilet Transfer  Level of Assist Required for Bathing: Requires Supervision with Bathing  Equipment for Bathing: Shower Chair  Level of Assist Required for Shower Transfer: Requires Supervision with Shower Transfer  Equipment for Shower Transfer: Shower Chair  Level of Assist Required for Home Mgmt: Requires Supervision with Home Management  Level of Assist Required for Meal Prep: Requires Supervision with Meal Preparation  Driving: May not Drive, Please Contact Physician for Further Information  Home Exercise Program: None Issued    Physical Therapy Discharge Instructions for Lana Phillips    8/18/2022    Level of Assist Required for Ambulation: Supervision on Flat Surfaces, Supervision on Curbs, Supervision on Stairs  Distance Patient May Ambulate: comnmunity distances as tolerated, take rest breaks when necessary  Device Recommended for Ambulation: None

## 2022-08-18 NOTE — CARE PLAN
Problem: OT Long Term Goals  Goal: LTG-By discharge, patient will complete basic self care tasks at Mod I level using AE/DME PRN.  Outcome: Not Met  Goal: LTG-By discharge, patient will perform bathroom transfers at Mod I level using AE/DME PRN.  Outcome: Not Met  Goal: LTG-By discharge, patient will complete basic home management at SPV-Mod I level using AE/DME PRN.  Outcome: Not Met

## 2022-08-26 DIAGNOSIS — I61.9 HEMORRHAGIC STROKE (HCC): ICD-10-CM

## 2022-08-30 ENCOUNTER — OFFICE VISIT (OUTPATIENT)
Dept: PHYSICAL MEDICINE AND REHAB | Facility: MEDICAL CENTER | Age: 57
End: 2022-08-30
Payer: COMMERCIAL

## 2022-08-30 VITALS
DIASTOLIC BLOOD PRESSURE: 74 MMHG | OXYGEN SATURATION: 95 % | WEIGHT: 145 LBS | RESPIRATION RATE: 15 BRPM | TEMPERATURE: 97.9 F | HEART RATE: 87 BPM | BODY MASS INDEX: 28.47 KG/M2 | SYSTOLIC BLOOD PRESSURE: 118 MMHG | HEIGHT: 60 IN

## 2022-08-30 DIAGNOSIS — I69.398 IMPAIRED BALANCE AS LATE EFFECT OF CEREBROVASCULAR ACCIDENT: ICD-10-CM

## 2022-08-30 DIAGNOSIS — Z74.09 IMPAIRED FUNCTIONAL MOBILITY, BALANCE, GAIT, AND ENDURANCE: ICD-10-CM

## 2022-08-30 DIAGNOSIS — R26.89 IMPAIRED BALANCE AS LATE EFFECT OF CEREBROVASCULAR ACCIDENT: ICD-10-CM

## 2022-08-30 DIAGNOSIS — I61.9 HEMORRHAGIC STROKE (HCC): Primary | ICD-10-CM

## 2022-08-30 PROCEDURE — 99215 OFFICE O/P EST HI 40 MIN: CPT | Performed by: PHYSICAL MEDICINE & REHABILITATION

## 2022-08-30 ASSESSMENT — FIBROSIS 4 INDEX: FIB4 SCORE: 0.64

## 2022-08-30 NOTE — PROGRESS NOTES
[facility]  [facility]  [address] [place] [address]  Ph: [phone]    NEW PATIENT EVALUATION    *Patient established in PM&R practice, however, patient new to writer as patient is transferring care. Therefore, patient billed as established.     Patient Name: Lana Phillips   Patient : 1965  Patient Age: 57 y.o.     Examining Physician: Dr. Nika Mccormack, DO    PM&R History to Date - Background Information:  Dates of Admission/Discharge to ARU: 2022 - 2022    SUBJECTIVE:   Patient Identification: Lana Phillips is a 57 y.o. female with PMH significant for hypertension and rehabilitation history significant for right cerebellar hemorrhagic stroke 2022 and is presenting to PM&R clinic for a NEW OUTPATIENT evaluation with the following chief complaint/s:    Chief Complaint: ARU discharge follow-up    Accompanied by Today: Self   History of Present Illness:    - Records reviewed: Acute rehab discharge summary reviewed in its entirety.  - Patient states that she is living with her two boys at home.   - Home health discharged her but she is still having balance issues and requires furniture surfing to go around the house.   - Patient uses walker in the community.   - Had constipation and did not not poop for 4 days.   - Has been able to make herself eggs and dye.   - Her sister had made her salads but those go bad in a couple of days.   - Has not followed up with [facility] yet.  - Has bought OTC Senna and Colace for constipation.     Review of Systems:  All other pertinent positive review of systems are noted above in HPI.   All other systems reviewed and are negative.    Past Medical History:  No past medical history on file.   Past Surgical History:   Procedure Laterality Date    CRANIOTOMY  2022    Procedure: Posterior fossa craniectomy for evacuation of intraparenchymal hemorrhage;  Surgeon: Pedro Pablo Vences M.D.;  Location: SURGERY [facility];  Service:  Neurosurgery        Current Outpatient Medications:     amLODIPine (NORVASC) 10 MG Tab, Take 1 Tablet by mouth every day., Disp: 30 Tablet, Rfl: 2    lisinopril (PRINIVIL) 40 MG tablet, Take 1 Tablet by mouth every day., Disp: 30 Tablet, Rfl: 2    metFORMIN (GLUCOPHAGE) 850 MG Tab, Take 1 Tablet by mouth 2 times a day with meals., Disp: 60 Tablet, Rfl: 2    SITagliptin (JANUVIA) 50 MG Tab, Take 1 Tablet by mouth every day., Disp: 30 Tablet, Rfl: 2    sodium chloride (SALT) 1 GM Tab, Take 1 Tablet by mouth 3 times a day with meals., Disp: 90 Tablet, Rfl: 2    atorvastatin (LIPITOR) 20 MG Tab, Take 1 Tablet by mouth every evening., Disp: 30 Tablet, Rfl: 2    scopolamine (TRANSDERM-SCOP) 1 mg/72hr PATCH 72 HR, Place 1 Patch on the skin every 72 hours., Disp: 10 Patch, Rfl: 2    vitamin D (VITAMIND D3) 1000 UNIT Tab, Take 2 Tablets by mouth every day., Disp: 60 Tablet, Rfl: 2    docusate sodium 100 MG Cap, Take 100 mg by mouth 2 times a day., Disp: 60 Capsule, Rfl:     senna-docusate (PERICOLACE OR SENOKOT S) 8.6-50 MG Tab, Take 1 Tablet by mouth every evening., Disp: 30 Tablet, Rfl: 0  No Known Allergies     Past Social History:  Social History     Socioeconomic History    Marital status:      Spouse name: Not on file    Number of children: Not on file    Years of education: Not on file    Highest education level: Not on file   Occupational History    Not on file   Tobacco Use    Smoking status: Never    Smokeless tobacco: Never   Substance and Sexual Activity    Alcohol use: No    Drug use: No    Sexual activity: Not on file   Other Topics Concern    Not on file   Social History Narrative    Not on file     Social Determinants of Health     Financial Resource Strain: Not on file   Food Insecurity: Not on file   Transportation Needs: Not on file   Physical Activity: Not on file   Stress: Not on file   Social Connections: Not on file   Intimate Partner Violence: Not on file   Housing Stability: Not on file         Family History:  Family History   Problem Relation Age of Onset    Stroke Mother     Heart Disease Father        Depression and Opioid Screening  PHQ-9:  Depression Screen (PHQ-2/PHQ-9) 8/4/2022 8/5/2022 8/12/2022   PHQ-2 Total Score 0 0 0     Interpretation of PHQ-9 Total Score   Score Severity   1-4 No Depression   5-9 Mild Depression   10-14 Moderate Depression   15-19 Moderately Severe Depression   20-27 Severe Depression     OBJECTIVE:   Vital Signs:  Vitals:    08/30/22 0933   BP: 118/74   Pulse: 87   Resp: 15   Temp: 36.6 °C (97.9 °F)   SpO2: 95%        Pertinent Labs:  Lab Results   Component Value Date/Time    SODIUM 138 08/16/2022 05:26 AM    POTASSIUM 4.2 08/16/2022 05:26 AM    CHLORIDE 102 08/16/2022 05:26 AM    CO2 25 08/16/2022 05:26 AM    GLUCOSE 98 08/16/2022 05:26 AM    BUN 9 08/16/2022 05:26 AM    CREATININE 0.42 (L) 08/16/2022 05:26 AM       Lab Results   Component Value Date/Time    HBA1C 7.1 (H) 08/02/2022 07:43 PM       Lab Results   Component Value Date/Time    WBC 10.1 08/13/2022 05:37 AM    RBC 4.41 08/13/2022 05:37 AM    HEMOGLOBIN 13.3 08/13/2022 05:37 AM    HEMATOCRIT 40.5 08/13/2022 05:37 AM    MCV 91.8 08/13/2022 05:37 AM    MCH 30.2 08/13/2022 05:37 AM    MCHC 32.8 (L) 08/13/2022 05:37 AM    MPV 10.2 08/13/2022 05:37 AM    NEUTSPOLYS 61.20 08/13/2022 05:37 AM    LYMPHOCYTES 24.60 08/13/2022 05:37 AM    MONOCYTES 7.40 08/13/2022 05:37 AM    EOSINOPHILS 2.20 08/13/2022 05:37 AM    BASOPHILS 0.90 08/13/2022 05:37 AM       Lab Results   Component Value Date/Time    ASTSGOT 22 08/13/2022 05:37 AM    ALTSGPT 48 08/13/2022 05:37 AM        Physical Exam:   GEN: No apparent distress  HEENT: Head normocephalic, atraumatic.  Sclera nonicteric bilaterally, no ocular discharge appreciated bilaterally.  CV: Extremities warm and well-perfused, no peripheral edema appreciated bilaterally.  PULMONARY: Breathing nonlabored on room air, no respiratory accessory muscle use.  Not requiring supplemental  oxygen.  SKIN: No appreciable skin breakdown on exposed areas of skin.  PSYCH: Mood and affect within normal limits.  NEURO: Awake alert.  Conversational.  Logical thought content.    Motor Exam Upper Extremities   ? Myotome R L   Shoulder Abduction C5 5 5   Elbow flexion C5 5 5   Wrist extension C6 5 5   Elbow extension C7 5 5   Finger flexion C8 5 5   Finger abduction T1 5 5     Motor Exam Lower Extremities  ? Myotome R L   Hip flexion L2 5 5   Knee extension L3 5 5   Ankle dorsiflexion L4 5 5   Toe extension L5 5 5   Ankle plantarflexion S1 5 5     No significant dysmetria appreciated on exam.  No clonus bilateral ankles  Requiring walker for ambulation  Mild right-sided nystagmus    Imaging:   CT head 8/2/2022  Large 4.6 x 3.4 cm intraparenchymal hematoma centered in the left cerebellar hemisphere. Associated mass effect results in effacement of the basal cisterns and fourth ventricle. No evidence of hydrocephalus.    ASSESSMENT/PLAN: Lana Phillips  is a 57 y.o. female with rehabilitation history significant for right cerebellar hemorrhagic stroke 8/2/2022, here for evaluation. The following plan was discussed with the patient who is in agreement.     Visit Diagnoses     ICD-10-CM   1. Hemorrhagic stroke (HCC)  I61.9   2. Impaired balance as late effect of cerebrovascular accident  I69.398    R26.89   3. Impaired functional mobility, balance, gait, and endurance  Z74.09      Sister assists with history.    Rehab/Neuro:   Right cerebellar hemorrhagic stroke 8/2/2022  Impaired balance  Dizziness  -Records reviewed as aforementioned in the HPI.  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  - Recommend RTC access so that she has more availability for rides.    Neurogenic Bowel:   Constipation  -Med management: Start  Colace 200 mg twice daily  - Med management: Start Senokot 2 tablets at night    Other:  -Referral to case management for assistance with resources in the community, Meals on Wheels, etc.      Follow up: 8 weeks for reevaluation    Total time spent was 40 minutes.  Included in this time is the time spent preparing for the visit including record review, my exam and evaluation, counseling and education regarding that which is aforementioned in the assessment and plan.  Time was spent documenting into patient's electronic health record.  Time was spent ordering the appropriate labs, tests, procedures, referrals, medications. Included this time as the time spent obtaining history from someone other than the patient.  Some of the time included occurred outside of charting time.  Discussion involved the patient and sister.    Please note that this dictation was created using voice recognition software. I have made every reasonable attempt to correct obvious errors but there may be errors of grammar and content that I may have overlooked prior to finalization of this note.    Dr. Nika Mccormack DO, MS  Department of Physical Medicine & Rehabilitation  Neuro Rehabilitation Clinic  [facility]

## 2022-09-07 ENCOUNTER — PATIENT OUTREACH (OUTPATIENT)
Dept: HEALTH INFORMATION MANAGEMENT | Facility: OTHER | Age: 57
End: 2022-09-07
Payer: COMMERCIAL

## 2022-09-07 NOTE — PROGRESS NOTES
9/7/22  CHW Laura received referral from patient's physiatrist to help with food and care giving resources. Patient has no PCP. CHW called patient and was unable to leave a voice message.    09/09/22  CHW called patient and spoke with sister Malu. Malu reports patient is currently receiving temporary disability and will be receiving it for 3 months. Sister does not know income and reports she will call CHW back with info. Patient's sister goes to grocery store for patient. Family helps out with grocery costs. Patient does not have food stamps. Patient has appointment with Dr. Flowers with [facility] to establish with a PCP. Patient's sister reported she would call back 5 minutes later to provide income info.    CHW called patient's sister again in the afternoon. Sister does not know income and was driving to work. CHW was not able to talk more about food stamps and was not able to talk about care giving. Patient's sister reports she will call CHW back with info. CHW will reach out next week if no contact has been made.     09/12/22  CHW called patient's main contact number and was unable to leave a voice message. CHW will attempt one more contact and will see if [facility] social work can follow patient.     CHW sent chart to [facility] social work pool. CHW will not follow.

## 2022-09-12 ENCOUNTER — OFFICE VISIT (OUTPATIENT)
Dept: INTERNAL MEDICINE | Facility: OTHER | Age: 57
End: 2022-09-12
Payer: COMMERCIAL

## 2022-09-12 VITALS
OXYGEN SATURATION: 94 % | TEMPERATURE: 97.8 F | BODY MASS INDEX: 28.95 KG/M2 | HEIGHT: 59 IN | SYSTOLIC BLOOD PRESSURE: 148 MMHG | HEART RATE: 85 BPM | DIASTOLIC BLOOD PRESSURE: 90 MMHG | WEIGHT: 143.6 LBS

## 2022-09-12 DIAGNOSIS — E11.65 TYPE 2 DIABETES MELLITUS WITH HYPERGLYCEMIA, WITHOUT LONG-TERM CURRENT USE OF INSULIN (HCC): ICD-10-CM

## 2022-09-12 DIAGNOSIS — R53.83 FATIGUE, UNSPECIFIED TYPE: ICD-10-CM

## 2022-09-12 DIAGNOSIS — I10 HYPERTENSION, UNSPECIFIED TYPE: ICD-10-CM

## 2022-09-12 DIAGNOSIS — E55.9 VITAMIN D DEFICIENCY: ICD-10-CM

## 2022-09-12 DIAGNOSIS — E78.49 OTHER HYPERLIPIDEMIA: ICD-10-CM

## 2022-09-12 DIAGNOSIS — I61.4 CEREBELLAR HEMORRHAGE, ACUTE (HCC): ICD-10-CM

## 2022-09-12 PROBLEM — E87.6 HYPOKALEMIA: Status: RESOLVED | Noted: 2019-02-08 | Resolved: 2022-09-12

## 2022-09-12 PROBLEM — K04.7 DENTAL INFECTION: Status: RESOLVED | Noted: 2019-02-08 | Resolved: 2022-09-12

## 2022-09-12 LAB — GLUCOSE BLD-MCNC: 92 MG/DL (ref 70–100)

## 2022-09-12 PROCEDURE — 99204 OFFICE O/P NEW MOD 45 MIN: CPT | Mod: GC

## 2022-09-12 PROCEDURE — 82962 GLUCOSE BLOOD TEST: CPT

## 2022-09-12 PROCEDURE — 99999 PR NO CHARGE: CPT | Performed by: INTERNAL MEDICINE

## 2022-09-12 RX ORDER — ERGOCALCIFEROL 1.25 MG/1
50000 CAPSULE ORAL
Qty: 12 CAPSULE | Refills: 0 | Status: SHIPPED | OUTPATIENT
Start: 2022-09-12 | End: 2022-10-24 | Stop reason: SDUPTHER

## 2022-09-12 RX ORDER — SCOLOPAMINE TRANSDERMAL SYSTEM 1 MG/1
1 PATCH, EXTENDED RELEASE TRANSDERMAL
Qty: 10 PATCH | Refills: 2 | Status: CANCELLED | OUTPATIENT
Start: 2022-09-12

## 2022-09-12 RX ORDER — BLOOD PRESSURE TEST KIT
1 KIT MISCELLANEOUS ONCE
Qty: 1 KIT | Refills: 0 | Status: SHIPPED | OUTPATIENT
Start: 2022-09-12 | End: 2022-09-12

## 2022-09-12 ASSESSMENT — ENCOUNTER SYMPTOMS
SEIZURES: 0
MUSCULOSKELETAL NEGATIVE: 1
CARDIOVASCULAR NEGATIVE: 1
HEADACHES: 0
PSYCHIATRIC NEGATIVE: 1
RESPIRATORY NEGATIVE: 1
FOCAL WEAKNESS: 0
EYES NEGATIVE: 1
GASTROINTESTINAL NEGATIVE: 1

## 2022-09-12 ASSESSMENT — FIBROSIS 4 INDEX: FIB4 SCORE: 0.64

## 2022-09-12 NOTE — PATIENT INSTRUCTIONS
Blood pressure monitoring at home  Please follow up with neurologist/neurosurgeon  Please take all your medications  Your Vitamin D dose will be increased  Please do labs 1 week before your next appointment   Please continue your PT, OT and Speech therapy sessions  We will see you next month for your follow up

## 2022-09-12 NOTE — PROGRESS NOTES
Chief Complaint   Patient presents with    New Patient    Hospital Follow-up     stroke       HISTORY OF PRESENT ILLNESS: Ms. Lana Phillips is a pleasant 57 y.o. female here in the clinic to establish care. Patient had hemorrhagic stroke on 22 ,she was found unresponsive on the bathroom during her mother's  and was brought to the ED where systolic blood pressure was noted to be in the 200s . CT scan revealed left cerebellar hemorrhage with compression of third and 4th ventricle , patient is status post craniectomy with evacuation of IPH on the same day 2022. Previous to her stroke episode, patient had no regular PCP and no regular check-up . During her hospitalization, patient was started on Amlodipine 10 mg once daily and Lisinopril 40 mg once daily for newly diagnosed hypertension. She was also newly diagnosed with diabetes mellitus type 2 and was started on metformin 850 mg twice daily and Sitagliptin 50 mg once daily. Patient was also started on atorvastatin 20 mg once daily.     Patient was subsequently transferred to [facility] on 2022 where she continued her physical therapy ,occupational therapy and speech pathology and was subsequently discharged to continue her therapy at home.  Since her stroke, patient complains of fatigue almost everyday and dizziness upon ambulation which would also occasionally occur even at rest. However, she notes that she has been watching a lot of television which could contribute to her dizziness. Patient does not complain of rotatory vertigo. Patient also reports sense of loss of balance but no history of falls. She states that  physical therapy sessions helped her regain her balance and currently patient walks using a cane with no assistance needed. Patient also has history of vitamin D deficiency, and was given Vit D 1000 units, 2 tablets once daily.     Patient denies chest pain , shortness of breath, headache, nausea and vomiting. Patient  has no weakness noted, nor changes in speech and is able to do basic ADL's with moderate help from her sister and two children. Patient reports compliance to her medications. Patient has no regular BP monitoring at home since she was started on antihypertensive medications.      No past medical history on file.  unremarkable    Patient Active Problem List    Diagnosis Date Noted    Hemorrhagic stroke (HCC) 08/12/2022    Impaired mobility and ADLs 08/12/2022    Other hyperlipidemia 08/12/2022    Type 2 diabetes mellitus with hyperglycemia, without long-term current use of insulin ([facility]) 08/09/2022    Intracranial hemorrhage (HCC) 08/04/2022    Hypertension 08/03/2022    Cerebellar hemorrhage, acute ([facility]) 08/02/2022        Patient has no known allergies.    Current Outpatient Medications   Medication Sig Dispense Refill    Blood Pressure Kit 1 Each one time for 1 dose. 1 Kit 0    vitamin D2, Ergocalciferol, (DRISDOL) 1.25 MG (16772 UT) Cap capsule Take 1 Capsule by mouth every 7 days. 12 Capsule 0    amLODIPine (NORVASC) 10 MG Tab Take 1 Tablet by mouth every day. 30 Tablet 2    lisinopril (PRINIVIL) 40 MG tablet Take 1 Tablet by mouth every day. 30 Tablet 2    metFORMIN (GLUCOPHAGE) 850 MG Tab Take 1 Tablet by mouth 2 times a day with meals. 60 Tablet 2    SITagliptin (JANUVIA) 50 MG Tab Take 1 Tablet by mouth every day. 30 Tablet 2    sodium chloride (SALT) 1 GM Tab Take 1 Tablet by mouth 3 times a day with meals. 90 Tablet 2    atorvastatin (LIPITOR) 20 MG Tab Take 1 Tablet by mouth every evening. 30 Tablet 2    docusate sodium 100 MG Cap Take 100 mg by mouth 2 times a day. 60 Capsule     senna-docusate (PERICOLACE OR SENOKOT S) 8.6-50 MG Tab Take 1 Tablet by mouth every evening. 30 Tablet 0    scopolamine (TRANSDERM-SCOP) 1 mg/72hr PATCH 72 HR Place 1 Patch on the skin every 72 hours. (Patient not taking: Reported on 9/12/2022) 10 Patch 2    vitamin D (VITAMIND D3) 1000 UNIT Tab Take 2 Tablets by mouth every day.  "(Patient not taking: Reported on 9/12/2022) 60 Tablet 2     No current facility-administered medications for this visit.       Social History     Tobacco Use    Smoking status: Never    Smokeless tobacco: Never   Vaping Use    Vaping Use: Never used   Substance Use Topics    Alcohol use: No    Drug use: No     Patient lives with her 2 children, currently stays at the first floor. 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 No history of illicit drug use.    Family History   Problem Relation Age of Onset    Stroke Mother     Heart Disease Father          Review of Systems   Review of Systems   HENT: Negative.     Eyes: Negative.    Respiratory: Negative.     Cardiovascular: Negative.    Gastrointestinal: Negative.    Genitourinary: Negative.    Musculoskeletal: Negative.    Skin: Negative.    Neurological:  Negative for focal weakness, seizures and headaches.   Endo/Heme/Allergies: Negative.    Psychiatric/Behavioral: Negative.     All other systems reviewed and are negative.    Exam:  BP (!) 148/90 (BP Location: Right arm, Patient Position: Sitting, BP Cuff Size: Adult)   Pulse 85   Temp 36.6 °C (97.8 °F) (Temporal)   Ht 1.499 m (4' 11\")   Wt 65.1 kg (143 lb 9.6 oz)   SpO2 94%  Body mass index is 29 kg/m².    Constitutional:  Not in acute distress, well appearing.  HEENT:   NC/AT  Cardiovascular: Regular rate and rhythm. No murmurs or gallops.      Lungs:   Clear to auscultation bilaterally. No wheezes or crackles. No respiratory distress.  Abdomen: Not distended, soft, not tender. No guarding or rigidity. No masses.  Extremities:  No cyanosis/clubbing/edema. No obvious deformities.  Skin:  Warm and dry.  No visible rashes.  Neurologic: Alert & oriented x 3, CN II-XII grossly intact and sensation grossly intact.  No focal deficits noted. 5/5 muscle strength on Bilateral upper extremity and lower extremity.  No dysmetria and no dysdiadochokinesia.   Patient is able to ambulate " with cane, no observed fall or tendency to lean on one side.  Psychiatric:  Affect normal, mood normal, judgment normal.    Assessment/Plan:     Hypertension, unspecified type  -BP today 09/12/2022 is 148/90, no regular BP monitoring at home( was provided with BP monitoring handout to use at home and bring for next follow up)  -continue Amlodipine and Lisinopril,advised to monitor blood pressure at home  -goal BP of <130/80, given that she has DM Type 2  - Blood Pressure Kit; 1 Each one time for 1 dose.  Dispense: 1 Kit; Refill: 0      2. Type 2 diabetes mellitus with hyperglycemia, without long-term current use of insulin (HCC)    -Patient is on metformin 850 mg twice daily, sitagliptin 50 mg once daily  - MICROALBUMIN CREAT RATIO URINE; Future  -Hemoglobin A1c 7.1% (08/02/2022)  - POCT Glucose-92 (09/13/22)  -Advised importance of regular exercise and healthy diet  -Advised importance of compliance to medications      3. Cerebellar hemorrhage, acute ([facility])  -S/p suboccipital craniectomy with evacuation of intraparenchymal hemorrhage 08/02/2022  -Has appointment with neurosurgery, advised patient to to follow-up to see if she needs to continue taking sodium chloride 1 g 3 times daily  -Advised patient to follow up with stroke bridge clinic/neurology  -Continue PT ,OT ,speech therapy sessions  -Patient saw physical medicine and rehab doctor(Dr. Mccormack) with same recommendations for PT, OT  -strict adherence to BP medications to decrease risk of hemorrhagic stroke  -discussed that patient's dizziness and occasional sense of loss of balance can be attributed to her recent stroke    4.  Fatigue, unspecified type    - TSH WITH REFLEX TO FT4; Future  - VITAMIN B12; Future  -Ordered vitamin D levels, Vitamin D level last 08/12/2022 was 7, prescribed with oral vitamin D 50,000 units every week    5. Vitamin D deficiency    - VITAMIN D,25 HYDROXY (DEFICIENCY); Future  -Vitamin D level last 08/12/2022 was 7  -Prescribed with  oral vitamin D 50,000 units every week    6. Other hyperlipidemia  - (08/2/22)  -Patient is on atorvastatin 20 mg once daily, secondary prevention    7. Screening Tests/Preventive Care  -We will discuss screening tests on patient's next visit  -however patient declined colonoscopy and mammogram that was recommended during this visit  -plan to discuss vaccinations next visit    All imaging results and lab results and consult notes are reviewed at this visit.  Followup: Return in about 4 weeks (around 10/10/2022).    Please note that this dictation was created using voice recognition software. I have made every reasonable attempt to correct obvious errors, but I expect that there are errors of grammar and possibly content that I did not discover before finalizing the note.    RUPINDER NELSON MD  PGY-1

## 2022-09-13 ENCOUNTER — PHARMACY VISIT (OUTPATIENT)
Dept: PHARMACY | Facility: MEDICAL CENTER | Age: 57
End: 2022-09-13
Payer: MEDICARE

## 2022-09-13 PROCEDURE — RXMED WILLOW AMBULATORY MEDICATION CHARGE: Performed by: PHYSICAL MEDICINE & REHABILITATION

## 2022-09-20 ENCOUNTER — TELEPHONE (OUTPATIENT)
Dept: INTERNAL MEDICINE | Facility: OTHER | Age: 57
End: 2022-09-20
Payer: COMMERCIAL

## 2022-09-20 NOTE — TELEPHONE ENCOUNTER
Patient referred to social work by psychiatrist to discuss possibility of SNAP benefits and help with care giving. Student  called Lana's cell phone and her sister Nirmal Phillips answered. Nirmal Phillips is listed as a contact for treatment and billing. She provided sister's current income which comes entirely from temporary disability. Student  requested Lana call back when available.     Peggy Castano  MSW Intern

## 2022-09-23 ENCOUNTER — PHARMACY VISIT (OUTPATIENT)
Dept: PHARMACY | Facility: MEDICAL CENTER | Age: 57
End: 2022-09-23
Payer: MEDICARE

## 2022-09-23 PROCEDURE — RXMED WILLOW AMBULATORY MEDICATION CHARGE: Performed by: PHYSICAL MEDICINE & REHABILITATION

## 2022-09-28 ENCOUNTER — APPOINTMENT (OUTPATIENT)
Dept: OCCUPATIONAL THERAPY | Facility: REHABILITATION | Age: 57
End: 2022-09-28
Payer: COMMERCIAL

## 2022-09-28 ENCOUNTER — APPOINTMENT (OUTPATIENT)
Dept: OCCUPATIONAL THERAPY | Facility: REHABILITATION | Age: 57
End: 2022-09-28
Attending: PHYSICAL MEDICINE & REHABILITATION
Payer: COMMERCIAL

## 2022-09-28 NOTE — OP THERAPY EVALUATION
Outpatient Occupational Therapy  INITIAL NEUROLOGICAL EVALUATION    [facility] Occupational Therapy [address]  [address]  [place] [address]  Phone:  [phone]  Fax:  [phone]    Date of Evaluation: 09/28/2022    Patient: Lana Phlilips  YOB: 1965  MRN: [id number]     Referring Provider: Nika Mccormack D.O.  [address]  [address]  [place] [address]   Referring Diagnosis Hemorrhagic stroke (HCC) [I61.9];Impaired balance as late effect of cerebrovascular accident [I69.398, R26.89];Impaired functional mobility, balance, gait, and endurance [Z74.09]     Time Calculation                       Chief Complaint: No chief complaint on file.    Visit Diagnoses     ICD-10-CM   1. Hemorrhagic stroke (HCC)  I61.9   2. Impaired balance as late effect of cerebrovascular accident  I69.398    R26.89   3. Impaired functional mobility, balance, gait, and endurance  Z74.09       Subjective:   History of Present Illness:     Date of onset:  8/2/2022    Mechanism of injury:  PM&R History to Date - Background Information:  Dates of Admission/Discharge to ARU: 8/12/2022 - 8/18/2022     SUBJECTIVE:   Patient Identification: Lana Phillips is a 57 y.o. female with PMH significant for hypertension and rehabilitation history significant for right cerebellar hemorrhagic stroke 8/2/2022 with the following chief complaint/s:     History of Present Illness:    - Records reviewed: Acute rehab discharge summary reviewed in its entirety.  - Patient states that she is living with her two boys at home.   - Home health discharged her but she is still having balance issues and requires furniture surfing to go around the house.   - Patient uses walker in the community.     No past medical history on file.  Past Surgical History:   Procedure Laterality Date    CRANIOTOMY  8/2/2022    Procedure: Posterior fossa craniectomy for evacuation of intraparenchymal hemorrhage;  Surgeon: Pedro Pablo Vences M.D.;  Location:  [facility]URGERY [facility];  Service: Neurosurgery     Social History     Tobacco Use    Smoking status: Never    Smokeless tobacco: Never   Substance Use Topics    Alcohol use: No     Family and Occupational History     Socioeconomic History    Marital status:      Spouse name: Not on file    Number of children: Not on file    Years of education: Not on file    Highest education level: Not on file   Occupational History    Not on file       Objective    Exercises/Treatments  Time-based treatments/modalities:           Assessment/Response/Plan    Functional Assessment Used        Referring provider co-signature:  I have reviewed this plan of care and my co-signature certifies the need for services.    Certification Period: 09/28/2022 to  {Future Date:24418}    Physician Signature: ________________________________ Date: ______________

## 2022-10-04 ENCOUNTER — OCCUPATIONAL THERAPY (OUTPATIENT)
Dept: OCCUPATIONAL THERAPY | Facility: REHABILITATION | Age: 57
End: 2022-10-04
Attending: PHYSICAL MEDICINE & REHABILITATION
Payer: COMMERCIAL

## 2022-10-04 DIAGNOSIS — I69.30 LATE EFFECTS OF CEREBRAL ISCHEMIC STROKE: ICD-10-CM

## 2022-10-04 PROCEDURE — 97110 THERAPEUTIC EXERCISES: CPT

## 2022-10-04 PROCEDURE — 97165 OT EVAL LOW COMPLEX 30 MIN: CPT

## 2022-10-04 SDOH — ECONOMIC STABILITY: GENERAL: QUALITY OF LIFE: EXCELLENT

## 2022-10-04 ASSESSMENT — BALANCE ASSESSMENTS
BALANCE - SITTING STATIC: NORMAL
BALANCE - STANDING DYNAMIC: FAIR +
BALANCE - SITTING DYNAMIC: NORMAL
WEIGHT SHIFT SITTING: NORMAL
BALANCE - STANDING STATIC: GOOD
WEIGHT SHIFT STANDING: GOOD

## 2022-10-04 ASSESSMENT — ENCOUNTER SYMPTOMS
ALLEVIATING FACTORS: REST
PAIN TIMING: SPORADIC
EXACERBATED BY: NOTHING
PAIN SCALE AT LOWEST: 0
QUALITY: NEEDLE-LIKE
CLUSTER HEADACHES: 1
PAIN SCALE: 0
PAIN SCALE AT HIGHEST: 2

## 2022-10-04 ASSESSMENT — ACTIVITIES OF DAILY LIVING (ADL)
POOR_BALANCE: 1
MONEY MANAGEMENT (EXPENSES/BILLS): INDEPENDENT
AMBULATION_WITH_ASSISTIVE_DEVICE: SUPERVISION
PREPARING MEALS: SUPERVISION
LAUNDRY: MINIMUM ASSIST
LIGHT HOUSEKEEPING: MODERATE ASSIST
USING THE TELPHONE: INDEPENDENT
SHOPPING: MAXIMUM ASSIST
MEDICATION_ADMINISTRATION: INDEPENDENT
WRITING: INDEPENDENT

## 2022-10-04 NOTE — OP THERAPY EVALUATION
"  Outpatient Occupational Therapy  INITIAL NEUROLOGICAL EVALUATION    [facility] Occupational Therapy [address]  [address]  [place] [address]  Phone:  [phone]  Fax:  [phone]    Date of Evaluation: 10/04/2022    Patient: Lana Phillips  YOB: 1965  MRN: [id number]     Referring Provider: Nika Mccormack D.O.  [address]  [address]  [place] [id number]   Referring Diagnosis Hemorrhagic stroke (HCC) [I61.9];Impaired balance as late effect of cerebrovascular accident [I69.398, R26.89];Impaired functional mobility, balance, gait, and endurance [Z74.09]     Time Calculation    Start time: 845  Stop time: 930 Time Calculation (min): 45 minutes             Chief Complaint: Self Care Duties    Visit Diagnoses     ICD-10-CM   1. Late effects of cerebral ischemic stroke  I69.30       Subjective:   History of Present Illness:     Date of onset:  2022    Date of surgery:  2022    Mechanism of injury:  Patient Identification: Lana Phillips is a 57 y.o. female with PMH significant for hypertension and rehabilitation history significant for right cerebellar hemorrhagic stroke 2022 and status post craniectomy with evacuation of IPH on the same day 2022.     History of Present Illness:    - Patient states that she is living with her two boys at home.   - Home health discharged her but she is still having balance issues and requires furniture surfing to go around the house.   - Patient uses walker in the community.   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  Was independent with all ADLs and drove  Headaches:  cluster headaches  Ear problems: none  Sleep disturbance:  Not disrupted  Pain:     Current pain ratin    At best pain ratin    At worst pain ratin    Location:  \"small headaches " "every now and then\"    Quality:  Needle-like    Pain timing:  Sporadic    Relieving factors:  Rest    Aggravating factors:  Nothing    Progression:  Unchanged  Social Support:     Lives in:  Multiple-level home    Lives with:  Adult children  Hand dominance:  Left  Diagnostic Tests:     CT scan: abnormal      Diagnostic Tests Comments:  CT head 8/2/2022  Large 4.6 x 3.4 cm intraparenchymal hematoma centered in the left cerebellar hemisphere. Associated mass effect results in effacement of the basal cisterns and fourth ventricle. No evidence of hydrocephalus.  Treatments:     Previous treatment:  Physical therapy, occupational therapy and speech therapy    Treatment Comments:  Awaiting PT evaluation.  Activities of Daily Living:     Patient reported ADL status: Mod I with all personal ADLs and light domestic tasks.  Needs assistance with heavier household tasks.  Independent with money management and keen to return to driving and improve her balance so she can return to work.    Patient Goals:     Patient goals for therapy:  Return to work, independence with ADLs/IADLs, improved balance and increased strength    No past medical history on file.  Past Surgical History:   Procedure Laterality Date    CRANIOTOMY  8/2/2022    Procedure: Posterior fossa craniectomy for evacuation of intraparenchymal hemorrhage;  Surgeon: Pedro Pablo Vences M.D.;  Location: SURGERY [facility];  Service: Neurosurgery     Social History     Tobacco Use    Smoking status: Never    Smokeless tobacco: Never   Substance Use Topics    Alcohol use: No     Family and Occupational History     Socioeconomic History    Marital status:      Spouse name: Not on file    Number of children: Not on file    Years of education: Not on file    Highest education level: Not on file   Occupational History    Not on file       Objective:   Active Range of Motion:   Upper extremity (left):     All left upper extremity active range of motion: All within " functional limits  Upper extremity (right):     All right upper extremity active range of motion: All within functional limits      Strength:     Left upper extremity strength within functional limits.      Right upper extremity strength within functional limits.      Strength Comments:   strength:  L=55 pounds  R = 51 pounds    Tone, Sensation and Coordination:   Tone:     Left upper extremity muscle tone: Normal    Right upper extremity muscle tone: Normal    Sensation   Upper extremity (left):     Light touch: Intact    Sharp/dull: Intact     Stereognosis: Intact     Proprioception: Intact   Upper extremity (right):     Light touch: Intact    Sharp/dull: Intact     Stereognosis: Intact     Proprioception: Intact     Coordination   Upper extremity (left):     Fine motor: Within functional limits    Gross motor: Within functional limits    Slow alternating movements: Within functional limits    Rapid alternating movements: Within functional limits    Finger to finger: Within functional limits    Finger touch to nose: Within functional limits  Upper extremity (right):     Fine motor: Within functional limits    Gross motor: Within functional limits    Slow alternating movements: Within functional limits    Rapid alternating movements: Within functional limits    Finger to finger: Within functional limits    Finger touch to nose: Within functional limits    Cognition:     Orientation: normal to time, normal to place, normal to person and normal to situation    Direction following: three step    Short term memory: intact    Long term memory: intact    Attention span: intact    Sequencing: intact    Organization: intact    Problem solving: intact    Judgement and safety awareness: intact    Hearing: intact    Cognition Comments:  Maze Task test+ 24 seconds    Vision/Perception:     Visual tracking: intact    Convergence: intact    Visual attentions: intact    Visual acuity: intact    Visual scanning: intact    R/L  discrimination: intact    R/L hemianopsia: not present    R/L neglect: not present    Spatial relations: intact    Vision assistive device(s): none    Vision/Perception Comments:   Bell's Test = 35/35 in under 3 minutes  Trail making Part B test = completed in 1 minute and 25 seconds; however had 4 errors.      Postural Control (Balance)     Sitting (static): Normal    Sitting (dynamic): Normal    Standing (static): Good    Standing (dynamic): Fair +    Weight shift (sitting): Normal    Weight shift (standing): Good    Ambulation: Level Surfaces   Ambulation with assistive device: supervision    Activities of Daily Living:     Household Management:     Housekeeping: moderate assist    Laundry: minimum assist    Meal preparation: supervision    Medication management: independent    Money management: independent    Shopping: maximum assist    Telephone use: independent    Writing: independent    Household Management Comments:  Tires easily when standing during meal prep, cooking and laundry.  Sister assists with changing bed linen and vacuuming/mopping tasks.       Therapeutic Exercises (CPT 69681):     1. Dynamic standing balance exercises    2. Firm theraband exercises for all extremities    Therapeutic Exercise Summary:  Initiated HEP with written, verbal and visual cues.  To complete 3 x a day      Time-based treatments/modalities:    Occupational Therapy Timed Treatment Charges  Therapeutic exercise minutes (CPT 56554): 15 minutes      Assessment, Response and Plan:   Impairments: activity intolerance, difficulty performing job, impaired balance, impaired physical strength, lacks appropriate home exercise program and limited ADL's    Assessment details:  Patient is a 56 y/o female S/P cerebellar CVA and craniectomy with evacuation of IPH on 8/2/22.  Patient is progressing well functionally and working towards independence.  Patient presenting with impaired endurance, impaired dynamic standing balance during  functional tasks and would like to return to driving and back to work.   Barriers to therapy:  None  Prognosis: good    Goals:   Short Term Goals:   Patient will initiate pre-driving training utilizing  simulator to ascertain safety prior to returning to driving  Patient will tolerate dynamic standing activity during functional task for at least 10 minutes  Patient will have Fair +/Good - dynamic standing balance during a functional task    Short term goal timespan:  2-4 weeks    Long Term Goals:   Patient will tolerate dynamic standing activity during functional tasks for at least 15 minutes  Patient will have Good dynamic standing balance during a functional task  Patient will be Mod I with laundry tasks  Patient will be Mod I with changing bed linen  Patient will score 15 seconds or less on the Maze Task test.    Long term goal timespan:  4-6 weeks    Plan:   Therapy options:  Occupational therapy treatment to continue  Planned therapy interventions:  Neuromuscular Re-education (CPT 49792), Self Care ADL Training (CPT 33473), Therapeutic Activities (CPT 67917) and Therapeutic Exercise (CPT 00829)  Frequency:  2x week  Duration in weeks:  6  Duration in visits:  12  Discussed with:  Patient and family    Functional Assessment Used  Maze Task test= 24 seconds, Trail Making Part B = under 2 minutes with 4 errors, SAFE = 8        Referring provider co-signature:  I have reviewed this plan of care and my co-signature certifies the need for services.    Certification Period: 10/04/2022 to  11/15/22    Physician Signature: ________________________________ Date: ______________

## 2022-10-13 ENCOUNTER — OFFICE VISIT (OUTPATIENT)
Dept: PHYSICAL MEDICINE AND REHAB | Facility: MEDICAL CENTER | Age: 57
End: 2022-10-13
Payer: COMMERCIAL

## 2022-10-13 VITALS
TEMPERATURE: 98.6 F | HEART RATE: 98 BPM | BODY MASS INDEX: 28.07 KG/M2 | SYSTOLIC BLOOD PRESSURE: 150 MMHG | RESPIRATION RATE: 17 BRPM | HEIGHT: 60 IN | OXYGEN SATURATION: 97 % | DIASTOLIC BLOOD PRESSURE: 82 MMHG | WEIGHT: 143 LBS

## 2022-10-13 DIAGNOSIS — R26.89 IMPAIRED BALANCE AS LATE EFFECT OF CEREBROVASCULAR ACCIDENT: ICD-10-CM

## 2022-10-13 DIAGNOSIS — I61.9 HEMORRHAGIC STROKE (HCC): Primary | ICD-10-CM

## 2022-10-13 DIAGNOSIS — I69.398 IMPAIRED BALANCE AS LATE EFFECT OF CEREBROVASCULAR ACCIDENT: ICD-10-CM

## 2022-10-13 DIAGNOSIS — I10 PRIMARY HYPERTENSION: ICD-10-CM

## 2022-10-13 PROCEDURE — 99214 OFFICE O/P EST MOD 30 MIN: CPT | Performed by: PHYSICAL MEDICINE & REHABILITATION

## 2022-10-13 ASSESSMENT — FIBROSIS 4 INDEX: FIB4 SCORE: 0.64

## 2022-10-13 NOTE — PROGRESS NOTES
[facility]  [facility] Neuro Rehabilitation [facility]  [address] [place] [address]  Ph: [phone]    FOLLOW UP PATIENT EVALUATION      Patient Name: Lana Phillips   Patient : 1965  Patient Age: 57 y.o.     Examining Physician: Dr. Nika Mccormack, DO    PM&R History to Date - Background Information:  Dates of Admission/Discharge to ARU: 2022 - 2022    SUBJECTIVE:   Patient Identification: Lana Phillips is a 57 y.o. female with PMH significant for hypertension and rehabilitation history significant for right cerebellar hemorrhagic stroke 2022 and is presenting to PM&R clinic for a FOLLOW UP OUTPATIENT evaluation with the following chief complaint/s:    Chief Complaint: Stroke Follow Up    History of Present Illness:    - Has not started PT yet. She is in OT with [facility].   - Dr. Vences would like her to get PT through [facility].   - Doesn't need a walker or cane any longer.   - Had an almost fall after waking up and she felt dizzy, but did not fall.   - Her BP was in the 200's the other day. She has a wrist cuff which she is using to control her BP.   - She is cooking for herself.   - Constipation has resolved.   - Is unable to safely return to work    Review of Systems:  All other pertinent positive review of systems are noted above in HPI.   All other systems reviewed and are negative.    Past Medical History:  No past medical history on file.   Past Surgical History:   Procedure Laterality Date    CRANIOTOMY  2022    Procedure: Posterior fossa craniectomy for evacuation of intraparenchymal hemorrhage;  Surgeon: Pedro Pablo Vences M.D.;  Location: SURGERY [facility];  Service: Neurosurgery        Current Outpatient Medications:     amLODIPine (NORVASC) 10 MG Tab, Take 1 Tablet by mouth every day., Disp: 30 Tablet, Rfl: 2    lisinopril (PRINIVIL) 40 MG tablet, Take 1 Tablet by mouth every day., Disp: 30 Tablet, Rfl: 2    metFORMIN (GLUCOPHAGE) 850 MG Tab, Take 1 Tablet by mouth 2 times a day with  meals., Disp: 60 Tablet, Rfl: 2    SITagliptin (JANUVIA) 50 MG Tab, Take 1 Tablet by mouth every day., Disp: 30 Tablet, Rfl: 2    atorvastatin (LIPITOR) 20 MG Tab, Take 1 Tablet by mouth every evening., Disp: 30 Tablet, Rfl: 2    scopolamine (TRANSDERM-SCOP) 1 mg/72hr PATCH 72 HR, Place 1 Patch on the skin every 72 hours., Disp: 10 Patch, Rfl: 2    vitamin D2, Ergocalciferol, (DRISDOL) 1.25 MG (00088 UT) Cap capsule, Take 1 Capsule by mouth every 7 days. (Patient not taking: Reported on 10/13/2022), Disp: 12 Capsule, Rfl: 0    sodium chloride (SALT) 1 GM Tab, Take 1 Tablet by mouth 3 times a day with meals., Disp: 90 Tablet, Rfl: 2    docusate sodium 100 MG Cap, Take 100 mg by mouth 2 times a day., Disp: 60 Capsule, Rfl:     senna-docusate (PERICOLACE OR SENOKOT S) 8.6-50 MG Tab, Take 1 Tablet by mouth every evening. (Patient not taking: Reported on 10/13/2022), Disp: 30 Tablet, Rfl: 0  No Known Allergies     Past Social History:  Social History     Socioeconomic History    Marital status:      Spouse name: Not on file    Number of children: Not on file    Years of education: Not on file    Highest education level: Not on file   Occupational History    Not on file   Tobacco Use    Smoking status: Never    Smokeless tobacco: Never   Vaping Use    Vaping Use: Never used   Substance and Sexual Activity    Alcohol use: No    Drug use: No    Sexual activity: Not on file   Other Topics Concern    Not on file   Social History Narrative    Not on file     Social Determinants of Health     Financial Resource Strain: Not on file   Food Insecurity: Not on file   Transportation Needs: Not on file   Physical Activity: Not on file   Stress: Not on file   Social Connections: Not on file   Intimate Partner Violence: Not on file   Housing Stability: Not on file        Family History:  Family History   Problem Relation Age of Onset    Stroke Mother     Heart Disease Father        Depression and Opioid  Screening  PHQ-9:  Depression Screen (PHQ-2/PHQ-9) 8/4/2022 8/5/2022 8/12/2022   PHQ-2 Total Score 0 0 0     Interpretation of PHQ-9 Total Score   Score Severity   1-4 No Depression   5-9 Mild Depression   10-14 Moderate Depression   15-19 Moderately Severe Depression   20-27 Severe Depression     OBJECTIVE:   Vital Signs:  Vitals:    10/13/22 1533   BP: (!) 150/82   Pulse: 98   Resp: 17   Temp: 37 °C (98.6 °F)   SpO2: 97%        Pertinent Labs:  Lab Results   Component Value Date/Time    SODIUM 138 08/16/2022 05:26 AM    POTASSIUM 4.2 08/16/2022 05:26 AM    CHLORIDE 102 08/16/2022 05:26 AM    CO2 25 08/16/2022 05:26 AM    GLUCOSE 98 08/16/2022 05:26 AM    BUN 9 08/16/2022 05:26 AM    CREATININE 0.42 (L) 08/16/2022 05:26 AM       Lab Results   Component Value Date/Time    HBA1C 7.1 (H) 08/02/2022 07:43 PM       Lab Results   Component Value Date/Time    WBC 10.1 08/13/2022 05:37 AM    RBC 4.41 08/13/2022 05:37 AM    HEMOGLOBIN 13.3 08/13/2022 05:37 AM    HEMATOCRIT 40.5 08/13/2022 05:37 AM    MCV 91.8 08/13/2022 05:37 AM    MCH 30.2 08/13/2022 05:37 AM    MCHC 32.8 (L) 08/13/2022 05:37 AM    MPV 10.2 08/13/2022 05:37 AM    NEUTSPOLYS 61.20 08/13/2022 05:37 AM    LYMPHOCYTES 24.60 08/13/2022 05:37 AM    MONOCYTES 7.40 08/13/2022 05:37 AM    EOSINOPHILS 2.20 08/13/2022 05:37 AM    BASOPHILS 0.90 08/13/2022 05:37 AM       Lab Results   Component Value Date/Time    ASTSGOT 22 08/13/2022 05:37 AM    ALTSGPT 48 08/13/2022 05:37 AM        Physical Exam:   GEN: No apparent distress  HEENT: Head normocephalic, atraumatic.  Sclera nonicteric bilaterally, no ocular discharge appreciated bilaterally.  CV: Extremities warm and well-perfused, no peripheral edema appreciated bilaterally.  PULMONARY: Breathing nonlabored on room air, no respiratory accessory muscle use.  Not requiring supplemental oxygen.  SKIN: No appreciable skin breakdown on exposed areas of skin.  PSYCH: Mood and affect within normal limits.  NEURO: Awake  alert.  Conversational.  Logical thought content.  Ambulatory without assistive device.  Romberg's positive.  Balance impaired.    Motor Exam Upper Extremities   ? Myotome R L   Shoulder Abduction C5 5 5   Elbow flexion C5 5 5   Wrist extension C6 5 5   Elbow extension C7 5 5   Finger flexion C8 5 5   Finger abduction T1 5 5     Motor Exam Lower Extremities  ? Myotome R L   Hip flexion L2 5 5   Knee extension L3 5 5   Ankle dorsiflexion L4 5 5   Toe extension L5 5 5   Ankle plantarflexion S1 5 5     Chauncey's negative bilaterally  Finger-nose-finger intact bilaterally  Right nystagmus    ASSESSMENT/PLAN: Lana Phillips  is a 57 y.o. female with rehabilitation history significant for right cerebellar hemorrhagic stroke 8/2/2022, here for evaluation. The following plan was discussed with the patient who is in agreement.     Visit Diagnoses     ICD-10-CM   1. Hemorrhagic stroke (HCC)  I61.9   2. Impaired balance as late effect of cerebrovascular accident  I69.398    R26.89   3. Primary hypertension  I10        Sister assists with history.    Rehab/Neuro:   Right cerebellar hemorrhagic stroke 8/2/2022  Impaired balance  Dizziness  - Therapy: Continue OT through [facility], will assess driving as well.  - Therapy: Will initiate physical therapy at [facility]  - Has follow-up with Dr. 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  - At this time I recommend that the patient not return to work.  She has not even initiated physical therapy.  She cannot stand for 10 hours at a time to complete a standard shift.  She is at high risk for falls.  -Function: More independent than she had been prior.  Does not require assistive device and can make her self meals.  However she has continued weakness and instability globally.  -Time spent drafting letter to excuse patient from work while she continues her recovery.    Neurogenic Bowel:    Constipation  -Resolved.    Hypertension:  - Patient reports that her blood pressure is going up into the 200s at times.  - Counseled extensively on diet modification.  - Blood pressure today was high in the 150s.  - Given her history of hemorrhagic stroke, referral to vascular medicine to assist with blood pressure control.      Follow up: 3 months for reevaluation of return to work.    Total time spent was 38 minutes.  Included in this time is the time spent preparing for the visit including record review, my exam and evaluation, counseling and education regarding that which is aforementioned in the assessment and plan.  Time was spent documenting into patient's electronic health record.  Time was spent ordering the appropriate labs, tests, procedures, referrals, medications. Included this time as the time spent obtaining history from someone other than the patient.  Some of the time included occurred outside of charting time.  Discussion involved the patient and sister.      Please note that this dictation was created using voice recognition software. I have made every reasonable attempt to correct obvious errors but there may be errors of grammar and content that I may have overlooked prior to finalization of this note.    Dr. Nika Mccormack DO, MS  Department of Physical Medicine & Rehabilitation  Neuro Rehabilitation Clinic  [facility]

## 2022-10-13 NOTE — LETTER
Re: Lana Phillips  DOS: 10/13/2022     To Whom It May Concern,     Lana Phillips is not medically cleared to return to work. At this time there are not any reasonable accommodations that would allow her to return to work. She is still undergoing treatment which will give her the best chance to fully recover and return to work. I recommend she remain out of work to focus on recovery at least until 2023 at which time we will re-evaluate her.     If there are questions or concerns, please do not hesitate to contact our office at the [phone]      Dr. Nika Costello DO, MS  Department of Physical Medicine & Rehabilitation  Neuro Rehabilitation Clinic  [facility]

## 2022-10-14 ENCOUNTER — OCCUPATIONAL THERAPY (OUTPATIENT)
Dept: OCCUPATIONAL THERAPY | Facility: REHABILITATION | Age: 57
End: 2022-10-14
Attending: PHYSICAL MEDICINE & REHABILITATION
Payer: COMMERCIAL

## 2022-10-14 DIAGNOSIS — I69.30 LATE EFFECTS OF CEREBRAL ISCHEMIC STROKE: ICD-10-CM

## 2022-10-14 PROCEDURE — 97110 THERAPEUTIC EXERCISES: CPT

## 2022-10-14 PROCEDURE — 97530 THERAPEUTIC ACTIVITIES: CPT

## 2022-10-14 NOTE — OP THERAPY DAILY TREATMENT
Outpatient Occupational Therapy  DAILY TREATMENT     [facility] Occupational Therapy [address]  [address]  [place] [address]  Phone:  [phone]  Fax:  [phone]    Date: 10/14/2022    Patient: Lana Phillips  YOB: 1965  MRN: [id number]     Time Calculation  Start time: 1100  Stop time: 1145 Time Calculation (min): 45 minutes         Chief Complaint: Self Care Duties and Extremity Weakness    Visit #: 2    SUBJECTIVE:  My blood pressure is still a bit high and I am monitoring it every morning and evening.      OBJECTIVE:  Current objective measures:   Active Range of Motion:   Upper extremity (left):     All left upper extremity active range of motion: All within functional limits  Upper extremity (right):     All right upper extremity active range of motion: All within functional limits        Strength:     Left upper extremity strength within functional limits.      Right upper extremity strength within functional limits.       Strength Comments:   strength:  L=55 pounds  R = 51 pounds     Tone, Sensation and Coordination:   Tone:     Left upper extremity muscle tone: Normal    Right upper extremity muscle tone: Normal     Sensation   Upper extremity (left):     Light touch: Intact    Sharp/dull: Intact     Stereognosis: Intact     Proprioception: Intact   Upper extremity (right):     Light touch: Intact    Sharp/dull: Intact     Stereognosis: Intact     Proprioception: Intact      Coordination   Upper extremity (left):     Fine motor: Within functional limits    Gross motor: Within functional limits    Slow alternating movements: Within functional limits    Rapid alternating movements: Within functional limits    Finger to finger: Within functional limits    Finger touch to nose: Within functional limits  Upper extremity (right):     Fine motor: Within functional limits    Gross motor: Within functional limits    Slow alternating movements: Within functional limits     Rapid alternating movements: Within functional limits    Finger to finger: Within functional limits    Finger touch to nose: Within functional limits     Cognition:     Orientation: normal to time, normal to place, normal to person and normal to situation    Direction following: three step    Short term memory: intact    Long term memory: intact    Attention span: intact    Sequencing: intact    Organization: intact    Problem solving: intact    Judgement and safety awareness: intact    Hearing: intact     Cognition Comments:  Maze Task test+ 24 seconds     Vision/Perception:     Visual tracking: intact    Convergence: intact    Visual attentions: intact    Visual acuity: intact    Visual scanning: intact    R/L discrimination: intact    R/L hemianopsia: not present    R/L neglect: not present    Spatial relations: intact    Vision assistive device(s): none     Vision/Perception Comments:   Bell's Test = 35/35 in under 3 minutes  Trail making Part B test = completed in 1 minute and 25 seconds; however had 4 errors.       Postural Control (Balance)     Sitting (static): Normal    Sitting (dynamic): Normal    Standing (static): Good    Standing (dynamic): Fair +    Weight shift (sitting): Normal    Weight shift (standing): Good     Ambulation: Level Surfaces   Ambulation with assistive device: supervision     Activities of Daily Living:      Household Management:     Housekeeping: moderate assist    Laundry: minimum assist    Meal preparation: supervision    Medication management: independent    Money management: independent    Shopping: maximum assist    Telephone use: independent    Writing: independent     Household Management Comments:  Tires easily when standing during meal prep, cooking and laundry.  Sister assists with changing bed linen and vacuuming/mopping tasks.         Therapeutic Exercises (CPT 12639):     1. Nu-step, Level 2 for 10 minutes    Therapeutic Exercise Summary:  Patient stated she has been  completing HEP.        Therapeutic Treatments and Modalities:    1. Therapeutic Activities (CPT 61447)    Therapeutic Treatments and Modalities Summary: Initiated pre-driving training using simulator in both rural and city settings.  Patient displayed good reaction times and anticipatory skills; however had difficulty staying in jenny and making turns.  Will benefit with continued practice  Time-based treatments/modalities:  Therapeutic exercise minutes (CPT 14937): 10 minutes  Therapeutic activity minutes (CPT 42616): 35 minutes        Pain rating before treatment: 0  Pain rating after treatment: 0    ASSESSMENT:   Response to treatment: Progressing well with treatment and adhering to HEP    PLAN/RECOMMENDATIONS:   Plan for treatment: therapy treatment to continue next visit.  Planned interventions for next visit: continue with current treatment, neuromuscular re-education (CPT 60663), self care ADL training (CPT 85578), therapeutic activities (CPT 38614), and therapeutic exercise (CPT 10057)

## 2022-10-18 ENCOUNTER — OCCUPATIONAL THERAPY (OUTPATIENT)
Dept: OCCUPATIONAL THERAPY | Facility: REHABILITATION | Age: 57
End: 2022-10-18
Attending: PHYSICAL MEDICINE & REHABILITATION
Payer: COMMERCIAL

## 2022-10-18 DIAGNOSIS — I69.30 LATE EFFECTS OF CEREBRAL ISCHEMIC STROKE: ICD-10-CM

## 2022-10-18 PROCEDURE — 97530 THERAPEUTIC ACTIVITIES: CPT

## 2022-10-18 NOTE — OP THERAPY DAILY TREATMENT
Outpatient Occupational Therapy  DAILY TREATMENT     [facility] Occupational Therapy [address]  [address]  [place] [address]  Phone:  [phone]  Fax:  [phone]    Date: 10/18/2022    Patient: Lana Phillips  YOB: 1965  MRN: [id number]     Time Calculation  Start time: 1100  Stop time: 1145 Time Calculation (min): 45 minutes         Chief Complaint: Self Care Duties    Visit #: 3    SUBJECTIVE:  I am doing my normal routine at home with no problems.  I haven't lost my balance yet.      OBJECTIVE:  Current objective measures:   Active Range of Motion:   Upper extremity (left):     All left upper extremity active range of motion: All within functional limits  Upper extremity (right):     All right upper extremity active range of motion: All within functional limits        Strength:     Left upper extremity strength within functional limits.      Right upper extremity strength within functional limits.       Strength Comments:   strength:  L=55 pounds  R = 51 pounds     Tone, Sensation and Coordination:   Tone:     Left upper extremity muscle tone: Normal    Right upper extremity muscle tone: Normal     Sensation   Upper extremity (left):     Light touch: Intact    Sharp/dull: Intact     Stereognosis: Intact     Proprioception: Intact   Upper extremity (right):     Light touch: Intact    Sharp/dull: Intact     Stereognosis: Intact     Proprioception: Intact      Coordination   Upper extremity (left):     Fine motor: Within functional limits    Gross motor: Within functional limits    Slow alternating movements: Within functional limits    Rapid alternating movements: Within functional limits    Finger to finger: Within functional limits    Finger touch to nose: Within functional limits  Upper extremity (right):     Fine motor: Within functional limits    Gross motor: Within functional limits    Slow alternating movements: Within functional limits    Rapid alternating movements:  Within functional limits    Finger to finger: Within functional limits    Finger touch to nose: Within functional limits     Cognition:     Orientation: normal to time, normal to place, normal to person and normal to situation    Direction following: three step    Short term memory: intact    Long term memory: intact    Attention span: intact    Sequencing: intact    Organization: intact    Problem solving: intact    Judgement and safety awareness: intact    Hearing: intact     Cognition Comments:  Maze Task test=16 seconds     Vision/Perception:     Visual tracking: intact    Convergence: intact    Visual attentions: intact    Visual acuity: intact    Visual scanning: intact    R/L discrimination: intact    R/L hemianopsia: not present    R/L neglect: not present    Spatial relations: intact    Vision assistive device(s): none     Vision/Perception Comments:   Bell's Test = 35/35 in under 3 minutes  Trail making Part B test = completed in 1 minute and 25 seconds; with no errors.     Postural Control (Balance)     Sitting (static): Normal    Sitting (dynamic): Normal    Standing (static): Good    Standing (dynamic): Fair +    Weight shift (sitting): Normal    Weight shift (standing): Good     Ambulation: Level Surfaces   Ambulation with assistive device: supervision     Activities of Daily Living:      Household Management:     Housekeeping: moderate assist    Laundry: minimum assist    Meal preparation: supervision    Medication management: independent    Money management: independent    Shopping: maximum assist    Telephone use: independent    Writing: independent     Household Management Comments:  Tires easily when standing during meal prep, cooking and laundry.  Sister assists with changing bed linen and vacuuming/mopping tasks.         Therapeutic Treatments and Modalities:    1. Therapeutic Activities (CPT 57231)    Therapeutic Treatments and Modalities Summary: Initiated pre-driving training using simulator  in both rural and city settings.  Patient displayed good reaction times and anticipatory skills; however had difficulty staying in jenny and use of signals.  Distracted at times.  Better with turning today.  Will benefit with continued practice  Completed trail Making part B test  and Maze Task test.    Time-based treatments/modalities:  Therapeutic activity minutes (CPT 66810): 45 minutes        Pain rating before treatment: 0  Pain rating after treatment: 0    ASSESSMENT:   Response to treatment: improved scores with Trail Making Part B and Maze task test.  Needs continued practice with driving simulator to work on divided and focusing of attention when multiple issues happening at one time.      PLAN/RECOMMENDATIONS:   Plan for treatment: therapy treatment to continue next visit.  Planned interventions for next visit: continue with current treatment, neuromuscular re-education (CPT 34080), self care ADL training (CPT 97968), therapeutic activities (CPT 51203), and therapeutic exercise (CPT 37509)

## 2022-10-20 ENCOUNTER — OCCUPATIONAL THERAPY (OUTPATIENT)
Dept: OCCUPATIONAL THERAPY | Facility: REHABILITATION | Age: 57
End: 2022-10-20
Attending: PHYSICAL MEDICINE & REHABILITATION
Payer: COMMERCIAL

## 2022-10-20 DIAGNOSIS — I69.30 LATE EFFECTS OF CEREBRAL ISCHEMIC STROKE: ICD-10-CM

## 2022-10-20 PROCEDURE — 97530 THERAPEUTIC ACTIVITIES: CPT

## 2022-10-20 NOTE — OP THERAPY DAILY TREATMENT
Outpatient Occupational Therapy  DAILY TREATMENT     [facility] Occupational Therapy [address]  [address]  [place] [address]  Phone:  [phone]  Fax:  [phone]    Date: 10/20/2022    Patient: Lana Phillips  YOB: 1965  MRN: [id number]     Time Calculation  Start time: 1100  Stop time: 1145 Time Calculation (min): 45 minutes         Chief Complaint: Self Care Duties    Visit #: 4    SUBJECTIVE:  I started PT at [facility] [place]    OBJECTIVE:  Current objective measures:   Active Range of Motion:   Upper extremity (left):     All left upper extremity active range of motion: All within functional limits  Upper extremity (right):     All right upper extremity active range of motion: All within functional limits        Strength:     Left upper extremity strength within functional limits.      Right upper extremity strength within functional limits.       Strength Comments:   strength:  L=55 pounds  R = 51 pounds     Tone, Sensation and Coordination:   Tone:     Left upper extremity muscle tone: Normal    Right upper extremity muscle tone: Normal     Sensation   Upper extremity (left):     Light touch: Intact    Sharp/dull: Intact     Stereognosis: Intact     Proprioception: Intact   Upper extremity (right):     Light touch: Intact    Sharp/dull: Intact     Stereognosis: Intact     Proprioception: Intact      Coordination   Upper extremity (left):     Fine motor: Within functional limits    Gross motor: Within functional limits    Slow alternating movements: Within functional limits    Rapid alternating movements: Within functional limits    Finger to finger: Within functional limits    Finger touch to nose: Within functional limits  Upper extremity (right):     Fine motor: Within functional limits    Gross motor: Within functional limits    Slow alternating movements: Within functional limits    Rapid alternating movements: Within functional limits    Finger to finger: Within  functional limits    Finger touch to nose: Within functional limits     Cognition:     Orientation: normal to time, normal to place, normal to person and normal to situation    Direction following: three step    Short term memory: intact    Long term memory: intact    Attention span: intact    Sequencing: intact    Organization: intact    Problem solving: intact    Judgement and safety awareness: intact    Hearing: intact     Cognition Comments:  Maze Task test=16 seconds     Vision/Perception:     Visual tracking: intact    Convergence: intact    Visual attentions: intact    Visual acuity: intact    Visual scanning: intact    R/L discrimination: intact    R/L hemianopsia: not present    R/L neglect: not present    Spatial relations: intact    Vision assistive device(s): none     Vision/Perception Comments:   Bell's Test = 35/35 in under 3 minutes  Trail making Part B test = completed in 1 minute and 25 seconds; with no errors.     Postural Control (Balance)     Sitting (static): Normal    Sitting (dynamic): Normal    Standing (static): Good    Standing (dynamic): Fair +    Weight shift (sitting): Normal    Weight shift (standing): Good     Ambulation: Level Surfaces   Ambulation with assistive device: supervision     Activities of Daily Living:      Household Management:     Housekeeping: moderate assist    Laundry: minimum assist    Meal preparation: supervision    Medication management: independent    Money management: independent    Shopping: maximum assist    Telephone use: independent    Writing: independent     Household Management Comments:  Tires easily when standing during meal prep, cooking and laundry.  Sister assists with changing bed linen and vacuuming/mopping tasks.         Therapeutic Treatments and Modalities:    1. Therapeutic Activities (CPT 67551)    Therapeutic Treatments and Modalities Summary: Continued with pre-driving training using simulator in both rural and city settings.  Patient  displayed good reaction times and anticipatory skills.  Better at staying in jenny and overtaking vehicles along with better use of signals.  Recommended to patient and sister to start practice driving together in quiet and familiar area and will review on next visit.      Time-based treatments/modalities:  Therapeutic activity minutes (CPT 50104): 45 minutes        Pain rating before treatment: 0  Pain rating after treatment: 0    ASSESSMENT:   Response to treatment: improving driving skills to and to progress to practice driving with sister.      PLAN/RECOMMENDATIONS:   Plan for treatment: therapy treatment to continue next visit.  Planned interventions for next visit: continue with current treatment, neuromuscular re-education (CPT 64570), self care ADL training (CPT 62181), therapeutic activities (CPT 32271), and therapeutic exercise (CPT 76595)

## 2022-10-21 ENCOUNTER — TELEPHONE (OUTPATIENT)
Dept: VASCULAR LAB | Facility: MEDICAL CENTER | Age: 57
End: 2022-10-21
Payer: COMMERCIAL

## 2022-10-21 NOTE — TELEPHONE ENCOUNTER
Called and left VM for pt to call back to get scheduled for initial vascular med appt with Dr. Bloch. Next available and high priority top of wait list if needed!     ----- Message from Michael J Bloch, M.D. sent at 10/19/2022  9:24 AM PDT -----  Regarding: Referral  Please schedule initial visit with block when available.  Please put on the top of the cancellation list

## 2022-10-24 ENCOUNTER — OFFICE VISIT (OUTPATIENT)
Dept: INTERNAL MEDICINE | Facility: OTHER | Age: 57
End: 2022-10-24
Payer: COMMERCIAL

## 2022-10-24 VITALS
SYSTOLIC BLOOD PRESSURE: 139 MMHG | WEIGHT: 140.4 LBS | OXYGEN SATURATION: 97 % | HEIGHT: 60 IN | TEMPERATURE: 98.1 F | DIASTOLIC BLOOD PRESSURE: 90 MMHG | HEART RATE: 82 BPM | BODY MASS INDEX: 27.56 KG/M2

## 2022-10-24 DIAGNOSIS — Z86.73 HISTORY OF STROKE: ICD-10-CM

## 2022-10-24 DIAGNOSIS — E78.49 OTHER HYPERLIPIDEMIA: ICD-10-CM

## 2022-10-24 DIAGNOSIS — E55.9 VITAMIN D DEFICIENCY: ICD-10-CM

## 2022-10-24 DIAGNOSIS — I10 HYPERTENSION, UNSPECIFIED TYPE: ICD-10-CM

## 2022-10-24 DIAGNOSIS — E11.65 TYPE 2 DIABETES MELLITUS WITH HYPERGLYCEMIA, WITHOUT LONG-TERM CURRENT USE OF INSULIN (HCC): ICD-10-CM

## 2022-10-24 DIAGNOSIS — E53.8 VITAMIN B12 DEFICIENCY: ICD-10-CM

## 2022-10-24 DIAGNOSIS — I10 PRIMARY HYPERTENSION: ICD-10-CM

## 2022-10-24 PROCEDURE — 99214 OFFICE O/P EST MOD 30 MIN: CPT | Mod: GC

## 2022-10-24 RX ORDER — ERGOCALCIFEROL 1.25 MG/1
50000 CAPSULE ORAL
Qty: 12 CAPSULE | Refills: 0 | Status: SHIPPED | OUTPATIENT
Start: 2022-10-24 | End: 2022-11-10

## 2022-10-24 RX ORDER — AMLODIPINE BESYLATE 10 MG/1
10 TABLET ORAL DAILY
Qty: 30 TABLET | Refills: 2 | Status: SHIPPED | OUTPATIENT
Start: 2022-10-24 | End: 2022-11-10

## 2022-10-24 RX ORDER — ATORVASTATIN CALCIUM 20 MG/1
20 TABLET, FILM COATED ORAL EVERY EVENING
Qty: 30 TABLET | Refills: 2 | Status: SHIPPED | OUTPATIENT
Start: 2022-10-24 | End: 2022-11-10

## 2022-10-24 RX ORDER — BLOOD PRESSURE TEST KIT
1 KIT MISCELLANEOUS ONCE
Qty: 1 KIT | Refills: 0 | Status: SHIPPED | OUTPATIENT
Start: 2022-10-24 | End: 2022-10-24

## 2022-10-24 RX ORDER — LISINOPRIL 40 MG/1
40 TABLET ORAL DAILY
Qty: 30 TABLET | Refills: 2 | Status: SHIPPED | OUTPATIENT
Start: 2022-10-24 | End: 2022-11-10

## 2022-10-24 ASSESSMENT — FIBROSIS 4 INDEX: FIB4 SCORE: 0.64

## 2022-10-24 NOTE — PROGRESS NOTES
Chief Complaint   Patient presents with    Follow-Up     4 weeks, follow up on the stroke she has in august     Hypertension Follow-up    Medication Refill       HISTORY OF PRESENT ILLNESS: Patient is a 57 y.o. female established patient who presents today for the results of her blood work and refill of medications.  Patient states that her balance has improved a lot after PT sessions , has had no history of falls or injury and is doing well. Patient does not complain of headache ,dizziness, chest pain or dyspnea.     As advised from previous visit, patient was able to monitor her blood pressure at home which is in range of 150 over 90s, however this is with the use of a wrist cuff.  Patient has had no headache, no dizziness, no nausea and no vomiting.She has been compliant with her medications including amlodipine, lisinopril, metformin and Januvia.  Patient does not complain of any hypoglycemic symptoms/episodes with her current regimen.         No past medical history on file.    Patient Active Problem List    Diagnosis Date Noted    Hemorrhagic stroke (HCC) 08/12/2022    Impaired mobility and ADLs 08/12/2022    Other hyperlipidemia 08/12/2022    Type 2 diabetes mellitus with hyperglycemia, without long-term current use of insulin (HCC) 08/09/2022    Intracranial hemorrhage (HCC) 08/04/2022    Hypertension 08/03/2022    Cerebellar hemorrhage, acute ([facility]) 08/02/2022       Patient has no known allergies.    Current Outpatient Medications   Medication Sig Dispense Refill    Blood Pressure Kit 1 Each one time for 1 dose. 1 Kit 0    vitamin D2, Ergocalciferol, (DRISDOL) 1.25 MG (62491 UT) Cap capsule Take 1 Capsule by mouth every 7 days. 12 Capsule 0    amLODIPine (NORVASC) 10 MG Tab Take 1 Tablet by mouth every day. 30 Tablet 2    lisinopril (PRINIVIL) 40 MG tablet Take 1 Tablet by mouth every day. 30 Tablet 2    metFORMIN (GLUCOPHAGE) 850 MG Tab Take 1 Tablet by mouth 2 times a day with meals. 60 Tablet 2     SITagliptin (JANUVIA) 50 MG Tab Take 1 Tablet by mouth every day. 30 Tablet 2    sodium chloride (SALT) 1 GM Tab Take 1 Tablet by mouth 3 times a day with meals. 90 Tablet 2    atorvastatin (LIPITOR) 20 MG Tab Take 1 Tablet by mouth every evening. 30 Tablet 2    scopolamine (TRANSDERM-SCOP) 1 mg/72hr PATCH 72 HR Place 1 Patch on the skin every 72 hours. 10 Patch 2    docusate sodium 100 MG Cap Take 100 mg by mouth 2 times a day. 60 Capsule     senna-docusate (PERICOLACE OR SENOKOT S) 8.6-50 MG Tab Take 1 Tablet by mouth every evening. 30 Tablet 0     No current facility-administered medications for this visit.       Social History     Tobacco Use    Smoking status: Never    Smokeless tobacco: Never   Vaping Use    Vaping Use: Never used   Substance Use Topics    Alcohol use: No    Drug use: No       Family History   Problem Relation Age of Onset    Stroke Mother     Heart Disease Father      Review of Systems   All other systems reviewed and are negative.    Exam:  BP (!) 139/90 (BP Location: Right arm, Patient Position: Sitting, BP Cuff Size: Adult)   Pulse 82   Temp 36.7 °C (98.1 °F) (Temporal)   Ht 1.524 m (5')   Wt 63.7 kg (140 lb 6.4 oz)   SpO2 97%  Body mass index is 27.42 kg/m².    Constitutional:  Not in acute distress, well appearing.  HEENT:   NC/AT  Cardiovascular: Regular rate and rhythm. No murmurs or gallops.      Lungs:   Clear to auscultation bilaterally. No wheezes or crackles. No respiratory distress.  Abdomen: Not distended, soft, not tender. No guarding or rigidity. No masses.  Extremities:  No cyanosis/clubbing/edema.  No noted wounds on bilateral feet /legs, sensation intact on both feet  Skin:  Warm and dry.  No visible rashes.  Neurologic: Alert & oriented x 3, CN II-XII grossly intact, strength and sensation grossly intact.  No focal deficits noted.  Negative dysmetria, negative dysdiadochokinesia, no abnormalities in gait noted.   Psychiatric:  Affect normal, mood normal, judgment  normal.    Assessment/Plan:      #Hypertension   -Needing close follow-up since patient has prior history of hemorrhagic stroke last 08/02/2022   -denies denies headache ,dizziness, nausea , vomiting   -Target blood pressure less than 130/80 in the setting of diabetes and prior history of stroke  -repeat bp was 125/80 with custom sized (pediatric) cuff, patient is at goal BP, (home bp readings above goal with use of wrist cuff which is not accurate) , continue with current medications lisinopril and amlodipine  -On physical examination, patient has normal neurological examination , no facial asymmetry muscle strength  grossly 5/5 in both upper and extremity and lower extremity and walk able to walk, stand without difficulty. Patient was seen walking in the examination room readily without losing balance   -No dysmetria and no dysdiadochokinesia  - prescribed with  blood pressure cuff with properly sized cuff  - taught  on proper blood pressure technique, to continue logging readings   -normal urine microalbumin creatinine ratio 09/11/22  -regularly follows neurology, physical medicine and rehabilitation, has PT/OT sessions  -patient already able to drive with a      #DM2  -Patient is compliant on metformin and Januvia  -no hypoglycemic symptoms such as dizziness, sweating, palpitations  -Vitamin B12 ordered  -computed ASCVD risk score of 7.1% with diabetic equivalent, continue current atorvastatin 20 mg  -No symptoms of neuropathy   -On physical examination no signs of wound/ulcers  -discussed with patient ophthalmology referral for diabetic retinopathy screening, will refer patient on her next visit next month after discussing with patient  - recommended dental consult    #Vitamin D deficiency  -Last Vitamin D 08/12/22 was 7  -Patient prescribed with vitamin D  - ordered VITAMIN D,25 HYDROXY (DEFICIENCY); Future    #Preventive care   -patient declined PCV and influenza shot  -All imaging results and lab  results and consult notes are reviewed at this visit  -will focus more on preventive care next visit    Followup: Return in about 4 weeks (around 11/21/2022).    Please note that this dictation was created using voice recognition software. I have made every reasonable attempt to correct obvious errors, but I expect that there are errors of grammar and possibly content that I did not discover before finalizing the note.    RUPINDER NELSON MD  PGY-1

## 2022-10-24 NOTE — PROGRESS NOTES
Teaching Physician Attestation      Level of Participation    I have personally interviewed and examined the patient.  In addition, I discussed with the resident physician the patient's history, exam, assessment and plan in detail.  Topics listed in my addendum were the focus of the visit.  Healthcare maintenance was not addressed this visit unless listed as a topic in my addendum.  I agree with the plan as written along with the following additions/modifications:      HTN in setting of prior hemmorhagic stroke  -denies HA or other sx.  Speech nl, smile symmetric, no pronator drift, able to stand readily without balance issue, finger to nose intact.  -repeat bp 125/80 with custom sized (pediatric) cuff, although home bp readings all above goal.  Patient reports she is using a wrist blood pressure cuff.  Will write prescription for traditional blood pressure cuff with properly sized cuff, counseled on proper blood pressure technique, patient will log readings and bring to next visit.  Since she is at goal in office, will continue with lisinopril and amlodipine for now.  BMP was normal within the last year after the start of lisinopril at current dose.  -ana/cr nl  -appreciate neuro, physiatry, and occupational therapy support      DM2  -no hypoglycemic sx.  At goal, cont met and januvia, check vit b12  -cont atorva 20.  -ascvd 7.1.  Although some data such as the sparkl trial indicates increased risk of hemorrhagic conversion on atorvastatin, subsequent literature such as [place] et al Stroke 2020 shows likely safe.  Will continue for now given risks due to dm and htn and was discharged on this after craniotomy - will reach out to neurosurgical/neurology team to ensure in agreement.  -ana/cr nl  -eye/dental rec  -foot exam per Dr. Flowers note.  -rec pcv 20 today, flu shot today, pt delcines.    Low Vitamin D  -will replete with [id number] once weekly x 6-8 weeks, repeat vit d then, unless patient has started vit  d repletion from hospital discharge, in which case recheck level now.  Dr. Flowers will clarify.    Rtc 2-4 weeks.

## 2022-10-26 ENCOUNTER — HOSPITAL ENCOUNTER (OUTPATIENT)
Dept: RADIOLOGY | Facility: MEDICAL CENTER | Age: 57
End: 2022-10-26
Attending: PHYSICIAN ASSISTANT
Payer: COMMERCIAL

## 2022-10-26 DIAGNOSIS — I61.4 NONTRAUMATIC INTRACEREBRAL HEMORRHAGE OF CEREBELLUM, UNSPECIFIED LATERALITY (HCC): ICD-10-CM

## 2022-10-26 PROCEDURE — 70450 CT HEAD/BRAIN W/O DYE: CPT

## 2022-10-27 ENCOUNTER — OCCUPATIONAL THERAPY (OUTPATIENT)
Dept: OCCUPATIONAL THERAPY | Facility: REHABILITATION | Age: 57
End: 2022-10-27
Attending: PHYSICAL MEDICINE & REHABILITATION
Payer: COMMERCIAL

## 2022-10-27 DIAGNOSIS — I69.30 LATE EFFECTS OF CEREBRAL ISCHEMIC STROKE: ICD-10-CM

## 2022-10-27 PROCEDURE — 97530 THERAPEUTIC ACTIVITIES: CPT

## 2022-10-27 NOTE — OP THERAPY DISCHARGE SUMMARY
Outpatient Occupational Therapy  DISCHARGE SUMMARY NOTE    [address]  [address]  [place] [address]  Phone:  [phone]  Fax:  [phone]    Date of Visit: 10/27/2022    Patient: Lana Phillips  YOB: 1965  MRN: [id number]     Referring Provider: Nika Mccormack D.O.  [address]  [address]  [place] [address]   Referring Diagnosis: Hemorrhagic stroke (HCC) [I61.9];Impaired balance as late effect of cerebrovascular accident [I69.398, R26.89];Impaired functional mobility, balance, gait, and endurance [Z74.09]         Functional Assessment Used:  UEFI = 72/80        Your patient is being discharged from Occupational Therapy with the following comments:   Goals met  Patient will initiate pre-driving training utilizing  simulator to ascertain safety prior to returning to driving  Patient will tolerate dynamic standing activity during functional task for at least 10 minutes  Patient will have Fair +/Good - dynamic standing balance during a functional task  Comments:  Patient did very well with therapy.  Current objective measures:   Active Range of Motion:   Upper extremity (left):     All left upper extremity active range of motion: All within functional limits  Upper extremity (right):     All right upper extremity active range of motion: All within functional limits        Strength:     Left upper extremity strength within functional limits.      Right upper extremity strength within functional limits.       Strength Comments:   strength:  L=55 pounds  R = 51 pounds     Tone, Sensation and Coordination:   Tone:     Left upper extremity muscle tone: Normal    Right upper extremity muscle tone: Normal     Sensation   Upper extremity (left):     Light touch: Intact    Sharp/dull: Intact     Stereognosis: Intact     Proprioception: Intact   Upper extremity (right):     Light touch: Intact    Sharp/dull: Intact     Stereognosis: Intact      Proprioception: Intact      Coordination   Upper extremity (left):     Fine motor: Within functional limits    Gross motor: Within functional limits    Slow alternating movements: Within functional limits    Rapid alternating movements: Within functional limits    Finger to finger: Within functional limits    Finger touch to nose: Within functional limits  Upper extremity (right):     Fine motor: Within functional limits    Gross motor: Within functional limits    Slow alternating movements: Within functional limits    Rapid alternating movements: Within functional limits    Finger to finger: Within functional limits    Finger touch to nose: Within functional limits     Cognition:     Orientation: normal to time, normal to place, normal to person and normal to situation    Direction following: three step    Short term memory: intact    Long term memory: intact    Attention span: intact    Sequencing: intact    Organization: intact    Problem solving: intact    Judgement and safety awareness: intact    Hearing: intact     Cognition Comments:  Maze Task test=15 seconds     Vision/Perception:     Visual tracking: intact    Convergence: intact    Visual attentions: intact    Visual acuity: intact    Visual scanning: intact    R/L discrimination: intact    R/L hemianopsia: not present    R/L neglect: not present    Spatial relations: intact    Vision assistive device(s): none     Vision/Perception Comments:   Bell's Test = 35/35 in under 3 minutes  Trail making Part B test = completed in 1 minute and 25 seconds; with no errors.     Postural Control (Balance)     Sitting (static): Normal    Sitting (dynamic): Normal    Standing (static): Good    Standing (dynamic): Fair +/Good -     Weight shift (sitting): Normal    Weight shift (standing): Good     Ambulation: Level Surfaces   Ambulation with assistive device: supervision     Activities of Daily Living:      Household Management:     Housekeeping: Mod I     Laundry:  Mod I     Meal preparation: Mod I     Medication management: independent    Money management: independent    Shopping: min A     Telephone use: independent    Writing: independent     Household Management Comments:  Now Mod I with all household tasks, min A shopping and now practice driving with her sister.         Limitations Remaining:  Functional mobility as patient shuffles her feet when ambulating.  Continue driving practice with sister    Recommendations:  Continue with HEP and PT    Sunita Leary, PASTORAR/L    Date: 10/27/2022

## 2022-10-27 NOTE — OP THERAPY DAILY TREATMENT
Outpatient Occupational Therapy  DAILY TREATMENT     [facility] Occupational Therapy [address].  [address]  [place] [address]  Phone:  [phone]  Fax:  [phone]    Date: 10/27/2022    Patient: Lana Phillips  YOB: 1965  MRN: [id number]     Time Calculation  Start time: 1015  Stop time: 1100 Time Calculation (min): 45 minutes         Chief Complaint: Self Care Duties    Visit #: 5    SUBJECTIVE:  I am doing really good at home, but not ready to go back to work yet.      OBJECTIVE:  Current objective measures:   Active Range of Motion:   Upper extremity (left):     All left upper extremity active range of motion: All within functional limits  Upper extremity (right):     All right upper extremity active range of motion: All within functional limits        Strength:     Left upper extremity strength within functional limits.      Right upper extremity strength within functional limits.       Strength Comments:   strength:  L=55 pounds  R = 51 pounds     Tone, Sensation and Coordination:   Tone:     Left upper extremity muscle tone: Normal    Right upper extremity muscle tone: Normal     Sensation   Upper extremity (left):     Light touch: Intact    Sharp/dull: Intact     Stereognosis: Intact     Proprioception: Intact   Upper extremity (right):     Light touch: Intact    Sharp/dull: Intact     Stereognosis: Intact     Proprioception: Intact      Coordination   Upper extremity (left):     Fine motor: Within functional limits    Gross motor: Within functional limits    Slow alternating movements: Within functional limits    Rapid alternating movements: Within functional limits    Finger to finger: Within functional limits    Finger touch to nose: Within functional limits  Upper extremity (right):     Fine motor: Within functional limits    Gross motor: Within functional limits    Slow alternating movements: Within functional limits    Rapid alternating movements: Within  functional limits    Finger to finger: Within functional limits    Finger touch to nose: Within functional limits     Cognition:     Orientation: normal to time, normal to place, normal to person and normal to situation    Direction following: three step    Short term memory: intact    Long term memory: intact    Attention span: intact    Sequencing: intact    Organization: intact    Problem solving: intact    Judgement and safety awareness: intact    Hearing: intact     Cognition Comments:  Maze Task test=15 seconds     Vision/Perception:     Visual tracking: intact    Convergence: intact    Visual attentions: intact    Visual acuity: intact    Visual scanning: intact    R/L discrimination: intact    R/L hemianopsia: not present    R/L neglect: not present    Spatial relations: intact    Vision assistive device(s): none     Vision/Perception Comments:   Bell's Test = 35/35 in under 3 minutes  Trail making Part B test = completed in 1 minute and 25 seconds; with no errors.     Postural Control (Balance)     Sitting (static): Normal    Sitting (dynamic): Normal    Standing (static): Good    Standing (dynamic): Fair +/Good -     Weight shift (sitting): Normal    Weight shift (standing): Good     Ambulation: Level Surfaces   Ambulation with assistive device: supervision     Activities of Daily Living:      Household Management:     Housekeeping: Mod I     Laundry: Mod I     Meal preparation: Mod I     Medication management: independent    Money management: independent    Shopping: min A     Telephone use: independent    Writing: independent     Household Management Comments:  Now Mod I with all household tasks, min A shopping and now practice driving with her sister.          Therapeutic Treatments and Modalities:    1. Therapeutic Activities (CPT 25928)    Therapeutic Treatments and Modalities Summary: Continued with pre-driving training using simulator in both rural and city settings.  Patient displayed good reaction  times and anticipatory skills.  Able to stay in jenny and safely maneuver around vehicles as needed with good use of signals.  Patient has started practice driving with sister during quiet times and on familiar roads.  Completed Maze Task Test.    Time-based treatments/modalities:  Therapeutic activity minutes (CPT 88270): 45 minutes        Pain rating before treatment: 0  Pain rating after treatment: 0    ASSESSMENT:   Response to treatment: All OT goals met.  To continue with PT for mobility and balance/LE strengthening.    PLAN/RECOMMENDATIONS:   Plan for treatment: therapy treatment to continue next visit.  Planned interventions for next visit:  Last therapy session today

## 2022-10-31 ENCOUNTER — APPOINTMENT (OUTPATIENT)
Dept: OCCUPATIONAL THERAPY | Facility: REHABILITATION | Age: 57
End: 2022-10-31
Attending: PHYSICAL MEDICINE & REHABILITATION
Payer: COMMERCIAL

## 2022-11-10 ENCOUNTER — OFFICE VISIT (OUTPATIENT)
Dept: VASCULAR LAB | Facility: MEDICAL CENTER | Age: 57
End: 2022-11-10
Attending: INTERNAL MEDICINE
Payer: COMMERCIAL

## 2022-11-10 VITALS
HEIGHT: 60 IN | SYSTOLIC BLOOD PRESSURE: 112 MMHG | HEART RATE: 81 BPM | WEIGHT: 141 LBS | DIASTOLIC BLOOD PRESSURE: 73 MMHG | BODY MASS INDEX: 27.68 KG/M2

## 2022-11-10 DIAGNOSIS — E78.49 OTHER HYPERLIPIDEMIA: ICD-10-CM

## 2022-11-10 DIAGNOSIS — I61.4 CEREBELLAR HEMORRHAGE, ACUTE (HCC): ICD-10-CM

## 2022-11-10 DIAGNOSIS — Z86.73 HISTORY OF STROKE: ICD-10-CM

## 2022-11-10 DIAGNOSIS — I10 PRIMARY HYPERTENSION: ICD-10-CM

## 2022-11-10 DIAGNOSIS — E11.65 TYPE 2 DIABETES MELLITUS WITH HYPERGLYCEMIA, WITHOUT LONG-TERM CURRENT USE OF INSULIN (HCC): ICD-10-CM

## 2022-11-10 PROCEDURE — 99212 OFFICE O/P EST SF 10 MIN: CPT

## 2022-11-10 PROCEDURE — 99204 OFFICE O/P NEW MOD 45 MIN: CPT | Performed by: INTERNAL MEDICINE

## 2022-11-10 RX ORDER — LISINOPRIL 40 MG/1
40 TABLET ORAL DAILY
Qty: 30 TABLET | Refills: 11 | Status: SHIPPED | OUTPATIENT
Start: 2022-11-10 | End: 2023-01-12 | Stop reason: SDUPTHER

## 2022-11-10 RX ORDER — ATORVASTATIN CALCIUM 20 MG/1
20 TABLET, FILM COATED ORAL EVERY EVENING
Qty: 30 TABLET | Refills: 11 | Status: SHIPPED | OUTPATIENT
Start: 2022-11-10 | End: 2023-01-12 | Stop reason: SDUPTHER

## 2022-11-10 RX ORDER — AMLODIPINE BESYLATE 10 MG/1
10 TABLET ORAL DAILY
Qty: 30 TABLET | Refills: 11 | Status: SHIPPED | OUTPATIENT
Start: 2022-11-10 | End: 2023-01-12 | Stop reason: SDUPTHER

## 2022-11-10 ASSESSMENT — FIBROSIS 4 INDEX: FIB4 SCORE: 0.64

## 2022-11-10 NOTE — PATIENT INSTRUCTIONS
Same medications for now (as attached).     Get a new arm BP cuff and start taking readings at home (see attached)    Get fasting blood work done before next visit (see attached)    Don't forget to make follow up appt with neurosurgeons    No salt tablets    Decrease salt and sugar in diet     Follow Up: Dec 16 at 1040a    Michael Bloch, MD  [facility]  [phone]   Yes

## 2022-11-10 NOTE — PROGRESS NOTES
VASCULAR MEDICINE CLINIC - INITIAL VISIT  11/10/22     Lana Phillips is a 57 y.o.  male who presents today  for   Chief Complaint   Patient presents with    Follow-Up      Subjective      HPI:  Patient referred for evaluation and management of htn, in setting of ICH, dyslipidemia and dm.  Patient presented in early August with large cerebellar hemorrhage with mass-effect which required emergent decompressive craniotomy.  She has been recovering slowly.  She still has some unsteadiness on her feet but has graduated from occupational therapy.  She recently had a CT scan.  She has not yet followed up with her neurosurgeon.  She does see PMR regularly.  She has no headache or other neurologic complaints aside from the unsteadiness on her feet  She stated that before presentation to the hospital she had not seen a doctor in 20 years.  She never been told she had high blood pressure.  Her blood pressure was exceedingly elevated on presentation.  She did have gestational diabetes 20 years ago.  She is on no medications at the time of her admission.  Since that time she has been started on statin 2 diabetes medicines.  She also has been taking 2 antihypertensive medications.  She denies any cough or leg swelling.  She describes good adherence.  She was told at the rehab to increase her salt intake and to take salt tablets.  No myalgias.  She is not doing fingersticks.  She has not been measuring her blood pressure at home    PMH - htn, ich,     Past Surgical History:   Procedure Laterality Date    CRANIOTOMY  8/2/2022    Procedure: Posterior fossa craniectomy for evacuation of intraparenchymal hemorrhage;  Surgeon: Pedro Pablo Vences M.D.;  Location: SURGERY [facility];  Service: Neurosurgery        Family History   Problem Relation Age of Onset    Stroke Mother     Heart Disease Father         Social History     Tobacco Use    Smoking status: Never    Smokeless tobacco: Never   Vaping Use    Vaping Use: Never used    Substance Use Topics    Alcohol use: No    Drug use: No        Current Outpatient Medications on File Prior to Visit   Medication Sig Dispense Refill    amLODIPine (NORVASC) 10 MG Tab Take 1 Tablet by mouth every day. 30 Tablet 2    atorvastatin (LIPITOR) 20 MG Tab Take 1 Tablet by mouth every evening. 30 Tablet 2    lisinopril (PRINIVIL) 40 MG tablet Take 1 Tablet by mouth every day. 30 Tablet 2    metFORMIN (GLUCOPHAGE) 850 MG Tab Take 1 Tablet by mouth 2 times a day with meals. 60 Tablet 2    SITagliptin (JANUVIA) 50 MG Tab Take 1 Tablet by mouth every day. 30 Tablet 2    vitamin D2, Ergocalciferol, (DRISDOL) 1.25 MG (23003 UT) Cap capsule Take 1 Capsule by mouth every 7 days. 12 Capsule 0    sodium chloride (SALT) 1 GM Tab Take 1 Tablet by mouth 3 times a day with meals. 90 Tablet 2     No current facility-administered medications on file prior to visit.        ALLERGIES  Patient has no known allergies.     DIET AND EXERCISE:  Weight Change: Stable  Diet: common adult  Exercise:  Working with PT              Objective     Objective:     Vitals:    11/10/22 1111   BP: 112/73   BP Location: Left arm   Patient Position: Sitting   BP Cuff Size: Adult   Pulse: 81   Weight: 64 kg (141 lb)   Height: 1.524 m (5')        Physical Exam  Vitals reviewed.   Constitutional:       General: She is not in acute distress.     Appearance: She is not diaphoretic.   HENT:      Head: Normocephalic and atraumatic.   Eyes:      General: No scleral icterus.     Conjunctiva/sclera: Conjunctivae normal.   Neck:      Vascular: No carotid bruit.   Cardiovascular:      Rate and Rhythm: Normal rate and regular rhythm.      Heart sounds: Normal heart sounds. No murmur heard.  Pulmonary:      Effort: Pulmonary effort is normal. No respiratory distress.      Breath sounds: Normal breath sounds. No wheezing or rales.   Musculoskeletal:      Right lower leg: No edema.      Left lower leg: No edema.   Skin:     Coloration: Skin is not pale.    Neurological:      General: No focal deficit present.      Mental Status: She is alert and oriented to person, place, and time.      Cranial Nerves: No cranial nerve deficit.      Coordination: Coordination normal.      Gait: Gait is intact.      Comments: Shuffling gait   Psychiatric:         Mood and Affect: Mood and affect normal.         Behavior: Behavior normal.      Comments: Perhaps a bit tangential        DATA REVIEW    Lab Results   Component Value Date/Time    CHOLSTRLTOT 166 08/02/2022 07:43 PM     (H) 08/02/2022 07:43 PM    HDL 46 08/02/2022 07:43 PM    TRIGLYCERIDE 78 08/02/2022 07:43 PM       Lab Results   Component Value Date/Time    SODIUM 138 08/16/2022 05:26 AM    POTASSIUM 4.2 08/16/2022 05:26 AM    CHLORIDE 102 08/16/2022 05:26 AM    CO2 25 08/16/2022 05:26 AM    GLUCOSE 98 08/16/2022 05:26 AM    BUN 9 08/16/2022 05:26 AM    CREATININE 0.42 (L) 08/16/2022 05:26 AM     Lab Results   Component Value Date/Time    ALKPHOSPHAT 89 08/13/2022 05:37 AM    ASTSGOT 22 08/13/2022 05:37 AM    ALTSGPT 48 08/13/2022 05:37 AM    TBILIRUBIN 0.5 08/13/2022 05:37 AM       Lab Results   Component Value Date/Time    HBA1C 7.1 (H) 08/02/2022 07:43 PM       Lab Results   Component Value Date/Time    MICRALB 5.5 09/11/2022 09:00 PM       Multiple imaging studies available in EMR and reviewed with patient at todays visit         Medical Decision Making:  Today's Assessment / Status / Plan:     1. Primary hypertension        2. Cerebellar hemorrhage, acute (HCC)        3. Other hyperlipidemia        4. Type 2 diabetes mellitus with hyperglycemia, without long-term current use of insulin (HCC)             Patient Type: Secondary Prevention    Etiology of Established CVD if Present:     Cerebellar intracerebral hemorrhage -no obvious aneurysm on imaging I was able to review.  Likely due to untreated hypertension.   -Medical management as per below   -Follow-up with neurosurgery and PMR for further  recommendations    Lipid Management: Qualifies for Statin Therapy Based on 2018 ACC/AHA Guidelines: yes  Calculated 10-Year Risk of ASCVD: N/A  Currently on Statin: Yes  Goal LDL less than 100 and non-HDL less than 130  Unclear level of control  -Continue atorvastatin 20 mg daily for now  -Repeat fasting lipid panel prior to next visit and adjust as needed    Blood Pressure Management:  [place] (2017) Blood Pressure Goal <130/80  Likely had hypertension prior to admission  ICH raises the possibility this was prior aldosteronism  Rule out other secondary causes  Blood pressure under reasonable control based in the office  Tolerating current medications  No obvious interfering substances  Plan:  -Continue lisinopril 40 mg a day  -Continue amlodipine at current dose  -Get new arm cuff and begin keeping a log  -Check renin, aldosterone, plasma free metanephrines, TSH, urinalysis, urine for albumin, GFR, electrolytes  -Consider CTA renal arteries if difficult to control in the future    Glycemic Status: Diabetic, type II  Does have history of gestational diabetes  Goal A1c less than 7.0  Unclear level of control  -Continue metformin  -Continue Januvia  -Recheck A1c  -Adjust pharmacotherapy based upon results    Anti-Platelet/Anti-Coagulant Tx: no  Avoid given history of ICH    Smoking: continue complete avoidance of all tobacco products    Physical Activity: Per PMR and physical therapy    Weight Management and Nutrition: Decrease sodium and stop salt tablets.  Less sugar      Instructed to follow-up with PCP for remainder of adult medical needs: yes  We will partner with other providers in the management of established vascular disease and cardiometabolic risk factors.    Studies to Be Obtained: Per neurosurgery  Labs to Be Obtained: As above prior to next visit    Follow up in: 6 weeks    Michael J Bloch, M.D.     Cc:   ARIELLE Mccormack

## 2022-11-16 ENCOUNTER — APPOINTMENT (OUTPATIENT)
Dept: PHYSICAL THERAPY | Facility: REHABILITATION | Age: 57
End: 2022-11-16
Attending: PHYSICAL MEDICINE & REHABILITATION
Payer: COMMERCIAL

## 2022-11-29 ENCOUNTER — DOCUMENTATION (OUTPATIENT)
Dept: VASCULAR LAB | Facility: MEDICAL CENTER | Age: 57
End: 2022-11-29
Payer: COMMERCIAL

## 2022-11-29 NOTE — PROGRESS NOTES
Multiple attempts to reach patient to reschedule 12/16 appt with APN. No answer and no vm. Appt will be cancelled at this time. We will not have a provider in office that day for her appt.

## 2022-11-30 ENCOUNTER — APPOINTMENT (OUTPATIENT)
Dept: PHYSICAL THERAPY | Facility: REHABILITATION | Age: 57
End: 2022-11-30
Attending: PHYSICAL MEDICINE & REHABILITATION
Payer: COMMERCIAL

## 2022-12-02 ENCOUNTER — APPOINTMENT (OUTPATIENT)
Dept: PHYSICAL THERAPY | Facility: REHABILITATION | Age: 57
End: 2022-12-02
Attending: PHYSICAL MEDICINE & REHABILITATION
Payer: COMMERCIAL

## 2022-12-05 ENCOUNTER — APPOINTMENT (OUTPATIENT)
Dept: PHYSICAL THERAPY | Facility: REHABILITATION | Age: 57
End: 2022-12-05
Attending: PHYSICAL MEDICINE & REHABILITATION
Payer: COMMERCIAL

## 2022-12-06 ENCOUNTER — APPOINTMENT (OUTPATIENT)
Dept: INTERNAL MEDICINE | Facility: OTHER | Age: 57
End: 2022-12-06
Payer: COMMERCIAL

## 2022-12-07 ENCOUNTER — APPOINTMENT (OUTPATIENT)
Dept: PHYSICAL THERAPY | Facility: REHABILITATION | Age: 57
End: 2022-12-07
Attending: PHYSICAL MEDICINE & REHABILITATION
Payer: COMMERCIAL

## 2022-12-08 ENCOUNTER — HOSPITAL ENCOUNTER (OUTPATIENT)
Dept: LAB | Facility: MEDICAL CENTER | Age: 57
End: 2022-12-08
Payer: COMMERCIAL

## 2022-12-08 ENCOUNTER — HOSPITAL ENCOUNTER (OUTPATIENT)
Dept: LAB | Facility: MEDICAL CENTER | Age: 57
End: 2022-12-08
Attending: INTERNAL MEDICINE
Payer: COMMERCIAL

## 2022-12-08 DIAGNOSIS — E78.49 OTHER HYPERLIPIDEMIA: ICD-10-CM

## 2022-12-08 DIAGNOSIS — E55.9 VITAMIN D DEFICIENCY: ICD-10-CM

## 2022-12-08 DIAGNOSIS — R53.83 FATIGUE, UNSPECIFIED TYPE: ICD-10-CM

## 2022-12-08 DIAGNOSIS — I10 PRIMARY HYPERTENSION: ICD-10-CM

## 2022-12-08 DIAGNOSIS — E11.65 TYPE 2 DIABETES MELLITUS WITH HYPERGLYCEMIA, WITHOUT LONG-TERM CURRENT USE OF INSULIN (HCC): ICD-10-CM

## 2022-12-08 LAB
25(OH)D3 SERPL-MCNC: 30 NG/ML (ref 30–100)
ALBUMIN SERPL BCP-MCNC: 4.4 G/DL (ref 3.2–4.9)
ALBUMIN/GLOB SERPL: 1.3 G/DL
ALP SERPL-CCNC: 86 U/L (ref 30–99)
ALT SERPL-CCNC: 23 U/L (ref 2–50)
ANION GAP SERPL CALC-SCNC: 12 MMOL/L (ref 7–16)
AST SERPL-CCNC: 17 U/L (ref 12–45)
BILIRUB SERPL-MCNC: 0.4 MG/DL (ref 0.1–1.5)
BUN SERPL-MCNC: 19 MG/DL (ref 8–22)
CALCIUM SERPL-MCNC: 9.8 MG/DL (ref 8.5–10.5)
CHLORIDE SERPL-SCNC: 105 MMOL/L (ref 96–112)
CHOLEST SERPL-MCNC: 171 MG/DL (ref 100–199)
CO2 SERPL-SCNC: 24 MMOL/L (ref 20–33)
CREAT SERPL-MCNC: 0.51 MG/DL (ref 0.5–1.4)
CREAT UR-MCNC: 27.9 MG/DL
EST. AVERAGE GLUCOSE BLD GHB EST-MCNC: 126 MG/DL
FASTING STATUS PATIENT QL REPORTED: NORMAL
GFR SERPLBLD CREATININE-BSD FMLA CKD-EPI: 108 ML/MIN/1.73 M 2
GLOBULIN SER CALC-MCNC: 3.4 G/DL (ref 1.9–3.5)
GLUCOSE SERPL-MCNC: 101 MG/DL (ref 65–99)
HBA1C MFR BLD: 6 % (ref 4–5.6)
HDLC SERPL-MCNC: 51 MG/DL
LDLC SERPL CALC-MCNC: 98 MG/DL
MICROALBUMIN UR-MCNC: <1.2 MG/DL
MICROALBUMIN/CREAT UR: NORMAL MG/G (ref 0–30)
POTASSIUM SERPL-SCNC: 3.8 MMOL/L (ref 3.6–5.5)
PROT SERPL-MCNC: 7.8 G/DL (ref 6–8.2)
SODIUM SERPL-SCNC: 141 MMOL/L (ref 135–145)
TRIGL SERPL-MCNC: 110 MG/DL (ref 0–149)
TSH SERPL DL<=0.005 MIU/L-ACNC: 1.31 UIU/ML (ref 0.38–5.33)
TSH SERPL DL<=0.005 MIU/L-ACNC: 1.32 UIU/ML (ref 0.38–5.33)
VIT B12 SERPL-MCNC: 611 PG/ML (ref 211–911)

## 2022-12-08 PROCEDURE — 82607 VITAMIN B-12: CPT

## 2022-12-08 PROCEDURE — 82043 UR ALBUMIN QUANTITATIVE: CPT

## 2022-12-08 PROCEDURE — 36415 COLL VENOUS BLD VENIPUNCTURE: CPT

## 2022-12-08 PROCEDURE — 82570 ASSAY OF URINE CREATININE: CPT

## 2022-12-08 PROCEDURE — 84443 ASSAY THYROID STIM HORMONE: CPT | Mod: 91

## 2022-12-08 PROCEDURE — 84443 ASSAY THYROID STIM HORMONE: CPT

## 2022-12-08 PROCEDURE — 83835 ASSAY OF METANEPHRINES: CPT

## 2022-12-08 PROCEDURE — 80061 LIPID PANEL: CPT

## 2022-12-08 PROCEDURE — 83036 HEMOGLOBIN GLYCOSYLATED A1C: CPT

## 2022-12-08 PROCEDURE — 80053 COMPREHEN METABOLIC PANEL: CPT

## 2022-12-08 PROCEDURE — 84244 ASSAY OF RENIN: CPT

## 2022-12-08 PROCEDURE — 82088 ASSAY OF ALDOSTERONE: CPT

## 2022-12-08 PROCEDURE — 82306 VITAMIN D 25 HYDROXY: CPT

## 2022-12-09 LAB — ALDOST SERPL-MCNC: 11.6 NG/DL

## 2022-12-12 ENCOUNTER — APPOINTMENT (OUTPATIENT)
Dept: PHYSICAL THERAPY | Facility: REHABILITATION | Age: 57
End: 2022-12-12
Attending: PHYSICAL MEDICINE & REHABILITATION
Payer: COMMERCIAL

## 2022-12-13 LAB
METANEPHS SERPL-SCNC: 0.26 NMOL/L (ref 0–0.49)
NORMETANEPHRINE SERPL-SCNC: 0.36 NMOL/L (ref 0–0.89)

## 2022-12-15 ENCOUNTER — OFFICE VISIT (OUTPATIENT)
Dept: INTERNAL MEDICINE | Facility: OTHER | Age: 57
End: 2022-12-15
Payer: COMMERCIAL

## 2022-12-15 VITALS
SYSTOLIC BLOOD PRESSURE: 140 MMHG | BODY MASS INDEX: 27.09 KG/M2 | WEIGHT: 138 LBS | DIASTOLIC BLOOD PRESSURE: 80 MMHG | OXYGEN SATURATION: 96 % | TEMPERATURE: 98.3 F | HEART RATE: 76 BPM | HEIGHT: 60 IN

## 2022-12-15 DIAGNOSIS — I61.9 HEMORRHAGIC STROKE (HCC): ICD-10-CM

## 2022-12-15 DIAGNOSIS — I10 HYPERTENSION, UNSPECIFIED TYPE: ICD-10-CM

## 2022-12-15 DIAGNOSIS — E11.65 TYPE 2 DIABETES MELLITUS WITH HYPERGLYCEMIA, WITHOUT LONG-TERM CURRENT USE OF INSULIN (HCC): ICD-10-CM

## 2022-12-15 PROBLEM — Z86.73 HISTORY OF CEREBELLAR STROKE: Status: ACTIVE | Noted: 2022-08-02

## 2022-12-15 PROCEDURE — 99214 OFFICE O/P EST MOD 30 MIN: CPT | Mod: GC

## 2022-12-15 ASSESSMENT — ENCOUNTER SYMPTOMS
FOCAL WEAKNESS: 0
EYES NEGATIVE: 1
HEADACHES: 0
RESPIRATORY NEGATIVE: 1
SEIZURES: 0
GASTROINTESTINAL NEGATIVE: 1
CARDIOVASCULAR NEGATIVE: 1
PSYCHIATRIC NEGATIVE: 1
MUSCULOSKELETAL NEGATIVE: 1

## 2022-12-15 ASSESSMENT — FIBROSIS 4 INDEX: FIB4 SCORE: 0.71

## 2022-12-15 NOTE — PATIENT INSTRUCTIONS
Please regularly check your blood pressure and log in your blood pressure  We will see you in 3 months, please go to your appointment with Dr. Bloch as well as Dr. Mccormack  We have referred you to ophthalmologist for screening for complications of diabetes

## 2022-12-15 NOTE — PROGRESS NOTES
Subjective                Lana Phillips is a very pleasant 57 y.o. female with past medical history of left cerebellar hemorrhagic stroke status post suboccipital craniectomy with evacuation of left cerebellar IPH duraplasty and C1 laminectomy on 08.02.22.  Since being discharged from [facility], patient has been stable and has been following up with neurosurgery, physical medicine and rehabilitation and has completed physical therapy/ occupational therapy sessions. Patient is also being followed by Dr. Bloch, recently seen on 11/10/22 and has a follow up appointment on 12/22/22.  Patient comes in for 1 month follow-up.  Patient has no specific concerns during his visit and states that she has been doing well and has not had any problems with balance.  Furthermore, patient is able to ambulate on her own without any use of assistive device.  She is also likewise having regular exercise and eating healthy diet.      Review of Systems   Constitutional:  Negative for chills and fever.   HENT: Negative.     Eyes: Negative.  Negative for blurred vision.   Respiratory: Negative.     Cardiovascular: Negative.  Negative for chest pain.   Gastrointestinal: Negative.    Genitourinary: Negative.    Musculoskeletal: Negative.    Skin: Negative.    Neurological:  Negative for sensory change, focal weakness, seizures, weakness and headaches.   Endo/Heme/Allergies: Negative.    Psychiatric/Behavioral: Negative.              Objective     BP (!) 140/80   Pulse 76   Temp 36.8 °C (98.3 °F) (Temporal)   Ht 1.524 m (5')   Wt 62.6 kg (138 lb)   SpO2 96%   BMI 26.95 kg/m²      Physical Exam     Constitutional:  Not in acute distress, well appearing.  HEENT:   NC/AT  Cardiovascular: Regular rate and rhythm. No murmurs or gallops.      Lungs:   Clear to auscultation bilaterally. No wheezes or crackles. No respiratory distress.  Abdomen: Not distended, soft, not tender. No guarding or rigidity. No  masses.  Extremities:  No cyanosis/clubbing/edema.  No noted wounds on bilateral feet /legs, sensation intact on both feet  Skin:  Warm and dry.  No visible rashes.  Neurologic: Alert & oriented x 3, CN II-XII grossly intact, strength and sensation grossly intact.  No focal deficits noted.  Negative dysmetria, negative dysdiadochokinesia, no abnormalities in gait noted.  Able to ambulate without use of any assistive device  Psychiatric:  Affect normal, mood normal, judgment normal.                   Assessment & Plan       History of Cerebellar intracerebral hemorrhage   -Likely secondary to untreated hypertension  -Has had no regular PCP prior to hospitalization  - optimize blood pressure control, hypertensive management as per below   -Goal LDL less than 100  - lipid panel 12/8/22 LDL 98, Total cholesterol 171, Trig 110, at goal   -Continue atorvastatin 20 mg, as patient is diabetic as well  -continue regular follow-up with Dr. Bloch, neurosurgery and PMR   -Patient has completed physical therapy and occupational Therapy sessions  -Patient has improved sense of balance, no weakness and able to do ambulate on her own without any assistive device  - c   2. Hypertension, unspecified type  -Blood Pressure Goal <130/80  -denies denies headache ,dizziness, nausea , vomiting and blurring of vision  -- repeat office blood pressure of 140/80 (patient took coffee before going to her visit,likely component of white coat hypertension as well) previous BP reading of 130/90. Noted blood pressure of 112/73 on 11/10/2022 during patient's visit with Dr. Bloch  --continue Amlodipine 10 mg,OD and Lisinopril 40 mg, OD  -Labs reviewed from 12/8/2022 revealed normal creatinine, electrolytes, no signs of end organ damage  -From chart review, patient was worked up for secondary causes of hypertension by Dr. Bloch, (hyperaldosteronism, pheochromocytoma)   - noted normal levels from labs done 12/16/22 aldosterone 11.6 , renin activity 0.5  , metanephrine 0.26, normetanephrine 0.36  -patient will be getting her blood pressure kit after today's visit, patient was given BP log and instructed how to properly take blood pressure,with correct BP cuff size, record BP readings and bring record on patient's next visit  -Extensively counseled on the importance of controlling blood pressure     3. Type 2 diabetes mellitus with hyperglycemia, without long-term current use of insulin ([facility])  - [id number] of 6.0 12/8/22 from 7.1 % ( 4 months ago)  -patient is at goal  -labs on 9/11/2022 microalbumin creatinine of 22  -12/08/22/2022 urine microalbumin less than 1.2 , urine creatinine 27.90  -Continue metformin 850 mg twice daily, Januvia 5 mg once daily  -Reports compliance  - Referral to Ophthalmology for diabetic retinopathy screening    4.  Preventive care   -Was recommended PCV 20 as well as influenza vaccine, patient politely declined  -discussed mammography and colonoscopy screening, patient politely declined    # Vitamin D level of 30  (12/8/2022) from 7 ( 8/13/22)  -Was given a course of vitamin D 50,000 weekly, discontinued       Follow-up in 1 to 2 months with repeat BMP

## 2022-12-16 PROBLEM — Z74.09 IMPAIRED MOBILITY AND ADLS: Status: RESOLVED | Noted: 2022-08-12 | Resolved: 2022-12-16

## 2022-12-16 PROBLEM — Z78.9 IMPAIRED MOBILITY AND ADLS: Status: RESOLVED | Noted: 2022-08-12 | Resolved: 2022-12-16

## 2022-12-16 PROBLEM — E78.49 OTHER HYPERLIPIDEMIA: Status: RESOLVED | Noted: 2022-08-12 | Resolved: 2022-12-16

## 2022-12-16 LAB — RENIN PLAS-CCNC: 0.5 NG/ML/HR

## 2022-12-16 ASSESSMENT — ENCOUNTER SYMPTOMS
CHILLS: 0
SENSORY CHANGE: 0
BLURRED VISION: 0
FEVER: 0
WEAKNESS: 0

## 2022-12-22 ENCOUNTER — OFFICE VISIT (OUTPATIENT)
Dept: VASCULAR LAB | Facility: MEDICAL CENTER | Age: 57
End: 2022-12-22
Attending: NURSE PRACTITIONER
Payer: COMMERCIAL

## 2022-12-22 VITALS
HEART RATE: 76 BPM | HEIGHT: 60 IN | DIASTOLIC BLOOD PRESSURE: 88 MMHG | SYSTOLIC BLOOD PRESSURE: 153 MMHG | BODY MASS INDEX: 27.88 KG/M2 | WEIGHT: 142 LBS

## 2022-12-22 DIAGNOSIS — I10 PRIMARY HYPERTENSION: ICD-10-CM

## 2022-12-22 DIAGNOSIS — I61.9 HEMORRHAGIC STROKE (HCC): ICD-10-CM

## 2022-12-22 DIAGNOSIS — Z86.73 HISTORY OF CEREBELLAR STROKE: ICD-10-CM

## 2022-12-22 DIAGNOSIS — E11.65 TYPE 2 DIABETES MELLITUS WITH HYPERGLYCEMIA, WITHOUT LONG-TERM CURRENT USE OF INSULIN (HCC): ICD-10-CM

## 2022-12-22 PROCEDURE — 99212 OFFICE O/P EST SF 10 MIN: CPT

## 2022-12-22 PROCEDURE — 99214 OFFICE O/P EST MOD 30 MIN: CPT | Performed by: NURSE PRACTITIONER

## 2022-12-22 RX ORDER — HYDROCHLOROTHIAZIDE 25 MG/1
25 TABLET ORAL DAILY
Qty: 90 TABLET | Refills: 3 | Status: SHIPPED | OUTPATIENT
Start: 2022-12-22 | End: 2023-01-12 | Stop reason: SDUPTHER

## 2022-12-22 ASSESSMENT — FIBROSIS 4 INDEX: FIB4 SCORE: 0.71

## 2022-12-22 NOTE — PROGRESS NOTES
VASCULAR MEDICINE CLINIC - Follow Up VISIT  12/22/2022     Lana Phillips is a 57 y.o. female who presents today  for   Chief Complaint   Patient presents with    Follow-Up      Subjective      HPI:  Patient referred for evaluation and management of htn, in setting of ICH, dyslipidemia and dm.  Aug 2022 had large cerebellar hemorrhage which required an emergent craniotomy.   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 30 Tablet 11    lisinopril (PRINIVIL) 40 MG tablet Take 1 Tablet by mouth every day. 30 Tablet 11    amLODIPine (NORVASC) 10 MG Tab Take 1 Tablet by mouth every day. 30 Tablet 11    SITagliptin (JANUVIA) 50 MG Tab Take 1 Tablet by mouth every day. 30 Tablet 11    metFORMIN (GLUCOPHAGE) 850 MG Tab Take 1 Tablet by mouth 2 times a day with meals. 60 Tablet 11     No current facility-administered medications on file prior to visit.      ALLERGIES  Patient has no known allergies.     DIET AND EXERCISE:  Weight Change: Stable  Diet: common adult  Exercise:  Working with PT          Objective     Objective:     Vitals:    12/22/22 1407 12/22/22 1411   BP: (!) 160/89 (!) 153/88   BP Location: Left arm Left arm   Patient Position: Sitting Sitting   BP Cuff Size: Adult Adult   Pulse: 77 76   Weight: 64.4 kg (142 lb)    Height: 1.524 m (5')        Physical Exam  Vitals reviewed.   Constitutional:       General: She is not in acute distress.     Appearance: She is not diaphoretic.   HENT:      Head: Normocephalic and atraumatic.   Eyes:      General: No scleral icterus.     Conjunctiva/sclera: Conjunctivae normal.   Neck:      Vascular: No carotid bruit.   Cardiovascular:      Rate and Rhythm: Normal rate and regular rhythm.      Heart sounds: Normal heart sounds. No murmur heard.  Pulmonary:      Effort: Pulmonary effort is normal. No respiratory distress.      Breath sounds: Normal breath sounds. No wheezing or rales.   Musculoskeletal:      Right lower leg: No edema.      Left lower leg: No edema.   Skin:     Coloration: Skin is not pale.   Neurological:      General: No focal deficit present.      Mental Status: She is alert and oriented to person, place, and time.      Cranial Nerves: No cranial nerve deficit.      Coordination: Coordination normal.      Gait: Gait is intact.      Comments: Shuffling gait   Psychiatric:         Mood and Affect: Mood and affect normal.         Behavior: Behavior normal.      Comments: Perhaps a bit tangential        DATA REVIEW    Lab Results   Component Value Date/Time    CHOLSTRLTOT 171 12/08/2022 09:44 AM    LDL 98 12/08/2022 09:44 AM    HDL 51 12/08/2022 09:44 AM    TRIGLYCERIDE 110 12/08/2022 09:44 AM       Lab Results   Component Value Date/Time    SODIUM 141 12/08/2022 09:44 AM    POTASSIUM 3.8 12/08/2022 09:44 AM    CHLORIDE 105 12/08/2022 09:44 AM    CO2 24 12/08/2022 09:44 AM    GLUCOSE 101 (H) 12/08/2022 09:44 AM    BUN 19 12/08/2022 09:44 AM    CREATININE 0.51 12/08/2022 09:44 AM     Lab Results   Component Value Date/Time    ALKPHOSPHAT 86 12/08/2022 09:44 AM    ASTSGOT 17 12/08/2022 09:44 AM    ALTSGPT 23 12/08/2022 09:44 AM    TBILIRUBIN 0.4 12/08/2022 09:44 AM       Lab Results   Component Value Date/Time    HBA1C 6.0 (H) 12/08/2022 09:44 AM       Lab Results   Component Value Date/Time    MALBCRT see  below 12/08/2022 09:44 AM    MICROALBUR <1.2 12/08/2022 09:44 AM    MICRALB 5.5 09/11/2022 09:00 PM       Multiple imaging studies available in EMR and reviewed with patient at todays visit         Medical Decision Making:  Today's Assessment / Status / Plan:     1. Primary hypertension  hydroCHLOROthiazide (HYDRODIURIL) 25 MG Tab    Basic Metabolic Panel      2. History of cerebellar stroke        3. Type 2 diabetes mellitus with hyperglycemia, without long-term current use of insulin (HCC)        4. Hemorrhagic stroke (HCC)           Patient Type: Secondary Prevention    Etiology of Established CVD if Present:     Cerebellar intracerebral hemorrhage -no obvious aneurysm on imaging I was able to review.  Likely due to untreated hypertension.   -Medical management as per below   -Follow-up with neurosurgery and PMR for further recommendations    Lipid Management: Qualifies for Statin Therapy Based on 2018 ACC/AHA Guidelines: yes  Calculated 10-Year Risk of ASCVD: N/A  Currently on Statin: Yes  Goal LDL less than 100 and non-HDL less than 130  Current lipid panel with good control   -Continue atorvastatin 20 mg daily   -Repeat fasting lipid panel in 6 months and adjust as needed    Blood Pressure Management:  Acc/aha (2017) Blood Pressure Goal <130/80  Likely had hypertension prior to admission  ICH raises the possibility this was prior aldosteronism  Aldosterone 11.6, renin 0.5 (12/2022)  Plasma free mets normal  TSH normal  Rule out other secondary causes  BP elevated in office today   Tolerating current medications  No obvious interfering substances  Plan:  -Continue lisinopril 40 mg a day  -Continue amlodipine at current dose  -Start HCTZ 12.5mg  -Again recommended to get new arm cuff and begin keeping a log  -Bring log to next appt  -Consider ABPM if unable to determine control   -Check GFR, electrolytes prior to next visit  -Consider CTA renal arteries if difficult to control in the future    Glycemic Status:  Diabetic, type II  Does have history of gestational diabetes  Goal A1c less than 7.0  Recent A1C 6.0 (12/2022)  -Continue metformin  -Continue Januvia  -Recheck A1c in 3 months   -Adjust pharmacotherapy based upon results    Anti-Platelet/Anti-Coagulant Tx: no  Avoid given history of ICH    Smoking: continue complete avoidance of all tobacco products    Physical Activity: Per PMR and physical therapy    Weight Management and Nutrition: Continue decreased sodium in diet- watch more closely, <1500mg per day.  Less sugar.    Instructed to follow-up with PCP for remainder of adult medical needs: yes  We will partner with other providers in the management of established vascular disease and cardiometabolic risk factors.    Studies to Be Obtained: Per neurosurgery  Labs to Be Obtained: As above prior to next visit    Follow up in: 2 months     Adamaris Beatty [facility]     Cc:   ARIELLE Mccormack

## 2023-01-12 ENCOUNTER — OFFICE VISIT (OUTPATIENT)
Dept: PHYSICAL MEDICINE AND REHAB | Facility: MEDICAL CENTER | Age: 58
End: 2023-01-12
Payer: COMMERCIAL

## 2023-01-12 VITALS
HEART RATE: 81 BPM | OXYGEN SATURATION: 98 % | SYSTOLIC BLOOD PRESSURE: 132 MMHG | DIASTOLIC BLOOD PRESSURE: 84 MMHG | WEIGHT: 143 LBS | BODY MASS INDEX: 28.07 KG/M2 | HEIGHT: 60 IN | TEMPERATURE: 97.8 F

## 2023-01-12 DIAGNOSIS — E78.49 OTHER HYPERLIPIDEMIA: ICD-10-CM

## 2023-01-12 DIAGNOSIS — I10 PRIMARY HYPERTENSION: ICD-10-CM

## 2023-01-12 DIAGNOSIS — Z86.73 HISTORY OF STROKE: Primary | ICD-10-CM

## 2023-01-12 DIAGNOSIS — E11.65 TYPE 2 DIABETES MELLITUS WITH HYPERGLYCEMIA, WITHOUT LONG-TERM CURRENT USE OF INSULIN (HCC): ICD-10-CM

## 2023-01-12 PROCEDURE — 99214 OFFICE O/P EST MOD 30 MIN: CPT | Performed by: PHYSICAL MEDICINE & REHABILITATION

## 2023-01-12 RX ORDER — ATORVASTATIN CALCIUM 20 MG/1
20 TABLET, FILM COATED ORAL EVERY EVENING
Qty: 90 TABLET | Refills: 3 | Status: SHIPPED | OUTPATIENT
Start: 2023-01-12 | End: 2023-04-17 | Stop reason: SDUPTHER

## 2023-01-12 RX ORDER — HYDROCHLOROTHIAZIDE 25 MG/1
25 TABLET ORAL DAILY
Qty: 90 TABLET | Refills: 3 | Status: SHIPPED | OUTPATIENT
Start: 2023-01-12 | End: 2023-04-17 | Stop reason: SDUPTHER

## 2023-01-12 RX ORDER — LISINOPRIL 40 MG/1
40 TABLET ORAL DAILY
Qty: 90 TABLET | Refills: 3 | Status: SHIPPED | OUTPATIENT
Start: 2023-01-12 | End: 2023-04-17 | Stop reason: SDUPTHER

## 2023-01-12 RX ORDER — AMLODIPINE BESYLATE 10 MG/1
10 TABLET ORAL DAILY
Qty: 90 TABLET | Refills: 3 | Status: SHIPPED | OUTPATIENT
Start: 2023-01-12 | End: 2023-04-17 | Stop reason: SDUPTHER

## 2023-01-12 ASSESSMENT — PATIENT HEALTH QUESTIONNAIRE - PHQ9
SUM OF ALL RESPONSES TO PHQ QUESTIONS 1-9: 11
5. POOR APPETITE OR OVEREATING: 2 - MORE THAN HALF THE DAYS
CLINICAL INTERPRETATION OF PHQ2 SCORE: 3

## 2023-01-12 ASSESSMENT — FIBROSIS 4 INDEX: FIB4 SCORE: 0.71

## 2023-01-12 NOTE — LETTER
Re: Lana Phillips   DOS: 1/12/2023     To Whom It May Concern,     Lana Phillips has been a patient of mine since her absence from work for her stroke. She is medically cleared to return to work for 8 hour shifts with standard breaks for 5 days per week.     If there are any questions or concerns, please do not hesitate to contact my office:    [phone]    Dr. Nika Costello DO, MS  Department of Physical Medicine & Rehabilitation  Neuro Rehabilitation Clinic  [facility]

## 2023-01-12 NOTE — PROGRESS NOTES
[facility]  PM&R Neuro Rehabilitation Clinic  [facility] [place] [address]  Ph: [phone]    FOLLOW UP PATIENT EVALUATION      Patient Name: Lana Phillips   Patient : 1965  Patient Age: 57 y.o.     Examining Physician: Dr. Nika Mccormack, DO    PM&R History to Date - Background Information:  Dates of Admission/Discharge to ARU: 2022 - 2022    SUBJECTIVE:   Patient Identification: Lana Phillips is a 57 y.o. female with PMH significant for hypertension and rehabilitation history significant for right cerebellar hemorrhagic stroke 2022 and is presenting to PM&R clinic for a FOLLOW UP OUTPATIENT evaluation with the following chief complaint/s:    Chief Complaint: Stroke Follow Up    History of Present Illness:    - Feels as though she is ready to go back to work  - Blood pressure is better controlled.   - No longer suffering from constipation.   - Does not get too much dizziness any longer.   - Has been out of her medications.   - Would like to RTW 23    Review of Systems:  All other pertinent positive review of systems are noted above in HPI.   All other systems reviewed and are negative.    Past Medical History:  Past Medical History:   Diagnosis Date    Other hyperlipidemia 2022    Impaired mobility and ADLs 2022      Past Surgical History:   Procedure Laterality Date    CRANIOTOMY  2022    Procedure: Posterior fossa craniectomy for evacuation of intraparenchymal hemorrhage;  Surgeon: Pedro Pablo Vences M.D.;  Location: SURGERY [facility];  Service: Neurosurgery        Current Outpatient Medications:     amLODIPine (NORVASC) 10 MG Tab, Take 1 Tablet by mouth every day., Disp: 90 Tablet, Rfl: 3    atorvastatin (LIPITOR) 20 MG Tab, Take 1 Tablet by mouth every evening., Disp: 90 Tablet, Rfl: 3    hydroCHLOROthiazide (HYDRODIURIL) 25 MG Tab, Take 1 Tablet by mouth every day., Disp: 90 Tablet, Rfl: 3    lisinopril (PRINIVIL) 40 MG tablet, Take 1 Tablet by mouth every day., Disp:  90 Tablet, Rfl: 3    SITagliptin (JANUVIA) 50 MG Tab, Take 1 Tablet by mouth every day., Disp: 90 Tablet, Rfl: 3    metFORMIN (GLUCOPHAGE) 850 MG Tab, Take 1 Tablet by mouth 2 times a day with meals., Disp: 180 Tablet, Rfl: 3  No Known Allergies     Past Social History:  Social History     Socioeconomic History    Marital status:      Spouse name: Not on file    Number of children: Not on file    Years of education: Not on file    Highest education level: Not on file   Occupational History    Not on file   Tobacco Use    Smoking status: Never    Smokeless tobacco: Never   Vaping Use    Vaping Use: Never used   Substance and Sexual Activity    Alcohol use: No    Drug use: No    Sexual activity: Not on file   Other Topics Concern    Not on file   Social History Narrative    Not on file     Social Determinants of Health     Financial Resource Strain: Not on file   Food Insecurity: Not on file   Transportation Needs: Not on file   Physical Activity: Not on file   Stress: Not on file   Social Connections: Not on file   Intimate Partner Violence: Not on file   Housing Stability: Not on file        Family History:  Family History   Problem Relation Age of Onset    Stroke Mother     Heart Disease Father        Depression and Opioid Screening  PHQ-9:      8/5/2022 8/12/2022 1/12/2023   Depression Screen (PHQ-2/PHQ-9)   PHQ-2 Total Score 0 0    PHQ-2 Total Score   3   PHQ-9 Total Score   11       Multiple values from one day are sorted in reverse-chronological order     Interpretation of PHQ-9 Total Score   Score Severity   1-4 No Depression   5-9 Mild Depression   10-14 Moderate Depression   15-19 Moderately Severe Depression   20-27 Severe Depression     OBJECTIVE:   Vital Signs:  Vitals:    01/12/23 1101   BP: 132/84   Pulse: 81   Temp: 36.6 °C (97.8 °F)   SpO2: 98%        Pertinent Labs:  Lab Results   Component Value Date/Time    SODIUM 141 12/08/2022 09:44 AM    POTASSIUM 3.8 12/08/2022 09:44 AM    CHLORIDE 105  12/08/2022 09:44 AM    CO2 24 12/08/2022 09:44 AM    GLUCOSE 101 (H) 12/08/2022 09:44 AM    BUN 19 12/08/2022 09:44 AM    CREATININE 0.51 12/08/2022 09:44 AM       Lab Results   Component Value Date/Time    HBA1C 6.0 (H) 12/08/2022 09:44 AM       Lab Results   Component Value Date/Time    WBC 10.1 08/13/2022 05:37 AM    RBC 4.41 08/13/2022 05:37 AM    HEMOGLOBIN 13.3 08/13/2022 05:37 AM    HEMATOCRIT 40.5 08/13/2022 05:37 AM    MCV 91.8 08/13/2022 05:37 AM    MCH 30.2 08/13/2022 05:37 AM    MCHC 32.8 (L) 08/13/2022 05:37 AM    MPV 10.2 08/13/2022 05:37 AM    NEUTSPOLYS 61.20 08/13/2022 05:37 AM    LYMPHOCYTES 24.60 08/13/2022 05:37 AM    MONOCYTES 7.40 08/13/2022 05:37 AM    EOSINOPHILS 2.20 08/13/2022 05:37 AM    BASOPHILS 0.90 08/13/2022 05:37 AM       Lab Results   Component Value Date/Time    ASTSGOT 17 12/08/2022 09:44 AM    ALTSGPT 23 12/08/2022 09:44 AM        Physical Exam:   GEN: No apparent distress  HEENT: Head normocephalic, atraumatic.  Sclera nonicteric bilaterally, no ocular discharge appreciated bilaterally.  CV: Extremities warm and well-perfused, no peripheral edema appreciated bilaterally.  PULMONARY: Breathing nonlabored on room air, no respiratory accessory muscle use.  Not requiring supplemental oxygen.  ABD: Soft, nontender.  SKIN: No appreciable skin breakdown on exposed areas of skin.  PSYCH: Mood and affect within normal limits.  NEURO: Awake alert.  Conversational.  Logical thought content. Answers questions appropriately. Ambulatory without assistive device.       ASSESSMENT/PLAN: Lana Phillips  is a 57 y.o. female with rehabilitation history significant for right cerebellar hemorrhagic stroke 8/2/2022, here for evaluation. The following plan was discussed with the patient who is in agreement.     Visit Diagnoses     ICD-10-CM   1. History of stroke  Z86.73   2. Primary hypertension  I10   3. Other hyperlipidemia  E78.49   4. Type 2 diabetes mellitus with hyperglycemia, without long-term  current use of insulin (HCC)  E11.65       Rehab/Neuro:   Right cerebellar hemorrhagic stroke 8/2/2022 s/p suboccipital craniectomy  Impaired balance  Dizziness  - Therapy: Continue OT  - Has follow-up with Dr. 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  - Function: Independent. Returned to baseline.   -Time spent drafting letter to allow patient to return to work.  - Time spent filling out paperwork 12/2022 08:47-08:57 for a total time of 10 minutes.   - High Risk Med Management: Rx for Statin + Januvia + Metformin    Hypertension:  - High Risk Med Management: Rx for Lisinopril + Amlodipine + HCTZ      Follow up: PRN    Please note that this dictation was created using voice recognition software. I have made every reasonable attempt to correct obvious errors but there may be errors of grammar and content that I may have overlooked prior to finalization of this note.    Dr. Nika Mccormack DO, MS  Department of Physical Medicine & Rehabilitation  Neuro Rehabilitation Clinic  [facility]

## 2023-01-12 NOTE — LETTER
Re: Lana Phillips   DOS: 1/12/2023     To Whom It May Concern,     Lana Phillips has been a patient of mine since her absence from work for her stroke. She is medically cleared to return to work 1/23/2022 for 8 hour shifts with standard breaks for 5 days per week.     If there are any questions or concerns, please do not hesitate to contact my office:    [phone]    Dr. Nika Costello DO, MS  Department of Physical Medicine & Rehabilitation  Neuro Rehabilitation Clinic  [facility]

## 2023-04-10 ENCOUNTER — HOSPITAL ENCOUNTER (OUTPATIENT)
Dept: LAB | Facility: MEDICAL CENTER | Age: 58
End: 2023-04-10
Attending: NURSE PRACTITIONER
Payer: COMMERCIAL

## 2023-04-10 DIAGNOSIS — I10 PRIMARY HYPERTENSION: ICD-10-CM

## 2023-04-10 LAB
ANION GAP SERPL CALC-SCNC: 12 MMOL/L (ref 7–16)
BUN SERPL-MCNC: 23 MG/DL (ref 8–22)
CALCIUM SERPL-MCNC: 8.7 MG/DL (ref 8.5–10.5)
CHLORIDE SERPL-SCNC: 110 MMOL/L (ref 96–112)
CO2 SERPL-SCNC: 22 MMOL/L (ref 20–33)
CREAT SERPL-MCNC: 0.86 MG/DL (ref 0.5–1.4)
GFR SERPLBLD CREATININE-BSD FMLA CKD-EPI: 78 ML/MIN/1.73 M 2
GLUCOSE SERPL-MCNC: 110 MG/DL (ref 65–99)
POTASSIUM SERPL-SCNC: 4.2 MMOL/L (ref 3.6–5.5)
SODIUM SERPL-SCNC: 144 MMOL/L (ref 135–145)

## 2023-04-10 PROCEDURE — 36415 COLL VENOUS BLD VENIPUNCTURE: CPT

## 2023-04-10 PROCEDURE — 80048 BASIC METABOLIC PNL TOTAL CA: CPT

## 2023-04-13 ENCOUNTER — OFFICE VISIT (OUTPATIENT)
Dept: VASCULAR LAB | Facility: MEDICAL CENTER | Age: 58
End: 2023-04-13
Attending: NURSE PRACTITIONER
Payer: COMMERCIAL

## 2023-04-13 VITALS
HEART RATE: 74 BPM | SYSTOLIC BLOOD PRESSURE: 123 MMHG | BODY MASS INDEX: 28.43 KG/M2 | HEIGHT: 59 IN | WEIGHT: 141 LBS | DIASTOLIC BLOOD PRESSURE: 77 MMHG

## 2023-04-13 DIAGNOSIS — I10 PRIMARY HYPERTENSION: ICD-10-CM

## 2023-04-13 DIAGNOSIS — E78.49 OTHER HYPERLIPIDEMIA: ICD-10-CM

## 2023-04-13 DIAGNOSIS — E11.65 TYPE 2 DIABETES MELLITUS WITH HYPERGLYCEMIA, WITHOUT LONG-TERM CURRENT USE OF INSULIN (HCC): ICD-10-CM

## 2023-04-13 PROCEDURE — 99214 OFFICE O/P EST MOD 30 MIN: CPT | Performed by: NURSE PRACTITIONER

## 2023-04-13 PROCEDURE — 99212 OFFICE O/P EST SF 10 MIN: CPT

## 2023-04-13 ASSESSMENT — FIBROSIS 4 INDEX: FIB4 SCORE: 0.71

## 2023-04-13 NOTE — PROGRESS NOTES
VASCULAR MEDICINE CLINIC - Follow Up VISIT  4/13/2023    Lana Phillips is a 57 y.o. female who presents today for   Chief Complaint   Patient presents with    Follow-Up      Subjective      HPI:  Patient returns for eval and management of htn, in setting of ICH s/p crani 8/2022, dyslipidemia and dm.  She continues to do well  Denies headache or dizziness  Continues to walk; finished all of her PT/OT rehab  Not taking home BPs but says 110/70s at other offices  Describes good med adherence but does not know the names of her meds  No CV complaints  No new labs      Social History     Tobacco Use    Smoking status: Never    Smokeless tobacco: Never   Vaping Use    Vaping Use: Never used   Substance Use Topics    Alcohol use: No    Drug use: No      Current Outpatient Medications on File Prior to Visit   Medication Sig Dispense Refill    amLODIPine (NORVASC) 10 MG Tab Take 1 Tablet by mouth every day. 90 Tablet 3    atorvastatin (LIPITOR) 20 MG Tab Take 1 Tablet by mouth every evening. 90 Tablet 3    hydroCHLOROthiazide (HYDRODIURIL) 25 MG Tab Take 1 Tablet by mouth every day. 90 Tablet 3    lisinopril (PRINIVIL) 40 MG tablet Take 1 Tablet by mouth every day. 90 Tablet 3    SITagliptin (JANUVIA) 50 MG Tab Take 1 Tablet by mouth every day. 90 Tablet 3    metFORMIN (GLUCOPHAGE) 850 MG Tab Take 1 Tablet by mouth 2 times a day with meals. 180 Tablet 3     No current facility-administered medications on file prior to visit.      ALLERGIES  Patient has no known allergies.     DIET AND EXERCISE:  Weight Change: Stable  Diet: common adult  Exercise:  walking          Objective     Objective:     There were no vitals filed for this visit.     Physical Exam  Vitals reviewed.   Constitutional:       General: She is not in acute distress.     Appearance: She is not diaphoretic.   Neck:      Vascular: No carotid bruit.   Cardiovascular:      Rate and Rhythm: Normal rate and regular rhythm.      Heart sounds: Normal heart sounds. No  murmur heard.  Pulmonary:      Effort: Pulmonary effort is normal. No respiratory distress.      Breath sounds: Normal breath sounds. No wheezing.   Musculoskeletal:      Right lower leg: No edema.      Left lower leg: No edema.   Skin:     General: Skin is warm and dry.      Coloration: Skin is not pale.   Neurological:      General: No focal deficit present.      Mental Status: She is alert and oriented to person, place, and time.      Gait: Gait is intact.   Psychiatric:         Mood and Affect: Mood and affect normal.        DATA REVIEW    Lab Results   Component Value Date/Time    CHOLSTRLTOT 171 12/08/2022 09:44 AM    LDL 98 12/08/2022 09:44 AM    HDL 51 12/08/2022 09:44 AM    TRIGLYCERIDE 110 12/08/2022 09:44 AM       Lab Results   Component Value Date/Time    SODIUM 144 04/10/2023 01:14 PM    POTASSIUM 4.2 04/10/2023 01:14 PM    CHLORIDE 110 04/10/2023 01:14 PM    CO2 22 04/10/2023 01:14 PM    GLUCOSE 110 (H) 04/10/2023 01:14 PM    BUN 23 (H) 04/10/2023 01:14 PM    CREATININE 0.86 04/10/2023 01:14 PM     Lab Results   Component Value Date/Time    ALKPHOSPHAT 86 12/08/2022 09:44 AM    ASTSGOT 17 12/08/2022 09:44 AM    ALTSGPT 23 12/08/2022 09:44 AM    TBILIRUBIN 0.4 12/08/2022 09:44 AM       Lab Results   Component Value Date/Time    HBA1C 6.0 (H) 12/08/2022 09:44 AM       Lab Results   Component Value Date/Time    MALBCRT see below 12/08/2022 09:44 AM    MICROALBUR <1.2 12/08/2022 09:44 AM    MICRALB 5.5 09/11/2022 09:00 PM       Multiple imaging studies available in EMR and reviewed with patient at todays visit         Medical Decision Making:  Today's Assessment / Status / Plan:     No diagnosis found.     Patient Type: Secondary Prevention    Etiology of Established CVD if Present:     Cerebellar intracerebral hemorrhage -no obvious aneurysm on imaging I was able to review.  Likely due to untreated hypertension.   -Medical management as per below   -Follow-up with neurosurgery and PMR for further  recommendations    Lipid Management: Qualifies for Statin Therapy Based on 2018 ACC/AHA Guidelines: yes  Calculated 10-Year Risk of ASCVD: N/A  Currently on Statin: Yes  Goal LDL less than 100 and non-HDL less than 130  Previous lipid panel with good control   -Continue atorvastatin 20 mg daily   -Repeat fasting lipid panel     Blood Pressure Management:  Acc/aha (2017) Blood Pressure Goal <130/80  Likely had hypertension prior to admission  ICH raises the possibility this was prior aldosteronism  Aldosterone 11.6, renin 0.5 (12/2022)  Plasma free mets normal  TSH normal  Rule out other secondary causes  No obvious interfering substances  Plan:  -Continue lisinopril 40 mg a day  -Continue amlodipine 10mg a day; denies any le swelling  -Continue HCTZ 25mg a day  -Again recommended to get new arm cuff and begin keeping a log at home  -Check renal fxn/lytes with next labs    Glycemic Status: Diabetic, type II  Does have history of gestational diabetes  Goal A1c less than 7.0  Most recent A1C 6.0 (12/2022)  -Continue metformin and Januvia  -Recheck A1c    Anti-Platelet/Anti-Coagulant Tx: no  Avoid given history of ICH    Smoking: continue complete avoidance of all tobacco products    Physical Activity: Per PT/OT; continue exercises at home and daily walking    Weight Management and Nutrition: Continue to watch Na+ and simple sugars in diet    Instructed to follow-up with PCP for remainder of adult medical needs: yes  We will partner with other providers in the management of established vascular disease and cardiometabolic risk factors.      Printed out med list and identified what she is taking each med for, for her review.    Studies to Be Obtained: Per neurosurgery  Labs to Be Obtained: cmp, lipids, a1c    Follow up in: 6mo    DANISH Shaffer  [facility]      Cc:   MD JUAN Espinoza DO

## 2023-04-17 ENCOUNTER — OFFICE VISIT (OUTPATIENT)
Dept: INTERNAL MEDICINE | Facility: OTHER | Age: 58
End: 2023-04-17
Payer: COMMERCIAL

## 2023-04-17 VITALS
TEMPERATURE: 97.6 F | BODY MASS INDEX: 28.91 KG/M2 | WEIGHT: 143.4 LBS | SYSTOLIC BLOOD PRESSURE: 132 MMHG | HEIGHT: 59 IN | HEART RATE: 78 BPM | DIASTOLIC BLOOD PRESSURE: 80 MMHG | OXYGEN SATURATION: 98 %

## 2023-04-17 DIAGNOSIS — E78.49 OTHER HYPERLIPIDEMIA: ICD-10-CM

## 2023-04-17 DIAGNOSIS — Z00.00 ENCOUNTER FOR PREVENTIVE CARE: ICD-10-CM

## 2023-04-17 DIAGNOSIS — Z86.73 HISTORY OF STROKE: ICD-10-CM

## 2023-04-17 DIAGNOSIS — I10 PRIMARY HYPERTENSION: ICD-10-CM

## 2023-04-17 DIAGNOSIS — E11.65 TYPE 2 DIABETES MELLITUS WITH HYPERGLYCEMIA, WITHOUT LONG-TERM CURRENT USE OF INSULIN (HCC): ICD-10-CM

## 2023-04-17 PROBLEM — I61.9 HEMORRHAGIC STROKE (HCC): Status: RESOLVED | Noted: 2022-08-12 | Resolved: 2023-04-17

## 2023-04-17 LAB
HBA1C MFR BLD: 6.2 % (ref ?–5.8)
POCT INT CON NEG: NEGATIVE
POCT INT CON POS: POSITIVE

## 2023-04-17 PROCEDURE — 83036 HEMOGLOBIN GLYCOSYLATED A1C: CPT

## 2023-04-17 PROCEDURE — 99214 OFFICE O/P EST MOD 30 MIN: CPT | Mod: GC

## 2023-04-17 RX ORDER — LISINOPRIL 40 MG/1
40 TABLET ORAL DAILY
Qty: 90 TABLET | Refills: 0 | Status: SHIPPED | OUTPATIENT
Start: 2023-04-17 | End: 2024-01-19 | Stop reason: SDUPTHER

## 2023-04-17 RX ORDER — HYDROCHLOROTHIAZIDE 25 MG/1
25 TABLET ORAL DAILY
Qty: 90 TABLET | Refills: 0 | Status: SHIPPED | OUTPATIENT
Start: 2023-04-17 | End: 2024-01-19 | Stop reason: SDUPTHER

## 2023-04-17 RX ORDER — AMLODIPINE BESYLATE 10 MG/1
10 TABLET ORAL DAILY
Qty: 90 TABLET | Refills: 0 | Status: SHIPPED | OUTPATIENT
Start: 2023-04-17 | End: 2023-10-31

## 2023-04-17 RX ORDER — ATORVASTATIN CALCIUM 20 MG/1
20 TABLET, FILM COATED ORAL EVERY EVENING
Qty: 90 TABLET | Refills: 0 | Status: SHIPPED | OUTPATIENT
Start: 2023-04-17 | End: 2023-10-24 | Stop reason: SDUPTHER

## 2023-04-17 ASSESSMENT — ENCOUNTER SYMPTOMS
SENSORY CHANGE: 0
HEADACHES: 0
FOCAL WEAKNESS: 0
BLURRED VISION: 0
HYPERTENSION: 1
EYES NEGATIVE: 1
FEVER: 0
WEAKNESS: 0
CHILLS: 0
RESPIRATORY NEGATIVE: 1
CARDIOVASCULAR NEGATIVE: 1
SEIZURES: 0
MUSCULOSKELETAL NEGATIVE: 1
GASTROINTESTINAL NEGATIVE: 1
PSYCHIATRIC NEGATIVE: 1

## 2023-04-17 ASSESSMENT — FIBROSIS 4 INDEX: FIB4 SCORE: 0.71

## 2023-04-17 NOTE — PATIENT INSTRUCTIONS
We will see you in 5 months with the results of your blood work  We refilled your medications  Please do your mammogram

## 2023-04-18 NOTE — PROGRESS NOTES
Subjective     Lana Phillips is a 57 y.o. female who presents with Hypertension (Follow up), Diabetes (Follow up), and Medication Management (Has no medications currently besides Atorvastatin due to not having picked up medications in time so the pharmacy restocked medications)      Patient has past medical history of left cerebellar hemorrhagic stroke status post suboccipital craniectomy with evacuation of left cerebellar IPH duraplasty and C1 laminectomy on 08.02.22. She is followed up by neurosurgery, vascular medicine. Also seen by physical medicine and rehabilitation and has completed PT and OT sessions. She is doing well and has been back to work for about 3-4 months now.  Patient has no particular complaints or concerns for this visit.  Patient was recently seen last April 13, 2023 by vascular medicine and was advised to continue current medications likewise CMP,lipid panel were ordered.         As per patient's hypertension, patient has BP cuff at home however has not been able to check her blood pressure. Of note, patient's blood pressure during clinic visits have been in the range of 130-140/80's.  Patient denies any headache, nausea, vomiting and also has no signs of endorgan damage.     As for patient's diabetes, patient is compliant with Januvia 50 mg daily, metformin 850 mg twice daily. YenpN2V of 6.0 last12/8/22 from 7.1 % .  No known history of chronic non healing wounds, was also likewise referred to ophthalmology for diabetic retinopathy screening however was able to go to appointment secondary to being busy at work.     Hypertension  Pertinent negatives include no blurred vision, chest pain or headaches.   Diabetes  Pertinent negatives for hypoglycemia include no headaches or seizures. Pertinent negatives for diabetes include no blurred vision, no chest pain and no weakness.     Review of Systems   Constitutional:  Negative for chills and fever.   HENT: Negative.     Eyes: Negative.  Negative for  "blurred vision.   Respiratory: Negative.     Cardiovascular: Negative.  Negative for chest pain.   Gastrointestinal: Negative.    Genitourinary: Negative.    Musculoskeletal: Negative.    Skin: Negative.    Neurological:  Negative for sensory change, focal weakness, seizures, weakness and headaches.   Endo/Heme/Allergies: Negative.    Psychiatric/Behavioral: Negative.              Objective     /80 (BP Location: Left arm, Patient Position: Sitting, BP Cuff Size: Adult)   Pulse 78   Temp 36.4 °C (97.6 °F) (Temporal)   Ht 1.499 m (4' 11\")   Wt 65 kg (143 lb 6.4 oz)   SpO2 98%   BMI 28.96 kg/m²      Physical Exam  Constitutional:       Appearance: Normal appearance.   HENT:      Head: Normocephalic and atraumatic.      Nose: Nose normal.      Mouth/Throat:      Mouth: Mucous membranes are moist.   Eyes:      Extraocular Movements: Extraocular movements intact.      Pupils: Pupils are equal, round, and reactive to light.   Cardiovascular:      Rate and Rhythm: Normal rate and regular rhythm.   Pulmonary:      Effort: Pulmonary effort is normal.   Musculoskeletal:      Cervical back: Normal range of motion and neck supple.   Neurological:      Mental Status: She is alert.                           Assessment & Plan   History of Cerebellar intracerebral hemorrhage   -Likely secondary to untreated hypertension  - optimize blood pressure control,goal BP< 130/80  -hypertensive management as per below   -Goal LDL less than 100  - lipid panel 12/8/22 LDL 98, Total cholesterol 171, Trig 110, at goal   -Lipid panel ordered by vascular medicine, patient to follow up once with results  -Continue atorvastatin 20 mg, as patient is diabetic as well  -continue regular follow-up with vascular medicine,neurosurgery  -Patient has completed physical therapy and occupational therapy sessions,patient able to ambulate on her own without any assistive device, no balance problems    2. Hypertension, unspecified type  -Blood " Pressure Goal <130/80  -denies denies headache ,dizziness, nausea , vomiting and blurring of vision  -BP during clinic visit 130/80   -continue Amlodipine 10 mg,OD, Lisinopril 40 mg, OD, Hydrochlorthiazide 25 mg OD  -Labs reviewed April 2023 revealed normal creatinine, electrolytes, no signs of end organ damage, repeat CMP as ordered by vascular medicine  -From chart review, patient was worked up for secondary causes of hypertension by Dr. Bloch, (hyperaldosteronism, pheochromocytoma)   - noted normal levels from labs done 12/16/22 aldosterone 11.6 , renin activity 0.5 , metanephrine 0.26, normetanephrine 0.36  -patient instructed how to properly take blood pressure,with correct BP cuff size, record BP readings, bring BP cuff/ BP log on patient's next visit  -Extensively counseled on the importance of controlling blood pressure  -Continue lifestyle modification     3. Type 2 diabetes mellitus   -Monofilament test done during this visit 4/17/2023 with normal results   -hemoglobin A1c during this visit 4/17/2023 was 6.2%(uxbyJ0Y of 6.0 12/8/22)  -patient at goal  -labs on 9/11/2022 microalbumin creatinine of 22  -pending urine microalbumin creatinine ratio  -12/08/22/2022 urine microalbumin less than 1.2 , urine creatinine 27.90  -Continue metformin 850 mg twice daily, Januvia 5 mg once daily  -Reports compliance to medications  - Referred previously to Ophthalmology for diabetic retinopathy screening  -patient instructed to check her extremities specifically feet/legs for any wounds  -Continue lifestyle modification     4.  Preventive care   -Was recommended PCV 20 as well as influenza vaccine, patient politely declined as she has had previously, discussed importance of these vaccines  -declined covid booster  -discussed mammography and patient agreed to have mammogram this year  -declined colonoscopy/cologuard, discussed importance of these screening test              #Follow up in 3-4 months

## 2023-06-22 NOTE — ED NOTES
Attempted to call report to CDU, RN not available, CDU RN to call [phone] for report, patient brought to floor by Transport  
Detail Level: Detailed
Med Rec completed per patient  Allergies reviewed  No ORAL antibiotics in last 30 days        
No change in patient condition  
Patient has swelling on upper right side of mouth/face, swelling is located on right cheek bone and extends to patients right eye, patient states her teeth on upper right park of her mouth are painful to the point where she has difficulty chewing, respirations are regular and non labored, patient is alert and awake, walked to [facility] room with steady gait.   
Quality 226: Preventive Care And Screening: Tobacco Use: Screening And Cessation Intervention: Patient screened for tobacco use, is a smoker AND received Cessation Counseling within measurement period or in the six months prior to the measurement period

## 2023-07-20 ENCOUNTER — TELEMEDICINE (OUTPATIENT)
Dept: PHYSICAL MEDICINE AND REHAB | Facility: MEDICAL CENTER | Age: 58
End: 2023-07-20
Payer: COMMERCIAL

## 2023-07-20 DIAGNOSIS — R42 DIZZINESS: ICD-10-CM

## 2023-07-20 DIAGNOSIS — Z86.73 HISTORY OF STROKE: ICD-10-CM

## 2023-07-20 DIAGNOSIS — I61.9 HEMORRHAGIC STROKE (HCC): Primary | ICD-10-CM

## 2023-07-20 DIAGNOSIS — R53.83 OTHER FATIGUE: ICD-10-CM

## 2023-07-20 PROCEDURE — 99213 OFFICE O/P EST LOW 20 MIN: CPT | Mod: 95 | Performed by: PHYSICAL MEDICINE & REHABILITATION

## 2023-07-20 NOTE — LETTER
DOS: 7/20/2023   Re: Lana Phillips    To Whom It May Concern,     Lana Phillips has been a patient of mine since 8/12/22. For medical reasons I am requesting that she be allowed to work 4 day week Monday through Thursday with standard hours and standard breaks.    If there are any questions or concerns, please do not hesitate to contact my office.     Dr. Nika Costello DO, MS  Department of Physical Medicine & Rehabilitation  Neuro Rehabilitation Clinic  [facility]

## 2023-07-20 NOTE — PROGRESS NOTES
[facility]  PM&R Neuro Rehabilitation Clinic  [facility] [place] [address]  Ph: [phone]    FOLLOW UP PATIENT EVALUATION    This evaluation was conducted via Zoom using secure and encrypted videoconferencing technology. The patient was in a private location in their home in the [facility].  The patient's identity was confirmed and verbal consent was obtained for this virtual visit.      Patient Name: Lana Phillips   Patient : 1965  Patient Age: 58 y.o.     Examining Physician: Dr. Nika Mccormack, DO    PM&R History to Date - Background Information:  Dates of Admission/Discharge to ARU: 2022 - 2022    SUBJECTIVE:   Patient Identification: Lana Phillips is a 58 y.o. female with PMH significant for hypertension and rehabilitation history significant for right cerebellar hemorrhagic stroke 2022 and is presenting to PM&R clinic for a FOLLOW UP OUTPATIENT evaluation with the following chief complaint/s:    Chief Complaint: Paperwork for work    History of Present Illness:      She is having a really hard time at her current job. 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 The fatigue is very difficult. She doesn't sleep well. She comes home and crashes, but then wakes up and cannot function well. She gets some dizziness and almost threw up. Her hours 7 AM to 3:30 PM. She sometimes is missing night time medication because of her inability to wake up.     Review of Systems:  All other pertinent positive review of systems are noted above in HPI.   All other systems reviewed and are negative.    Past Medical History:  Past Medical History:   Diagnosis Date    Other hyperlipidemia 2022    Impaired mobility and ADLs 2022    Hemorrhagic stroke (HCC) 2022      Past Surgical History:   Procedure Laterality Date    CRANIOTOMY  2022    Procedure: Posterior fossa craniectomy for evacuation of intraparenchymal hemorrhage;  Surgeon: Pedro Pablo Vences M.D.;  [facility]: [facility];  Service: Neurosurgery        Current Outpatient Medications:     amLODIPine (NORVASC) 10 MG Tab, Take 1 Tablet by mouth every day., Disp: 90 Tablet, Rfl: 0    atorvastatin (LIPITOR) 20 MG Tab, Take 1 Tablet by mouth every evening., Disp: 90 Tablet, Rfl: 0    hydroCHLOROthiazide (HYDRODIURIL) 25 MG Tab, Take 1 Tablet by mouth every day., Disp: 90 Tablet, Rfl: 0    lisinopril (PRINIVIL) 40 MG tablet, Take 1 Tablet by mouth every day., Disp: 90 Tablet, Rfl: 0    metFORMIN (GLUCOPHAGE) 850 MG Tab, Take 1 Tablet by mouth 2 times a day with meals., Disp: 180 Tablet, Rfl: 0    SITagliptin (JANUVIA) 50 MG Tab, Take 1 Tablet by mouth every day., Disp: 90 Tablet, Rfl: 0  No Known Allergies     Past Social History:  Social History     Socioeconomic History    Marital status:      Spouse name: Not on file    Number of children: Not on file    Years of education: Not on file    Highest education level: Not on file   Occupational History    Not on file   Tobacco Use    Smoking status: Never    Smokeless tobacco: Never   Vaping Use    Vaping Use: Never used   Substance and Sexual Activity    Alcohol use: No    Drug use: No    Sexual activity: Not on file   Other Topics Concern    Not on file   Social History Narrative    Not on file     Social Determinants of Health     Financial Resource Strain: Not on file   Food Insecurity: Not on file   Transportation Needs: Not on file   Physical Activity: Not on file   Stress: Not on file   Social Connections: Not on file   Intimate Partner Violence: Not on file   Housing Stability: Not on file        Family History:  Family History   Problem Relation Age of Onset    Stroke Mother     Heart Disease Father        Depression and Opioid Screening  PHQ-9:      8/5/2022     8:00 PM 8/12/2022     9:00 AM 1/12/2023    11:00 AM   Depression Screen (PHQ-2/PHQ-9)   PHQ-2 Total Score 0 0    PHQ-2 Total Score   3   PHQ-9 Total Score   11     Interpretation of PHQ-9 Total Score    Score Severity   1-4 No Depression   5-9 Mild Depression   10-14 Moderate Depression   15-19 Moderately Severe Depression   20-27 Severe Depression     OBJECTIVE:   Vital Signs:  There were no vitals filed for this visit.       Pertinent Labs:  Lab Results   Component Value Date/Time    SODIUM 144 04/10/2023 01:14 PM    POTASSIUM 4.2 04/10/2023 01:14 PM    CHLORIDE 110 04/10/2023 01:14 PM    CO2 22 04/10/2023 01:14 PM    GLUCOSE 110 (H) 04/10/2023 01:14 PM    BUN 23 (H) 04/10/2023 01:14 PM    CREATININE 0.86 04/10/2023 01:14 PM       Lab Results   Component Value Date/Time    HBA1C 6.2 (A) 04/17/2023 04:23 PM       Lab Results   Component Value Date/Time    WBC 10.1 08/13/2022 05:37 AM    RBC 4.41 08/13/2022 05:37 AM    HEMOGLOBIN 13.3 08/13/2022 05:37 AM    HEMATOCRIT 40.5 08/13/2022 05:37 AM    MCV 91.8 08/13/2022 05:37 AM    MCH 30.2 08/13/2022 05:37 AM    MCHC 32.8 (L) 08/13/2022 05:37 AM    MPV 10.2 08/13/2022 05:37 AM    NEUTSPOLYS 61.20 08/13/2022 05:37 AM    LYMPHOCYTES 24.60 08/13/2022 05:37 AM    MONOCYTES 7.40 08/13/2022 05:37 AM    EOSINOPHILS 2.20 08/13/2022 05:37 AM    BASOPHILS 0.90 08/13/2022 05:37 AM       Lab Results   Component Value Date/Time    ASTSGOT 17 12/08/2022 09:44 AM    ALTSGPT 23 12/08/2022 09:44 AM        Physical Exam:   GEN: No apparent distress  HEENT: Head normocephalic, atraumatic.  Sclera nonicteric bilaterally, no ocular discharge appreciated bilaterally.  PULMONARY: Breathing nonlabored on room air, no respiratory accessory muscle use.  Not requiring supplemental oxygen.  SKIN: No appreciable skin breakdown on exposed areas of skin.  PSYCH: Mood and affect within normal limits.  NEURO: Awake alert.  Conversational.  Logical thought content. Answers questions appropriately. Ambulatory without assistive device.       ASSESSMENT/PLAN: Lana Phillips  is a 58 y.o. female with rehabilitation history significant for right cerebellar hemorrhagic stroke 8/2/2022, here for evaluation. The  following plan was discussed with the patient who is in agreement.     Visit Diagnoses     ICD-10-CM   1. Hemorrhagic stroke (HCC)  I61.9   2. History of stroke  Z86.73   3. Other fatigue  R53.83   4. 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  - Function: Independent. Returned to baseline.   -High Risk Med Management: Rx for Statin + Januvia + Metformin      Follow up: PRN    Total time spent was 15 minutes.  Included in this time is the time spent preparing for the visit including record review, my exam and evaluation, counseling and education regarding that which is aforementioned in the assessment and plan.  Time was spent documenting into patient's electronic health record.  Time was spent ordering the appropriate labs, tests, procedures, referrals, medications.  Including this time was also the time spent in care coordination. Included this time as the time spent obtaining history from someone other than the patient.  Some of the time included occurred outside of charting time.  Discussion involved the patient.      Please note that this dictation was created using voice recognition software. I have made every reasonable attempt to correct obvious errors but there may be errors of grammar and content that I may have overlooked prior to finalization of this note.    Dr. Nika Mccormack DO, MS  Department of Physical Medicine & Rehabilitation  Neuro Rehabilitation Clinic  [facility]

## 2023-08-11 ENCOUNTER — HOSPITAL ENCOUNTER (OUTPATIENT)
Dept: LAB | Facility: MEDICAL CENTER | Age: 58
End: 2023-08-11
Payer: COMMERCIAL

## 2023-08-11 ENCOUNTER — HOSPITAL ENCOUNTER (OUTPATIENT)
Dept: LAB | Facility: MEDICAL CENTER | Age: 58
End: 2023-08-11
Attending: NURSE PRACTITIONER
Payer: COMMERCIAL

## 2023-08-11 DIAGNOSIS — E11.65 TYPE 2 DIABETES MELLITUS WITH HYPERGLYCEMIA, WITHOUT LONG-TERM CURRENT USE OF INSULIN (HCC): ICD-10-CM

## 2023-08-11 DIAGNOSIS — I10 PRIMARY HYPERTENSION: ICD-10-CM

## 2023-08-11 DIAGNOSIS — E78.49 OTHER HYPERLIPIDEMIA: ICD-10-CM

## 2023-08-11 DIAGNOSIS — I10 HYPERTENSION, UNSPECIFIED TYPE: ICD-10-CM

## 2023-08-11 LAB
ALBUMIN SERPL BCP-MCNC: 4.4 G/DL (ref 3.2–4.9)
ALBUMIN/GLOB SERPL: 1.3 G/DL
ALP SERPL-CCNC: 80 U/L (ref 30–99)
ALT SERPL-CCNC: 31 U/L (ref 2–50)
ANION GAP SERPL CALC-SCNC: 11 MMOL/L (ref 7–16)
ANION GAP SERPL CALC-SCNC: 14 MMOL/L (ref 7–16)
AST SERPL-CCNC: 20 U/L (ref 12–45)
BILIRUB SERPL-MCNC: 0.7 MG/DL (ref 0.1–1.5)
BUN SERPL-MCNC: 20 MG/DL (ref 8–22)
BUN SERPL-MCNC: 20 MG/DL (ref 8–22)
CALCIUM ALBUM COR SERPL-MCNC: 9.3 MG/DL (ref 8.5–10.5)
CALCIUM SERPL-MCNC: 9.6 MG/DL (ref 8.5–10.5)
CALCIUM SERPL-MCNC: 9.6 MG/DL (ref 8.5–10.5)
CHLORIDE SERPL-SCNC: 101 MMOL/L (ref 96–112)
CHLORIDE SERPL-SCNC: 102 MMOL/L (ref 96–112)
CHOLEST SERPL-MCNC: 182 MG/DL (ref 100–199)
CO2 SERPL-SCNC: 25 MMOL/L (ref 20–33)
CO2 SERPL-SCNC: 25 MMOL/L (ref 20–33)
CREAT SERPL-MCNC: 0.67 MG/DL (ref 0.5–1.4)
CREAT SERPL-MCNC: 0.67 MG/DL (ref 0.5–1.4)
CREAT UR-MCNC: 42.79 MG/DL
EST. AVERAGE GLUCOSE BLD GHB EST-MCNC: 131 MG/DL
FASTING STATUS PATIENT QL REPORTED: NORMAL
GFR SERPLBLD CREATININE-BSD FMLA CKD-EPI: 101 ML/MIN/1.73 M 2
GFR SERPLBLD CREATININE-BSD FMLA CKD-EPI: 101 ML/MIN/1.73 M 2
GLOBULIN SER CALC-MCNC: 3.5 G/DL (ref 1.9–3.5)
GLUCOSE SERPL-MCNC: 95 MG/DL (ref 65–99)
GLUCOSE SERPL-MCNC: 96 MG/DL (ref 65–99)
HBA1C MFR BLD: 6.2 % (ref 4–5.6)
HDLC SERPL-MCNC: 59 MG/DL
LDLC SERPL CALC-MCNC: 108 MG/DL
MICROALBUMIN UR-MCNC: <1.2 MG/DL
MICROALBUMIN/CREAT UR: NORMAL MG/G (ref 0–30)
POTASSIUM SERPL-SCNC: 4.4 MMOL/L (ref 3.6–5.5)
POTASSIUM SERPL-SCNC: 4.7 MMOL/L (ref 3.6–5.5)
PROT SERPL-MCNC: 7.9 G/DL (ref 6–8.2)
SODIUM SERPL-SCNC: 138 MMOL/L (ref 135–145)
SODIUM SERPL-SCNC: 140 MMOL/L (ref 135–145)
TRIGL SERPL-MCNC: 77 MG/DL (ref 0–149)

## 2023-08-11 PROCEDURE — 82043 UR ALBUMIN QUANTITATIVE: CPT

## 2023-08-11 PROCEDURE — 80048 BASIC METABOLIC PNL TOTAL CA: CPT

## 2023-08-11 PROCEDURE — 83036 HEMOGLOBIN GLYCOSYLATED A1C: CPT

## 2023-08-11 PROCEDURE — 36415 COLL VENOUS BLD VENIPUNCTURE: CPT

## 2023-08-11 PROCEDURE — 80061 LIPID PANEL: CPT

## 2023-08-11 PROCEDURE — 82570 ASSAY OF URINE CREATININE: CPT

## 2023-08-11 PROCEDURE — 80053 COMPREHEN METABOLIC PANEL: CPT

## 2023-10-24 ENCOUNTER — OFFICE VISIT (OUTPATIENT)
Dept: VASCULAR LAB | Facility: MEDICAL CENTER | Age: 58
End: 2023-10-24
Attending: NURSE PRACTITIONER
Payer: COMMERCIAL

## 2023-10-24 VITALS
SYSTOLIC BLOOD PRESSURE: 112 MMHG | BODY MASS INDEX: 29.64 KG/M2 | WEIGHT: 147 LBS | DIASTOLIC BLOOD PRESSURE: 73 MMHG | HEIGHT: 59 IN | HEART RATE: 84 BPM

## 2023-10-24 DIAGNOSIS — Z86.73 HISTORY OF CEREBELLAR STROKE: ICD-10-CM

## 2023-10-24 DIAGNOSIS — Z86.73 HISTORY OF STROKE: ICD-10-CM

## 2023-10-24 DIAGNOSIS — I10 PRIMARY HYPERTENSION: ICD-10-CM

## 2023-10-24 DIAGNOSIS — E11.65 TYPE 2 DIABETES MELLITUS WITH HYPERGLYCEMIA, WITHOUT LONG-TERM CURRENT USE OF INSULIN (HCC): ICD-10-CM

## 2023-10-24 DIAGNOSIS — E78.49 OTHER HYPERLIPIDEMIA: ICD-10-CM

## 2023-10-24 PROCEDURE — 3078F DIAST BP <80 MM HG: CPT | Performed by: NURSE PRACTITIONER

## 2023-10-24 PROCEDURE — 99214 OFFICE O/P EST MOD 30 MIN: CPT | Performed by: NURSE PRACTITIONER

## 2023-10-24 PROCEDURE — 3074F SYST BP LT 130 MM HG: CPT | Performed by: NURSE PRACTITIONER

## 2023-10-24 PROCEDURE — 99212 OFFICE O/P EST SF 10 MIN: CPT

## 2023-10-24 RX ORDER — ATORVASTATIN CALCIUM 40 MG/1
40 TABLET, FILM COATED ORAL EVERY EVENING
Qty: 90 TABLET | Refills: 3 | Status: SHIPPED | OUTPATIENT
Start: 2023-10-24 | End: 2024-03-11 | Stop reason: SDUPTHER

## 2023-10-24 ASSESSMENT — FIBROSIS 4 INDEX: FIB4 SCORE: 0.73

## 2023-10-24 NOTE — PROGRESS NOTES
"VASCULAR MEDICINE CLINIC - Follow Up VISIT  10/24/2023    Lana Phillips is a 58 y.o. female who presents today for   Chief Complaint   Patient presents with    Follow-Up      Subjective      HPI:  Patient returns for eval and management of htn, in setting of ICH s/p crani 8/2022, dyslipidemia and dm.  She continues to do well  Difficulty working long hours and has letter from another MD to work 4 days per week  Feels stressed because her job gives her a difficult time with these restrictions  Denies CP, SOB, palpitations   Denies headache or dizziness  Continues to walk; finished all of her PT/OT rehab  Not taking home BPs   Describes good med adherence but does not know the names of her meds  No new labs    Social History     Tobacco Use    Smoking status: Never    Smokeless tobacco: Never   Vaping Use    Vaping Use: Never used   Substance Use Topics    Alcohol use: No    Drug use: No      Current Outpatient Medications on File Prior to Visit   Medication Sig Dispense Refill    amLODIPine (NORVASC) 10 MG Tab Take 1 Tablet by mouth every day. 90 Tablet 0    hydroCHLOROthiazide (HYDRODIURIL) 25 MG Tab Take 1 Tablet by mouth every day. 90 Tablet 0    lisinopril (PRINIVIL) 40 MG tablet Take 1 Tablet by mouth every day. 90 Tablet 0    metFORMIN (GLUCOPHAGE) 850 MG Tab Take 1 Tablet by mouth 2 times a day with meals. 180 Tablet 0     No current facility-administered medications on file prior to visit.      ALLERGIES  Patient has no known allergies.     DIET AND EXERCISE:  Weight Change: Stable  Diet: common adult  Exercise:  walking        Objective     Objective:     Vitals:    10/24/23 1642   BP: 112/73   BP Location: Left arm   Patient Position: Sitting   BP Cuff Size: Adult   Pulse: 84   Weight: 66.7 kg (147 lb)   Height: 1.499 m (4' 11\")     Physical Exam  Vitals reviewed.   Constitutional:       General: She is not in acute distress.     Appearance: She is not diaphoretic.   Neck:      Vascular: No carotid bruit. "   Cardiovascular:      Rate and Rhythm: Normal rate and regular rhythm.      Heart sounds: Normal heart sounds. No murmur heard.  Pulmonary:      Effort: Pulmonary effort is normal. No respiratory distress.      Breath sounds: Normal breath sounds. No wheezing.   Musculoskeletal:      Right lower leg: No edema.      Left lower leg: No edema.   Skin:     General: Skin is warm and dry.      Coloration: Skin is not pale.   Neurological:      General: No focal deficit present.      Mental Status: She is alert and oriented to person, place, and time.      Gait: Gait is intact.   Psychiatric:         Mood and Affect: Mood and affect normal.        DATA REVIEW    Lab Results   Component Value Date/Time    CHOLSTRLTOT 182 08/11/2023 10:49 AM     (H) 08/11/2023 10:49 AM    HDL 59 08/11/2023 10:49 AM    TRIGLYCERIDE 77 08/11/2023 10:49 AM       Lab Results   Component Value Date/Time    SODIUM 138 08/11/2023 10:51 AM    POTASSIUM 4.7 08/11/2023 10:51 AM    CHLORIDE 102 08/11/2023 10:51 AM    CO2 25 08/11/2023 10:51 AM    GLUCOSE 95 08/11/2023 10:51 AM    BUN 20 08/11/2023 10:51 AM    CREATININE 0.67 08/11/2023 10:51 AM     Lab Results   Component Value Date/Time    ALKPHOSPHAT 80 08/11/2023 10:49 AM    ASTSGOT 20 08/11/2023 10:49 AM    ALTSGPT 31 08/11/2023 10:49 AM    TBILIRUBIN 0.7 08/11/2023 10:49 AM       Lab Results   Component Value Date/Time    HBA1C 6.2 (H) 08/11/2023 10:49 AM       Lab Results   Component Value Date/Time    MALBCRT see below 08/11/2023 10:51 AM    MICROALBUR <1.2 08/11/2023 10:51 AM    MICRALB 5.5 09/11/2022 09:00 PM       Multiple imaging studies available in EMR and reviewed with patient at todays visit         Medical Decision Making:  Today's Assessment / Status / Plan:     1. Type 2 diabetes mellitus with hyperglycemia, without long-term current use of insulin (HCC)  HEMOGLOBIN A1C          2. Primary hypertension  TSH    Comp Metabolic Panel              3. History of cerebellar stroke         4. Other hyperlipidemia  atorvastatin (LIPITOR) 40 MG Tab    Lipid Profile          5. History of stroke  atorvastatin (LIPITOR) 40 MG Tab    Lipid Profile        Patient Type: Secondary Prevention    Etiology of Established CVD if Present:     Cerebellar intracerebral hemorrhage -no obvious aneurysm on imaging I was able to review.  Likely due to untreated hypertension.   -Medical management as per below   -Follow-up with neurosurgery and PMR for further recommendations    Lipid Management: Qualifies for Statin Therapy Based on 2018 ACC/AHA Guidelines: yes  Calculated 10-Year Risk of ASCVD: N/A  Currently on Statin: Yes  Goal LDL less than 100 and non-HDL less than 130  Previous lipid panel with good control   -Increase atorvastatin to 40mg daily   -Repeat fasting lipid panel     Blood Pressure Management:  [place] (2017) Blood Pressure Goal <130/80  Likely had hypertension prior to admission  ICH raises the possibility this was prior aldosteronism  Aldosterone 11.6, renin 0.5 (12/2022)  Plasma free mets normal  TSH normal  Rule out other secondary causes  No obvious interfering substances  Plan:  -Continue lisinopril 40 mg a day  -Continue amlodipine 10mg a day; denies any le swelling  -Continue HCTZ 25mg a day  -Again recommended to get new arm cuff and begin keeping a log at home  -Check renal fxn/lytes with next labs    Glycemic Status: Diabetic, type II  Does have history of gestational diabetes  Goal A1c less than 7.0  Most recent A1C 6.2   Stopped Januvia for unclear reasons   -Continue metformin   -Recheck A1c    Anti-Platelet/Anti-Coagulant Tx: no  Avoid given history of ICH    Smoking: continue complete avoidance of all tobacco products    Physical Activity: continue exercises at home and daily walking    Weight Management and Nutrition: Continue to watch Na+ and simple sugars in diet    Instructed to follow-up with PCP for remainder of adult medical needs: yes  We will partner with other providers  in the management of established vascular disease and cardiometabolic risk factors.    Printed out med list and identified what she is taking each med for, for her review.    Studies to Be Obtained: Per neurosurgery  Labs to Be Obtained: cmp, lipids, a1c    Follow up in: 3 months to review labs     Adamaris PENG  [facility]

## 2023-10-31 DIAGNOSIS — I10 PRIMARY HYPERTENSION: ICD-10-CM

## 2023-10-31 RX ORDER — AMLODIPINE BESYLATE 10 MG/1
10 TABLET ORAL DAILY
Qty: 90 TABLET | Refills: 0 | Status: SHIPPED | OUTPATIENT
Start: 2023-10-31 | End: 2024-01-17

## 2023-11-17 ENCOUNTER — APPOINTMENT (OUTPATIENT)
Dept: INTERNAL MEDICINE | Facility: OTHER | Age: 58
End: 2023-11-17
Payer: COMMERCIAL

## 2024-01-17 DIAGNOSIS — I10 PRIMARY HYPERTENSION: ICD-10-CM

## 2024-01-17 RX ORDER — AMLODIPINE BESYLATE 10 MG/1
10 TABLET ORAL DAILY
Qty: 90 TABLET | Refills: 3 | Status: SHIPPED | OUTPATIENT
Start: 2024-01-17 | End: 2024-03-11 | Stop reason: SDUPTHER

## 2024-01-19 ENCOUNTER — OFFICE VISIT (OUTPATIENT)
Dept: VASCULAR LAB | Facility: MEDICAL CENTER | Age: 59
End: 2024-01-19
Payer: COMMERCIAL

## 2024-01-19 VITALS
SYSTOLIC BLOOD PRESSURE: 113 MMHG | BODY MASS INDEX: 30.44 KG/M2 | WEIGHT: 151 LBS | DIASTOLIC BLOOD PRESSURE: 73 MMHG | HEIGHT: 59 IN | HEART RATE: 69 BPM

## 2024-01-19 DIAGNOSIS — Z00.00 ENCOUNTER FOR PREVENTIVE CARE: ICD-10-CM

## 2024-01-19 DIAGNOSIS — I10 PRIMARY HYPERTENSION: ICD-10-CM

## 2024-01-19 DIAGNOSIS — Z86.73 HISTORY OF CEREBELLAR STROKE: ICD-10-CM

## 2024-01-19 DIAGNOSIS — E11.65 TYPE 2 DIABETES MELLITUS WITH HYPERGLYCEMIA, WITHOUT LONG-TERM CURRENT USE OF INSULIN (HCC): ICD-10-CM

## 2024-01-19 DIAGNOSIS — E78.49 OTHER HYPERLIPIDEMIA: ICD-10-CM

## 2024-01-19 PROCEDURE — 3078F DIAST BP <80 MM HG: CPT

## 2024-01-19 PROCEDURE — 3074F SYST BP LT 130 MM HG: CPT

## 2024-01-19 PROCEDURE — 99212 OFFICE O/P EST SF 10 MIN: CPT

## 2024-01-19 PROCEDURE — 99214 OFFICE O/P EST MOD 30 MIN: CPT

## 2024-01-19 RX ORDER — LISINOPRIL 40 MG/1
40 TABLET ORAL DAILY
Qty: 90 TABLET | Refills: 3 | Status: SHIPPED | OUTPATIENT
Start: 2024-01-19 | End: 2024-03-11 | Stop reason: SDUPTHER

## 2024-01-19 RX ORDER — HYDROCHLOROTHIAZIDE 25 MG/1
25 TABLET ORAL DAILY
Qty: 90 TABLET | Refills: 3 | Status: SHIPPED | OUTPATIENT
Start: 2024-01-19 | End: 2024-03-11 | Stop reason: SDUPTHER

## 2024-01-19 ASSESSMENT — FIBROSIS 4 INDEX: FIB4 SCORE: 0.73

## 2024-01-19 NOTE — PROGRESS NOTES
"VASCULAR MEDICINE CLINIC - Follow Up VISIT  1/19/2024    Lana Phillips is a 58 y.o. female who presents today for   Chief Complaint   Patient presents with    Follow-Up      Subjective      HPI:  Patient returns for eval and management of htn, in setting of ICH s/p crani 8/2022, dyslipidemia and dm.  Doing well  Continues to have difficulty working long hours and has letter from another MD to work 4 days per week, but may need it renewed soon  Feels stressed because her job gives her a difficult time with these restrictions  She feels very exhausted and tired after work  Denies CP, SOB, palpitations   Denies headache or dizziness, but does not head feeling \"full\" sometimes, denies BEAL  Not walking as much due to weather, and more fatigue  Not taking home BPs   Describes good med adherence but does not know the names of her meds, but takes everything at night.  Only taking metformin once a day, at night  Tolerating increase in atorva  No new labs  Having issues with filling prescriptions with pharmacy due to insurance requirements?  Seems one insurance requires 30 day supply, but other wants 90?    Social History     Tobacco Use    Smoking status: Never    Smokeless tobacco: Never   Vaping Use    Vaping Use: Never used   Substance Use Topics    Alcohol use: No    Drug use: No      Current Outpatient Medications on File Prior to Visit   Medication Sig Dispense Refill    amLODIPine (NORVASC) 10 MG Tab TAKE 1 TABLET BY MOUTH EVERY DAY 90 Tablet 3    atorvastatin (LIPITOR) 40 MG Tab Take 1 Tablet by mouth every evening. 90 Tablet 3     No current facility-administered medications on file prior to visit.      ALLERGIES  Patient has no known allergies.     DIET AND EXERCISE:  Weight Change: Stable, up a few lbs  Diet: common adult  Exercise:  walking        Objective     Objective:     Vitals:    01/19/24 1347   BP: 113/73   BP Location: Left arm   Patient Position: Sitting   BP Cuff Size: Adult   Pulse: 69   Weight: 68.5 " "kg (151 lb)   Height: 1.499 m (4' 11\")     Physical Exam  Vitals reviewed.   Constitutional:       General: She is not in acute distress.     Appearance: She is not diaphoretic.   HENT:      Head: Normocephalic.   Eyes:      General: No scleral icterus.  Neck:      Vascular: No carotid bruit.   Cardiovascular:      Rate and Rhythm: Normal rate and regular rhythm.      Heart sounds: Normal heart sounds. No murmur heard.  Pulmonary:      Effort: Pulmonary effort is normal. No respiratory distress.      Breath sounds: Normal breath sounds. No wheezing.   Musculoskeletal:      Right lower leg: No edema.      Left lower leg: No edema.   Skin:     General: Skin is warm and dry.      Coloration: Skin is not pale.   Neurological:      General: No focal deficit present.      Mental Status: She is alert and oriented to person, place, and time.      Gait: Gait is intact.   Psychiatric:         Mood and Affect: Mood and affect normal.        DATA REVIEW    Lab Results   Component Value Date/Time    CHOLSTRLTOT 182 08/11/2023 10:49 AM     (H) 08/11/2023 10:49 AM    HDL 59 08/11/2023 10:49 AM    TRIGLYCERIDE 77 08/11/2023 10:49 AM       Lab Results   Component Value Date/Time    SODIUM 138 08/11/2023 10:51 AM    POTASSIUM 4.7 08/11/2023 10:51 AM    CHLORIDE 102 08/11/2023 10:51 AM    CO2 25 08/11/2023 10:51 AM    GLUCOSE 95 08/11/2023 10:51 AM    BUN 20 08/11/2023 10:51 AM    CREATININE 0.67 08/11/2023 10:51 AM     Lab Results   Component Value Date/Time    ALKPHOSPHAT 80 08/11/2023 10:49 AM    ASTSGOT 20 08/11/2023 10:49 AM    ALTSGPT 31 08/11/2023 10:49 AM    TBILIRUBIN 0.7 08/11/2023 10:49 AM       Lab Results   Component Value Date/Time    HBA1C 6.2 (H) 08/11/2023 10:49 AM       Lab Results   Component Value Date/Time    MALBCRT see below 08/11/2023 10:51 AM    MICROALBUR <1.2 08/11/2023 10:51 AM    MICRALB 5.5 09/11/2022 09:00 PM       Multiple imaging studies available in EMR and reviewed with patient at todays visit "         Medical Decision Making:  Today's Assessment / Status / Plan:     Encounter Diagnoses   Name Primary?    Primary hypertension     Type 2 diabetes mellitus with hyperglycemia, without long-term current use of insulin (HCC)     History of cerebellar stroke     Other hyperlipidemia       Orders Placed This Encounter    Referral to Vascular Medicine    hydroCHLOROthiazide 25 MG Tab    lisinopril (PRINIVIL) 40 MG tablet    metFORMIN (GLUCOPHAGE) 850 MG Tab      Etiology of Established CVD if Present:     Cerebellar intracerebral hemorrhage -no obvious aneurysm on imaging I was able to review.  Likely due to untreated hypertension.   -Medical management as per below   -Follow-up with neurosurgery and PMR for further recommendations    Lipid Management: Qualifies for Statin Therapy Based on 2018 ACC/AHA Guidelines: yes  Calculated 10-Year Risk of ASCVD: N/A  Currently on Statin: Yes  Goal LDL less than 100 and non-HDL less than 130  Previous lipid panel with good control, no current labs  -continue atorvastatin to 40mg daily   -Repeat fasting lipid panel - reprinted    Blood Pressure Management:  [facility] (2017) Blood Pressure Goal <130/80  Likely had hypertension prior to admission  ICH raises the possibility this was prior aldosteronism  Aldosterone 11.6, renin 0.5 (12/2022)  Plasma free mets normal  TSH normal  Rule out other secondary causes  No obvious interfering substances  Plan:  -Continue lisinopril 40 mg a day  -Continue amlodipine 10mg a day; denies any le swelling  -Continue HCTZ 25mg a day - take in am  -Again recommended to get new arm cuff and begin keeping a log at home - pt to bring in log to next appt  -Check renal fxn/lytes with next labs - reprinted    Glycemic Status: Diabetic, type II  Does have history of gestational diabetes  Goal A1c less than 7.0  Most recent A1C 6.2   Stopped Januvia for unclear reasons   -Continue metformin - reviewed to take BID.  Provided written instructions for pt to  take  -Recheck A1c - reprinted    Anti-Platelet/Anti-Coagulant Tx: no  Avoid given history of ICH    Smoking: continue complete avoidance of all tobacco products    Physical Activity: continue exercises at home and daily walking    Weight Management and Nutrition: Continue to watch Na+ and simple sugars in diet    Instructed to follow-up with PCP for remainder of adult medical needs: yes  We will partner with other providers in the management of established vascular disease and cardiometabolic risk factors.    Again, printed out med list and identified what she is taking each med for, and WHEN she should take them, reviewed with pt, and given copy to take home.    Studies to Be Obtained: Per neurosurgery  Labs to Be Obtained: cmp, lipids, A1c - reprinted    Follow up in: 2 months to review labs and bps    aSmira ANGELES  [facility]

## 2024-03-05 ENCOUNTER — HOSPITAL ENCOUNTER (OUTPATIENT)
Dept: LAB | Facility: MEDICAL CENTER | Age: 59
End: 2024-03-05
Attending: NURSE PRACTITIONER
Payer: MEDICAID

## 2024-03-05 DIAGNOSIS — I10 PRIMARY HYPERTENSION: ICD-10-CM

## 2024-03-05 DIAGNOSIS — Z86.73 HISTORY OF STROKE: ICD-10-CM

## 2024-03-05 DIAGNOSIS — E11.65 TYPE 2 DIABETES MELLITUS WITH HYPERGLYCEMIA, WITHOUT LONG-TERM CURRENT USE OF INSULIN (HCC): ICD-10-CM

## 2024-03-05 DIAGNOSIS — E78.49 OTHER HYPERLIPIDEMIA: ICD-10-CM

## 2024-03-05 LAB
ALBUMIN SERPL BCP-MCNC: 4.2 G/DL (ref 3.2–4.9)
ALBUMIN/GLOB SERPL: 1.4 G/DL
ALP SERPL-CCNC: 73 U/L (ref 30–99)
ALT SERPL-CCNC: 14 U/L (ref 2–50)
ANION GAP SERPL CALC-SCNC: 12 MMOL/L (ref 7–16)
AST SERPL-CCNC: 16 U/L (ref 12–45)
BILIRUB SERPL-MCNC: 0.3 MG/DL (ref 0.1–1.5)
BUN SERPL-MCNC: 21 MG/DL (ref 8–22)
CALCIUM ALBUM COR SERPL-MCNC: 9.4 MG/DL (ref 8.5–10.5)
CALCIUM SERPL-MCNC: 9.6 MG/DL (ref 8.5–10.5)
CHLORIDE SERPL-SCNC: 104 MMOL/L (ref 96–112)
CHOLEST SERPL-MCNC: 106 MG/DL (ref 100–199)
CO2 SERPL-SCNC: 26 MMOL/L (ref 20–33)
CREAT SERPL-MCNC: 0.89 MG/DL (ref 0.5–1.4)
EST. AVERAGE GLUCOSE BLD GHB EST-MCNC: 128 MG/DL
GFR SERPLBLD CREATININE-BSD FMLA CKD-EPI: 75 ML/MIN/1.73 M 2
GLOBULIN SER CALC-MCNC: 2.9 G/DL (ref 1.9–3.5)
GLUCOSE SERPL-MCNC: 118 MG/DL (ref 65–99)
HBA1C MFR BLD: 6.1 % (ref 4–5.6)
HDLC SERPL-MCNC: 44 MG/DL
LDLC SERPL CALC-MCNC: 45 MG/DL
POTASSIUM SERPL-SCNC: 4.8 MMOL/L (ref 3.6–5.5)
PROT SERPL-MCNC: 7.1 G/DL (ref 6–8.2)
SODIUM SERPL-SCNC: 142 MMOL/L (ref 135–145)
TRIGL SERPL-MCNC: 83 MG/DL (ref 0–149)
TSH SERPL DL<=0.005 MIU/L-ACNC: 1.47 UIU/ML (ref 0.38–5.33)

## 2024-03-05 PROCEDURE — 84443 ASSAY THYROID STIM HORMONE: CPT

## 2024-03-05 PROCEDURE — 80061 LIPID PANEL: CPT

## 2024-03-05 PROCEDURE — 83036 HEMOGLOBIN GLYCOSYLATED A1C: CPT

## 2024-03-05 PROCEDURE — 36415 COLL VENOUS BLD VENIPUNCTURE: CPT

## 2024-03-05 PROCEDURE — 80053 COMPREHEN METABOLIC PANEL: CPT

## 2024-03-11 ENCOUNTER — OFFICE VISIT (OUTPATIENT)
Dept: VASCULAR LAB | Facility: MEDICAL CENTER | Age: 59
End: 2024-03-11
Attending: NURSE PRACTITIONER
Payer: MEDICAID

## 2024-03-11 VITALS
DIASTOLIC BLOOD PRESSURE: 90 MMHG | BODY MASS INDEX: 29.45 KG/M2 | SYSTOLIC BLOOD PRESSURE: 135 MMHG | WEIGHT: 150 LBS | HEART RATE: 80 BPM | HEIGHT: 60 IN

## 2024-03-11 DIAGNOSIS — I10 PRIMARY HYPERTENSION: ICD-10-CM

## 2024-03-11 DIAGNOSIS — Z86.73 HISTORY OF CEREBELLAR STROKE: ICD-10-CM

## 2024-03-11 DIAGNOSIS — Z86.73 HISTORY OF STROKE: ICD-10-CM

## 2024-03-11 DIAGNOSIS — E78.49 OTHER HYPERLIPIDEMIA: ICD-10-CM

## 2024-03-11 DIAGNOSIS — E11.65 TYPE 2 DIABETES MELLITUS WITH HYPERGLYCEMIA, WITHOUT LONG-TERM CURRENT USE OF INSULIN (HCC): ICD-10-CM

## 2024-03-11 PROCEDURE — 99212 OFFICE O/P EST SF 10 MIN: CPT

## 2024-03-11 PROCEDURE — 99214 OFFICE O/P EST MOD 30 MIN: CPT | Performed by: NURSE PRACTITIONER

## 2024-03-11 PROCEDURE — 3080F DIAST BP >= 90 MM HG: CPT | Performed by: NURSE PRACTITIONER

## 2024-03-11 PROCEDURE — 3075F SYST BP GE 130 - 139MM HG: CPT | Performed by: NURSE PRACTITIONER

## 2024-03-11 RX ORDER — LISINOPRIL 40 MG/1
40 TABLET ORAL DAILY
Qty: 100 TABLET | Refills: 3 | Status: SHIPPED | OUTPATIENT
Start: 2024-03-11

## 2024-03-11 RX ORDER — HYDROCHLOROTHIAZIDE 25 MG/1
25 TABLET ORAL DAILY
Qty: 100 TABLET | Refills: 3 | Status: SHIPPED | OUTPATIENT
Start: 2024-03-11

## 2024-03-11 RX ORDER — ATORVASTATIN CALCIUM 40 MG/1
40 TABLET, FILM COATED ORAL EVERY EVENING
Qty: 100 TABLET | Refills: 3 | Status: SHIPPED | OUTPATIENT
Start: 2024-03-11

## 2024-03-11 RX ORDER — AMLODIPINE BESYLATE 10 MG/1
10 TABLET ORAL DAILY
Qty: 100 TABLET | Refills: 3 | Status: SHIPPED | OUTPATIENT
Start: 2024-03-11

## 2024-03-11 ASSESSMENT — FIBROSIS 4 INDEX: FIB4 SCORE: 0.87

## 2024-03-11 NOTE — PROGRESS NOTES
"VASCULAR MEDICINE CLINIC - Follow Up VISIT  03/11/2024    Lana Phillips is a 58 y.o. female who presents today for   Chief Complaint   Patient presents with    Follow-Up      Subjective      HPI:  Patient returns for eval and management of htn, in setting of ICH s/p crani 8/2022, dyslipidemia and dm.  Doing well  Occasional dizziness  Home -120/70-80's  Quit job due to stress-- needs less stressful job   Going to take a trip to [facility] x 3 weeks   Denies CP, SOB, or palpitations  No LE swelling   No s/s stroke or TIA  On meds as prescribed    Social History     Tobacco Use    Smoking status: Never    Smokeless tobacco: Never   Vaping Use    Vaping Use: Never used   Substance Use Topics    Alcohol use: No    Drug use: No      ALLERGIES  Patient has no known allergies.     DIET AND EXERCISE:  Weight Change: Stable, up a few lbs  Diet: common adult  Exercise:  walking        Objective     Objective:     Vitals:    03/11/24 1624 03/11/24 1627   BP: (!) 152/95 (!) 135/90   BP Location: Left arm Left arm   Patient Position: Sitting Sitting   BP Cuff Size: Adult Adult   Pulse: 87 80   Weight: 68 kg (150 lb)    Height: 1.511 m (4' 11.5\")      Physical Exam  Vitals reviewed.   Constitutional:       General: She is not in acute distress.     Appearance: She is not diaphoretic.   HENT:      Head: Normocephalic.   Eyes:      General: No scleral icterus.  Neck:      Vascular: No carotid bruit.   Cardiovascular:      Rate and Rhythm: Normal rate and regular rhythm.      Heart sounds: Normal heart sounds. No murmur heard.  Pulmonary:      Effort: Pulmonary effort is normal. No respiratory distress.      Breath sounds: Normal breath sounds. No wheezing.   Musculoskeletal:      Right lower leg: No edema.      Left lower leg: No edema.   Skin:     General: Skin is warm and dry.      Coloration: Skin is not pale.   Neurological:      General: No focal deficit present.      Mental Status: She is alert and oriented to " person, place, and time.      Gait: Gait is intact.   Psychiatric:         Mood and Affect: Mood and affect normal.        DATA REVIEW    Lab Results   Component Value Date/Time    CHOLSTRLTOT 106 03/05/2024 07:03 AM    LDL 45 03/05/2024 07:03 AM    HDL 44 03/05/2024 07:03 AM    TRIGLYCERIDE 83 03/05/2024 07:03 AM       Lab Results   Component Value Date/Time    SODIUM 142 03/05/2024 07:03 AM    POTASSIUM 4.8 03/05/2024 07:03 AM    CHLORIDE 104 03/05/2024 07:03 AM    CO2 26 03/05/2024 07:03 AM    GLUCOSE 118 (H) 03/05/2024 07:03 AM    BUN 21 03/05/2024 07:03 AM    CREATININE 0.89 03/05/2024 07:03 AM     Lab Results   Component Value Date/Time    ALKPHOSPHAT 73 03/05/2024 07:03 AM    ASTSGOT 16 03/05/2024 07:03 AM    ALTSGPT 14 03/05/2024 07:03 AM    TBILIRUBIN 0.3 03/05/2024 07:03 AM       Lab Results   Component Value Date/Time    HBA1C 6.1 (H) 03/05/2024 07:03 AM       Lab Results   Component Value Date/Time    MALBCRT see below 08/11/2023 10:51 AM    MICROALBUR <1.2 08/11/2023 10:51 AM    MICRALB 5.5 09/11/2022 09:00 PM       Multiple imaging studies available in EMR and reviewed with patient at todays visit         Medical Decision Making:  Today's Assessment / Status / Plan:     Encounter Diagnoses   Name Primary?    Other hyperlipidemia     History of stroke     Primary hypertension     Type 2 diabetes mellitus with hyperglycemia, without long-term current use of insulin (HCC)     History of cerebellar stroke      Orders Placed This Encounter    atorvastatin (LIPITOR) 40 MG Tab    amLODIPine (NORVASC) 10 MG Tab    hydroCHLOROthiazide 25 MG Tab    lisinopril (PRINIVIL) 40 MG tablet    metFORMIN (GLUCOPHAGE) 850 MG Tab      Etiology of Established CVD if Present:     Cerebellar intracerebral hemorrhage -no obvious aneurysm on imaging I was able to review.  Likely due to untreated hypertension.   -Medical management as per below   -Follow-up with neurosurgery and PMR for further recommendations    Lipid  Management: Qualifies for Statin Therapy Based on 2018 ACC/AHA Guidelines: yes  Calculated 10-Year Risk of ASCVD: N/A  Currently on Statin: Yes  Goal LDL less than 100 and non-HDL less than 130  Previous lipid panel with good control, no current labs  -continue atorvastatin 40mg daily   -Repeat fasting lipid pane    Blood Pressure Management:  Acc[facility] (2017) Blood Pressure Goal <130/80  Likely had hypertension prior to admission  ICH raises the possibility this was prior aldosteronism  Aldosterone 11.6, renin 0.5 (12/2022)  Plasma free mets normal  TSH normal  Rule out other secondary causes  No obvious interfering substances  Plan:  -Continue lisinopril 40 mg a day  -Continue amlodipine 10mg a day  -Continue HCTZ 25mg a day - take in am  -Check renal fxn/lytes prior to next visit     Glycemic Status: Diabetic, type II  Does have history of gestational diabetes  Goal A1c less than 7.0  Most recent A1C 6.1   Stopped Januvia for unclear reasons   -Continue metformin - reviewed to take BID.   -Recheck A1c prior to next visit     Anti-Platelet/Anti-Coagulant Tx: no  Avoid given history of ICH    Smoking: continue complete avoidance of all tobacco products    Physical Activity: continue exercises at home and daily walking-- not a lot of exercise     Weight Management and Nutrition: Continue to watch Na+ and simple sugars in diet    Instructed to follow-up with PCP for remainder of adult medical needs: yes  We will partner with other providers in the management of established vascular disease and cardiometabolic risk factors.    Studies to Be Obtained  Labs to Be Obtained: cmp, lipids, A1c     Follow up in: 6 months     [facility]

## 2024-09-16 ENCOUNTER — APPOINTMENT (OUTPATIENT)
Dept: VASCULAR LAB | Facility: MEDICAL CENTER | Age: 59
End: 2024-09-16
Attending: NURSE PRACTITIONER
Payer: MEDICAID

## (undated) DEVICE — GOWN WARMING STANDARD FLEX - (30/CA)

## (undated) DEVICE — GOWN SURGEONS X-LARGE - DISP. (30/CA)

## (undated) DEVICE — TOOL MR8 9CM ACORN 7.5MM DIAMETER (1/EA)

## (undated) DEVICE — PACK CRANI - (1EA/CA)

## (undated) DEVICE — SUTURE 2-0 ETHILON FS - (36/BX) 18 INCH

## (undated) DEVICE — TOWEL STOP TIMEOUT SAFETY FLAG (40EA/CA)

## (undated) DEVICE — CANISTER SUCTION 3000ML MECHANICAL FILTER AUTO SHUTOFF MEDI-VAC NONSTERILE LF DISP  (40EA/CA)

## (undated) DEVICE — GLOVE SZ 8 BIOGEL PI MICRO - PF LF (50PR/BX)

## (undated) DEVICE — SUTURE 2-0 VICRYL PLUS CT-1 - 8 X 18 INCH(12/BX)

## (undated) DEVICE — TUBING CLEARLINK DUO-VENT - C-FLO (48EA/CA)

## (undated) DEVICE — TOWELS CLOTH SURGICAL - (4/PK 20PK/CA)

## (undated) DEVICE — SODIUM CHL IRRIGATION 0.9% 1000ML (12EA/CA)

## (undated) DEVICE — GLOVE BIOGEL PI INDICATOR SZ 6.5 SURGICAL PF LF - (50/BX 4BX/CA)

## (undated) DEVICE — CATHETER IV SAFETY 14 GA X 2 IN (200/CA)

## (undated) DEVICE — SUCTION INSTRUMENT YANKAUER BULBOUS TIP W/O VENT (50EA/CA)

## (undated) DEVICE — BOVIE NEEDLE TIP 3CM COLORADO

## (undated) DEVICE — CORDS BIPOLAR COAGULATION - 12FT STERILE DISP. (10EA/BX)

## (undated) DEVICE — SUTURE 0 VICRYL PLUS CT-1 - 8 X 18 INCH (12/BX)

## (undated) DEVICE — TOOL MR8 F1/7CM TAPER 1.5MM DIAMETER (1/EA)

## (undated) DEVICE — SUTURE GENERAL

## (undated) DEVICE — BLANKET WARMING LOWER BODY (10EA/CA)

## (undated) DEVICE — PATTIES SURG X-RAYCOTTONOID - 1/2 X 3 IN (200/CA)

## (undated) DEVICE — PEN SKIN MARKER W/RULER - (50EA/BX)

## (undated) DEVICE — BLADE CLIPPER FITS 2501 CLIPPER (BLUE)  (20EA/CA)

## (undated) DEVICE — LACTATED RINGERS INJ 1000 ML - (14EA/CA 60CA/PF)

## (undated) DEVICE — BLADE SURGICAL #15 - (50/BX 3BX/CA)

## (undated) DEVICE — TRAY SURESTEP FOLEY TEMP SENSING 16FR (10EA/CA) ORDER  #18764 FOR TEMP FOLEY ONLY

## (undated) DEVICE — KIT SURGIFLO W/OUT THROMBIN - (6EA/CA)

## (undated) DEVICE — NUROLON 3-0 RB-1 - (12/BX)

## (undated) DEVICE — ELECTRODE DUAL RETURN W/ CORD - (50/PK)

## (undated) DEVICE — GLOVE BIOGEL SZ 6.5 SURGICAL PF LTX (50PR/BX 4BX/CA)

## (undated) DEVICE — DRAPE STRLE REG TOWEL 18X24 - (10/BX 4BX/CA)"

## (undated) DEVICE — BOVIE BLADE COATED &INSULATED - 25/PK

## (undated) DEVICE — TOOL MR8 14CM MATCH HD SYM-TRI 3MM DIAMETER (1/EA)

## (undated) DEVICE — FORCEPS ELECTROSURGICAL DISPOSABLE CODMAN 8IN 1.5MM

## (undated) DEVICE — SURGIFOAM (SIZE 100) - (6EA/CA)

## (undated) DEVICE — SET EXTENSION WITH 2 PORTS (48EA/CA) ***PART #2C8610 IS A SUBSTITUTE*****

## (undated) DEVICE — SET LEADWIRE 5 LEAD BEDSIDE DISPOSABLE ECG (1SET OF 5/EA)

## (undated) DEVICE — DRESSING NON-ADHERING 8 X 3 - (50/BX)

## (undated) DEVICE — PIN HEAD MAYFIELD DISP. (3EA/PK 12PK/BX)

## (undated) DEVICE — TISSEEL 4ML ----MUST ORDER A MIN OF 6EA----

## (undated) DEVICE — DRAPE IOBAN II INCISE 23X17 - (10EA/BX 4BX/CA)